# Patient Record
Sex: FEMALE | Race: WHITE | NOT HISPANIC OR LATINO | URBAN - METROPOLITAN AREA
[De-identification: names, ages, dates, MRNs, and addresses within clinical notes are randomized per-mention and may not be internally consistent; named-entity substitution may affect disease eponyms.]

---

## 2017-07-19 ENCOUNTER — OUTPATIENT (OUTPATIENT)
Dept: OUTPATIENT SERVICES | Facility: HOSPITAL | Age: 72
LOS: 1 days | Discharge: HOME | End: 2017-07-19

## 2017-07-19 DIAGNOSIS — N39.0 URINARY TRACT INFECTION, SITE NOT SPECIFIED: ICD-10-CM

## 2017-07-30 ENCOUNTER — OUTPATIENT (OUTPATIENT)
Dept: OUTPATIENT SERVICES | Facility: HOSPITAL | Age: 72
LOS: 1 days | Discharge: HOME | End: 2017-07-30

## 2017-07-30 DIAGNOSIS — N18.9 CHRONIC KIDNEY DISEASE, UNSPECIFIED: ICD-10-CM

## 2018-01-04 PROBLEM — Z00.00 ENCOUNTER FOR PREVENTIVE HEALTH EXAMINATION: Status: ACTIVE | Noted: 2018-01-04

## 2018-03-26 ENCOUNTER — APPOINTMENT (OUTPATIENT)
Dept: OBGYN | Facility: CLINIC | Age: 73
End: 2018-03-26
Payer: COMMERCIAL

## 2018-03-26 PROCEDURE — 77085 DXA BONE DENSITY AXL VRT FX: CPT

## 2018-04-05 ENCOUNTER — APPOINTMENT (OUTPATIENT)
Dept: OBGYN | Facility: CLINIC | Age: 73
End: 2018-04-05

## 2018-06-12 RX ORDER — DENOSUMAB 60 MG/ML
60 INJECTION SUBCUTANEOUS
Qty: 1 | Refills: 5 | Status: ACTIVE | COMMUNITY
Start: 2018-06-12 | End: 1900-01-01

## 2018-08-14 ENCOUNTER — APPOINTMENT (OUTPATIENT)
Dept: OBGYN | Facility: CLINIC | Age: 73
End: 2018-08-14
Payer: COMMERCIAL

## 2018-08-14 VITALS — BODY MASS INDEX: 27.6 KG/M2 | HEIGHT: 62 IN | WEIGHT: 150 LBS

## 2018-08-14 PROCEDURE — 99212 OFFICE O/P EST SF 10 MIN: CPT | Mod: 25

## 2018-08-14 PROCEDURE — 96372 THER/PROPH/DIAG INJ SC/IM: CPT

## 2018-08-30 ENCOUNTER — OUTPATIENT (OUTPATIENT)
Dept: OUTPATIENT SERVICES | Facility: HOSPITAL | Age: 73
LOS: 1 days | Discharge: HOME | End: 2018-08-30

## 2018-08-30 DIAGNOSIS — N28.1 CYST OF KIDNEY, ACQUIRED: ICD-10-CM

## 2018-10-31 ENCOUNTER — OUTPATIENT (OUTPATIENT)
Dept: OUTPATIENT SERVICES | Facility: HOSPITAL | Age: 73
LOS: 1 days | Discharge: HOME | End: 2018-10-31

## 2018-10-31 DIAGNOSIS — K76.89 OTHER SPECIFIED DISEASES OF LIVER: ICD-10-CM

## 2018-11-14 ENCOUNTER — OUTPATIENT (OUTPATIENT)
Dept: OUTPATIENT SERVICES | Facility: HOSPITAL | Age: 73
LOS: 1 days | Discharge: HOME | End: 2018-11-14

## 2018-11-14 DIAGNOSIS — Z12.31 ENCOUNTER FOR SCREENING MAMMOGRAM FOR MALIGNANT NEOPLASM OF BREAST: ICD-10-CM

## 2019-01-17 ENCOUNTER — APPOINTMENT (OUTPATIENT)
Dept: OBGYN | Facility: CLINIC | Age: 74
End: 2019-01-17
Payer: COMMERCIAL

## 2019-01-17 VITALS — WEIGHT: 150 LBS | BODY MASS INDEX: 20.32 KG/M2 | HEIGHT: 72 IN

## 2019-01-17 PROCEDURE — 96372 THER/PROPH/DIAG INJ SC/IM: CPT

## 2019-07-01 ENCOUNTER — APPOINTMENT (OUTPATIENT)
Dept: OBGYN | Facility: CLINIC | Age: 74
End: 2019-07-01

## 2019-08-20 ENCOUNTER — APPOINTMENT (OUTPATIENT)
Dept: OBGYN | Facility: CLINIC | Age: 74
End: 2019-08-20
Payer: COMMERCIAL

## 2019-08-20 PROCEDURE — 96372 THER/PROPH/DIAG INJ SC/IM: CPT

## 2019-08-25 ENCOUNTER — OUTPATIENT (OUTPATIENT)
Dept: OUTPATIENT SERVICES | Facility: HOSPITAL | Age: 74
LOS: 1 days | Discharge: HOME | End: 2019-08-25
Payer: COMMERCIAL

## 2019-08-25 DIAGNOSIS — D35.00 BENIGN NEOPLASM OF UNSPECIFIED ADRENAL GLAND: ICD-10-CM

## 2019-08-25 PROCEDURE — 76770 US EXAM ABDO BACK WALL COMP: CPT | Mod: 26

## 2020-01-03 ENCOUNTER — INPATIENT (INPATIENT)
Facility: HOSPITAL | Age: 75
LOS: 10 days | Discharge: HOME HEALTH | End: 2020-01-14
Attending: INTERNAL MEDICINE | Admitting: INTERNAL MEDICINE
Payer: COMMERCIAL

## 2020-01-03 VITALS
HEART RATE: 100 BPM | OXYGEN SATURATION: 100 % | SYSTOLIC BLOOD PRESSURE: 131 MMHG | TEMPERATURE: 98 F | RESPIRATION RATE: 20 BRPM | DIASTOLIC BLOOD PRESSURE: 87 MMHG

## 2020-01-03 DIAGNOSIS — K66.8 OTHER SPECIFIED DISORDERS OF PERITONEUM: ICD-10-CM

## 2020-01-03 DIAGNOSIS — F41.9 ANXIETY DISORDER, UNSPECIFIED: ICD-10-CM

## 2020-01-03 DIAGNOSIS — K21.9 GASTRO-ESOPHAGEAL REFLUX DISEASE WITHOUT ESOPHAGITIS: ICD-10-CM

## 2020-01-03 DIAGNOSIS — I10 ESSENTIAL (PRIMARY) HYPERTENSION: ICD-10-CM

## 2020-01-03 DIAGNOSIS — M81.0 AGE-RELATED OSTEOPOROSIS WITHOUT CURRENT PATHOLOGICAL FRACTURE: ICD-10-CM

## 2020-01-03 DIAGNOSIS — G89.29 OTHER CHRONIC PAIN: ICD-10-CM

## 2020-01-03 DIAGNOSIS — R53.1 WEAKNESS: ICD-10-CM

## 2020-01-03 DIAGNOSIS — R47.1 DYSARTHRIA AND ANARTHRIA: ICD-10-CM

## 2020-01-03 DIAGNOSIS — J43.9 EMPHYSEMA, UNSPECIFIED: ICD-10-CM

## 2020-01-03 DIAGNOSIS — R47.01 APHASIA: ICD-10-CM

## 2020-01-03 DIAGNOSIS — E78.5 HYPERLIPIDEMIA, UNSPECIFIED: ICD-10-CM

## 2020-01-03 DIAGNOSIS — J98.11 ATELECTASIS: ICD-10-CM

## 2020-01-03 DIAGNOSIS — E87.3 ALKALOSIS: ICD-10-CM

## 2020-01-03 DIAGNOSIS — A41.9 SEPSIS, UNSPECIFIED ORGANISM: ICD-10-CM

## 2020-01-03 DIAGNOSIS — F11.23 OPIOID DEPENDENCE WITH WITHDRAWAL: ICD-10-CM

## 2020-01-03 DIAGNOSIS — E87.2 ACIDOSIS: ICD-10-CM

## 2020-01-03 DIAGNOSIS — R33.9 RETENTION OF URINE, UNSPECIFIED: ICD-10-CM

## 2020-01-03 DIAGNOSIS — A40.3 SEPSIS DUE TO STREPTOCOCCUS PNEUMONIAE: ICD-10-CM

## 2020-01-03 DIAGNOSIS — A08.11 ACUTE GASTROENTEROPATHY DUE TO NORWALK AGENT: ICD-10-CM

## 2020-01-03 DIAGNOSIS — D72.829 ELEVATED WHITE BLOOD CELL COUNT, UNSPECIFIED: ICD-10-CM

## 2020-01-03 DIAGNOSIS — E43 UNSPECIFIED SEVERE PROTEIN-CALORIE MALNUTRITION: ICD-10-CM

## 2020-01-03 DIAGNOSIS — M54.9 DORSALGIA, UNSPECIFIED: ICD-10-CM

## 2020-01-03 DIAGNOSIS — Z98.1 ARTHRODESIS STATUS: ICD-10-CM

## 2020-01-03 LAB
ALBUMIN SERPL ELPH-MCNC: 4.8 G/DL — SIGNIFICANT CHANGE UP (ref 3.5–5.2)
ALP SERPL-CCNC: 44 U/L — SIGNIFICANT CHANGE UP (ref 30–115)
ALT FLD-CCNC: 15 U/L — SIGNIFICANT CHANGE UP (ref 0–41)
ANION GAP SERPL CALC-SCNC: 25 MMOL/L — HIGH (ref 7–14)
APAP SERPL-MCNC: <5 UG/ML — LOW (ref 10–30)
APPEARANCE UR: CLEAR — SIGNIFICANT CHANGE UP
APTT BLD: 23.4 SEC — CRITICAL LOW (ref 27–39.2)
AST SERPL-CCNC: 20 U/L — SIGNIFICANT CHANGE UP (ref 0–41)
BACTERIA # UR AUTO: NEGATIVE — SIGNIFICANT CHANGE UP
BASE EXCESS BLDV CALC-SCNC: 1.4 MMOL/L — SIGNIFICANT CHANGE UP (ref -2–2)
BASOPHILS # BLD AUTO: 0.02 K/UL — SIGNIFICANT CHANGE UP (ref 0–0.2)
BASOPHILS NFR BLD AUTO: 0.2 % — SIGNIFICANT CHANGE UP (ref 0–1)
BILIRUB SERPL-MCNC: 0.9 MG/DL — SIGNIFICANT CHANGE UP (ref 0.2–1.2)
BILIRUB UR-MCNC: NEGATIVE — SIGNIFICANT CHANGE UP
BUN SERPL-MCNC: 15 MG/DL — SIGNIFICANT CHANGE UP (ref 10–20)
CA-I SERPL-SCNC: 1.11 MMOL/L — LOW (ref 1.12–1.3)
CALCIUM SERPL-MCNC: 9.7 MG/DL — SIGNIFICANT CHANGE UP (ref 8.5–10.1)
CHLORIDE SERPL-SCNC: 101 MMOL/L — SIGNIFICANT CHANGE UP (ref 98–110)
CO2 SERPL-SCNC: 18 MMOL/L — SIGNIFICANT CHANGE UP (ref 17–32)
COLOR SPEC: SIGNIFICANT CHANGE UP
CREAT SERPL-MCNC: 0.9 MG/DL — SIGNIFICANT CHANGE UP (ref 0.7–1.5)
DIFF PNL FLD: NEGATIVE — SIGNIFICANT CHANGE UP
EOSINOPHIL # BLD AUTO: 0 K/UL — SIGNIFICANT CHANGE UP (ref 0–0.7)
EOSINOPHIL NFR BLD AUTO: 0 % — SIGNIFICANT CHANGE UP (ref 0–8)
EPI CELLS # UR: 6 /HPF — HIGH (ref 0–5)
ETHANOL SERPL-MCNC: <10 MG/DL — SIGNIFICANT CHANGE UP
FLU A RESULT: NEGATIVE — SIGNIFICANT CHANGE UP
FLU A RESULT: NEGATIVE — SIGNIFICANT CHANGE UP
FLUAV AG NPH QL: NEGATIVE — SIGNIFICANT CHANGE UP
FLUBV AG NPH QL: NEGATIVE — SIGNIFICANT CHANGE UP
GAS PNL BLDV: 144 MMOL/L — SIGNIFICANT CHANGE UP (ref 136–145)
GAS PNL BLDV: SIGNIFICANT CHANGE UP
GLUCOSE SERPL-MCNC: 145 MG/DL — HIGH (ref 70–99)
GLUCOSE UR QL: NEGATIVE — SIGNIFICANT CHANGE UP
HCO3 BLDV-SCNC: 22 MMOL/L — SIGNIFICANT CHANGE UP (ref 22–29)
HCT VFR BLD CALC: 41.4 % — SIGNIFICANT CHANGE UP (ref 37–47)
HCT VFR BLDA CALC: 42.4 % — SIGNIFICANT CHANGE UP (ref 34–44)
HGB BLD CALC-MCNC: 13.8 G/DL — LOW (ref 14–18)
HGB BLD-MCNC: 13.7 G/DL — SIGNIFICANT CHANGE UP (ref 12–16)
HYALINE CASTS # UR AUTO: 3 /LPF — SIGNIFICANT CHANGE UP (ref 0–7)
IMM GRANULOCYTES NFR BLD AUTO: 0.6 % — HIGH (ref 0.1–0.3)
INR BLD: 1.04 RATIO — SIGNIFICANT CHANGE UP (ref 0.65–1.3)
KETONES UR-MCNC: ABNORMAL
LACTATE BLDV-MCNC: 3.7 MMOL/L — HIGH (ref 0.5–1.6)
LACTATE SERPL-SCNC: 3.3 MMOL/L — HIGH (ref 0.7–2)
LEUKOCYTE ESTERASE UR-ACNC: NEGATIVE — SIGNIFICANT CHANGE UP
LYMPHOCYTES # BLD AUTO: 0.61 K/UL — LOW (ref 1.2–3.4)
LYMPHOCYTES # BLD AUTO: 4.8 % — LOW (ref 20.5–51.1)
MCHC RBC-ENTMCNC: 27.5 PG — SIGNIFICANT CHANGE UP (ref 27–31)
MCHC RBC-ENTMCNC: 33.1 G/DL — SIGNIFICANT CHANGE UP (ref 32–37)
MCV RBC AUTO: 83.1 FL — SIGNIFICANT CHANGE UP (ref 81–99)
MONOCYTES # BLD AUTO: 0.41 K/UL — SIGNIFICANT CHANGE UP (ref 0.1–0.6)
MONOCYTES NFR BLD AUTO: 3.2 % — SIGNIFICANT CHANGE UP (ref 1.7–9.3)
NEUTROPHILS # BLD AUTO: 11.6 K/UL — HIGH (ref 1.4–6.5)
NEUTROPHILS NFR BLD AUTO: 91.2 % — HIGH (ref 42.2–75.2)
NITRITE UR-MCNC: NEGATIVE — SIGNIFICANT CHANGE UP
NRBC # BLD: 0 /100 WBCS — SIGNIFICANT CHANGE UP (ref 0–0)
PCO2 BLDV: 24 MMHG — LOW (ref 41–51)
PH BLDV: 7.57 — HIGH (ref 7.26–7.43)
PH UR: 8.5 — HIGH (ref 5–8)
PLATELET # BLD AUTO: 303 K/UL — SIGNIFICANT CHANGE UP (ref 130–400)
PO2 BLDV: 23 MMHG — SIGNIFICANT CHANGE UP (ref 20–40)
POTASSIUM BLDV-SCNC: 3.2 MMOL/L — LOW (ref 3.3–5.6)
POTASSIUM SERPL-MCNC: 3.5 MMOL/L — SIGNIFICANT CHANGE UP (ref 3.5–5)
POTASSIUM SERPL-SCNC: 3.5 MMOL/L — SIGNIFICANT CHANGE UP (ref 3.5–5)
PROT SERPL-MCNC: 7.3 G/DL — SIGNIFICANT CHANGE UP (ref 6–8)
PROT UR-MCNC: ABNORMAL
PROTHROM AB SERPL-ACNC: 11.9 SEC — SIGNIFICANT CHANGE UP (ref 9.95–12.87)
RBC # BLD: 4.98 M/UL — SIGNIFICANT CHANGE UP (ref 4.2–5.4)
RBC # FLD: 15.4 % — HIGH (ref 11.5–14.5)
RBC CASTS # UR COMP ASSIST: 3 /HPF — SIGNIFICANT CHANGE UP (ref 0–4)
RSV RESULT: NEGATIVE — SIGNIFICANT CHANGE UP
RSV RNA RESP QL NAA+PROBE: NEGATIVE — SIGNIFICANT CHANGE UP
SALICYLATES SERPL-MCNC: <0.3 MG/DL — LOW (ref 4–30)
SAO2 % BLDV: 48 % — SIGNIFICANT CHANGE UP
SODIUM SERPL-SCNC: 144 MMOL/L — SIGNIFICANT CHANGE UP (ref 135–146)
SP GR SPEC: >1.05 (ref 1.01–1.02)
TROPONIN T SERPL-MCNC: <0.01 NG/ML — SIGNIFICANT CHANGE UP
UROBILINOGEN FLD QL: SIGNIFICANT CHANGE UP
WBC # BLD: 12.71 K/UL — HIGH (ref 4.8–10.8)
WBC # FLD AUTO: 12.71 K/UL — HIGH (ref 4.8–10.8)
WBC UR QL: 1 /HPF — SIGNIFICANT CHANGE UP (ref 0–5)

## 2020-01-03 PROCEDURE — 70498 CT ANGIOGRAPHY NECK: CPT | Mod: 26

## 2020-01-03 PROCEDURE — 99451 NTRPROF PH1/NTRNET/EHR 5/>: CPT

## 2020-01-03 PROCEDURE — 93010 ELECTROCARDIOGRAM REPORT: CPT

## 2020-01-03 PROCEDURE — 70496 CT ANGIOGRAPHY HEAD: CPT | Mod: 26

## 2020-01-03 PROCEDURE — 71045 X-RAY EXAM CHEST 1 VIEW: CPT | Mod: 26

## 2020-01-03 PROCEDURE — 70450 CT HEAD/BRAIN W/O DYE: CPT | Mod: 26,59

## 2020-01-03 PROCEDURE — 99285 EMERGENCY DEPT VISIT HI MDM: CPT

## 2020-01-03 PROCEDURE — 71275 CT ANGIOGRAPHY CHEST: CPT | Mod: 26

## 2020-01-03 RX ORDER — VANCOMYCIN HCL 1 G
1000 VIAL (EA) INTRAVENOUS EVERY 12 HOURS
Refills: 0 | Status: DISCONTINUED | OUTPATIENT
Start: 2020-01-04 | End: 2020-01-04

## 2020-01-03 RX ORDER — AMLODIPINE BESYLATE 2.5 MG/1
10 TABLET ORAL DAILY
Refills: 0 | Status: DISCONTINUED | OUTPATIENT
Start: 2020-01-03 | End: 2020-01-14

## 2020-01-03 RX ORDER — FENTANYL CITRATE 50 UG/ML
50 INJECTION INTRAVENOUS ONCE
Refills: 0 | Status: DISCONTINUED | OUTPATIENT
Start: 2020-01-03 | End: 2020-01-03

## 2020-01-03 RX ORDER — ATORVASTATIN CALCIUM 80 MG/1
20 TABLET, FILM COATED ORAL AT BEDTIME
Refills: 0 | Status: DISCONTINUED | OUTPATIENT
Start: 2020-01-03 | End: 2020-01-14

## 2020-01-03 RX ORDER — VANCOMYCIN HCL 1 G
1000 VIAL (EA) INTRAVENOUS ONCE
Refills: 0 | Status: COMPLETED | OUTPATIENT
Start: 2020-01-03 | End: 2020-01-03

## 2020-01-03 RX ORDER — ACYCLOVIR SODIUM 500 MG
500 VIAL (EA) INTRAVENOUS EVERY 8 HOURS
Refills: 0 | Status: DISCONTINUED | OUTPATIENT
Start: 2020-01-04 | End: 2020-01-04

## 2020-01-03 RX ORDER — OXYCODONE AND ACETAMINOPHEN 5; 325 MG/1; MG/1
1 TABLET ORAL EVERY 6 HOURS
Refills: 0 | Status: DISCONTINUED | OUTPATIENT
Start: 2020-01-03 | End: 2020-01-10

## 2020-01-03 RX ORDER — ACYCLOVIR SODIUM 500 MG
500 VIAL (EA) INTRAVENOUS ONCE
Refills: 0 | Status: DISCONTINUED | OUTPATIENT
Start: 2020-01-03 | End: 2020-01-04

## 2020-01-03 RX ORDER — ALPRAZOLAM 0.25 MG
0.5 TABLET ORAL
Refills: 0 | Status: DISCONTINUED | OUTPATIENT
Start: 2020-01-03 | End: 2020-01-10

## 2020-01-03 RX ORDER — ACETAMINOPHEN 500 MG
975 TABLET ORAL ONCE
Refills: 0 | Status: COMPLETED | OUTPATIENT
Start: 2020-01-03 | End: 2020-01-03

## 2020-01-03 RX ORDER — ONDANSETRON 8 MG/1
4 TABLET, FILM COATED ORAL
Refills: 0 | Status: DISCONTINUED | OUTPATIENT
Start: 2020-01-03 | End: 2020-01-06

## 2020-01-03 RX ORDER — CEFEPIME 1 G/1
2000 INJECTION, POWDER, FOR SOLUTION INTRAMUSCULAR; INTRAVENOUS ONCE
Refills: 0 | Status: COMPLETED | OUTPATIENT
Start: 2020-01-03 | End: 2020-01-03

## 2020-01-03 RX ORDER — CYCLOBENZAPRINE HYDROCHLORIDE 10 MG/1
5 TABLET, FILM COATED ORAL
Refills: 0 | Status: DISCONTINUED | OUTPATIENT
Start: 2020-01-03 | End: 2020-01-14

## 2020-01-03 RX ORDER — TAMSULOSIN HYDROCHLORIDE 0.4 MG/1
0.4 CAPSULE ORAL AT BEDTIME
Refills: 0 | Status: DISCONTINUED | OUTPATIENT
Start: 2020-01-03 | End: 2020-01-14

## 2020-01-03 RX ORDER — LOSARTAN POTASSIUM 100 MG/1
100 TABLET, FILM COATED ORAL DAILY
Refills: 0 | Status: DISCONTINUED | OUTPATIENT
Start: 2020-01-03 | End: 2020-01-14

## 2020-01-03 RX ORDER — ACETAMINOPHEN 500 MG
650 TABLET ORAL ONCE
Refills: 0 | Status: COMPLETED | OUTPATIENT
Start: 2020-01-03 | End: 2020-01-03

## 2020-01-03 RX ORDER — ASPIRIN/CALCIUM CARB/MAGNESIUM 324 MG
325 TABLET ORAL ONCE
Refills: 0 | Status: COMPLETED | OUTPATIENT
Start: 2020-01-03 | End: 2020-01-03

## 2020-01-03 RX ORDER — PANTOPRAZOLE SODIUM 20 MG/1
40 TABLET, DELAYED RELEASE ORAL
Refills: 0 | Status: DISCONTINUED | OUTPATIENT
Start: 2020-01-03 | End: 2020-01-14

## 2020-01-03 RX ORDER — SERTRALINE 25 MG/1
25 TABLET, FILM COATED ORAL DAILY
Refills: 0 | Status: DISCONTINUED | OUTPATIENT
Start: 2020-01-03 | End: 2020-01-14

## 2020-01-03 RX ORDER — ENOXAPARIN SODIUM 100 MG/ML
40 INJECTION SUBCUTANEOUS AT BEDTIME
Refills: 0 | Status: DISCONTINUED | OUTPATIENT
Start: 2020-01-03 | End: 2020-01-14

## 2020-01-03 RX ADMIN — CEFEPIME 100 MILLIGRAM(S): 1 INJECTION, POWDER, FOR SOLUTION INTRAMUSCULAR; INTRAVENOUS at 16:58

## 2020-01-03 RX ADMIN — FENTANYL CITRATE 50 MICROGRAM(S): 50 INJECTION INTRAVENOUS at 19:21

## 2020-01-03 RX ADMIN — ONDANSETRON 4 MILLIGRAM(S): 8 TABLET, FILM COATED ORAL at 22:11

## 2020-01-03 RX ADMIN — TAMSULOSIN HYDROCHLORIDE 0.4 MILLIGRAM(S): 0.4 CAPSULE ORAL at 23:22

## 2020-01-03 RX ADMIN — ATORVASTATIN CALCIUM 20 MILLIGRAM(S): 80 TABLET, FILM COATED ORAL at 23:20

## 2020-01-03 RX ADMIN — Medication 250 MILLIGRAM(S): at 20:51

## 2020-01-03 RX ADMIN — Medication 975 MILLIGRAM(S): at 16:35

## 2020-01-03 RX ADMIN — Medication 0.5 MILLIGRAM(S): at 23:24

## 2020-01-03 RX ADMIN — Medication 325 MILLIGRAM(S): at 16:31

## 2020-01-03 RX ADMIN — ENOXAPARIN SODIUM 40 MILLIGRAM(S): 100 INJECTION SUBCUTANEOUS at 23:21

## 2020-01-03 NOTE — H&P ADULT - NSHPREVIEWOFSYSTEMS_GEN_ALL_CORE
REVIEW OF SYSTEMS:  CONSTITUTIONAL: Admits to weakness and fevers  EYES/ENT: No visual changes;  No vertigo or throat pain   NECK: No pain or stiffness  RESPIRATORY: No cough, wheezing, hemoptysis; No shortness of breath  CARDIOVASCULAR: No chest pain or palpitations  GASTROINTESTINAL: Admits to epigastric pain. Admits to nausea, vomiting. Denies hematemesis; Admits to diarrhea. No melena or hematochezia.  GENITOURINARY: No dysuria, frequency or hematuria  NEUROLOGICAL: No numbness or weakness  SKIN: No itching, rashes

## 2020-01-03 NOTE — H&P ADULT - NSICDXPASTMEDICALHX_GEN_ALL_CORE_FT
PAST MEDICAL HISTORY:  Chronic back pain s/p back sx and stimulator    Chronic GERD     HLD (hyperlipidemia)     HTN (hypertension)

## 2020-01-03 NOTE — H&P ADULT - NSHPLABSRESULTS_GEN_ALL_CORE
LABS:                          13.7   12.71 )-----------( 303      ( 03 Jan 2020 14:57 )             41.4     01-03    144  |  101  |  15  ----------------------------<  145<H>  3.5   |  18  |  0.9    Ca    9.7      03 Jan 2020 14:57    TPro  7.3  /  Alb  4.8  /  TBili  0.9  /  DBili  x   /  AST  20  /  ALT  15  /  AlkPhos  44  01-03          Urinalysis Basic - ( 03 Jan 2020 16:11 )    Color: Light Yellow / Appearance: Clear / SG: >1.050 / pH: x  Gluc: x / Ketone: Moderate  / Bili: Negative / Urobili: <2 mg/dL   Blood: x / Protein: 30 mg/dL / Nitrite: Negative   Leuk Esterase: Negative / RBC: 3 /HPF / WBC 1 /HPF   Sq Epi: x / Non Sq Epi: 6 /HPF / Bacteria: Negative      PT/INR - ( 03 Jan 2020 14:57 )   PT: 11.90 sec;   INR: 1.04 ratio         PTT - ( 03 Jan 2020 14:57 )  PTT:23.4 sec  Lactate Trend    CARDIAC MARKERS ( 03 Jan 2020 14:57 )  x     / <0.01 ng/mL / x     / x     / x          CAPILLARY BLOOD GLUCOSE  136 (03 Jan 2020 16:12)      POCT Blood Glucose.: 136 mg/dL (03 Jan 2020 15:02)        RADIOLOGY:    < from: CT Angio Neck w/ IV Cont (01.03.20 @ 15:56) >  IMPRESSION:   No evidence of major vascular stenosis or occlusion.  < end of copied text >    < from: CT Angio Chest Dissection Protocol (01.03.20 @ 15:54) >    IMPRESSION:  1.  Study nondiagnostic for aortic dissection due to delayed timing.  2.  Otherwise, no acute findings in the chest abdomen or pelvis.  3.  Sheetlike peritoneal nodularity in the pelvis with associated calcification - differential includes sequelae of prior surgery/inflammatory process versus malignant process such as peritoneal carcinomatosis. Comparison to any prior CTs would be very helpful.  < end of copied text >      < from: CT Brain Stroke Protocol (01.03.20 @ 15:36) >  IMPRESSION:   No evidence of acute intracranial hemorrhage or large territory infarct.  Spoke with GUSTAVO JORGE on 1/3/2020 3:38 PM with readback.  < end of copied text >          EKGS:

## 2020-01-03 NOTE — STROKE CODE NOTE - NIH STROKE SCALE: 9. BEST LANGUAGE, QM
(1) Mild-to-moderate aphasia; some obvious loss of fluency or facility of comprehension, without significant limitation on ideas expressed or form of expression. Reduction of speech and/or comprehension, however, makes conversation about provided material difficult or impossible. For example, in conversation about provided materials, examiner can identify picture or naming card content from patient's response.
(1) Mild-to-moderate aphasia; some obvious loss of fluency or facility of comprehension, without significant limitation on ideas expressed or form of expression. Reduction of speech and/or comprehension, however, makes conversation about provided material difficult or impossible. For example, in conversation about provided materials, examiner can identify picture or naming card content from patient's response.
(0) No aphasia; normal

## 2020-01-03 NOTE — H&P ADULT - ATTENDING COMMENTS
75 yo F w/ PMH of HTN, HLD, LBP s/p multiple back sx and anxiety presents to the hospital complaining of abdominal pain- now a bit resolved, but still with vague discomfort and nausea    Pt seen and examined - a bit improved but still uncomfortable    chart reviewed- agree with above    labs improved    CT unremarkable    all consults noted    GI eval    diet as tolerated    OOB    off abx as per ID

## 2020-01-03 NOTE — H&P ADULT - NSHPPHYSICALEXAM_GEN_ALL_CORE
PHYSICAL EXAM:  GENERAL: NAD, AAO x 4, 74y F  HEAD:  Atraumatic, Normocephalic  EYES: EOMI, conjunctiva clear and sclera white  NECK: Supple, No JVD  CHEST/LUNG: Clear to auscultation bilaterally; No wheeze; No crackles; No accessory muscles used  HEART: Regular rate and rhythm; No murmurs;   ABDOMEN: Soft, Nontender, Nondistended; Bowel sounds present; No guarding  EXTREMITIES:  2+ Peripheral Pulses, No cyanosis or edema  NEUROLOGY: non-focal

## 2020-01-03 NOTE — H&P ADULT - ASSESSMENT
Ms. Chow is a pleasant 75 yo F w/ PMH of HTN, HLD, LBP s/p multiple back sx and anxiety presents to the hospital complaining of abdominal pain.     # Confusion and aphasia, resolved    - r/o encephalitis, meningitis, concussion, metabolic encephalopathy secondary to other infection vs less likely stroke  - CTH negative   - leukocytosis, will trend CBC   - will c/w empirical abx for now  - ID consult  - IR consult (in AM) for LP if indicated  - f/u blood culture     # Epigastric pain, resolved   - likely viral gastroenteritis   - trend CBC   - c/w protonix     # HTN, controlled  # HLD  - c/w home meds    # Anxiety  - c/w xanax, discussed with toxicology, ok to continue    # LBP s/p multiple back sx   - discussed with toxicology, ok to c/w home meds  - c/w pain meds PRN     Diet: DASH diet  GI ppx: Protonix   DVT ppx: Lovenox 40 mg qD  Activity: Increase as tolerated  Code status: Full Code ( X ) / DNR (  ) / DNI (  )  Disposition: from home, needs medical optimization

## 2020-01-03 NOTE — ED PROVIDER NOTE - PHYSICAL EXAMINATION
Physical Exam    Vital Signs: I have reviewed the initial vital signs.  Constitutional: well-nourished, appears stated age, no acute distress  Eyes: Conjunctiva pink, Sclera clear, PERRLA  ENT: MMMs  Cardiovascular: S1 and S2, regular rate, regular rhythm, well-perfused extremities, radial pulses equal and 2+  Respiratory: unlabored respiratory effort, clear to auscultation bilaterally no wheezing, rales and rhonchi  Gastrointestinal: soft,mild diffuse ttp, no pulsatile mass, normal bowl sounds  Musculoskeletal: supple neck, no lower extremity edema, no midline tenderness  Integumentary: warm, dry, no rash  Neurologic: pt is awake, able to follow most command, answers some questions, + aphasia and mild dysarthria, no pronator drift, finger to nose intact, strength intact, face symmetrical, no slurred speech.

## 2020-01-03 NOTE — ED PROVIDER NOTE - OBJECTIVE STATEMENT
73 yo female, pmh of chronic back pain s/p thoracic spinal cord stimulator, lumbar fusion, on percocet and cyclobenzaprine, anxiety on .5 xanax prn, htn, hld, presents to ed for abd pain. In triage pt with c/o abd pain 1 ep of nbnb vomiting. In ed pt became altered and confused. As per daughter not at baseline as of 1345. Daughter states unsure if mother has been taking more or less of prescribed medication.

## 2020-01-03 NOTE — CONSULT NOTE ADULT - ASSESSMENT
A 73 y/o woman, left handed with pmhx of osteoporosis (on Prolia injections), HTN/HLD, prior spinal surgeries who initially presents to ED for abdominal pain, n/v/diarrhea, developed sudden onset CP and aphasia/confusion per ED team while patient in waiting room. Stroke Code activated. Last known well within 3 hours window at 13:15PM. Initial NIHSS 0. CTH negative for any acute intracranial hemorrhage/infarction. CTA H/N ordered for also evaluation of possible aortic dissection and negative; also negative for any LVO. At this time, based on no abnormal neuro clinical findings and neuroimaging she is not a candidate for IV-tPA.     Suggestions:  - no need to admit to Stroke unit  - c/w management per ED team for her abdominal issues    Thank you for consult. Please reconsult if needed.   Updated neuroimaging findings/plan with ED team Dr. Skinner [phone]  Case discussed and patient seen with Stroke attending physician   Carleen Sky, NP  [id number] A 75 y/o woman, left handed with pmhx of osteoporosis (on Prolia injections), HTN/HLD, prior spinal surgeries who initially presents to ED for abdominal pain, n/v/diarrhea, developed sudden onset CP and aphasia/confusion per ED team while patient in waiting room. Stroke Code activated. Last known well within 3 hours window at 13:15PM. Initial NIHSS 3. CTH negative for any acute intracranial hemorrhage/infarction. CTA H/N ordered for also evaluation of possible aortic dissection and negative; also negative for any LVO. Post CT scan, repeat NIHSS: 0. At this time, based on no abnormal neuro clinical findings and neuroimaging she is not a candidate for IV-tPA.     Suggestions:  - no need to admit to Stroke unit  - c/w management per ED team for her abdominal issues    Thank you for consult. Please reconsult if needed.   Updated neuroimaging findings/plan with ED team Dr. Skinner [phone]  Case discussed and patient seen with Stroke attending physician   Carleen Sky NP  [phone]

## 2020-01-03 NOTE — ED PROVIDER NOTE - PROGRESS NOTE DETAILS
time does not reflec real time occurrence: Lena: [employer] Student Thurman evaluated pt at approximately, 1445, at that time code primitivo called, pt was aphasic, last known normal 1345, pt dysarthric, following commands, no sensory deficits, moving all extremities, no pronator drift, symmetrical face, noraml eye exam. Ct and CTA of head and neck normal at this time, pt still confused, at 1629, rectal temp 101, code sepsis initiated at this time. dr gage aware of pt, aware of pending c/s and labs. dr gage aware of pt, aware of pending c/s and labs. pt c/s initally for lp, due to lumbar surgery unable to tap. family c/s approved for tox c/s spoke with tox, nop specific reccs at this time, will speak with attending, will call back inpt team for further recs.

## 2020-01-03 NOTE — CONSULT NOTE ADULT - SUBJECTIVE AND OBJECTIVE BOX
NEUROENDOVASCULAR CONSULT NOTE    Consulted by            for neuroendovascular management.    Patient is a 74y old  Female who presents with a chief complaint of     HPI: This is a 73 y/o woman, left handed with pmhx of osteoporosis (on Prolia injections), HTN/HLD, prior spinal surgeries who initially presents to ED for "stomach pain" per patient, nausea, vomiting, and diarrhea since yesterday evening. While in waiting room, patient developed sudden chest pain and per  at side patient confused. In ED, /87, , RR 20, T 97.7F, O2 100% RA. Patient seen by ED team member and noted patient to be aphasic and activated Stroke Code. Last known well within 3 hours window at 13:15PM. Patient seen in ED holding, in severe abdominal pain, clenching her abdomen, and shivering. Initial NIHSS 0. Following commands, and naming objects intact, and appropriately answering questions despite her distraction due to the pain. No focal neurological deficit appreciated. CTH negative for any acute intracranial hemorrhage/infarction. CTA H/N ordered for also evaluation of possible aortic dissection and negative; also negative for any LVO. At this time, based on no abnormal neuro clinical findings and neuroimaging she is not a candidate for IV-tPA.     PAST MEDICAL & SURGICAL HISTORY: Please see above    FAMILY HISTORY:  No significant family history    SOCIAL HISTORY:  Smoking/ETOH/Drugs: Denies to all    Allergies  No Known Allergies    Intolerances    REVIEW OF SYSTEMS  Constitutional: Ill   Eyes: No eye pain, visual disturbances, or discharge  ENMT:  No difficulty hearing, tinnitus, vertigo; No sinus or throat pain  Neck: No pain or stiffness  Respiratory: No cough, wheezing, chills or hemoptysis  Cardiovascular: + chest pain and palpitations. Denies shortness of breath, dizziness or leg swelling  Gastrointestinal: + abdominal pain, nausea, vomiting, and diarrhea  Genitourinary: No dysuria, frequency, hematuria or incontinence  Neurological: As per HPI  Skin: No itching, burning, rashes or lesions   Musculoskeletal: No joint pain or swelling; No muscle, back or extremity pain    PHYSICAL EXAM:    Vital Signs Last 24 Hrs  T(C): 36.5 (03 Jan 2020 11:46), Max: 36.5 (03 Jan 2020 11:46)  T(F): 97.7 (03 Jan 2020 11:46), Max: 97.7 (03 Jan 2020 11:46)  HR: 100 (03 Jan 2020 11:46) (100 - 100)  BP: 131/87 (03 Jan 2020 11:46) (131/87 - 131/87)  BP(mean): --  RR: 20 (03 Jan 2020 11:46) (20 - 20)  SpO2: 100% (03 Jan 2020 11:46) (100% - 100%)    Neurologic Exam:  Mental status: AAO x 3.  Names objects and comprehension intact.  Attention/concentration intact.  No dysarthria, no aphasia.  Cranial nerves: Pupils equally round and reactive to light, visual fields full, no nystagmus, extraocular muscles intact, V1 through V3 intact bilaterally and symmetric, face symmetric, hearing intact to finger rub, palate elevation symmetric, tongue was midline, sternocleidomastoid/shoulder shrug strength bilaterally 5/5.    Motor: Strength 5/5 in bilateral upper and lower extremities.   strength 5/5.   Sensation: Intact to light touch. No neglect.   Coordination: No dysmetria on finger-to-nose  Gait: Deferred    NIH STROKE SCALE  Item	                                                        Score  1 a.	Level of Consciousness	               	0  1 b. LOC Questions	                                0  1 c.	LOC Commands	                               	0  2.	Best Gaze	                                        0  3.	Visual	                                                0  4.	Facial Palsy	                                        0  5 a.	Motor Arm - Left	                                0  5 b.	Motor Arm - Right	                        0  6 a.	Motor Leg - Left	                                0  6 b.	Motor Leg - Right	                                0  7.	Limb Ataxia	                                        0  8.	Sensory	                                                0  9.	Language	                                        0  10.	Dysarthria	                                        0  11.	Extinction and Inattention  	        0  ______________________________________  TOTAL	                                                        0    mRS: 0 No symptoms at all    MEDICATIONS  (STANDING):  aspirin 325 milliGRAM(s) Oral Once    LABS                        13.7   12.71 )-----------( 303      ( 03 Jan 2020 14:57 )             41.4     PT/INR - ( 03 Jan 2020 14:57 )   PT: 11.90 sec;   INR: 1.04 ratio      RADIOLOGY/EKG:    < from: CT Angio Neck w/ IV Cont (01.03.20 @ 15:56) >  Findings:   NECK:  The visualized aortic arch is patent. There is motion artifact at the level of the great vessel origins with no definite stenosis demonstrated. There is a bovine configuration of the aortic arch, normal variant.    The right common, internal and external carotid arteries are patent.  The left common, internal and external carotid arteries are patent.  The vertebral arteries are patent.    HEAD:  The distal internal carotid arteries are patent. The anterior and middle cerebral arteries are patent.   The distal vertebral arteries are patent.  The basilar artery is patent. The posterior cerebral arteries are patent.    OTHER:  Cervical spine degenerative changes with mild anterolisthesis of C3 on C4 and C4 on C5.  Subcentimeter right thyroid nodule.    IMPRESSION: No evidence of major vascular stenosis or occlusion.    < from: CT Brain Stroke Protocol (01.03.20 @ 15:36) >  Findings:  The ventricles and cortical sulci are normal in size and configuration.   There is no acute intracranial hemorrhage, extra-axialfluid collection or midline shift.  Gray-white matter differentiation is maintained.   The visualized paranasal sinuses and mastoids are well-aerated.    IMPRESSION: No evidence of acute intracranial hemorrhage or large territory infarct. STROKE CONSULT NOTE    Consulted by ED team Dr. Elam for STROKE CODE    Patient is a 74y old  Female who presents with a chief complaint of abdomen pain, n/v/diarrhea.     HPI: This is a 73 y/o woman, left handed with pmhx of osteoporosis (on Prolia injections), HTN/HLD, prior spinal surgeries who initially presents to ED for "stomach pain" per patient, nausea, vomiting, and diarrhea since yesterday evening. While in waiting room, patient developed sudden chest pain and per  at side patient confused. In ED, /87, , RR 20, T 97.7F, O2 100% RA. Patient seen by ED team member and noted patient to be aphasic and activated Stroke Code. Last known well within 3 hours window at 13:15PM. Patient seen in ED holding, in severe abdominal pain, clenching her abdomen, and shivering. Initial NIHSS 3. Following commands, and naming objects intact, and appropriately answering questions despite her distraction due to the pain. CTH negative for any acute intracranial hemorrhage/infarction. CTA H/N ordered for also evaluation of possible aortic dissection and negative; also negative for any LVO. Post CT scans repeat NIHSS: 0. At this time, based on no abnormal neuro clinical findings and neuroimaging she is not a candidate for IV-tPA.     PAST MEDICAL & SURGICAL HISTORY: Please see above    FAMILY HISTORY:  No significant family history    SOCIAL HISTORY:  Smoking/ETOH/Drugs: Denies to all    Allergies  No Known Allergies    Intolerances    REVIEW OF SYSTEMS  Constitutional: Ill   Eyes: No eye pain, visual disturbances, or discharge  ENMT:  No difficulty hearing, tinnitus, vertigo; No sinus or throat pain  Neck: No pain or stiffness  Respiratory: No cough, wheezing, chills or hemoptysis  Cardiovascular: + chest pain and palpitations. Denies shortness of breath, dizziness or leg swelling  Gastrointestinal: + abdominal pain, nausea, vomiting, and diarrhea  Genitourinary: No dysuria, frequency, hematuria or incontinence  Neurological: As per HPI  Skin: No itching, burning, rashes or lesions   Musculoskeletal: No joint pain or swelling; No muscle, back or extremity pain    PHYSICAL EXAM:    Vital Signs Last 24 Hrs  T(C): 36.5 (03 Jan 2020 11:46), Max: 36.5 (03 Jan 2020 11:46)  T(F): 97.7 (03 Jan 2020 11:46), Max: 97.7 (03 Jan 2020 11:46)  HR: 100 (03 Jan 2020 11:46) (100 - 100)  BP: 131/87 (03 Jan 2020 11:46) (131/87 - 131/87)  BP(mean): --  RR: 20 (03 Jan 2020 11:46) (20 - 20)  SpO2: 100% (03 Jan 2020 11:46) (100% - 100%)    Neurologic Exam:  Mental status: AAO x 3.  Names objects and comprehension intact.  Attention/concentration intact.  No dysarthria, no aphasia.  Cranial nerves: Pupils equally round and reactive to light, visual fields full, no nystagmus, extraocular muscles intact, V1 through V3 intact bilaterally and symmetric, face symmetric, hearing intact to finger rub, palate elevation symmetric, tongue was midline, sternocleidomastoid/shoulder shrug strength bilaterally 5/5.    Motor: Strength 5/5 in bilateral upper and lower extremities.   strength 5/5.   Sensation: Intact to light touch. No neglect.   Coordination: No dysmetria on finger-to-nose  Gait: Deferred    NIH STROKE SCALE  Item	                                                        Score  1 a.	Level of Consciousness	               	0  1 b. LOC Questions	                                0  1 c.	LOC Commands	                               	0  2.	Best Gaze	                                        0  3.	Visual	                                                0  4.	Facial Palsy	                                        0  5 a.	Motor Arm - Left	                                0  5 b.	Motor Arm - Right	                        0  6 a.	Motor Leg - Left	                                0  6 b.	Motor Leg - Right	                                0  7.	Limb Ataxia	                                        0  8.	Sensory	                                                0  9.	Language	                                        0  10.	Dysarthria	                                        0  11.	Extinction and Inattention  	        0  ______________________________________  TOTAL	                                                        0    mRS: 0 No symptoms at all    MEDICATIONS  (STANDING):  aspirin 325 milliGRAM(s) Oral Once    LABS                        13.7   12.71 )-----------( 303      ( 03 Jan 2020 14:57 )             41.4     PT/INR - ( 03 Jan 2020 14:57 )   PT: 11.90 sec;   INR: 1.04 ratio      RADIOLOGY/EKG:    < from: CT Angio Neck w/ IV Cont (01.03.20 @ 15:56) >  Findings:   NECK:  The visualized aortic arch is patent. There is motion artifact at the level of the great vessel origins with no definite stenosis demonstrated. There is a bovine configuration of the aortic arch, normal variant.    The right common, internal and external carotid arteries are patent.  The left common, internal and external carotid arteries are patent.  The vertebral arteries are patent.    HEAD:  The distal internal carotid arteries are patent. The anterior and middle cerebral arteries are patent.   The distal vertebral arteries are patent.  The basilar artery is patent. The posterior cerebral arteries are patent.    OTHER:  Cervical spine degenerative changes with mild anterolisthesis of C3 on C4 and C4 on C5.  Subcentimeter right thyroid nodule.    IMPRESSION: No evidence of major vascular stenosis or occlusion.    < from: CT Brain Stroke Protocol (01.03.20 @ 15:36) >  Findings:  The ventricles and cortical sulci are normal in size and configuration.   There is no acute intracranial hemorrhage, extra-axialfluid collection or midline shift.  Gray-white matter differentiation is maintained.   The visualized paranasal sinuses and mastoids are well-aerated.    IMPRESSION: No evidence of acute intracranial hemorrhage or large territory infarct.

## 2020-01-03 NOTE — H&P ADULT - HISTORY OF PRESENT ILLNESS
Ms. Chow is a pleasant 73 yo F w/ PMH of HTN, HLD, LBP s/p multiple back sx and anxiety presents to the hospital complaining of abdominal pain. Pt states she had lettuce the day prior and she developed abdominal discomfort. Had 1 episode non-bloody watery stool. Pt denies sick contact, fever or chill. Pt also hit her head while using the bathroom and LOC. Otherwise, pt also denies palpitation, SOB, CP, dyspnea on exertion, photophobia, neck rigidity and recent travel.     Vital Signs Last 24 Hrs  T(C): 38.4 (03 Jan 2020 16:12), Max: 38.4 (03 Jan 2020 16:12)  T(F): 101.1 (03 Jan 2020 16:12), Max: 101.1 (03 Jan 2020 16:12)  HR: 85 (03 Jan 2020 16:12) (85 - 100)  BP: 131/87 (03 Jan 2020 11:46) (131/87 - 131/87)  BP(mean): --  RR: 20 (03 Jan 2020 11:46) (20 - 20)  SpO2: 100% (03 Jan 2020 11:46) (100% - 100%)    In ED, pt began having aphasia. Concerning for stroke, stroke code was called. Neurology saw pt and had CTH which was negative. Pt was also complaining of shoulder pain and CTA was done to and ruled out dissection. Pt was then found to have rectal temperature of 101F. Jacinto was placed. LP was unable to be done due to hx of back sx and stimulator. Pt was started on empirical abx and antiviral med to r/o encephalitis vs meningitis.

## 2020-01-04 LAB
ALBUMIN SERPL ELPH-MCNC: 4.7 G/DL — SIGNIFICANT CHANGE UP (ref 3.5–5.2)
ALP SERPL-CCNC: 39 U/L — SIGNIFICANT CHANGE UP (ref 30–115)
ALT FLD-CCNC: 15 U/L — SIGNIFICANT CHANGE UP (ref 0–41)
ANION GAP SERPL CALC-SCNC: 20 MMOL/L — HIGH (ref 7–14)
AST SERPL-CCNC: 29 U/L — SIGNIFICANT CHANGE UP (ref 0–41)
BASOPHILS # BLD AUTO: 0.04 K/UL — SIGNIFICANT CHANGE UP (ref 0–0.2)
BASOPHILS NFR BLD AUTO: 0.3 % — SIGNIFICANT CHANGE UP (ref 0–1)
BILIRUB SERPL-MCNC: 0.5 MG/DL — SIGNIFICANT CHANGE UP (ref 0.2–1.2)
BUN SERPL-MCNC: 14 MG/DL — SIGNIFICANT CHANGE UP (ref 10–20)
CALCIUM SERPL-MCNC: 9.1 MG/DL — SIGNIFICANT CHANGE UP (ref 8.5–10.1)
CHLORIDE SERPL-SCNC: 100 MMOL/L — SIGNIFICANT CHANGE UP (ref 98–110)
CHOLEST SERPL-MCNC: 159 MG/DL — SIGNIFICANT CHANGE UP (ref 100–200)
CO2 SERPL-SCNC: 21 MMOL/L — SIGNIFICANT CHANGE UP (ref 17–32)
CREAT SERPL-MCNC: 1 MG/DL — SIGNIFICANT CHANGE UP (ref 0.7–1.5)
CULTURE RESULTS: NO GROWTH — SIGNIFICANT CHANGE UP
EOSINOPHIL # BLD AUTO: 0 K/UL — SIGNIFICANT CHANGE UP (ref 0–0.7)
EOSINOPHIL NFR BLD AUTO: 0 % — SIGNIFICANT CHANGE UP (ref 0–8)
GLUCOSE SERPL-MCNC: 121 MG/DL — HIGH (ref 70–99)
HCT VFR BLD CALC: 40 % — SIGNIFICANT CHANGE UP (ref 37–47)
HDLC SERPL-MCNC: 53 MG/DL — SIGNIFICANT CHANGE UP
HGB BLD-MCNC: 13.2 G/DL — SIGNIFICANT CHANGE UP (ref 12–16)
IMM GRANULOCYTES NFR BLD AUTO: 0.7 % — HIGH (ref 0.1–0.3)
LACTATE SERPL-SCNC: 2.8 MMOL/L — HIGH (ref 0.7–2)
LIPID PNL WITH DIRECT LDL SERPL: 78 MG/DL — SIGNIFICANT CHANGE UP (ref 4–129)
LYMPHOCYTES # BLD AUTO: 1.85 K/UL — SIGNIFICANT CHANGE UP (ref 1.2–3.4)
LYMPHOCYTES # BLD AUTO: 13.4 % — LOW (ref 20.5–51.1)
MAGNESIUM SERPL-MCNC: 2 MG/DL — SIGNIFICANT CHANGE UP (ref 1.8–2.4)
MCHC RBC-ENTMCNC: 27.5 PG — SIGNIFICANT CHANGE UP (ref 27–31)
MCHC RBC-ENTMCNC: 33 G/DL — SIGNIFICANT CHANGE UP (ref 32–37)
MCV RBC AUTO: 83.3 FL — SIGNIFICANT CHANGE UP (ref 81–99)
MONOCYTES # BLD AUTO: 1.21 K/UL — HIGH (ref 0.1–0.6)
MONOCYTES NFR BLD AUTO: 8.8 % — SIGNIFICANT CHANGE UP (ref 1.7–9.3)
NEUTROPHILS # BLD AUTO: 10.62 K/UL — HIGH (ref 1.4–6.5)
NEUTROPHILS NFR BLD AUTO: 76.8 % — HIGH (ref 42.2–75.2)
NRBC # BLD: 0 /100 WBCS — SIGNIFICANT CHANGE UP (ref 0–0)
PHOSPHATE SERPL-MCNC: 2.6 MG/DL — SIGNIFICANT CHANGE UP (ref 2.1–4.9)
PLATELET # BLD AUTO: 309 K/UL — SIGNIFICANT CHANGE UP (ref 130–400)
POTASSIUM SERPL-MCNC: 3.2 MMOL/L — LOW (ref 3.5–5)
POTASSIUM SERPL-SCNC: 3.2 MMOL/L — LOW (ref 3.5–5)
PROT SERPL-MCNC: 6.9 G/DL — SIGNIFICANT CHANGE UP (ref 6–8)
RBC # BLD: 4.8 M/UL — SIGNIFICANT CHANGE UP (ref 4.2–5.4)
RBC # FLD: 16.3 % — HIGH (ref 11.5–14.5)
SODIUM SERPL-SCNC: 141 MMOL/L — SIGNIFICANT CHANGE UP (ref 135–146)
SPECIMEN SOURCE: SIGNIFICANT CHANGE UP
TOTAL CHOLESTEROL/HDL RATIO MEASUREMENT: 3 RATIO — LOW (ref 4–5.5)
TRIGL SERPL-MCNC: 166 MG/DL — HIGH (ref 10–149)
TSH SERPL-MCNC: 0.79 UIU/ML — SIGNIFICANT CHANGE UP (ref 0.27–4.2)
WBC # BLD: 13.81 K/UL — HIGH (ref 4.8–10.8)
WBC # FLD AUTO: 13.81 K/UL — HIGH (ref 4.8–10.8)

## 2020-01-04 PROCEDURE — 74177 CT ABD & PELVIS W/CONTRAST: CPT | Mod: 26

## 2020-01-04 RX ORDER — SODIUM CHLORIDE 9 MG/ML
1000 INJECTION, SOLUTION INTRAVENOUS
Refills: 0 | Status: DISCONTINUED | OUTPATIENT
Start: 2020-01-04 | End: 2020-01-13

## 2020-01-04 RX ORDER — POTASSIUM CHLORIDE 20 MEQ
20 PACKET (EA) ORAL
Refills: 0 | Status: COMPLETED | OUTPATIENT
Start: 2020-01-04 | End: 2020-01-04

## 2020-01-04 RX ORDER — METOCLOPRAMIDE HCL 10 MG
10 TABLET ORAL ONCE
Refills: 0 | Status: COMPLETED | OUTPATIENT
Start: 2020-01-04 | End: 2020-01-04

## 2020-01-04 RX ORDER — POLYETHYLENE GLYCOL 3350 17 G/17G
17 POWDER, FOR SOLUTION ORAL ONCE
Refills: 0 | Status: COMPLETED | OUTPATIENT
Start: 2020-01-04 | End: 2020-01-04

## 2020-01-04 RX ADMIN — Medication 50 MILLIEQUIVALENT(S): at 23:04

## 2020-01-04 RX ADMIN — SERTRALINE 25 MILLIGRAM(S): 25 TABLET, FILM COATED ORAL at 11:41

## 2020-01-04 RX ADMIN — Medication 10 MILLIGRAM(S): at 11:38

## 2020-01-04 RX ADMIN — ONDANSETRON 4 MILLIGRAM(S): 8 TABLET, FILM COATED ORAL at 20:09

## 2020-01-04 RX ADMIN — LOSARTAN POTASSIUM 100 MILLIGRAM(S): 100 TABLET, FILM COATED ORAL at 06:42

## 2020-01-04 RX ADMIN — Medication 110 MILLIGRAM(S): at 06:34

## 2020-01-04 RX ADMIN — Medication 50 MILLIEQUIVALENT(S): at 22:01

## 2020-01-04 RX ADMIN — AMLODIPINE BESYLATE 10 MILLIGRAM(S): 2.5 TABLET ORAL at 06:42

## 2020-01-04 RX ADMIN — Medication 0.5 MILLIGRAM(S): at 18:20

## 2020-01-04 RX ADMIN — TAMSULOSIN HYDROCHLORIDE 0.4 MILLIGRAM(S): 0.4 CAPSULE ORAL at 22:02

## 2020-01-04 RX ADMIN — Medication 0.5 MILLIGRAM(S): at 23:04

## 2020-01-04 RX ADMIN — OXYCODONE AND ACETAMINOPHEN 1 TABLET(S): 5; 325 TABLET ORAL at 22:35

## 2020-01-04 RX ADMIN — Medication 0.5 MILLIGRAM(S): at 06:42

## 2020-01-04 RX ADMIN — Medication 0.5 MILLIGRAM(S): at 11:41

## 2020-01-04 RX ADMIN — ATORVASTATIN CALCIUM 20 MILLIGRAM(S): 80 TABLET, FILM COATED ORAL at 22:02

## 2020-01-04 RX ADMIN — PANTOPRAZOLE SODIUM 40 MILLIGRAM(S): 20 TABLET, DELAYED RELEASE ORAL at 08:00

## 2020-01-04 RX ADMIN — ONDANSETRON 4 MILLIGRAM(S): 8 TABLET, FILM COATED ORAL at 09:34

## 2020-01-04 RX ADMIN — ENOXAPARIN SODIUM 40 MILLIGRAM(S): 100 INJECTION SUBCUTANEOUS at 22:02

## 2020-01-04 RX ADMIN — ONDANSETRON 4 MILLIGRAM(S): 8 TABLET, FILM COATED ORAL at 16:18

## 2020-01-04 RX ADMIN — Medication 50 MILLIEQUIVALENT(S): at 19:00

## 2020-01-04 RX ADMIN — Medication 250 MILLIGRAM(S): at 06:34

## 2020-01-04 RX ADMIN — SODIUM CHLORIDE 75 MILLILITER(S): 9 INJECTION, SOLUTION INTRAVENOUS at 09:08

## 2020-01-04 RX ADMIN — OXYCODONE AND ACETAMINOPHEN 1 TABLET(S): 5; 325 TABLET ORAL at 22:02

## 2020-01-04 NOTE — CONSULT NOTE ADULT - SUBJECTIVE AND OBJECTIVE BOX
Time:01-04-20 @ 23:04  [facility] [id number] [address]  Emergent/Routine    Chief Complaint:  Vomiting and diarrhea    History of Present Illness:  As per ED team, 74y woman  pmh of chronic back pain s/p thoracic spinal cord stimulator, lumbar fusion, on oxycodone and cyclobenzaprine, anxiety on .5 alprazolam prn, htn, hld, presents to ed for abd pain. In triage pt with c/o abd pain 1 ep of nbnb vomiting. In ed pt became altered and confused. As per daughter not at baseline as of 1345. Daughter states unsure if mother has been taking more or less of prescribed medication.  A stroke code was called and workup did not reveal any clear central cause. They are calling now due to concern for possible withdrawal syndromes vs. toxic encephalopathy.       Past Medical History:  SEPSIS  ^SEPSIS  Handoff  MEWS Score  Chronic back pain  Chronic GERD  HLD (hyperlipidemia)  HTN (hypertension)  Sepsis  VOMITTING/FALL  Altered mental status        Home Medications:  ALPRAZolam 0.5 mg oral tablet: 1 tab(s) orally 4 times a day (03 Jan 2020 20:30)  amLODIPine 10 mg oral tablet: 1 tab(s) orally once a day (03 Jan 2020 20:30)  Crestor 5 mg oral tablet: 1 tab(s) orally once a day (03 Jan 2020 20:30)  cyclobenzaprine 5 mg oral tablet: 1 tab(s) orally 2 times a day, As Needed (03 Jan 2020 20:30)  diclofenac sodium 100 mg oral tablet, extended release: 1 tab(s) orally once a day (03 Jan 2020 20:30)  Diovan 160 mg oral capsule: 1 tab(s) orally once a day (03 Jan 2020 20:30)  oxyCODONE-acetaminophen 7.5 mg-325 mg oral tablet: 1 tab(s) orally every 8 hours, As Needed (03 Jan 2020 20:30)  pantoprazole 40 mg oral delayed release tablet: 1 tab(s) orally once a day (03 Jan 2020 20:30)  sertraline 25 mg oral tablet: 1 tab(s) orally once a day (03 Jan 2020 20:30)  tamsulosin 0.4 mg oral capsule: 1 cap(s) orally once a day (03 Jan 2020 20:30)      Active Medications:  MEDICATIONS  (STANDING):  ALPRAZolam 0.5 milliGRAM(s) Oral four times a day  amLODIPine   Tablet 10 milliGRAM(s) Oral daily  atorvastatin 20 milliGRAM(s) Oral at bedtime  enoxaparin Injectable 40 milliGRAM(s) SubCutaneous at bedtime  lactated ringers. 1000 milliLiter(s) (75 mL/Hr) IV Continuous <Continuous>  losartan 100 milliGRAM(s) Oral daily  pantoprazole    Tablet 40 milliGRAM(s) Oral before breakfast  polyethylene glycol 3350 17 Gram(s) Oral once  potassium chloride  20 mEq/100 mL IVPB 20 milliEquivalent(s) IV Intermittent every 2 hours  sertraline 25 milliGRAM(s) Oral daily  tamsulosin 0.4 milliGRAM(s) Oral at bedtime    MEDICATIONS  (PRN):  cyclobenzaprine Oral Tab/Cap - Peds 5 milliGRAM(s) Oral two times a day PRN Back spasm  ondansetron Injectable 4 milliGRAM(s) IV Push four times a day PRN Nausea and/or Vomiting  oxycodone    5 mG/acetaminophen 325 mG 1 Tablet(s) Oral every 6 hours PRN Moderate Pain (4 - 6)        Social History:  Tobacco:   Denied  EtOH:   Denied  Illicit Substances:   Denied    Family History:  Non-contributory    Physical Exam:  Vital Signs Last 24 Hrs  T(C): 37.1 (04 Jan 2020 21:25), Max: 37.6 (04 Jan 2020 05:16)  T(F): 98.7 (04 Jan 2020 21:25), Max: 99.6 (04 Jan 2020 05:16)  HR: 68 (04 Jan 2020 21:25) (68 - 97)  BP: 148/67 (04 Jan 2020 21:25) (117/70 - 148/67)  BP(mean): --  RR: 18 (04 Jan 2020 21:25) (18 - 18)  SpO2: --  CAPILLARY BLOOD GLUCOSE      EKG:  NSR@75 bpm; QRS-88 ms, QTc-457 ms  Labs:                        13.2   13.81 )-----------( 309      ( 04 Jan 2020 09:44 )             40.0     01-04    141  |  100  |  14  ----------------------------<  121<H>  3.2<L>   |  21  |  1.0    Ca    9.1      04 Jan 2020 09:44  Phos  2.6     01-04  Mg     2.0     01-04    TPro  6.9  /  Alb  4.7  /  TBili  0.5  /  DBili  x   /  AST  29  /  ALT  15  /  AlkPhos  39  01-04    PT/INR - ( 03 Jan 2020 14:57 )   PT: 11.90 sec;   INR: 1.04 ratio         PTT - ( 03 Jan 2020 14:57 )  PTT:23.4 sec  Urinalysis Basic - ( 03 Jan 2020 16:11 )    Color: Light Yellow / Appearance: Clear / SG: >1.050 / pH: x  Gluc: x / Ketone: Moderate  / Bili: Negative / Urobili: <2 mg/dL   Blood: x / Protein: 30 mg/dL / Nitrite: Negative   Leuk Esterase: Negative / RBC: 3 /HPF / WBC 1 /HPF   Sq Epi: x / Non Sq Epi: 6 /HPF / Bacteria: Negative        CARDIAC MARKERS ( 03 Jan 2020 14:57 )  x     / <0.01 ng/mL / x     / x     / x          Aspirin:  <0.3  Acetaminophen:  <5  Ethanol:  <10

## 2020-01-04 NOTE — PROGRESS NOTE ADULT - SUBJECTIVE AND OBJECTIVE BOX
HPI:  Ms. Chow is a pleasant 75 yo F w/ PMH of HTN, HLD, LBP s/p multiple back sx and anxiety presents to the hospital complaining of abdominal pain. Pt states she had lettuce the day prior and she developed abdominal discomfort. Had 1 episode non-bloody watery stool. Pt denies sick contact, fever or chill. Pt also hit her head while using the bathroom and LOC. Otherwise, pt also denies palpitation, SOB, CP, dyspnea on exertion, photophobia, neck rigidity and recent travel.     Vital Signs Last 24 Hrs  T(C): 38.4 (03 Jan 2020 16:12), Max: 38.4 (03 Jan 2020 16:12)  T(F): 101.1 (03 Jan 2020 16:12), Max: 101.1 (03 Jan 2020 16:12)  HR: 85 (03 Jan 2020 16:12) (85 - 100)  BP: 131/87 (03 Jan 2020 11:46) (131/87 - 131/87)  BP(mean): --  RR: 20 (03 Jan 2020 11:46) (20 - 20)  SpO2: 100% (03 Jan 2020 11:46) (100% - 100%)    In ED, pt began having aphasia. Concerning for stroke, stroke code was called. Neurology saw pt and had CTH which was negative. Pt was also complaining of shoulder pain and CTA was done to and ruled out dissection. Pt was then found to have rectal temperature of 101F. Jacinto was placed. LP was unable to be done due to hx of back sx and stimulator. Pt was started on empirical abx and antiviral med to r/o encephalitis vs meningitis. (03 Jan 2020 20:18)    Currently admitted to medicine with the primary diagnosis of Sepsis     Today is hospital day 1d.     INTERVAL HPI / OVERNIGHT EVENTS:  Patient was examined and seen at bedside. This morning she is resting comfortably in bed and reports no new issues or overnight events.     ROS: Otherwise unremarkable     PAST MEDICAL & SURGICAL HISTORY  Chronic back pain: s/p back sx and stimulator  Chronic GERD  HLD (hyperlipidemia)  HTN (hypertension)    ALLERGIES  No Known Allergies    MEDICATIONS  STANDING MEDICATIONS  acyclovir IVPB 500 milliGRAM(s) IV Intermittent once  acyclovir IVPB 500 milliGRAM(s) IV Intermittent every 8 hours  ALPRAZolam 0.5 milliGRAM(s) Oral four times a day  amLODIPine   Tablet 10 milliGRAM(s) Oral daily  atorvastatin 20 milliGRAM(s) Oral at bedtime  enoxaparin Injectable 40 milliGRAM(s) SubCutaneous at bedtime  losartan 100 milliGRAM(s) Oral daily  pantoprazole    Tablet 40 milliGRAM(s) Oral before breakfast  sertraline 25 milliGRAM(s) Oral daily  tamsulosin 0.4 milliGRAM(s) Oral at bedtime  vancomycin  IVPB 1000 milliGRAM(s) IV Intermittent every 12 hours    PRN MEDICATIONS  cyclobenzaprine Oral Tab/Cap - Peds 5 milliGRAM(s) Oral two times a day PRN  ondansetron Injectable 4 milliGRAM(s) IV Push four times a day PRN  oxycodone    5 mG/acetaminophen 325 mG 1 Tablet(s) Oral every 6 hours PRN    VITALS:  T(F): 99.6  HR: 97  BP: 117/70  RR: 18  SpO2: 100%    PHYSICAL EXAM  GEN: NAD, Resting comfortably in bed  PULM: Clear to auscultation bilaterally, No wheezes  CVS: Regular rate and rhythm, S1-S2, no murmurs  ABD: Soft, non-tender, non-distended, no guarding  EXT: No edema  NEURO: AAOx3, no focal deficits    LABS                        13.7   12.71 )-----------( 303      ( 03 Jan 2020 14:57 )             41.4     01-03    144  |  101  |  15  ----------------------------<  145<H>  3.5   |  18  |  0.9    Ca    9.7      03 Jan 2020 14:57    TPro  7.3  /  Alb  4.8  /  TBili  0.9  /  DBili  x   /  AST  20  /  ALT  15  /  AlkPhos  44  01-03    PT/INR - ( 03 Jan 2020 14:57 )   PT: 11.90 sec;   INR: 1.04 ratio         PTT - ( 03 Jan 2020 14:57 )  PTT:23.4 sec  Urinalysis Basic - ( 03 Jan 2020 16:11 )    Color: Light Yellow / Appearance: Clear / SG: >1.050 / pH: x  Gluc: x / Ketone: Moderate  / Bili: Negative / Urobili: <2 mg/dL   Blood: x / Protein: 30 mg/dL / Nitrite: Negative   Leuk Esterase: Negative / RBC: 3 /HPF / WBC 1 /HPF   Sq Epi: x / Non Sq Epi: 6 /HPF / Bacteria: Negative        Lactate, Blood: 3.3 mmol/L <H> (01-03-20 @ 19:32)  Troponin T, Serum: <0.01 ng/mL (01-03-20 @ 14:57)      CARDIAC MARKERS ( 03 Jan 2020 14:57 )  x     / <0.01 ng/mL / x     / x     / x          RADIOLOGY    < from: CT Angio Neck w/ IV Cont (01.03.20 @ 15:56) >  IMPRESSION:     No evidence of major vascular stenosis or occlusion.    < end of copied text >    < from: CT Brain Stroke Protocol (01.03.20 @ 15:36) >  IMPRESSION:     No evidence of acute intracranial hemorrhage or large territory infarct.    < end of copied text >    < from: CT Angio Chest Dissection Protocol (01.03.20 @ 15:54) >  IMPRESSION:  1.  Study nondiagnostic for aortic dissection due to delayed timing.  2.  Otherwise, no acute findings in the chest abdomen or pelvis.  3.  Sheetlike peritoneal nodularity in the pelvis with associated calcification - differential includes sequelae of prior surgery/inflammatory process versus malignant process such as peritoneal carcinomatosis. Comparison to any prior CTs would be very helpful.    < end of copied text > HPI:  Ms. Chow is a pleasant 75 yo F w/ PMH of HTN, HLD, LBP s/p multiple back sx and anxiety presents to the hospital complaining of abdominal pain. Pt states she had lettuce the day prior and she developed abdominal discomfort. Had 1 episode non-bloody watery stool. Pt denies sick contact, fever or chill. Pt also hit her head while using the bathroom and LOC. Otherwise, pt also denies palpitation, SOB, CP, dyspnea on exertion, photophobia, neck rigidity and recent travel.     Vital Signs Last 24 Hrs  T(C): 38.4 (03 Jan 2020 16:12), Max: 38.4 (03 Jan 2020 16:12)  T(F): 101.1 (03 Jan 2020 16:12), Max: 101.1 (03 Jan 2020 16:12)  HR: 85 (03 Jan 2020 16:12) (85 - 100)  BP: 131/87 (03 Jan 2020 11:46) (131/87 - 131/87)  BP(mean): --  RR: 20 (03 Jan 2020 11:46) (20 - 20)  SpO2: 100% (03 Jan 2020 11:46) (100% - 100%)    In ED, pt began having aphasia. Concerning for stroke, stroke code was called. Neurology saw pt and had CTH which was negative. Pt was also complaining of shoulder pain and CTA was done to and ruled out dissection. Pt was then found to have rectal temperature of 101F. Jacinto was placed. LP was unable to be done due to hx of back sx and stimulator. Pt was started on empirical abx and antiviral med to r/o encephalitis vs meningitis. (03 Jan 2020 20:18)    Currently admitted to medicine with the primary diagnosis of Sepsis     Today is hospital day 1d.     INTERVAL HPI / OVERNIGHT EVENTS:  Patient was examined and seen at bedside. This morning she is resting comfortably in bed. The patient did not have any dysarthria on my exam States that she had nauseous feeling in the epigastrium. Later in the morning, started feeling pain, had a greenish BM, and vomited. Patient was given Zofran and will be NPO for now.      PAST MEDICAL & SURGICAL HISTORY  Chronic back pain: s/p back sx and stimulator  Chronic GERD  HLD (hyperlipidemia)  HTN (hypertension)    ALLERGIES  No Known Allergies    MEDICATIONS  STANDING MEDICATIONS  acyclovir IVPB 500 milliGRAM(s) IV Intermittent once  acyclovir IVPB 500 milliGRAM(s) IV Intermittent every 8 hours  ALPRAZolam 0.5 milliGRAM(s) Oral four times a day  amLODIPine   Tablet 10 milliGRAM(s) Oral daily  atorvastatin 20 milliGRAM(s) Oral at bedtime  enoxaparin Injectable 40 milliGRAM(s) SubCutaneous at bedtime  losartan 100 milliGRAM(s) Oral daily  pantoprazole    Tablet 40 milliGRAM(s) Oral before breakfast  sertraline 25 milliGRAM(s) Oral daily  tamsulosin 0.4 milliGRAM(s) Oral at bedtime  vancomycin  IVPB 1000 milliGRAM(s) IV Intermittent every 12 hours    PRN MEDICATIONS  cyclobenzaprine Oral Tab/Cap - Peds 5 milliGRAM(s) Oral two times a day PRN  ondansetron Injectable 4 milliGRAM(s) IV Push four times a day PRN  oxycodone    5 mG/acetaminophen 325 mG 1 Tablet(s) Oral every 6 hours PRN    VITALS:  T(F): 99.6  HR: 97  BP: 117/70  RR: 18  SpO2: 100%    PHYSICAL EXAM  GEN: NAD, Resting comfortably in bed  PULM: Clear to auscultation bilaterally, No wheezes  CVS: Regular rate and rhythm, S1-S2, no murmurs  ABD: Soft, non-tender, nauesous feeling on palpation in epigastrium, non-distended, no guarding  EXT: No edema  NEURO: AAOx3, no focal deficits    LABS                        13.7   12.71 )-----------( 303      ( 03 Jan 2020 14:57 )             41.4     01-03    144  |  101  |  15  ----------------------------<  145<H>  3.5   |  18  |  0.9    Ca    9.7      03 Jan 2020 14:57    TPro  7.3  /  Alb  4.8  /  TBili  0.9  /  DBili  x   /  AST  20  /  ALT  15  /  AlkPhos  44  01-03    PT/INR - ( 03 Jan 2020 14:57 )   PT: 11.90 sec;   INR: 1.04 ratio         PTT - ( 03 Jan 2020 14:57 )  PTT:23.4 sec  Urinalysis Basic - ( 03 Jan 2020 16:11 )    Color: Light Yellow / Appearance: Clear / SG: >1.050 / pH: x  Gluc: x / Ketone: Moderate  / Bili: Negative / Urobili: <2 mg/dL   Blood: x / Protein: 30 mg/dL / Nitrite: Negative   Leuk Esterase: Negative / RBC: 3 /HPF / WBC 1 /HPF   Sq Epi: x / Non Sq Epi: 6 /HPF / Bacteria: Negative        Lactate, Blood: 3.3 mmol/L <H> (01-03-20 @ 19:32)  Troponin T, Serum: <0.01 ng/mL (01-03-20 @ 14:57)      CARDIAC MARKERS ( 03 Jan 2020 14:57 )  x     / <0.01 ng/mL / x     / x     / x          RADIOLOGY    < from: CT Angio Neck w/ IV Cont (01.03.20 @ 15:56) >  IMPRESSION:     No evidence of major vascular stenosis or occlusion.    < end of copied text >    < from: CT Brain Stroke Protocol (01.03.20 @ 15:36) >  IMPRESSION:     No evidence of acute intracranial hemorrhage or large territory infarct.    < end of copied text >    < from: CT Angio Chest Dissection Protocol (01.03.20 @ 15:54) >  IMPRESSION:  1.  Study nondiagnostic for aortic dissection due to delayed timing.  2.  Otherwise, no acute findings in the chest abdomen or pelvis.  3.  Sheetlike peritoneal nodularity in the pelvis with associated calcification - differential includes sequelae of prior surgery/inflammatory process versus malignant process such as peritoneal carcinomatosis. Comparison to any prior CTs would be very helpful.    < end of copied text >

## 2020-01-04 NOTE — PROGRESS NOTE ADULT - SUBJECTIVE AND OBJECTIVE BOX
Patient was seen and examined. Spoke with RN. Chart reviewed.  c/o nausea/ abd pain/lbms  d/w nursing zofran prn   ivf  d/w ho    No events overnight.  Vital Signs Last 24 Hrs  T(F): 97.8 (04 Jan 2020 12:00), Max: 101.1 (03 Jan 2020 16:12)  HR: 76 (04 Jan 2020 12:00) (76 - 97)  BP: 145/79 (04 Jan 2020 12:00) (117/70 - 145/79)  SpO2: --  MEDICATIONS  (STANDING):  ALPRAZolam 0.5 milliGRAM(s) Oral four times a day  amLODIPine   Tablet 10 milliGRAM(s) Oral daily  atorvastatin 20 milliGRAM(s) Oral at bedtime  enoxaparin Injectable 40 milliGRAM(s) SubCutaneous at bedtime  lactated ringers. 1000 milliLiter(s) (75 mL/Hr) IV Continuous <Continuous>  losartan 100 milliGRAM(s) Oral daily  pantoprazole    Tablet 40 milliGRAM(s) Oral before breakfast  polyethylene glycol 3350 17 Gram(s) Oral once  sertraline 25 milliGRAM(s) Oral daily  tamsulosin 0.4 milliGRAM(s) Oral at bedtime    MEDICATIONS  (PRN):  cyclobenzaprine Oral Tab/Cap - Peds 5 milliGRAM(s) Oral two times a day PRN Back spasm  ondansetron Injectable 4 milliGRAM(s) IV Push four times a day PRN Nausea and/or Vomiting  oxycodone    5 mG/acetaminophen 325 mG 1 Tablet(s) Oral every 6 hours PRN Moderate Pain (4 - 6)    Labs:                        13.2   13.81 )-----------( 309      ( 04 Jan 2020 09:44 )             40.0                         13.7   12.71 )-----------( 303      ( 03 Jan 2020 14:57 )             41.4     04 Jan 2020 09:44    141    |  100    |  14     ----------------------------<  121    3.2     |  21     |  1.0    03 Jan 2020 14:57    144    |  101    |  15     ----------------------------<  145    3.5     |  18     |  0.9      Ca    9.1        04 Jan 2020 09:44  Ca    9.7        03 Jan 2020 14:57  Phos  2.6       04 Jan 2020 09:44  Mg     2.0       04 Jan 2020 09:44    TPro  6.9    /  Alb  4.7    /  TBili  0.5    /  DBili  x      /  AST  29     /  ALT  15     /  AlkPhos  39     04 Jan 2020 09:44  TPro  7.3    /  Alb  4.8    /  TBili  0.9    /  DBili  x      /  AST  20     /  ALT  15     /  AlkPhos  44     03 Jan 2020 14:57    PT/INR - ( 03 Jan 2020 14:57 )   PT: 11.90 sec;   INR: 1.04 ratio         PTT - ( 03 Jan 2020 14:57 )  PTT:23.4 sec  Urinalysis Basic - ( 03 Jan 2020 16:11 )    Color: Light Yellow / Appearance: Clear / SG: >1.050 / pH: x  Gluc: x / Ketone: Moderate  / Bili: Negative / Urobili: <2 mg/dL   Blood: x / Protein: 30 mg/dL / Nitrite: Negative   Leuk Esterase: Negative / RBC: 3 /HPF / WBC 1 /HPF   Sq Epi: x / Non Sq Epi: 6 /HPF / Bacteria: Negative          Radiology:    General: comfortable, NAD  Neurology: A&Ox3, nonfocal  Head:  Normocephalic, atraumatic  ENT:  Mucosa moist, no ulcerations  Neck:  Supple, no JVD,   Skin: decreased  Resp: CTA B/L  CV: RRR, S1S2,   GI: Soft, NT, bowel sounds  MS: No edema, + peripheral pulses, FROM all 4 extremity

## 2020-01-04 NOTE — PROGRESS NOTE ADULT - ASSESSMENT
Ms. Chow is a pleasant 75 yo F w/ PMH of HTN, HLD, LBP s/p multiple back sx and anxiety presents to the hospital complaining of abdominal pain.     #Confusion and aphasia, resolved    - r/o encephalitis, meningitis, concussion, metabolic encephalopathy secondary to other infection vs less likely stroke  - NIHSS 2-3 (dysarthria, aphasia, answered 1 question)  - CTH negative   - leukocytosis, will trend CBC   - will c/w empirical abx for now  - F/U ID consult  - IR consult (in AM) for LP if indicated  - f/u blood culture   - UA shows proteinuria/ketonuria    # Epigastric pain, resolved   - likely viral gastroenteritis   - trend CBC   - c/w protonix     # HTN, controlled  # HLD  - c/w home meds    # Anxiety  - c/w xanax, discussed with toxicology, ok to continue    # LBP s/p multiple back sx   - ok to c/w home meds as per H&P  - c/w pain meds PRN     Diet: DASH diet  GI ppx: Protonix   DVT ppx: Lovenox 40 mg qD  Activity: Increase as tolerated  Code status: Full Code ( X ) / DNR (  ) / DNI (  )  Disposition: from home, needs medical optimization    Reference: H&P 1/3/2020 Ms. Chow is a pleasant 75 yo F w/ PMH of HTN, HLD, LBP s/p multiple back sx and anxiety presents to the hospital complaining of abdominal pain.     #Confusion and aphasia, resolved    - r/o encephalitis, meningitis, concussion, metabolic encephalopathy secondary to other infection vs less likely stroke  - NIHSS 2-3 (dysarthria, aphasia, answered 1 question)  - CTH negative   - leukocytosis, will trend CBC   - will c/w empirical abx for now  - F/U ID consult  - Trend Lactate  - F/U blood culture   - UA shows proteinuria/ketonuria    # Epigastric pain, vomiting  - likely viral gastroenteritis   - trend CBC   - c/w protonix   - LR @75  - Zofran PRN  - Flu/RSV negative    # HTN, controlled  # HLD  - c/w home meds    # Anxiety  - c/w xanax, discussed with toxicology, ok to continue    # LBP s/p multiple back sx   - ok to c/w home meds as per H&P  - c/w pain meds PRN     Diet: NPO until vomiting resolves  GI ppx: Protonix   DVT ppx: Lovenox 40 mg qD  Activity: Increase as tolerated  Code status: Full Code  Disposition: from home, needs medical optimization    Reference: H&P 1/3/2020

## 2020-01-04 NOTE — CONSULT NOTE ADULT - ASSESSMENT
1-  Respiratory alkalosis associated with abdominal cramping and diarrhea with an episode of aphasia    -  Salicylism ruled out  -  I would highly consider opioid withdrawal especially if improvement started after administration of inpatient opioids  -  Patient had an Rx of oxycodone dispensed on 12/12/19 for oxycodone 7.5/325  -  Patient may have been taking extra doses and ran out early or potentially GI issue prevented ingestion of opioids  -  Since patient is a chronic pain patient, agree with plan for IV opioid therapy   -  Continue all other medications as I do not suspect a toxic encephalopathy as the etiology    --Please call with any further questions    Bree    [phone] (pager)

## 2020-01-04 NOTE — CONSULT NOTE ADULT - SUBJECTIVE AND OBJECTIVE BOX
MANUEL CHOW  74y, Female  Allergy: No Known Allergies      CHIEF COMPLAINT: Stomach ache. (04 Jan 2020 07:19)      HPI:  Ms. Chow is a pleasant 73 yo F w/ PMH of HTN, HLD, LBP s/p multiple back sx and anxiety presents to the hospital complaining of abdominal pain. Pt states she had lettuce the day prior and she developed abdominal discomfort. Had 1 episode non-bloody watery stool. Pt denies sick contact, fever or chill. Pt also hit her head while using the bathroom and LOC. Otherwise, pt also denies palpitation, SOB, CP, dyspnea on exertion, photophobia, neck rigidity and recent travel.     Vital Signs Last 24 Hrs  T(C): 38.4 (03 Jan 2020 16:12), Max: 38.4 (03 Jan 2020 16:12)  T(F): 101.1 (03 Jan 2020 16:12), Max: 101.1 (03 Jan 2020 16:12)  HR: 85 (03 Jan 2020 16:12) (85 - 100)  BP: 131/87 (03 Jan 2020 11:46) (131/87 - 131/87)  BP(mean): --  RR: 20 (03 Jan 2020 11:46) (20 - 20)  SpO2: 100% (03 Jan 2020 11:46) (100% - 100%)    In ED, pt began having aphasia. Concerning for stroke, stroke code was called. Neurology saw pt and had CTH which was negative. Pt was also complaining of shoulder pain and CTA was done to and ruled out dissection. Pt was then found to have rectal temperature of 101F. Jacinto was placed. LP was unable to be done due to hx of back sx and stimulator. Pt was started on empirical abx and antiviral med to r/o encephalitis vs meningitis. (03 Jan 2020 20:18)    FAMILY HISTORY:    PAST MEDICAL & SURGICAL HISTORY:  Chronic back pain: s/p back sx and stimulator  Chronic GERD  HLD (hyperlipidemia)  HTN (hypertension)      Substance Use (  ) never used  (  ) IVDU (  ) Other:  Tobacco Usage:  (   ) never smoked   (   ) former smoker   (   ) current smoker   Alcohol Usage: (   ) social  (   ) daily use (   ) denies  Sexual History:       ROS  General: Denies fevers, chills, nightsweats, weight loss  HEENT: Denies headache, rhinorrhea, sore throat, eye pain  CV: Denies CP, palpitations  PULM: Denies SOB, cough  GI: Denies abdominal pain, diarrhea  : Denies dysuria, hematuria  MSK: Denies arthralgias  SKIN: Denies rash   NEURO: Denies paresthesias, weakness  PSYCH: Denies depression    VITALS:  T(F): 99.6, Max: 101.1 (01-03-20 @ 16:12)  HR: 97  BP: 117/70  RR: 18Vital Signs Last 24 Hrs  T(C): 37.6 (04 Jan 2020 05:16), Max: 38.4 (03 Jan 2020 16:12)  T(F): 99.6 (04 Jan 2020 05:16), Max: 101.1 (03 Jan 2020 16:12)  HR: 97 (04 Jan 2020 05:16) (80 - 100)  BP: 117/70 (04 Jan 2020 05:16) (117/70 - 142/67)  BP(mean): --  RR: 18 (04 Jan 2020 05:16) (18 - 20)  SpO2: 100% (03 Jan 2020 11:46) (100% - 100%)    PHYSICAL EXAM:  Gen: NAD, resting in bed  HEENT: Normocephalic, atraumatic  Neck: supple, no lymphadenopathy  CV: Regular rate & regular rhythm  Lungs: decreased BS at bases, no fremitus  Abdomen: Soft, BS present  Ext: Warm, well perfused  Neuro: non focal, awake  Skin: no rash, no erythema    TESTS & MEASUREMENTS:                        13.2   13.81 )-----------( 309      ( 04 Jan 2020 09:44 )             40.0     01-04    141  |  100  |  14  ----------------------------<  121<H>  3.2<L>   |  21  |  1.0    Ca    9.1      04 Jan 2020 09:44  Phos  2.6     01-04  Mg     2.0     01-04    TPro  6.9  /  Alb  4.7  /  TBili  0.5  /  DBili  x   /  AST  29  /  ALT  15  /  AlkPhos  39  01-04    eGFR if Non African American: 55 mL/min/1.73M2 (01-04-20 @ 09:44)  eGFR if : 63 mL/min/1.73M2 (01-04-20 @ 09:44)  eGFR if Non African American: 62 mL/min/1.73M2 (01-03-20 @ 14:57)  eGFR if : 72 mL/min/1.73M2 (01-03-20 @ 14:57)    LIVER FUNCTIONS - ( 04 Jan 2020 09:44 )  Alb: 4.7 g/dL / Pro: 6.9 g/dL / ALK PHOS: 39 U/L / ALT: 15 U/L / AST: 29 U/L / GGT: x           Urinalysis Basic - ( 03 Jan 2020 16:11 )    Color: Light Yellow / Appearance: Clear / SG: >1.050 / pH: x  Gluc: x / Ketone: Moderate  / Bili: Negative / Urobili: <2 mg/dL   Blood: x / Protein: 30 mg/dL / Nitrite: Negative   Leuk Esterase: Negative / RBC: 3 /HPF / WBC 1 /HPF   Sq Epi: x / Non Sq Epi: 6 /HPF / Bacteria: Negative          Lactate, Blood: 2.8 mmol/L (01-04-20 @ 09:44)  Lactate, Blood: 3.3 mmol/L (01-03-20 @ 19:32)  Blood Gas Venous - Lactate: 3.7 mmoL/L (01-03-20 @ 17:42)      INFECTIOUS DISEASES TESTING      RADIOLOGY & ADDITIONAL TESTS:  I have personally reviewed the last Chest xray  CXR  Xray Chest 1 View- PORTABLE-Urgent:   EXAM:  XR CHEST PORTABLE URGENT 1V            PROCEDURE DATE:  01/03/2020            INTERPRETATION:  CLINICAL HISTORY: Stroke Code.    COMPARISON: 1/17/2013.    TECHNIQUE: Portable frontal chest radiograph. Adequate positioning.    FINDINGS:    Support devices: None.    Cardiac/mediastinum/hilum: Stable.    Lung parenchyma/Pleura: No focal parenchymal opacities, pleural effusions, or pneumothorax.    Skeleton/soft tissues: Spinal stimulator device wires seen.      IMPRESSION:      No radiographic evidence of acute cardiopulmonary disease.                  RU CARTWRIGHT M.D., ATTENDING RADIOLOGIST  This document has been electronically signed. Jan 4 2020  9:31AM             (01-03-20 @ 16:30)      CT  CT Angio Chest Dissection Protocol:   EXAM:  CTA DISSECTION             PROCEDURE DATE:  01/03/2020            INTERPRETATION:  Clinical History / Reason for exam: Chest and abdominal pain. Evaluation for aortic dissection.      TECHNIQUE: Post-administration of intravenous Optiray contrast, CT angiographic axial images of the aorta from the chest to the pelvis were attempted. Coronal and sagittal reformats were performed. 3D (MIP) reformats obtained.    COMPARISON: Abdominal ultrasound dated 10/31/2018.    FINDINGS:    The study is nondiagnostic for aortic dissection as images were obtained with very delayed contrast timing (contrast being excreted through the kidneys), noting an initial CTA of the head had been performed prior to the study.    VASCULATURE:  Nondiagnostic for aortic dissection. No aortic aneurysm. Atherosclerotic calcifications.    LUNGS, PLEURA, AIRWAYS: Patent central airways. Right middle lobe bulla. Scattered areas of linear atelectasis/scarring. No pleural effusion or pneumothorax.    THORACIC NODES: No intrathoracic lymphadenopathy.    MEDIASTINUM/GREAT VESSELS: No pericardial effusion. Heart size is within normal limits. Coronary artery calcifications.    HEPATOBILIARY: Hypoattenuating hepatic lesions, may represent cysts as seen on prior ultrasound of 10/31/2018. On the current study, these lesions measure higher than simple fluid attenuation , probably secondary to extensive streak artifact from patient's arm positioning over the abdomen. Unremarkable CT appearance of the gallbladder. No biliary ductal dilatation.    SPLEEN: Unremarkable.    PANCREAS: Unremarkable.    ADRENAL GLANDS: Unremarkable.    KIDNEYS: Excretion of contrast in the renal collecting systems. No hydronephrosis.    ABDOMINOPELVIC NODES: Unremarkable.    PELVIC ORGANS: Status post hysterectomy.    PERITONEUM/MESENTERY/BOWEL: Sheetlike peritoneal nodularity in the left hemipelvis (series 4 images 349 - 365) with associated calcification along the bilateral pelvic peritoneal planes. Colonic diverticulosis. No evidence of bowel obstruction. No ascites free air or fluid collection.    BONES/SOFT TISSUES: Degenerative changes spine. Lumbar spinal fixation hardware.    OTHER: Spinal stimulator device noted.        IMPRESSION:  1.  Study nondiagnostic for aortic dissection due to delayed timing.  2.  Otherwise, no acute findings in the chest abdomen or pelvis.  3.  Sheetlike peritoneal nodularity in the pelvis with associated calcification - differential includes sequelae of prior surgery/inflammatory process versus malignant process such as peritoneal carcinomatosis. Comparison to any prior CTs would be very helpful.                  KEVIN FERNÁNDEZ M.D., ATTENDING RADIOLOGIST  This document has been electronically signed. Jimenez  3 2020  4:55PM             (01-03-20 @ 15:54)      CARDIOLOGY TESTING  12 Lead ECG:   Ventricular Rate 75 BPM    Atrial Rate 75 BPM    P-R Interval 230 ms    QRS Duration 88 ms    Q-T Interval 410 ms    QTC Calculation(Bezet) 457 ms    P Axis 68 degrees    R Axis -44 degrees    T Axis 43 degrees    Diagnosis Line Sinus rhythm with 1st degree A-V block  Poor R wave progression  Left axis deviation  Low voltage QRS  Nonspecific ST and T wave abnormality  Abnormal ECG    Confirmed by NESTOR FISHMAN MD ([id number]) on 1/3/2020 7:06:47 PM (01-03-20 @ 16:05)      MEDICATIONS  ALPRAZolam 0.5  amLODIPine   Tablet 10  atorvastatin 20  enoxaparin Injectable 40  lactated ringers. 1000  losartan 100  metoclopramide Injectable 10  pantoprazole    Tablet 40  polyethylene glycol 3350 17  sertraline 25  tamsulosin 0.4      ANTIBIOTICS:      All available historical data has been reviewed

## 2020-01-04 NOTE — PROGRESS NOTE ADULT - ASSESSMENT
toxic metabolic encephalopathy  resolved  gastroenteritis      id eval   ivf  symptopmatic rx  zofran  leginella titre  ppi  dvt proph

## 2020-01-04 NOTE — CONSULT NOTE ADULT - ASSESSMENT
ASSESSMENT  74y F admitted with SEPSIS    HPI:  Ms. Chow is a pleasant 73 yo F w/ PMH of HTN, HLD, LBP s/p multiple back sx and anxiety presents to the hospital complaining of abdominal pain. Pt states she had lettuce the day prior and she developed abdominal discomfort. Had vomtingx1; constipated. Pt denies sick contact, fever or chill. Pt also hit her head while using the bathroom and LOC. Otherwise, pt also denies palpitation, SOB, CP, dyspnea on exertion, photophobia, neck rigidity and recent travel.     PROBLEMS  1. R/O Viral syndrome    SIRS (T>101F, Pulse>90, WBC>12)    New problem with additional W/U  acute illness with systemic symptoms    U/A negative  Cxray negative  RSV & FLU all negative    Lactic acidosis    Atelectasis on CT    PLAN  - Await blood cultures, urine culture  Stool GI PCR  Observe off antibiotics  - Repeat  wbc

## 2020-01-05 LAB
ANION GAP SERPL CALC-SCNC: 17 MMOL/L — HIGH (ref 7–14)
BUN SERPL-MCNC: 11 MG/DL — SIGNIFICANT CHANGE UP (ref 10–20)
CALCIUM SERPL-MCNC: 8.7 MG/DL — SIGNIFICANT CHANGE UP (ref 8.5–10.1)
CHLORIDE SERPL-SCNC: 100 MMOL/L — SIGNIFICANT CHANGE UP (ref 98–110)
CO2 SERPL-SCNC: 22 MMOL/L — SIGNIFICANT CHANGE UP (ref 17–32)
CREAT SERPL-MCNC: 0.6 MG/DL — LOW (ref 0.7–1.5)
GLUCOSE SERPL-MCNC: 114 MG/DL — HIGH (ref 70–99)
HCT VFR BLD CALC: 39.6 % — SIGNIFICANT CHANGE UP (ref 37–47)
HGB BLD-MCNC: 12.9 G/DL — SIGNIFICANT CHANGE UP (ref 12–16)
MAGNESIUM SERPL-MCNC: 2 MG/DL — SIGNIFICANT CHANGE UP (ref 1.8–2.4)
MCHC RBC-ENTMCNC: 27.2 PG — SIGNIFICANT CHANGE UP (ref 27–31)
MCHC RBC-ENTMCNC: 32.6 G/DL — SIGNIFICANT CHANGE UP (ref 32–37)
MCV RBC AUTO: 83.5 FL — SIGNIFICANT CHANGE UP (ref 81–99)
NRBC # BLD: 0 /100 WBCS — SIGNIFICANT CHANGE UP (ref 0–0)
PLATELET # BLD AUTO: 239 K/UL — SIGNIFICANT CHANGE UP (ref 130–400)
POTASSIUM SERPL-MCNC: 3.8 MMOL/L — SIGNIFICANT CHANGE UP (ref 3.5–5)
POTASSIUM SERPL-SCNC: 3.8 MMOL/L — SIGNIFICANT CHANGE UP (ref 3.5–5)
RBC # BLD: 4.74 M/UL — SIGNIFICANT CHANGE UP (ref 4.2–5.4)
RBC # FLD: 15.9 % — HIGH (ref 11.5–14.5)
SODIUM SERPL-SCNC: 139 MMOL/L — SIGNIFICANT CHANGE UP (ref 135–146)
WBC # BLD: 11.89 K/UL — HIGH (ref 4.8–10.8)
WBC # FLD AUTO: 11.89 K/UL — HIGH (ref 4.8–10.8)

## 2020-01-05 RX ADMIN — Medication 0.5 MILLIGRAM(S): at 17:28

## 2020-01-05 RX ADMIN — Medication 0.5 MILLIGRAM(S): at 23:04

## 2020-01-05 RX ADMIN — TAMSULOSIN HYDROCHLORIDE 0.4 MILLIGRAM(S): 0.4 CAPSULE ORAL at 23:04

## 2020-01-05 RX ADMIN — PANTOPRAZOLE SODIUM 40 MILLIGRAM(S): 20 TABLET, DELAYED RELEASE ORAL at 06:29

## 2020-01-05 RX ADMIN — ENOXAPARIN SODIUM 40 MILLIGRAM(S): 100 INJECTION SUBCUTANEOUS at 23:04

## 2020-01-05 RX ADMIN — SODIUM CHLORIDE 75 MILLILITER(S): 9 INJECTION, SOLUTION INTRAVENOUS at 12:17

## 2020-01-05 RX ADMIN — ONDANSETRON 4 MILLIGRAM(S): 8 TABLET, FILM COATED ORAL at 12:21

## 2020-01-05 RX ADMIN — LOSARTAN POTASSIUM 100 MILLIGRAM(S): 100 TABLET, FILM COATED ORAL at 05:32

## 2020-01-05 RX ADMIN — ONDANSETRON 4 MILLIGRAM(S): 8 TABLET, FILM COATED ORAL at 23:19

## 2020-01-05 RX ADMIN — ATORVASTATIN CALCIUM 20 MILLIGRAM(S): 80 TABLET, FILM COATED ORAL at 23:04

## 2020-01-05 RX ADMIN — Medication 0.5 MILLIGRAM(S): at 05:32

## 2020-01-05 RX ADMIN — AMLODIPINE BESYLATE 10 MILLIGRAM(S): 2.5 TABLET ORAL at 05:32

## 2020-01-05 RX ADMIN — SODIUM CHLORIDE 75 MILLILITER(S): 9 INJECTION, SOLUTION INTRAVENOUS at 05:33

## 2020-01-05 RX ADMIN — SERTRALINE 25 MILLIGRAM(S): 25 TABLET, FILM COATED ORAL at 12:21

## 2020-01-05 RX ADMIN — Medication 0.5 MILLIGRAM(S): at 12:20

## 2020-01-05 RX ADMIN — ONDANSETRON 4 MILLIGRAM(S): 8 TABLET, FILM COATED ORAL at 19:33

## 2020-01-05 RX ADMIN — ONDANSETRON 4 MILLIGRAM(S): 8 TABLET, FILM COATED ORAL at 05:44

## 2020-01-05 RX ADMIN — ONDANSETRON 4 MILLIGRAM(S): 8 TABLET, FILM COATED ORAL at 00:08

## 2020-01-05 NOTE — PROGRESS NOTE ADULT - SUBJECTIVE AND OBJECTIVE BOX
Patient was seen and examined- improving. Spoke with RN. Chart reviewed- see H and P  No events overnight.  Vital Signs Last 24 Hrs  T(F): 98.5 (05 Jan 2020 05:23), Max: 98.7 (04 Jan 2020 21:25)  HR: 71 (05 Jan 2020 05:23) (68 - 76)  BP: 135/69 (05 Jan 2020 05:23) (135/69 - 148/67)  SpO2: --  MEDICATIONS  (STANDING):  ALPRAZolam 0.5 milliGRAM(s) Oral four times a day  amLODIPine   Tablet 10 milliGRAM(s) Oral daily  atorvastatin 20 milliGRAM(s) Oral at bedtime  enoxaparin Injectable 40 milliGRAM(s) SubCutaneous at bedtime  lactated ringers. 1000 milliLiter(s) (75 mL/Hr) IV Continuous <Continuous>  losartan 100 milliGRAM(s) Oral daily  pantoprazole    Tablet 40 milliGRAM(s) Oral before breakfast  polyethylene glycol 3350 17 Gram(s) Oral once  sertraline 25 milliGRAM(s) Oral daily  tamsulosin 0.4 milliGRAM(s) Oral at bedtime    MEDICATIONS  (PRN):  cyclobenzaprine Oral Tab/Cap - Peds 5 milliGRAM(s) Oral two times a day PRN Back spasm  ondansetron Injectable 4 milliGRAM(s) IV Push four times a day PRN Nausea and/or Vomiting  oxycodone    5 mG/acetaminophen 325 mG 1 Tablet(s) Oral every 6 hours PRN Moderate Pain (4 - 6)    Labs:                        12.9   11.89 )-----------( 239      ( 05 Jan 2020 08:12 )             39.6                         13.2   13.81 )-----------( 309      ( 04 Jan 2020 09:44 )             40.0     05 Jan 2020 08:12    139    |  100    |  11     ----------------------------<  114    3.8     |  22     |  0.6    04 Jan 2020 09:44    141    |  100    |  14     ----------------------------<  121    3.2     |  21     |  1.0      Ca    8.7        05 Jan 2020 08:12  Ca    9.1        04 Jan 2020 09:44  Phos  2.6       04 Jan 2020 09:44  Mg     2.0       05 Jan 2020 08:12  Mg     2.0       04 Jan 2020 09:44    TPro  6.9    /  Alb  4.7    /  TBili  0.5    /  DBili  x      /  AST  29     /  ALT  15     /  AlkPhos  39     04 Jan 2020 09:44  TPro  7.3    /  Alb  4.8    /  TBili  0.9    /  DBili  x      /  AST  20     /  ALT  15     /  AlkPhos  44     03 Jan 2020 14:57    PT/INR - ( 03 Jan 2020 14:57 )   PT: 11.90 sec;   INR: 1.04 ratio         PTT - ( 03 Jan 2020 14:57 )  PTT:23.4 sec  Urinalysis Basic - ( 03 Jan 2020 16:11 )    Color: Light Yellow / Appearance: Clear / SG: >1.050 / pH: x  Gluc: x / Ketone: Moderate  / Bili: Negative / Urobili: <2 mg/dL   Blood: x / Protein: 30 mg/dL / Nitrite: Negative   Leuk Esterase: Negative / RBC: 3 /HPF / WBC 1 /HPF   Sq Epi: x / Non Sq Epi: 6 /HPF / Bacteria: Negative        Culture - Blood (collected 03 Jan 2020 16:34)  Source: .Blood Blood-Peripheral  Preliminary Report (04 Jan 2020 22:01):    No growth to date.    Culture - Blood (collected 03 Jan 2020 16:34)  Source: .Blood Blood-Peripheral  Preliminary Report (04 Jan 2020 22:01):    No growth to date.    Culture - Urine (collected 03 Jan 2020 16:11)  Source: .Urine Catheterized  Final Report (04 Jan 2020 17:14):    No growth      General: comfortable, NAD  Neurology: A&Ox3, nonfocal  Head:  Normocephalic, atraumatic  ENT:  Mucosa moist, no ulcerations  Neck:  Supple, no JVD,   Skin: no breakdowns (as per RN)  Resp: CTA B/L  CV: RRR, S1S2,   GI: mild tenderness difusely  MS: No edema, + peripheral pulses, FROM all 4 extremity      A/P:  73 yo F w/ PMH of HTN, HLD, LBP s/p multiple back sx and anxiety presents to the hospital complaining of abdominal pain- now a bit resolved, but still with vague discomfort and nausea    Pt seen and examined - a bit improved but still uncomfortable    chart reviewed- agree with above    labs improved    CT unremarkable    all consults noted    GI eval    pain managment eval    diet as tolerated    OOB    off abx as per ID    DVT prophylaxis  Decubitus prevention- all measures as per RN protocol  Please call or text me with any questions or updates Patient was seen and examined- improving. Spoke with RN. Chart reviewed- see H and P  No events overnight.  Vital Signs Last 24 Hrs  T(F): 98.5 (05 Jan 2020 05:23), Max: 98.7 (04 Jan 2020 21:25)  HR: 71 (05 Jan 2020 05:23) (68 - 76)  BP: 135/69 (05 Jan 2020 05:23) (135/69 - 148/67)  SpO2: --  MEDICATIONS  (STANDING):  ALPRAZolam 0.5 milliGRAM(s) Oral four times a day  amLODIPine   Tablet 10 milliGRAM(s) Oral daily  atorvastatin 20 milliGRAM(s) Oral at bedtime  enoxaparin Injectable 40 milliGRAM(s) SubCutaneous at bedtime  lactated ringers. 1000 milliLiter(s) (75 mL/Hr) IV Continuous <Continuous>  losartan 100 milliGRAM(s) Oral daily  pantoprazole    Tablet 40 milliGRAM(s) Oral before breakfast  polyethylene glycol 3350 17 Gram(s) Oral once  sertraline 25 milliGRAM(s) Oral daily  tamsulosin 0.4 milliGRAM(s) Oral at bedtime    MEDICATIONS  (PRN):  cyclobenzaprine Oral Tab/Cap - Peds 5 milliGRAM(s) Oral two times a day PRN Back spasm  ondansetron Injectable 4 milliGRAM(s) IV Push four times a day PRN Nausea and/or Vomiting  oxycodone    5 mG/acetaminophen 325 mG 1 Tablet(s) Oral every 6 hours PRN Moderate Pain (4 - 6)    Labs:                        12.9   11.89 )-----------( 239      ( 05 Jan 2020 08:12 )             39.6                         13.2   13.81 )-----------( 309      ( 04 Jan 2020 09:44 )             40.0     05 Jan 2020 08:12    139    |  100    |  11     ----------------------------<  114    3.8     |  22     |  0.6    04 Jan 2020 09:44    141    |  100    |  14     ----------------------------<  121    3.2     |  21     |  1.0      Ca    8.7        05 Jan 2020 08:12  Ca    9.1        04 Jan 2020 09:44  Phos  2.6       04 Jan 2020 09:44  Mg     2.0       05 Jan 2020 08:12  Mg     2.0       04 Jan 2020 09:44    TPro  6.9    /  Alb  4.7    /  TBili  0.5    /  DBili  x      /  AST  29     /  ALT  15     /  AlkPhos  39     04 Jan 2020 09:44  TPro  7.3    /  Alb  4.8    /  TBili  0.9    /  DBili  x      /  AST  20     /  ALT  15     /  AlkPhos  44     03 Jan 2020 14:57    PT/INR - ( 03 Jan 2020 14:57 )   PT: 11.90 sec;   INR: 1.04 ratio         PTT - ( 03 Jan 2020 14:57 )  PTT:23.4 sec  Urinalysis Basic - ( 03 Jan 2020 16:11 )    Color: Light Yellow / Appearance: Clear / SG: >1.050 / pH: x  Gluc: x / Ketone: Moderate  / Bili: Negative / Urobili: <2 mg/dL   Blood: x / Protein: 30 mg/dL / Nitrite: Negative   Leuk Esterase: Negative / RBC: 3 /HPF / WBC 1 /HPF   Sq Epi: x / Non Sq Epi: 6 /HPF / Bacteria: Negative        Culture - Blood (collected 03 Jan 2020 16:34)  Source: .Blood Blood-Peripheral  Preliminary Report (04 Jan 2020 22:01):    No growth to date.    Culture - Blood (collected 03 Jan 2020 16:34)  Source: .Blood Blood-Peripheral  Preliminary Report (04 Jan 2020 22:01):    No growth to date.    Culture - Urine (collected 03 Jan 2020 16:11)  Source: .Urine Catheterized  Final Report (04 Jan 2020 17:14):    No growth      General: comfortable, NAD  Neurology: A&Ox3, nonfocal  Head:  Normocephalic, atraumatic  ENT:  Mucosa moist, no ulcerations  Neck:  Supple, no JVD,   Skin: no breakdowns (as per RN)  Resp: CTA B/L  CV: RRR, S1S2,   GI: mild tenderness difusely  MS: No edema, + peripheral pulses, FROM all 4 extremity      A/P:  73 yo F w/ PMH of HTN, HLD, LBP s/p multiple back sx and anxiety presents to the hospital complaining of abdominal pain- now a bit resolved, but still with vague discomfort and nausea    labs improved    CT unremarkable    all consults noted    GI eval    pain managment eval    diet as tolerated    OOB    off abx as per ID    DVT prophylaxis  Decubitus prevention- all measures as per RN protocol  Please call or text me with any questions or updates

## 2020-01-06 LAB
ANION GAP SERPL CALC-SCNC: 19 MMOL/L — HIGH (ref 7–14)
BUN SERPL-MCNC: 12 MG/DL — SIGNIFICANT CHANGE UP (ref 10–20)
CALCIUM SERPL-MCNC: 8.8 MG/DL — SIGNIFICANT CHANGE UP (ref 8.5–10.1)
CHLORIDE SERPL-SCNC: 99 MMOL/L — SIGNIFICANT CHANGE UP (ref 98–110)
CO2 SERPL-SCNC: 19 MMOL/L — SIGNIFICANT CHANGE UP (ref 17–32)
CREAT SERPL-MCNC: 0.6 MG/DL — LOW (ref 0.7–1.5)
GLUCOSE SERPL-MCNC: 101 MG/DL — HIGH (ref 70–99)
HCT VFR BLD CALC: 41.7 % — SIGNIFICANT CHANGE UP (ref 37–47)
HGB BLD-MCNC: 13.8 G/DL — SIGNIFICANT CHANGE UP (ref 12–16)
MAGNESIUM SERPL-MCNC: 2.2 MG/DL — SIGNIFICANT CHANGE UP (ref 1.8–2.4)
MCHC RBC-ENTMCNC: 27 PG — SIGNIFICANT CHANGE UP (ref 27–31)
MCHC RBC-ENTMCNC: 33.1 G/DL — SIGNIFICANT CHANGE UP (ref 32–37)
MCV RBC AUTO: 81.6 FL — SIGNIFICANT CHANGE UP (ref 81–99)
NRBC # BLD: 0 /100 WBCS — SIGNIFICANT CHANGE UP (ref 0–0)
PLATELET # BLD AUTO: 273 K/UL — SIGNIFICANT CHANGE UP (ref 130–400)
POTASSIUM SERPL-MCNC: 3.8 MMOL/L — SIGNIFICANT CHANGE UP (ref 3.5–5)
POTASSIUM SERPL-SCNC: 3.8 MMOL/L — SIGNIFICANT CHANGE UP (ref 3.5–5)
RBC # BLD: 5.11 M/UL — SIGNIFICANT CHANGE UP (ref 4.2–5.4)
RBC # FLD: 15.4 % — HIGH (ref 11.5–14.5)
SODIUM SERPL-SCNC: 137 MMOL/L — SIGNIFICANT CHANGE UP (ref 135–146)
WBC # BLD: 12.52 K/UL — HIGH (ref 4.8–10.8)
WBC # FLD AUTO: 12.52 K/UL — HIGH (ref 4.8–10.8)

## 2020-01-06 PROCEDURE — 93979 VASCULAR STUDY: CPT | Mod: 26

## 2020-01-06 RX ORDER — PROCHLORPERAZINE MALEATE 5 MG
5 TABLET ORAL ONCE
Refills: 0 | Status: COMPLETED | OUTPATIENT
Start: 2020-01-06 | End: 2020-01-06

## 2020-01-06 RX ORDER — PROCHLORPERAZINE MALEATE 5 MG
5 TABLET ORAL EVERY 6 HOURS
Refills: 0 | Status: DISCONTINUED | OUTPATIENT
Start: 2020-01-06 | End: 2020-01-07

## 2020-01-06 RX ORDER — PROCHLORPERAZINE MALEATE 5 MG
5 TABLET ORAL EVERY 6 HOURS
Refills: 0 | Status: DISCONTINUED | OUTPATIENT
Start: 2020-01-06 | End: 2020-01-06

## 2020-01-06 RX ADMIN — Medication 5 MILLIGRAM(S): at 01:44

## 2020-01-06 RX ADMIN — Medication 5 MILLIGRAM(S): at 16:22

## 2020-01-06 RX ADMIN — Medication 0.5 MILLIGRAM(S): at 06:42

## 2020-01-06 RX ADMIN — PANTOPRAZOLE SODIUM 40 MILLIGRAM(S): 20 TABLET, DELAYED RELEASE ORAL at 06:42

## 2020-01-06 RX ADMIN — ONDANSETRON 4 MILLIGRAM(S): 8 TABLET, FILM COATED ORAL at 06:42

## 2020-01-06 RX ADMIN — TAMSULOSIN HYDROCHLORIDE 0.4 MILLIGRAM(S): 0.4 CAPSULE ORAL at 22:32

## 2020-01-06 RX ADMIN — Medication 0.5 MILLIGRAM(S): at 11:47

## 2020-01-06 RX ADMIN — Medication 5 MILLIGRAM(S): at 22:31

## 2020-01-06 RX ADMIN — LOSARTAN POTASSIUM 100 MILLIGRAM(S): 100 TABLET, FILM COATED ORAL at 06:42

## 2020-01-06 RX ADMIN — AMLODIPINE BESYLATE 10 MILLIGRAM(S): 2.5 TABLET ORAL at 06:42

## 2020-01-06 RX ADMIN — SODIUM CHLORIDE 75 MILLILITER(S): 9 INJECTION, SOLUTION INTRAVENOUS at 22:32

## 2020-01-06 RX ADMIN — ATORVASTATIN CALCIUM 20 MILLIGRAM(S): 80 TABLET, FILM COATED ORAL at 22:31

## 2020-01-06 RX ADMIN — Medication 5 MILLIGRAM(S): at 10:09

## 2020-01-06 RX ADMIN — ENOXAPARIN SODIUM 40 MILLIGRAM(S): 100 INJECTION SUBCUTANEOUS at 22:31

## 2020-01-06 RX ADMIN — Medication 0.5 MILLIGRAM(S): at 17:10

## 2020-01-06 RX ADMIN — SERTRALINE 25 MILLIGRAM(S): 25 TABLET, FILM COATED ORAL at 11:47

## 2020-01-06 NOTE — PROGRESS NOTE ADULT - SUBJECTIVE AND OBJECTIVE BOX
Patient was seen and examined. Spoke with RN. Chart reviewed.  Pt reports not feeling well; with no improvement  Vital Signs Last 24 Hrs  T(F): 98.6 (06 Jan 2020 04:58), Max: 98.9 (05 Jan 2020 22:31)  HR: 72 (06 Jan 2020 04:58) (69 - 72)  BP: 145/74 (06 Jan 2020 04:58) (132/63 - 145/74)  SpO2: 95% (06 Jan 2020 07:59) (95% - 95%)  MEDICATIONS  (STANDING):  ALPRAZolam 0.5 milliGRAM(s) Oral four times a day  amLODIPine   Tablet 10 milliGRAM(s) Oral daily  atorvastatin 20 milliGRAM(s) Oral at bedtime  enoxaparin Injectable 40 milliGRAM(s) SubCutaneous at bedtime  lactated ringers. 1000 milliLiter(s) (75 mL/Hr) IV Continuous <Continuous>  losartan 100 milliGRAM(s) Oral daily  pantoprazole    Tablet 40 milliGRAM(s) Oral before breakfast  polyethylene glycol 3350 17 Gram(s) Oral once  prochlorperazine   Injectable 5 milliGRAM(s) IV Push once  sertraline 25 milliGRAM(s) Oral daily  tamsulosin 0.4 milliGRAM(s) Oral at bedtime    MEDICATIONS  (PRN):  cyclobenzaprine Oral Tab/Cap - Peds 5 milliGRAM(s) Oral two times a day PRN Back spasm  ondansetron Injectable 4 milliGRAM(s) IV Push four times a day PRN Nausea and/or Vomiting  oxycodone    5 mG/acetaminophen 325 mG 1 Tablet(s) Oral every 6 hours PRN Moderate Pain (4 - 6)    Labs:                        13.8   12.52 )-----------( 273      ( 06 Jan 2020 09:23 )             41.7                         12.9   11.89 )-----------( 239      ( 05 Jan 2020 08:12 )             39.6     05 Jan 2020 08:12    139    |  100    |  11     ----------------------------<  114    3.8     |  22     |  0.6      Ca    8.7        05 Jan 2020 08:12  Mg     2.0       05 Jan 2020 08:12            Culture - Blood (collected 04 Jan 2020 09:44)  Source: .Blood None  Preliminary Report (05 Jan 2020 19:01):    No growth to date.    Culture - Blood (collected 03 Jan 2020 16:34)  Source: .Blood Blood-Peripheral  Preliminary Report (04 Jan 2020 22:01):    No growth to date.    Culture - Blood (collected 03 Jan 2020 16:34)  Source: .Blood Blood-Peripheral  Preliminary Report (04 Jan 2020 22:01):    No growth to date.    Culture - Urine (collected 03 Jan 2020 16:11)  Source: .Urine Catheterized  Final Report (04 Jan 2020 17:14):    No growth      General: uncomfortable,   Neurology: A&Ox3, nonfocal  Head:  Normocephalic, atraumatic  ENT:  Mucosa moist, no ulcerations  Neck:  Supple, no JVD,   Skin: no breakdowns (as per RN)  Resp: CTA B/L  CV: RRR, S1S2,   GI: Soft, mild tenderness, bowel sounds  MS: No edema, + peripheral pulses, FROM all 4 extremity      A/P:  75 yo F w/ PMH of HTN, HLD, LBP s/p multiple back sx and anxiety presents to the hospital complaining of abdominal pain- still with vague abdominal discomfort and nausea    labs improved    CT unremarkable    GI eval if unable to tolerate PO intake    diet as tolerated    OOB    off abx as per ID    encourage PO intake    DVT prophylaxis  Decubitus prevention- all measures as per RN protocol  Please call or text me with any questions or updates

## 2020-01-06 NOTE — PROGRESS NOTE ADULT - ASSESSMENT
Ms. Chow is a pleasant 75 yo F w/ PMH of HTN, HLD, LBP s/p multiple back sx and anxiety presents to the hospital complaining of abdominal pain.     #Confusion and aphasia, resolved    - r/o encephalitis, meningitis, concussion, metabolic encephalopathy secondary to other infection vs less likely stroke  - NIHSS 2-3 (dysarthria, aphasia, answered 1 question)  - CTH negative   - leukocytosis, will trend CBC   - will c/w empirical abx for now  - F/U ID consult  - Trend Lactate  - F/U blood culture   - UA shows proteinuria/ketonuria    # Epigastric pain, vomiting  - likely viral gastroenteritis   - trend CBC   - c/w protonix   - LR @75  - Zofran PRN  - Flu/RSV negative    # HTN, controlled  # HLD  - c/w home meds    # Anxiety  - c/w xanax, discussed with toxicology, ok to continue    # LBP s/p multiple back sx   - ok to c/w home meds as per H&P  - c/w pain meds PRN     Diet: NPO until vomiting resolves  GI ppx: Protonix   DVT ppx: Lovenox 40 mg qD  Activity: Increase as tolerated  Code status: Full Code  Disposition: from home, needs medical optimization Ms. Chow is a pleasant 73 yo F w/ PMH of HTN, HLD, LBP s/p multiple back sx and anxiety presents to the hospital complaining of abdominal pain.     #Confusion and aphasia, resolved    - r/o encephalitis, meningitis, concussion, metabolic encephalopathy secondary to other infection vs less likely stroke  - NIHSS 2-3 (dysarthria, aphasia, answered 1 question)  - CTH negative  - leukocytosis, will trend CBC   - BCx, UCx, Flu negative  - Per toxicology, may be secondary to opioid use or withdrawal; symptoms did improve with inpatient   - Lactic acidosis; will trend    # Epigastric pain, vomiting  - likely viral gastroenteritis   - Leukocytosis, will trend CBC  - ID on board: monitor off Abx  - c/w protonix   - LR @75  - Zofran PRN  - Abdominal duplex negative  - GI consult for persistent pain out of proportion to exam and nausea/vomiting    # HTN  - continue amlodipine, losartan    # HLD  - c/w home meds    # Anxiety  - continue home dose of xanax    # LBP s/p multiple back sx   - ok to continue home meds as per H&P  - continue pain meds PRN     Diet: NPO until vomiting resolves  GI ppx: Protonix   DVT ppx: Lovenox 40 mg qD  Activity: Increase as tolerated  Code status: Full Code  Disposition: from home, needs medical optimization Ms. Chow is a pleasant 73 yo F w/ PMH of HTN, HLD, LBP s/p multiple back sx and anxiety presents to the hospital complaining of abdominal pain.     #Confusion and aphasia, resolved    - r/o encephalitis, meningitis, concussion, metabolic encephalopathy secondary to other infection vs less likely stroke  - NIHSS 2-3 (dysarthria, aphasia, answered 1 question)  - CTH negative  - leukocytosis, will trend CBC   - BCx, UCx, Flu negative  - Per toxicology, may be secondary to opioid use or withdrawal; symptoms did improve with inpatient   - Lactic acidosis; will trend    # Epigastric pain, vomiting  - likely viral gastroenteritis   - CT abdomen/pelvis negative for acute abdominal pathology  - Abdominal duplex negative  - Leukocytosis, will trend CBC  - ID on board: monitor off Abx  - c/w protonix   - LR @75  - Zofran PRN  - GI consult for persistent pain out of proportion to exam and nausea/vomiting (Dr. Vásquez)    # HTN  - continue amlodipine, losartan    # HLD  - continue atorvastatin 20mg daily    # Anxiety  - continue home dose of xanax    # LBP s/p multiple back sx   - ok to continue home meds as per H&P  - continue pain meds PRN     #Diet: NPO until vomiting resolves  #GI ppx: Protonix   #DVT ppx: Lovenox 40 mg qD  #Activity: Increase as tolerated; PT consult pending  #Dispo: from home, needs medical optimization  FULL CODE

## 2020-01-06 NOTE — PROGRESS NOTE ADULT - SUBJECTIVE AND OBJECTIVE BOX
Hospital Day:  3d    Subjective:    Patient is a 74y old  Female who presents with a chief complaint of Stomach ache. (06 Jan 2020 12:33)    There were no acute overnight events. The patient was seen and examined at the bedside. She is complaining of nausea and abdominal pain. She states that she feels the urge to vomit whenever she tries to get up or move around. She otherwise denies fever, chills, headache, dizziness, chest pain, palpitations, shortness of breath, abdominal pain, nausea, vomiting, diarrhea.    Past Medical Hx:   Chronic back pain  Chronic GERD  HLD (hyperlipidemia)  HTN (hypertension)    Past Sx:    Allergies:  No Known Allergies    Current Meds:   Standng Meds:  ALPRAZolam 0.5 milliGRAM(s) Oral four times a day  amLODIPine   Tablet 10 milliGRAM(s) Oral daily  atorvastatin 20 milliGRAM(s) Oral at bedtime  enoxaparin Injectable 40 milliGRAM(s) SubCutaneous at bedtime  lactated ringers. 1000 milliLiter(s) (75 mL/Hr) IV Continuous <Continuous>  losartan 100 milliGRAM(s) Oral daily  pantoprazole    Tablet 40 milliGRAM(s) Oral before breakfast  polyethylene glycol 3350 17 Gram(s) Oral once  sertraline 25 milliGRAM(s) Oral daily  tamsulosin 0.4 milliGRAM(s) Oral at bedtime    PRN Meds:  cyclobenzaprine Oral Tab/Cap - Peds 5 milliGRAM(s) Oral two times a day PRN Back spasm  ondansetron Injectable 4 milliGRAM(s) IV Push four times a day PRN Nausea and/or Vomiting  oxycodone    5 mG/acetaminophen 325 mG 1 Tablet(s) Oral every 6 hours PRN Moderate Pain (4 - 6)    HOME MEDICATIONS:  ALPRAZolam 0.5 mg oral tablet: 1 tab(s) orally 4 times a day  amLODIPine 10 mg oral tablet: 1 tab(s) orally once a day  Crestor 5 mg oral tablet: 1 tab(s) orally once a day  cyclobenzaprine 5 mg oral tablet: 1 tab(s) orally 2 times a day, As Needed  diclofenac sodium 100 mg oral tablet, extended release: 1 tab(s) orally once a day  Diovan 160 mg oral capsule: 1 tab(s) orally once a day  oxyCODONE-acetaminophen 7.5 mg-325 mg oral tablet: 1 tab(s) orally every 8 hours, As Needed  pantoprazole 40 mg oral delayed release tablet: 1 tab(s) orally once a day  sertraline 25 mg oral tablet: 1 tab(s) orally once a day  tamsulosin 0.4 mg oral capsule: 1 cap(s) orally once a day      Vital Signs:   T(F): 98.9 (01-06-20 @ 12:14), Max: 98.9 (01-05-20 @ 22:31)  HR: 80 (01-06-20 @ 12:14) (69 - 80)  BP: 147/78 (01-06-20 @ 12:14) (132/63 - 147/78)  RR: 18 (01-06-20 @ 12:14) (18 - 18)  SpO2: 95% (01-06-20 @ 07:59) (95% - 95%)      01-05-20 @ 07:01  -  01-06-20 @ 07:00  --------------------------------------------------------  IN: 0 mL / OUT: 1450 mL / NET: -1450 mL        Physical Exam:   General: Patient resting comfortably in bed, NAD  HEENT: NCAT, conjunctiva clear, sclera white, PEERLA, EOMI, moist mucous membranes, normal orophaynx  Neck: No masses, no JVD, no cervical lymphadenopathy  Respiratory: good inspiratory effort; lungs clear to auscultation bilaterally  CV: Normal rate, regular rhythm, normal S1/S2, no rubs, gallops, murmurs  GI: abdomen soft, non-tender, non-distended, no masses, normal bowel sounds  Extremities: Well-perfused, no clubbing, no cyanosis, no lower extremity edema bilaterally  Skin: warm and dry  Neurology: AAOx3, mentating appropriately, follows commands, nonfocal    Labs:                         13.8   12.52 )-----------( 273      ( 06 Jan 2020 09:23 )             41.7       06 Jan 2020 09:23    137    |  99     |  12     ----------------------------<  101    3.8     |  19     |  0.6      Ca    8.8        06 Jan 2020 09:23  Mg     2.2       06 Jan 2020 09:23                Troponin <0.01, CKMB --, CK -- 01-03-20 @ 14:57        Urinalysis Basic - ( 03 Jan 2020 16:11 )    Color: Light Yellow / Appearance: Clear / SG: >1.050 / pH: x  Gluc: x / Ketone: Moderate  / Bili: Negative / Urobili: <2 mg/dL   Blood: x / Protein: 30 mg/dL / Nitrite: Negative   Leuk Esterase: Negative / RBC: 3 /HPF / WBC 1 /HPF   Sq Epi: x / Non Sq Epi: 6 /HPF / Bacteria: Negative          Culture - Blood (collected 01-04-20 @ 09:44)  Source: .Blood None  Preliminary Report (01-05-20 @ 19:01):    No growth to date.    Culture - Blood (collected 01-03-20 @ 16:34)  Source: .Blood Blood-Peripheral  Preliminary Report (01-04-20 @ 22:01):    No growth to date.    Culture - Blood (collected 01-03-20 @ 16:34)  Source: .Blood Blood-Peripheral  Preliminary Report (01-04-20 @ 22:01):    No growth to date.        Radiology: Hospital Day:  3d    Subjective:    Patient is a 74y old  Female who presents with a chief complaint of Stomach ache. (06 Jan 2020 12:33)    There were no acute overnight events. The patient was seen and examined at the bedside. She is complaining of nausea and abdominal pain. She states that she feels the urge to vomit whenever she tries to get up or move around. She otherwise denies fever, chills, headache, dizziness, chest pain, palpitations, shortness of breath.    Past Medical Hx:   Chronic back pain  Chronic GERD  HLD (hyperlipidemia)  HTN (hypertension)    Past Sx:    Allergies:  No Known Allergies    Current Meds:   Standng Meds:  ALPRAZolam 0.5 milliGRAM(s) Oral four times a day  amLODIPine   Tablet 10 milliGRAM(s) Oral daily  atorvastatin 20 milliGRAM(s) Oral at bedtime  enoxaparin Injectable 40 milliGRAM(s) SubCutaneous at bedtime  lactated ringers. 1000 milliLiter(s) (75 mL/Hr) IV Continuous <Continuous>  losartan 100 milliGRAM(s) Oral daily  pantoprazole    Tablet 40 milliGRAM(s) Oral before breakfast  polyethylene glycol 3350 17 Gram(s) Oral once  sertraline 25 milliGRAM(s) Oral daily  tamsulosin 0.4 milliGRAM(s) Oral at bedtime    PRN Meds:  cyclobenzaprine Oral Tab/Cap - Peds 5 milliGRAM(s) Oral two times a day PRN Back spasm  ondansetron Injectable 4 milliGRAM(s) IV Push four times a day PRN Nausea and/or Vomiting  oxycodone    5 mG/acetaminophen 325 mG 1 Tablet(s) Oral every 6 hours PRN Moderate Pain (4 - 6)    HOME MEDICATIONS:  ALPRAZolam 0.5 mg oral tablet: 1 tab(s) orally 4 times a day  amLODIPine 10 mg oral tablet: 1 tab(s) orally once a day  Crestor 5 mg oral tablet: 1 tab(s) orally once a day  cyclobenzaprine 5 mg oral tablet: 1 tab(s) orally 2 times a day, As Needed  diclofenac sodium 100 mg oral tablet, extended release: 1 tab(s) orally once a day  Diovan 160 mg oral capsule: 1 tab(s) orally once a day  oxyCODONE-acetaminophen 7.5 mg-325 mg oral tablet: 1 tab(s) orally every 8 hours, As Needed  pantoprazole 40 mg oral delayed release tablet: 1 tab(s) orally once a day  sertraline 25 mg oral tablet: 1 tab(s) orally once a day  tamsulosin 0.4 mg oral capsule: 1 cap(s) orally once a day      Vital Signs:   T(F): 98.9 (01-06-20 @ 12:14), Max: 98.9 (01-05-20 @ 22:31)  HR: 80 (01-06-20 @ 12:14) (69 - 80)  BP: 147/78 (01-06-20 @ 12:14) (132/63 - 147/78)  RR: 18 (01-06-20 @ 12:14) (18 - 18)  SpO2: 95% (01-06-20 @ 07:59) (95% - 95%)      01-05-20 @ 07:01  -  01-06-20 @ 07:00  --------------------------------------------------------  IN: 0 mL / OUT: 1450 mL / NET: -1450 mL        Physical Exam:   General: Patient resting in bed, frail appearing, NAD  HEENT: NCAT, conjunctiva clear, sclera white, PEERLA, EOMI, moist mucous membranes, normal orophaynx  Neck: No masses, no JVD, no cervical lymphadenopathy  Respiratory: lungs clear to auscultation bilaterally  CV: Normal rate, regular rhythm, normal S1/S2, systolic murmur  GI: abdomen soft, diffusely tender to light palpation, non-distended, no masses, normal bowel sounds  Extremities: Well-perfused, no clubbing, no cyanosis, no lower extremity edema bilaterally  Skin: warm and dry  Neurology: AAOx3, mentating appropriately, follows commands, nonfocal    Labs:                         13.8   12.52 )-----------( 273      ( 06 Jan 2020 09:23 )             41.7       06 Jan 2020 09:23    137    |  99     |  12     ----------------------------<  101    3.8     |  19     |  0.6      Ca    8.8        06 Jan 2020 09:23  Mg     2.2       06 Jan 2020 09:23                Troponin <0.01, CKMB --, CK -- 01-03-20 @ 14:57        Urinalysis Basic - ( 03 Jan 2020 16:11 )    Color: Light Yellow / Appearance: Clear / SG: >1.050 / pH: x  Gluc: x / Ketone: Moderate  / Bili: Negative / Urobili: <2 mg/dL   Blood: x / Protein: 30 mg/dL / Nitrite: Negative   Leuk Esterase: Negative / RBC: 3 /HPF / WBC 1 /HPF   Sq Epi: x / Non Sq Epi: 6 /HPF / Bacteria: Negative          Culture - Blood (collected 01-04-20 @ 09:44)  Source: .Blood None  Preliminary Report (01-05-20 @ 19:01):    No growth to date.    Culture - Blood (collected 01-03-20 @ 16:34)  Source: .Blood Blood-Peripheral  Preliminary Report (01-04-20 @ 22:01):    No growth to date.    Culture - Blood (collected 01-03-20 @ 16:34)  Source: .Blood Blood-Peripheral  Preliminary Report (01-04-20 @ 22:01):    No growth to date.        Radiology:   < from: [facility] Duplex Aorta/IVC/Iliac Limited (01.06.20 @ 11:06) >    EXAM:  PELVIC VENOUS DUPLEX LIMITED            PROCEDURE DATE:  01/06/2020            INTERPRETATION:  Clinical History / Reason for exam: The patient is a 74 years old female with abdominal pain.  Arterial duplex examination of the abdominal aortaand mesenteric vessels was performed to rule out mesenteric stenosis.    Celiac trunk was visualized and appeared patent with a peak systolic velocity of 240 cm/s. Superior mesenteric artery was patent with a peak systolic velocity of 161 cm/s. Peak systolic velocity in the aorta was 134 cm/s.     Impression:    Patent superior mesenteric artery and celiac artery.    ICD-10:  R10.84                   DANITA MIGUEL M.D., ATTENDING VASCULAR  This document has been electronically signed. Jan 6 2020 11:34AM                < end of copied text >

## 2020-01-06 NOTE — CONSULT NOTE ADULT - ASSESSMENT
RML Bullae  Abdominal Pain  GERD    Pt lifelong non-smoker, RML bullae of no clinical significance  consider gi eval, ct abdomen without intrabdominal pathology  cont ppi  dvt px

## 2020-01-06 NOTE — CONSULT NOTE ADULT - SUBJECTIVE AND OBJECTIVE BOX
MANUEL ROSENBAUM  MRN-[id number]    HISTORY OF PRESENT ILLNESS:  75 yo F w/ PMH of HTN, HLD, LBP s/p multiple back sx and anxiety presents to the hospital complaining of abdominal pain. Pt states she had lettuce the day prior and she developed abdominal discomfort. Had 1 episode non-bloody watery stool. Pt denies sick contact, fever or chill. Pt also hit her head while using the bathroom and LOC. Otherwise, pt also denies palpitation, SOB, CP, dyspnea on exertion, photophobia, neck rigidity and recent travel.   Pt had ct chest and abdomen completed, Ct chest showing RML bullae and ct abdomen without intra-abdominal pathology      Vital Signs Last 24 Hrs  T(C): 38.4 (03 Jan 2020 16:12), Max: 38.4 (03 Jan 2020 16:12)  T(F): 101.1 (03 Jan 2020 16:12), Max: 101.1 (03 Jan 2020 16:12)  HR: 85 (03 Jan 2020 16:12) (85 - 100)  BP: 131/87 (03 Jan 2020 11:46) (131/87 - 131/87)  BP(mean): --  RR: 20 (03 Jan 2020 11:46) (20 - 20)  SpO2: 100% (03 Jan 2020 11:46) (100% - 100%)    In ED, pt began having aphasia. Concerning for stroke, stroke code was called. Neurology saw pt and had CTH which was negative. Pt was also complaining of shoulder pain and CTA was done to and ruled out dissection. Pt was then found to have rectal temperature of 101F. Jacinto was placed. LP was unable to be done due to hx of back sx and stimulator. Pt was started on empirical abx and antiviral med to r/o encephalitis vs meningitis.       PMH/PSH:  PAST MEDICAL & SURGICAL HISTORY:  Chronic back pain: s/p back sx and stimulator  Chronic GERD  HLD (hyperlipidemia)  HTN (hypertension)    ALLERGIES:  Allergies    No Known Allergies    Intolerances      SOCIAL HABITS:  negative x3    FAMILY HISTORY:   n/c    REVIEW OF SYSTEM:  Elements of review of systems are negative or non-applicable except as noted above in HPI section.       HOME MEDICATIONS:  ALPRAZolam 0.5 mg oral tablet  amLODIPine 10 mg oral tablet  Crestor 5 mg oral tablet  cyclobenzaprine 5 mg oral tablet  diclofenac sodium 100 mg oral tablet, extended release  Diovan 160 mg oral capsule  oxyCODONE-acetaminophen 7.5 mg-325 mg oral tablet  pantoprazole 40 mg oral delayed release tablet  sertraline 25 mg oral tablet  tamsulosin 0.4 mg oral capsule    MEDICATIONS:  MEDICATIONS  (STANDING):  ALPRAZolam 0.5 milliGRAM(s) Oral four times a day  amLODIPine   Tablet 10 milliGRAM(s) Oral daily  atorvastatin 20 milliGRAM(s) Oral at bedtime  enoxaparin Injectable 40 milliGRAM(s) SubCutaneous at bedtime  lactated ringers. 1000 milliLiter(s) (75 mL/Hr) IV Continuous <Continuous>  losartan 100 milliGRAM(s) Oral daily  pantoprazole    Tablet 40 milliGRAM(s) Oral before breakfast  polyethylene glycol 3350 17 Gram(s) Oral once  sertraline 25 milliGRAM(s) Oral daily  tamsulosin 0.4 milliGRAM(s) Oral at bedtime    MEDICATIONS  (PRN):  cyclobenzaprine Oral Tab/Cap - Peds 5 milliGRAM(s) Oral two times a day PRN Back spasm  ondansetron Injectable 4 milliGRAM(s) IV Push four times a day PRN Nausea and/or Vomiting  oxycodone    5 mG/acetaminophen 325 mG 1 Tablet(s) Oral every 6 hours PRN Moderate Pain (4 - 6)        VITALS:   Vital Signs Last 24 Hrs  T(C): 37.2 (06 Jan 2020 12:14), Max: 37.2 (05 Jan 2020 22:31)  T(F): 98.9 (06 Jan 2020 12:14), Max: 98.9 (05 Jan 2020 22:31)  HR: 80 (06 Jan 2020 12:14) (69 - 80)  BP: 147/78 (06 Jan 2020 12:14) (132/63 - 147/78)  BP(mean): --  RR: 18 (06 Jan 2020 12:14) (18 - 18)  SpO2: 95% (06 Jan 2020 07:59) (95% - 95%)        PHYSICAL EXAM:    GENERAL: NAD  HEAD:  Atraumatic, Normocephalic  NECK: Supple, No JVD  CHEST/LUNG: Clear to auscultation bilaterally;   HEART: Regular rate and rhythm; No murmurs  ABDOMEN: Soft, tenderness to palpation, Nondistended  EXTREMITIES:  Good peripheral Pulses, No clubbing, cyanosis, or edema      LABS:                        13.8   12.52 )-----------( 273      ( 06 Jan 2020 09:23 )             41.7     01-06    137  |  99  |  12  ----------------------------<  101<H>  3.8   |  19  |  0.6<L>    Ca    8.8      06 Jan 2020 09:23  Mg     2.2     01-06                Culture - Blood (collected 01-04-20 @ 09:44)  Source: .Blood None  Preliminary Report (01-05-20 @ 19:01):    No growth to date.    Culture - Blood (collected 01-03-20 @ 16:34)  Source: .Blood Blood-Peripheral  Preliminary Report (01-04-20 @ 22:01):    No growth to date.    Culture - Blood (collected 01-03-20 @ 16:34)  Source: .Blood Blood-Peripheral  Preliminary Report (01-04-20 @ 22:01):    No growth to date.    Culture - Urine (collected 01-03-20 @ 16:11)  Source: .Urine Catheterized  Final Report (01-04-20 @ 17:14):    No growth              DIAGNOSTIC STUDIES:    < from: CT Angio Chest Dissection Protocol (01.03.20 @ 15:54) >  LUNGS, PLEURA, AIRWAYS: Patent central airways. Right middle lobe bulla. Scattered areas of linear atelectasis/scarring. No pleural effusion or pneumothorax.    < end of copied text >

## 2020-01-07 LAB
ANION GAP SERPL CALC-SCNC: 17 MMOL/L — HIGH (ref 7–14)
BASOPHILS # BLD AUTO: 0.03 K/UL — SIGNIFICANT CHANGE UP (ref 0–0.2)
BASOPHILS NFR BLD AUTO: 0.3 % — SIGNIFICANT CHANGE UP (ref 0–1)
BUN SERPL-MCNC: 12 MG/DL — SIGNIFICANT CHANGE UP (ref 10–20)
CALCIUM SERPL-MCNC: 8.2 MG/DL — LOW (ref 8.5–10.1)
CHLORIDE SERPL-SCNC: 99 MMOL/L — SIGNIFICANT CHANGE UP (ref 98–110)
CO2 SERPL-SCNC: 21 MMOL/L — SIGNIFICANT CHANGE UP (ref 17–32)
CREAT SERPL-MCNC: 0.5 MG/DL — LOW (ref 0.7–1.5)
EOSINOPHIL # BLD AUTO: 0 K/UL — SIGNIFICANT CHANGE UP (ref 0–0.7)
EOSINOPHIL NFR BLD AUTO: 0 % — SIGNIFICANT CHANGE UP (ref 0–8)
GLUCOSE SERPL-MCNC: 92 MG/DL — SIGNIFICANT CHANGE UP (ref 70–99)
HCT VFR BLD CALC: 40.6 % — SIGNIFICANT CHANGE UP (ref 37–47)
HGB BLD-MCNC: 13.1 G/DL — SIGNIFICANT CHANGE UP (ref 12–16)
IMM GRANULOCYTES NFR BLD AUTO: 0.5 % — HIGH (ref 0.1–0.3)
LACTATE SERPL-SCNC: 0.9 MMOL/L — SIGNIFICANT CHANGE UP (ref 0.7–2)
LYMPHOCYTES # BLD AUTO: 19.6 % — LOW (ref 20.5–51.1)
LYMPHOCYTES # BLD AUTO: 2.09 K/UL — SIGNIFICANT CHANGE UP (ref 1.2–3.4)
MAGNESIUM SERPL-MCNC: 2.3 MG/DL — SIGNIFICANT CHANGE UP (ref 1.8–2.4)
MCHC RBC-ENTMCNC: 27.1 PG — SIGNIFICANT CHANGE UP (ref 27–31)
MCHC RBC-ENTMCNC: 32.3 G/DL — SIGNIFICANT CHANGE UP (ref 32–37)
MCV RBC AUTO: 83.9 FL — SIGNIFICANT CHANGE UP (ref 81–99)
MONOCYTES # BLD AUTO: 0.84 K/UL — HIGH (ref 0.1–0.6)
MONOCYTES NFR BLD AUTO: 7.9 % — SIGNIFICANT CHANGE UP (ref 1.7–9.3)
NEUTROPHILS # BLD AUTO: 7.64 K/UL — HIGH (ref 1.4–6.5)
NEUTROPHILS NFR BLD AUTO: 71.7 % — SIGNIFICANT CHANGE UP (ref 42.2–75.2)
NRBC # BLD: 0 /100 WBCS — SIGNIFICANT CHANGE UP (ref 0–0)
PLATELET # BLD AUTO: 262 K/UL — SIGNIFICANT CHANGE UP (ref 130–400)
POTASSIUM SERPL-MCNC: 3.4 MMOL/L — LOW (ref 3.5–5)
POTASSIUM SERPL-SCNC: 3.4 MMOL/L — LOW (ref 3.5–5)
RBC # BLD: 4.84 M/UL — SIGNIFICANT CHANGE UP (ref 4.2–5.4)
RBC # FLD: 15.2 % — HIGH (ref 11.5–14.5)
SODIUM SERPL-SCNC: 137 MMOL/L — SIGNIFICANT CHANGE UP (ref 135–146)
WBC # BLD: 10.65 K/UL — SIGNIFICANT CHANGE UP (ref 4.8–10.8)
WBC # FLD AUTO: 10.65 K/UL — SIGNIFICANT CHANGE UP (ref 4.8–10.8)

## 2020-01-07 RX ORDER — ALPRAZOLAM 0.25 MG
0.25 TABLET ORAL ONCE
Refills: 0 | Status: DISCONTINUED | OUTPATIENT
Start: 2020-01-07 | End: 2020-01-07

## 2020-01-07 RX ORDER — PROCHLORPERAZINE MALEATE 5 MG
5 TABLET ORAL EVERY 6 HOURS
Refills: 0 | Status: DISCONTINUED | OUTPATIENT
Start: 2020-01-07 | End: 2020-01-08

## 2020-01-07 RX ORDER — POTASSIUM CHLORIDE 20 MEQ
20 PACKET (EA) ORAL
Refills: 0 | Status: COMPLETED | OUTPATIENT
Start: 2020-01-07 | End: 2020-01-07

## 2020-01-07 RX ORDER — ONDANSETRON 8 MG/1
4 TABLET, FILM COATED ORAL EVERY 6 HOURS
Refills: 0 | Status: DISCONTINUED | OUTPATIENT
Start: 2020-01-07 | End: 2020-01-08

## 2020-01-07 RX ORDER — ONDANSETRON 8 MG/1
4 TABLET, FILM COATED ORAL ONCE
Refills: 0 | Status: COMPLETED | OUTPATIENT
Start: 2020-01-07 | End: 2020-01-07

## 2020-01-07 RX ADMIN — ONDANSETRON 4 MILLIGRAM(S): 8 TABLET, FILM COATED ORAL at 17:11

## 2020-01-07 RX ADMIN — Medication 0.5 MILLIGRAM(S): at 17:03

## 2020-01-07 RX ADMIN — LOSARTAN POTASSIUM 100 MILLIGRAM(S): 100 TABLET, FILM COATED ORAL at 05:08

## 2020-01-07 RX ADMIN — TAMSULOSIN HYDROCHLORIDE 0.4 MILLIGRAM(S): 0.4 CAPSULE ORAL at 21:40

## 2020-01-07 RX ADMIN — AMLODIPINE BESYLATE 10 MILLIGRAM(S): 2.5 TABLET ORAL at 05:08

## 2020-01-07 RX ADMIN — ONDANSETRON 4 MILLIGRAM(S): 8 TABLET, FILM COATED ORAL at 02:22

## 2020-01-07 RX ADMIN — ENOXAPARIN SODIUM 40 MILLIGRAM(S): 100 INJECTION SUBCUTANEOUS at 21:40

## 2020-01-07 RX ADMIN — Medication 0.5 MILLIGRAM(S): at 23:33

## 2020-01-07 RX ADMIN — SERTRALINE 25 MILLIGRAM(S): 25 TABLET, FILM COATED ORAL at 12:33

## 2020-01-07 RX ADMIN — ATORVASTATIN CALCIUM 20 MILLIGRAM(S): 80 TABLET, FILM COATED ORAL at 21:40

## 2020-01-07 RX ADMIN — Medication 5 MILLIGRAM(S): at 13:25

## 2020-01-07 RX ADMIN — Medication 0.5 MILLIGRAM(S): at 00:32

## 2020-01-07 RX ADMIN — Medication 0.5 MILLIGRAM(S): at 05:08

## 2020-01-07 RX ADMIN — Medication 0.5 MILLIGRAM(S): at 12:33

## 2020-01-07 RX ADMIN — Medication 0.25 MILLIGRAM(S): at 03:39

## 2020-01-07 RX ADMIN — Medication 50 MILLIEQUIVALENT(S): at 12:33

## 2020-01-07 RX ADMIN — Medication 50 MILLIEQUIVALENT(S): at 16:59

## 2020-01-07 RX ADMIN — Medication 5 MILLIGRAM(S): at 06:11

## 2020-01-07 RX ADMIN — PANTOPRAZOLE SODIUM 40 MILLIGRAM(S): 20 TABLET, DELAYED RELEASE ORAL at 06:11

## 2020-01-07 NOTE — DIETITIAN INITIAL EVALUATION ADULT. - PHYSICAL APPEARANCE
well nourished/BMI 25.4 using stated height 62inches, bed scale weight 63.3 kg  IBW: 110# UBW: 150# right before adm. BS 18 redness blanchable skin. No physical signs of malnutrition

## 2020-01-07 NOTE — CONSULT NOTE ADULT - SUBJECTIVE AND OBJECTIVE BOX
Chief Complaint: Patient is a 74y old  Female who presents with a chief complaint of Stomach ache. (07 Jan 2020 09:03)gi called for evaluation of n/v/d fever after eating reported bad letuce as per patient,,  diarrhea less but still with dyspepsia without actual vomitting.        Review Of Systems:    Medications:  ALPRAZolam 0.5 milliGRAM(s) Oral four times a day  amLODIPine   Tablet 10 milliGRAM(s) Oral daily  atorvastatin 20 milliGRAM(s) Oral at bedtime  cyclobenzaprine Oral Tab/Cap - Peds 5 milliGRAM(s) Oral two times a day PRN  enoxaparin Injectable 40 milliGRAM(s) SubCutaneous at bedtime  lactated ringers. 1000 milliLiter(s) IV Continuous <Continuous>  losartan 100 milliGRAM(s) Oral daily  oxycodone    5 mG/acetaminophen 325 mG 1 Tablet(s) Oral every 6 hours PRN  pantoprazole    Tablet 40 milliGRAM(s) Oral before breakfast  potassium chloride  20 mEq/100 mL IVPB 20 milliEquivalent(s) IV Intermittent every 2 hours  prochlorperazine   Injectable 5 milliGRAM(s) IV Push every 6 hours PRN  sertraline 25 milliGRAM(s) Oral daily  tamsulosin 0.4 milliGRAM(s) Oral at bedtime      PMHX/PSHX:  Chronic back pain  Chronic GERD  HLD (hyperlipidemia)  HTN (hypertension)      Family history:      Social History:       Allergies:  No Known Allergies            PHYSICAL EXAM:   Vital Signs:  Vital Signs Last 24 Hrs  T(C): 36.3 (07 Jan 2020 13:03), Max: 37.6 (06 Jan 2020 21:03)  T(F): 97.4 (07 Jan 2020 13:03), Max: 99.6 (06 Jan 2020 21:03)  HR: 114 (07 Jan 2020 13:03) (73 - 114)  BP: 157/81 (07 Jan 2020 13:03) (157/81 - 181/83)  BP(mean): 95 (07 Jan 2020 05:20) (95 - 95)  RR: 18 (07 Jan 2020 13:03) (18 - 18)  SpO2: --  Daily Height in cm: 157.48 (07 Jan 2020 11:19)    Daily     T(C): 36.3 (01-07-20 @ 13:03), Max: 37.6 (01-06-20 @ 21:03)  HR: 114 (01-07-20 @ 13:03) (73 - 114)  BP: 157/81 (01-07-20 @ 13:03) (157/81 - 181/83)  RR: 18 (01-07-20 @ 13:03) (18 - 18)  SpO2: --    GENERAL:  Appears stated age, well-groomed, well-nourished, no distress  ABDOMEN:  Soft, non-tender, non-distended, normoactive bowel sounds,  no masses ,no hepato-splenomegaly, no signs of chronic liver disease        LABS:                        13.1   10.65 )-----------( 262      ( 07 Jan 2020 08:06 )             40.6     01-07    137  |  99  |  12  ----------------------------<  92  3.4<L>   |  21  |  0.5<L>    Ca    8.2<L>      07 Jan 2020 08:06  Mg     2.3     01-07                Imaging:  INTERPRETATION:  CLINICAL STATEMENT: Vomiting and diarrhea. Stroke code.      TECHNIQUE: Contiguous axial CT images were obtained from the lower chest to the pubic symphysis following administration of 100cc Optiray 320 intravenous contrast.  Oral contrast was not administered.  Reformatted images in the coronal and sagittal planes were acquired.    COMPARISON CT: CT angiogram from one-day prior 1/3/2020    OTHER STUDIES USED FOR CORRELATION: None.       FINDINGS:    LOWER CHEST: Unremarkable.    HEPATOBILIARY: Unchanged liver cysts.    SPLEEN: Unremarkable.    PANCREAS: Unremarkable.    ADRENAL GLANDS: Unremarkable.    KIDNEYS: No hydronephrosis. Renal cysts.    ABDOMINOPELVIC NODES: Unremarkable.    PELVIC ORGANS: Jacinto catheter within the urinary bladder. Hysterectomy with postsurgical changes in the pelvis.    PERITONEUM/MESENTERY/BOWEL: Diverticulosis.    BONES/SOFT TISSUES: Unchanged.      IMPRESSION:   Since 1/3/2020:  1.  No CT evidence of acute abdominopelvic pathology.  2.  Stable findings from one day prior on 1/3/2020.  3.  Consider consultation with radiologist prior to performing a repeat exam within 24 hours.    FINDINGS:    The study is nondiagnostic for aortic dissection as images were obtained with very delayed contrast timing (contrast being excreted through the kidneys), noting an initial CTA of the head had been performed prior to the study.    VASCULATURE:  Nondiagnostic for aortic dissection. No aortic aneurysm. Atherosclerotic calcifications.    LUNGS, PLEURA, AIRWAYS: Patent central airways. Right middle lobe bulla. Scattered areas of linear atelectasis/scarring. No pleural effusion or pneumothorax.    THORACIC NODES: No intrathoracic lymphadenopathy.    MEDIASTINUM/GREAT VESSELS: No pericardial effusion. Heart size is within normal limits. Coronary artery calcifications.    HEPATOBILIARY: Hypoattenuating hepatic lesions, may represent cysts as seen on prior ultrasound of 10/31/2018. On the current study, these lesions measure higher than simple fluid attenuation , probably secondary to extensive streak artifact from patient's arm positioning over the abdomen. Unremarkable CT appearance of the gallbladder. No biliary ductal dilatation.    SPLEEN: Unremarkable.    PANCREAS: Unremarkable.    ADRENAL GLANDS: Unremarkable.    KIDNEYS: Excretion of contrast in the renal collecting systems. No hydronephrosis.    ABDOMINOPELVIC NODES: Unremarkable.    PELVIC ORGANS: Status post hysterectomy.    PERITONEUM/MESENTERY/BOWEL: Sheetlike peritoneal nodularity in the left hemipelvis (series 4 images 349 - [id number]) with associated calcification along the bilateral pelvic peritoneal planes. Colonic diverticulosis. No evidence of bowel obstruction. No ascites free air or fluid collection.    BONES/SOFT TISSUES: Degenerative changes spine. Lumbar spinal fixation hardware.    OTHER: Spinal stimulator device noted.        IMPRESSION:  1.  Study nondiagnostic for aortic dissection due to delayed timing.

## 2020-01-07 NOTE — PROGRESS NOTE ADULT - ASSESSMENT
Ms. Chow is a pleasant 73 yo F w/ PMH of HTN, HLD, LBP s/p multiple back sx and anxiety presents to the hospital complaining of abdominal pain.     #Confusion and aphasia, resolved    - r/o encephalitis, meningitis, concussion, metabolic encephalopathy secondary to other infection vs less likely stroke  - NIHSS 2-3 (dysarthria, aphasia, answered 1 question)  - CTH negative  - leukocytosis, will trend CBC   - BCx, UCx, Flu negative  - Per toxicology, may be secondary to opioid use or withdrawal; symptoms did improve with inpatient   - Lactic acidosis; will trend    # Epigastric pain, vomiting  - likely viral gastroenteritis   - CT abdomen/pelvis negative for acute abdominal pathology  - Abdominal duplex negative  - Leukocytosis, will trend CBC  - ID on board: monitor off Abx  - c/w protonix   - LR @75  - Zofran PRN  - GI consult for persistent pain out of proportion to exam and nausea/vomiting (Dr. Vásquez)    # HTN  - continue amlodipine, losartan    # HLD  - continue atorvastatin 20mg daily    # Anxiety  - continue home dose of xanax    # LBP s/p multiple back sx   - ok to continue home meds as per H&P  - continue pain meds PRN     #Diet: NPO until vomiting resolves  #GI ppx: Protonix   #DVT ppx: Lovenox 40 mg qD  #Activity: Increase as tolerated; PT consult pending  #Dispo: from home, needs medical optimization  FULL CODE Ms. Chow is a pleasant 73 yo F w/ PMH of HTN, HLD, LBP s/p multiple back sx and anxiety presents to the hospital complaining of abdominal pain.     #Confusion and aphasia, resolved    - r/o encephalitis, meningitis, concussion, metabolic encephalopathy secondary to other infection vs less likely stroke  - NIHSS 2-3 (dysarthria, aphasia, answered 1 question)  - CTH negative  - leukocytosis, will trend CBC   - BCx, UCx, Flu negative  - Per toxicology, may be secondary to opioid use or withdrawal; symptoms did improve with inpatient   - Lactic acidosis; will trend    # Epigastric pain, vomiting  - likely viral gastroenteritis   - CT abdomen/pelvis negative for acute abdominal pathology  - Abdominal duplex negative  - Leukocytosis, will trend CBC  - ID on board: monitor off Abx  - c/w protonix   - LR @73  - Zofran PRN  - GI consult placed for persistent pain out of proportion to exam and nausea/vomiting (Dr. Vásquez); will follow up    # HTN  - continue amlodipine, losartan    # HLD  - continue atorvastatin 20mg daily    # Anxiety  - continue home dose of xanax    # LBP s/p multiple back sx   - ok to continue home meds as per H&P  - continue pain meds PRN     #Diet: NPO until vomiting resolves  #GI ppx: Protonix   #DVT ppx: Lovenox 40 mg qD  #Activity: Increase as tolerated; PT consult pending  #Dispo: from home, needs medical optimization  FULL CODE Ms. Chow is a pleasant 75 yo F w/ PMH of HTN, HLD, LBP s/p multiple back sx and anxiety presents to the hospital complaining of abdominal pain.     #Confusion and aphasia, resolved    - CTH negative  - patient now AAOx3  - non-focal exam  - afebrile, leukocytosis resolved  - lactic acidosis resolved  - BCx, UCx, Flu negative  - Per toxicology, may be secondary to opioid use or withdrawal; symptoms did improve with inpatient     # Epigastric pain, vomiting  - likely viral gastroenteritis   - CT abdomen/pelvis negative for acute abdominal pathology  - Abdominal duplex negative  - Leukocytosis resolved  - ID on board: monitor off Abx  - c/w protonix   - LR @73  - Zofran PRN  - Follow BMP, replete electrolytes as needed  - GI consult placed for persistent pain out of proportion to exam and nausea/vomiting (Dr. Vásquez); called Dr. Vásquez's office, left message; will follow up    # HTN  - continue amlodipine, losartan    # HLD  - continue atorvastatin 20mg daily    # Anxiety  - continue home dose of xanax    # LBP s/p multiple back sx   - ok to continue home meds as per H&P  - continue pain meds PRN     #Diet: NPO until vomiting resolves  #GI ppx: Protonix   #DVT ppx: Lovenox 40 mg qD  #Activity: Increase as tolerated; PT consult pending  #Dispo: acute  FULL CODE

## 2020-01-07 NOTE — DIETITIAN INITIAL EVALUATION ADULT. - ADD RECOMMEND
When medically feasible advance as tolerated to GI soft diet. If unable to advance diet, consider consulting Dr Parra from [facility].

## 2020-01-07 NOTE — PROGRESS NOTE ADULT - SUBJECTIVE AND OBJECTIVE BOX
Hospital Day:  4d    Subjective:    Patient is a 74y old  Female who presents with a chief complaint of Stomach ache. (06 Jan 2020 14:25)    There were no acute overnight events. The patient was seen and examined at the bedside. No complaints. Denies fever, chills, headache, dizziness, chest pain, palpitations, shortness of breath, abdominal pain, nausea, vomiting, diarrhea.    Past Medical Hx:   Chronic back pain  Chronic GERD  HLD (hyperlipidemia)  HTN (hypertension)    Past Sx:    Allergies:  No Known Allergies    Current Meds:   Standng Meds:  ALPRAZolam 0.5 milliGRAM(s) Oral four times a day  amLODIPine   Tablet 10 milliGRAM(s) Oral daily  atorvastatin 20 milliGRAM(s) Oral at bedtime  enoxaparin Injectable 40 milliGRAM(s) SubCutaneous at bedtime  lactated ringers. 1000 milliLiter(s) (75 mL/Hr) IV Continuous <Continuous>  losartan 100 milliGRAM(s) Oral daily  pantoprazole    Tablet 40 milliGRAM(s) Oral before breakfast  polyethylene glycol 3350 17 Gram(s) Oral once  sertraline 25 milliGRAM(s) Oral daily  tamsulosin 0.4 milliGRAM(s) Oral at bedtime    PRN Meds:  cyclobenzaprine Oral Tab/Cap - Peds 5 milliGRAM(s) Oral two times a day PRN Back spasm  oxycodone    5 mG/acetaminophen 325 mG 1 Tablet(s) Oral every 6 hours PRN Moderate Pain (4 - 6)  prochlorperazine   Injectable 5 milliGRAM(s) IV Push every 6 hours PRN nausea/vomiting    HOME MEDICATIONS:  ALPRAZolam 0.5 mg oral tablet: 1 tab(s) orally 4 times a day  amLODIPine 10 mg oral tablet: 1 tab(s) orally once a day  Crestor 5 mg oral tablet: 1 tab(s) orally once a day  cyclobenzaprine 5 mg oral tablet: 1 tab(s) orally 2 times a day, As Needed  diclofenac sodium 100 mg oral tablet, extended release: 1 tab(s) orally once a day  Diovan 160 mg oral capsule: 1 tab(s) orally once a day  oxyCODONE-acetaminophen 7.5 mg-325 mg oral tablet: 1 tab(s) orally every 8 hours, As Needed  pantoprazole 40 mg oral delayed release tablet: 1 tab(s) orally once a day  sertraline 25 mg oral tablet: 1 tab(s) orally once a day  tamsulosin 0.4 mg oral capsule: 1 cap(s) orally once a day      Vital Signs:   T(F): 97.1 (01-07-20 @ 05:20), Max: 99.6 (01-06-20 @ 21:03)  HR: 95 (01-07-20 @ 05:20) (73 - 95)  BP: 181/83 (01-07-20 @ 05:20) (147/78 - 181/83)  RR: 18 (01-06-20 @ 21:03) (18 - 18)  SpO2: 95% (01-06-20 @ 07:59) (95% - 95%)      01-05-20 @ 07:01  -  01-06-20 @ 07:00  --------------------------------------------------------  IN: 0 mL / OUT: 1450 mL / NET: -1450 mL    01-06-20 @ 07:01  -  01-07-20 @ 06:59  --------------------------------------------------------  IN: 0 mL / OUT: 750 mL / NET: -750 mL        Physical Exam:   General: Patient resting comfortably in bed, NAD  HEENT: NCAT, conjunctiva clear, sclera white, PEERLA, EOMI, moist mucous membranes, normal orophaynx  Neck: No masses, no JVD, no cervical lymphadenopathy  Respiratory: good inspiratory effort; lungs clear to auscultation bilaterally  CV: Normal rate, regular rhythm, normal S1/S2, no rubs, gallops, murmurs  GI: abdomen soft, non-tender, non-distended, no masses, normal bowel sounds  Extremities: Well-perfused, no clubbing, no cyanosis, no lower extremity edema bilaterally  Skin: warm and dry  Neurology: AAOx3, mentating appropriately, follows commands, nonfocal    Labs:                         13.8   12.52 )-----------( 273      ( 06 Jan 2020 09:23 )             41.7       06 Jan 2020 09:23    137    |  99     |  12     ----------------------------<  101    3.8     |  19     |  0.6      Ca    8.8        06 Jan 2020 09:23  Mg     2.2       06 Jan 2020 09:23                Troponin <0.01, CKMB --, CK -- 01-03-20 @ 14:57        Urinalysis Basic - ( 03 Jan 2020 16:11 )    Color: Light Yellow / Appearance: Clear / SG: >1.050 / pH: x  Gluc: x / Ketone: Moderate  / Bili: Negative / Urobili: <2 mg/dL   Blood: x / Protein: 30 mg/dL / Nitrite: Negative   Leuk Esterase: Negative / RBC: 3 /HPF / WBC 1 /HPF   Sq Epi: x / Non Sq Epi: 6 /HPF / Bacteria: Negative          Culture - Blood (collected 01-04-20 @ 09:44)  Source: .Blood None  Preliminary Report (01-05-20 @ 19:01):    No growth to date.    Culture - Blood (collected 01-03-20 @ 16:34)  Source: .Blood Blood-Peripheral  Preliminary Report (01-04-20 @ 22:01):    No growth to date.    Culture - Blood (collected 01-03-20 @ 16:34)  Source: .Blood Blood-Peripheral  Preliminary Report (01-04-20 @ 22:01):    No growth to date.        Radiology: Hospital Day:  4d    Subjective:    Patient is a 74y old  Female who presents with a chief complaint of Stomach ache. (06 Jan 2020 14:25)    There were no acute overnight events. The patient was seen and examined at the bedside. No complaints. Denies fever, chills, headache, dizziness, chest pain, palpitations, shortness of breath, abdominal pain, nausea, vomiting, diarrhea.    Past Medical Hx:   Chronic back pain  Chronic GERD  HLD (hyperlipidemia)  HTN (hypertension)    Past Sx:    Allergies:  No Known Allergies    Current Meds:   Standng Meds:  ALPRAZolam 0.5 milliGRAM(s) Oral four times a day  amLODIPine   Tablet 10 milliGRAM(s) Oral daily  atorvastatin 20 milliGRAM(s) Oral at bedtime  enoxaparin Injectable 40 milliGRAM(s) SubCutaneous at bedtime  lactated ringers. 1000 milliLiter(s) (75 mL/Hr) IV Continuous <Continuous>  losartan 100 milliGRAM(s) Oral daily  pantoprazole    Tablet 40 milliGRAM(s) Oral before breakfast  polyethylene glycol 3350 17 Gram(s) Oral once  sertraline 25 milliGRAM(s) Oral daily  tamsulosin 0.4 milliGRAM(s) Oral at bedtime    PRN Meds:  cyclobenzaprine Oral Tab/Cap - Peds 5 milliGRAM(s) Oral two times a day PRN Back spasm  oxycodone    5 mG/acetaminophen 325 mG 1 Tablet(s) Oral every 6 hours PRN Moderate Pain (4 - 6)  prochlorperazine   Injectable 5 milliGRAM(s) IV Push every 6 hours PRN nausea/vomiting    HOME MEDICATIONS:  ALPRAZolam 0.5 mg oral tablet: 1 tab(s) orally 4 times a day  amLODIPine 10 mg oral tablet: 1 tab(s) orally once a day  Crestor 5 mg oral tablet: 1 tab(s) orally once a day  cyclobenzaprine 5 mg oral tablet: 1 tab(s) orally 2 times a day, As Needed  diclofenac sodium 100 mg oral tablet, extended release: 1 tab(s) orally once a day  Diovan 160 mg oral capsule: 1 tab(s) orally once a day  oxyCODONE-acetaminophen 7.5 mg-325 mg oral tablet: 1 tab(s) orally every 8 hours, As Needed  pantoprazole 40 mg oral delayed release tablet: 1 tab(s) orally once a day  sertraline 25 mg oral tablet: 1 tab(s) orally once a day  tamsulosin 0.4 mg oral capsule: 1 cap(s) orally once a day      Vital Signs:   T(F): 97.1 (01-07-20 @ 05:20), Max: 99.6 (01-06-20 @ 21:03)  HR: 95 (01-07-20 @ 05:20) (73 - 95)  BP: 181/83 (01-07-20 @ 05:20) (147/78 - 181/83)  RR: 18 (01-06-20 @ 21:03) (18 - 18)  SpO2: 95% (01-06-20 @ 07:59) (95% - 95%)      01-05-20 @ 07:01  -  01-06-20 @ 07:00  --------------------------------------------------------  IN: 0 mL / OUT: 1450 mL / NET: -1450 mL    01-06-20 @ 07:01  -  01-07-20 @ 06:59  --------------------------------------------------------  IN: 0 mL / OUT: 750 mL / NET: -750 mL        Physical Exam:   General: Patient resting comfortably in bed, NAD  HEENT: NCAT, conjunctiva clear, sclera white, PEERLA, EOMI, moist mucous membranes, normal orophaynx  Neck: No masses, no JVD, no cervical lymphadenopathy  Respiratory: good inspiratory effort; lungs clear to auscultation bilaterally  CV: Normal rate, regular rhythm, normal S1/S2, no rubs, gallops, murmurs  GI: abdomen soft, non-tender, non-distended, no masses, normal bowel sounds  Extremities: Well-perfused, no clubbing, no cyanosis, no lower extremity edema bilaterally  Skin: warm and dry  Neurology: AAOx3, mentating appropriately, follows commands, nonfocal  Labs:                         13.8   12.52 )-----------( 273      ( 06 Jan 2020 09:23 )             41.7       06 Jan 2020 09:23    137    |  99     |  12     ----------------------------<  101    3.8     |  19     |  0.6      Ca    8.8        06 Jan 2020 09:23  Mg     2.2       06 Jan 2020 09:23                Troponin <0.01, CKMB --, CK -- 01-03-20 @ 14:57        Urinalysis Basic - ( 03 Jan 2020 16:11 )    Color: Light Yellow / Appearance: Clear / SG: >1.050 / pH: x  Gluc: x / Ketone: Moderate  / Bili: Negative / Urobili: <2 mg/dL   Blood: x / Protein: 30 mg/dL / Nitrite: Negative   Leuk Esterase: Negative / RBC: 3 /HPF / WBC 1 /HPF   Sq Epi: x / Non Sq Epi: 6 /HPF / Bacteria: Negative          Culture - Blood (collected 01-04-20 @ 09:44)  Source: .Blood None  Preliminary Report (01-05-20 @ 19:01):    No growth to date.    Culture - Blood (collected 01-03-20 @ 16:34)  Source: .Blood Blood-Peripheral  Preliminary Report (01-04-20 @ 22:01):    No growth to date.    Culture - Blood (collected 01-03-20 @ 16:34)  Source: .Blood Blood-Peripheral  Preliminary Report (01-04-20 @ 22:01):    No growth to date. Hospital Day:  4d    Subjective:    Patient is a 74y old  Female who presents with a chief complaint of Stomach ache. (06 Jan 2020 14:25)    There were no acute overnight events. The patient was seen and examined at the bedside. She continues to complain of abdominal pain, which she reports experiencing from underneath her rib cage, down. She is persistently nauseous and began to dry heave and wretch during the exam. Otherwise denies fever, chills, headache, dizziness, chest pain, palpitations, shortness of breath.    Past Medical Hx:   Chronic back pain  Chronic GERD  HLD (hyperlipidemia)  HTN (hypertension)    Past Sx:    Allergies:  No Known Allergies    Current Meds:   Standng Meds:  ALPRAZolam 0.5 milliGRAM(s) Oral four times a day  amLODIPine   Tablet 10 milliGRAM(s) Oral daily  atorvastatin 20 milliGRAM(s) Oral at bedtime  enoxaparin Injectable 40 milliGRAM(s) SubCutaneous at bedtime  lactated ringers. 1000 milliLiter(s) (75 mL/Hr) IV Continuous <Continuous>  losartan 100 milliGRAM(s) Oral daily  pantoprazole    Tablet 40 milliGRAM(s) Oral before breakfast  polyethylene glycol 3350 17 Gram(s) Oral once  sertraline 25 milliGRAM(s) Oral daily  tamsulosin 0.4 milliGRAM(s) Oral at bedtime    PRN Meds:  cyclobenzaprine Oral Tab/Cap - Peds 5 milliGRAM(s) Oral two times a day PRN Back spasm  oxycodone    5 mG/acetaminophen 325 mG 1 Tablet(s) Oral every 6 hours PRN Moderate Pain (4 - 6)  prochlorperazine   Injectable 5 milliGRAM(s) IV Push every 6 hours PRN nausea/vomiting    HOME MEDICATIONS:  ALPRAZolam 0.5 mg oral tablet: 1 tab(s) orally 4 times a day  amLODIPine 10 mg oral tablet: 1 tab(s) orally once a day  Crestor 5 mg oral tablet: 1 tab(s) orally once a day  cyclobenzaprine 5 mg oral tablet: 1 tab(s) orally 2 times a day, As Needed  diclofenac sodium 100 mg oral tablet, extended release: 1 tab(s) orally once a day  Diovan 160 mg oral capsule: 1 tab(s) orally once a day  oxyCODONE-acetaminophen 7.5 mg-325 mg oral tablet: 1 tab(s) orally every 8 hours, As Needed  pantoprazole 40 mg oral delayed release tablet: 1 tab(s) orally once a day  sertraline 25 mg oral tablet: 1 tab(s) orally once a day  tamsulosin 0.4 mg oral capsule: 1 cap(s) orally once a day    Vital Signs:   T(F): 97.1 (01-07-20 @ 05:20), Max: 99.6 (01-06-20 @ 21:03)  HR: 95 (01-07-20 @ 05:20) (73 - 95)  BP: 181/83 (01-07-20 @ 05:20) (147/78 - 181/83)  RR: 18 (01-06-20 @ 21:03) (18 - 18)  SpO2: --      01-06-20 @ 07:01  -  01-07-20 @ 07:00  --------------------------------------------------------  IN: 0 mL / OUT: 1050 mL / NET: -1050 mL    01-07-20 @ 07:01  -  01-07-20 @ 09:54  --------------------------------------------------------  IN: 236 mL / OUT: 0 mL / NET: 236 mL        Physical Exam:   General: Patient resting in bed, frail appearing, NAD  HEENT: NCAT, conjunctiva clear, sclera white, PEERLA, EOMI, moist mucous membranes, normal orophaynx  Neck: No masses, no JVD, no cervical lymphadenopathy  Respiratory: lungs clear to auscultation bilaterally  CV: Normal rate, regular rhythm, normal S1/S2, systolic murmur  GI: abdomen soft, diffusely tender to light palpation, non-distended, no masses, normal bowel sounds  Extremities: Well-perfused, no clubbing, no cyanosis, no lower extremity edema bilaterally  Skin: warm and dry    Labs:                         13.1   10.65 )-----------( 262      ( 07 Jan 2020 08:06 )             40.6     Neutophil% 71.7, Lymphocyte% 19.6, Monocyte% 7.9, Bands% 0.5 01-07-20 @ 08:06    07 Jan 2020 08:06    137    |  99     |  12     ----------------------------<  92     3.4     |  21     |  0.5      Ca    8.2        07 Jan 2020 08:06  Mg     2.3       07 Jan 2020 08:06                Troponin <0.01, CKMB --, CK -- 01-03-20 @ 14:57        Urinalysis Basic - ( 03 Jan 2020 16:11 )    Color: Light Yellow / Appearance: Clear / SG: >1.050 / pH: x  Gluc: x / Ketone: Moderate  / Bili: Negative / Urobili: <2 mg/dL   Blood: x / Protein: 30 mg/dL / Nitrite: Negative   Leuk Esterase: Negative / RBC: 3 /HPF / WBC 1 /HPF   Sq Epi: x / Non Sq Epi: 6 /HPF / Bacteria: Negative          Culture - Blood (collected 01-04-20 @ 09:44)  Source: .Blood None  Preliminary Report (01-05-20 @ 19:01):    No growth to date.    Culture - Blood (collected 01-03-20 @ 16:34)  Source: .Blood Blood-Peripheral  Preliminary Report (01-04-20 @ 22:01):    No growth to date.    Culture - Blood (collected 01-03-20 @ 16:34)  Source: .Blood Blood-Peripheral  Preliminary Report (01-04-20 @ 22:01):    No growth to date.

## 2020-01-07 NOTE — DIETITIAN INITIAL EVALUATION ADULT. - FACTORS AFF FOOD INTAKE
Pt currently NPO. C/o nausea - says zofran is helping. The only food she took by mouth was apple juice, but she immediately vomited, npo since. Says she is waiting to see Dr. Vásquez. PTA, she had good appetite, 3 meals/day. Reports small BM 1/6. Denies chewing/swallowing difficulty. NKFA.

## 2020-01-07 NOTE — CONSULT NOTE ADULT - ASSESSMENT
ASS Enterocolitis ( food poisening) with possible gastroparesis  possible peritoneal nodularity seen on 1 scan  continue antibiotics   agressive fluid resucitaion  observation for now   reglan 10mg tid prn   patient informed clearly she needs outpatient follow in office to follow the questionable ct scan findings

## 2020-01-07 NOTE — DIETITIAN INITIAL EVALUATION ADULT. - OTHER INFO
Pt adm for Stomach ache. Epigastric pain, vomiting- GI evel pending. CT abdomen/pelvis negative for acute abdominal pathology. Confusion and aphasia - resolved.

## 2020-01-07 NOTE — CHART NOTE - NSCHARTNOTEFT_GEN_A_CORE
Upon Nutritional Assessment by the Registered Dietitian your patient was determined to meet criteria / has evidence of the following diagnosis/diagnoses:          [ ]  Mild Protein Calorie Malnutrition        [ ]  Moderate Protein Calorie Malnutrition        [x] Severe Protein Calorie Malnutrition        [ ] Unspecified Protein Calorie Malnutrition        [ ] Underweight / BMI <19        [ ] Morbid Obesity / BMI > 40      Findings as based on:  •  Comprehensive nutrition assessment and consultation    Indicators:   po intake <50% of estimated needs for >5 days  7% weight loss in less than a week.    Treatment:    The following diet has been recommended: When medically feasible advance as tolerated to GI soft diet. If unable to advance diet, consider consulting Dr Parra from [facility].      PROVIDER Section:     By signing this assessment you are acknowledging and agree with the diagnosis/diagnoses assigned by the Registered Dietician    Comments:

## 2020-01-07 NOTE — PHYSICAL THERAPY INITIAL EVALUATION ADULT - GAIT DISTANCE, PT EVAL
5 steps marching in place and 4 steps forward 4 steps back, 4 steps laterally toward HOB. pt declined ambulating away from bed

## 2020-01-07 NOTE — PROGRESS NOTE ADULT - SUBJECTIVE AND OBJECTIVE BOX
Patient was seen and examined. Spoke with RN. Chart reviewed.  No events overnight.  Pt lying in bed with a bucket under her chin as she is vomiting frequently and cannot hold down anything  No fever or chills  c/o abdominal pain      Vital Signs Last 24 Hrs  T(F): 97.1 (07 Jan 2020 05:20), Max: 99.6 (06 Jan 2020 21:03)  HR: 95 (07 Jan 2020 05:20) (73 - 95)  BP: 181/83 (07 Jan 2020 05:20) (147/78 - 181/83)  SpO2: --      MEDICATIONS  (STANDING):  ALPRAZolam 0.5 milliGRAM(s) Oral four times a day  amLODIPine   Tablet 10 milliGRAM(s) Oral daily  atorvastatin 20 milliGRAM(s) Oral at bedtime  enoxaparin Injectable 40 milliGRAM(s) SubCutaneous at bedtime  lactated ringers. 1000 milliLiter(s) (75 mL/Hr) IV Continuous <Continuous>  losartan 100 milliGRAM(s) Oral daily  pantoprazole    Tablet 40 milliGRAM(s) Oral before breakfast  sertraline 25 milliGRAM(s) Oral daily  tamsulosin 0.4 milliGRAM(s) Oral at bedtime    MEDICATIONS  (PRN):  cyclobenzaprine Oral Tab/Cap - Peds 5 milliGRAM(s) Oral two times a day PRN Back spasm  oxycodone    5 mG/acetaminophen 325 mG 1 Tablet(s) Oral every 6 hours PRN Moderate Pain (4 - 6)  prochlorperazine   Injectable 5 milliGRAM(s) IV Push every 6 hours PRN nausea/vomiting    Labs:                        13.1   10.65 )-----------( 262      ( 07 Jan 2020 08:06 )             40.6                         13.8   12.52 )-----------( 273      ( 06 Jan 2020 09:23 )             41.7     06 Jan 2020 09:23    137    |  99     |  12     ----------------------------<  101    3.8     |  19     |  0.6      Ca    8.8        06 Jan 2020 09:23  Mg     2.2       06 Jan 2020 09:23            Culture - Blood (collected 04 Jan 2020 09:44)  Source: .Blood None  Preliminary Report (05 Jan 2020 19:01):    No growth to date.      General: comfortable, NAD  Neurology: A&Ox3  Head:  Normocephalic, atraumatic  ENT:  Mucosa moist, no ulcerations  Neck:  Supple, no JVD,   Skin: no breakdowns (as per RN)  Resp: CTA B/L  CV: RRR, S1S2,   GI: soft, but did not let me palpate as she said its too tender and it will cause her to vomit   MS: No edema, + peripheral pulses, FROM all 4 extremity      A/P:    73 yo F w/ PMH of HTN, HLD, LBP s/p multiple back sx and anxiety presents to the hospital complaining of abdominal pain.      Epigastric pain, vomiting  - GI evel pending  - IVF/ Zofran  - CT abdomen/pelvis negative for acute abdominal pathology  - Abdominal duplex negative  - watch labs      Confusion and aphasia - resolved    -supportive care  - w/u was negative  - Off abx      HTN  - amlodipine, losartan    Cont other meds    on anxiety and pain meds    DVT prophylaxis  Decubitus prevention- all measures as per RN protocol  Please call or text me with any questions or updates

## 2020-01-07 NOTE — PHYSICAL THERAPY INITIAL EVALUATION ADULT - PERTINENT HX OF CURRENT PROBLEM, REHAB EVAL
pt was admitted with c/o abdominal pain and one episode of non-bloody watery stool. also c/o falling while using bathroom and hitting her head with LOC. pt has h/o low back pain and is s/p back surgery and stimulator.

## 2020-01-07 NOTE — DIETITIAN INITIAL EVALUATION ADULT. - MALNUTRITION
Severe PCM of acute illness RT decreased appetite 2/2 epigastric pain/vomiting AEB po intake <50% of estimated needs for >5 days and 7% weight loss in less than a week. GOAL: Pt to advance to po diet, consume >50% of meal tray, and at least maintain CBW w/i 1-2 lbs in 3 days

## 2020-01-07 NOTE — DIETITIAN INITIAL EVALUATION ADULT. - ENERGY NEEDS
calorie: 1297 - 1621 kcals/day (MSJ x 1.2 - 1.5 AF for PCM)  protein: 76 - 88 gms/day (1.2 - 1.4 gm/kg for PCM)  fluid: 1ml/kcal or per LIP

## 2020-01-07 NOTE — PHYSICAL THERAPY INITIAL EVALUATION ADULT - PRECAUTIONS/LIMITATIONS, REHAB EVAL
History     Chief Complaint   Patient presents with     Cough     Cough runny nose and low grade fever x 2 days.  History of pneumonia last year.       HPI    History obtained from mother    Yo is a 1 year old who presents at  9:32 PM with mother for cough, congestion and low grade fever for the past two days.  Patient's mother reports for the past few days patient has had increasing cough and congestion.  However this evening while getting ready for bed she noted some increased belly breathing and tachypnea.  She felt like the patient was having increased work of breathing and was talking in shorter sentences.  Upon initial evaluation here she denies any nausea, vomiting, diarrhea, abdominal pain, rash or altered mental status.  She notes the patient has otherwise been acting like his normal self but is quite fatigued.  Patient is able to tolerate p.o. fluids without difficulty.  No one else at home has been sick recently.  Patient did have diagnosis of pneumonia one year ago and mother reports that she had similar appearance at that time.  Was treated with antibiotics and improved quickly.    PMHx:  No past medical history on file.  No past surgical history on file.  These were reviewed with the patient/family.    MEDICATIONS were reviewed and are as follows:   No current facility-administered medications for this encounter.      Current Outpatient Prescriptions   Medication     amoxicillin (AMOXIL) 400 MG/5ML suspension     ibuprofen (CHILD IBUPROFEN) 100 MG/5ML suspension     acetaminophen (TYLENOL) 160 MG/5ML solution       ALLERGIES:  Review of patient's allergies indicates no known allergies.    IMMUNIZATIONS: Up-to-date by report.    SOCIAL HISTORY: Yo lives with mother and father.      I have reviewed the Medications, Allergies, Past Medical and Surgical History, and Social History in the Epic system.    Review of Systems  Please see HPI for pertinent positives and negatives.  All other systems  reviewed and found to be negative.        Physical Exam   Heart Rate: 137  Temp: 98.4  F (36.9  C)  Resp: (!) 42  Weight: 19.1 kg (42 lb 1.7 oz)  SpO2: 93 %      Physical Exam  Appearance: Appears uncomfortable, well developed, nontoxic, with moist mucous membranes.  HEENT: Head: Normocephalic and atraumatic. Eyes: PERRL, EOM grossly intact, conjunctivae and sclerae clear. Ears: Tympanic membranes clear bilaterally, without inflammation or effusion. Nose: Nares clear with no active discharge.  Mouth/Throat: No oral lesions, pharynx clear with no erythema or exudate.  Neck: Supple, no masses, no meningismus. No significant cervical lymphadenopathy.  Pulmonary:  mild belly breathing and tachypnea.  Possible mild wheezing present.  No obvious rales or rhonchi.  Cardiovascular: Regular rate and rhythm, normal S1 and S2, with no murmurs.  Normal symmetric peripheral pulses and brisk cap refill.  Abdominal: Normal bowel sounds, soft, nontender, nondistended, with no masses and no hepatosplenomegaly.  Neurologic: Alert and oriented, cranial nerves II-XII grossly intact, moving all extremities equally with grossly normal coordination and normal gait.  Extremities/Back: No deformity, no CVA tenderness.  Skin: No significant rashes, ecchymoses, or lacerations.    ED Course     ED Course     Procedures    Results for orders placed or performed during the hospital encounter of 01/04/18 (from the past 24 hour(s))   XR Chest 2 Views    Narrative    Diffuse interstitial prominence peribronchial cuffing, suggestive of  viral illness or reactive airway disease. Mild retrocardiac opacities  may represent superimposed atelectasis or consolidation.       Medications   ipratropium - albuterol 0.5 mg/2.5 mg/3 mL (DUONEB) neb solution 3 mL (3 mLs Nebulization Given 1/4/18 2221)   ibuprofen (ADVIL/MOTRIN) suspension 200 mg (200 mg Oral Given 1/4/18 2220)       Old chart from [facility] reviewed, supported history as above.  Imaging reviewed  and left retrocardiac was concerning for possible pneumonia.   History obtained from family.    Critical care time:  none      Assessments & Plan (with Medical Decision Making)     Patient is an otherwise healthy 1-year-old male who comes in to the emergency department with cough, runny nose and low-grade fevers for the past few days.  However this evening mother was concerned as the patient began having tachypnea and increased work of breathing.  Upon initial evaluation here he is afebrile slightly tachycardic and tachypneic.  Oxygen saturations are in the mid 90s with his zoie along 93%.  Patient does not have any focal rales or rhonchi however does have some mild wheezing bilaterally.  Chest x-ray was performed as the patient was febrile at home and hypoxic here.  There is a small area in the left retrocardiac region of the lung which is concerning for possible consolidation.  Given the patient's overall clinical appearance I will treat the patient for pneumonia with amoxicillin.  Patient was also given 1 DuoNeb in the emergency department without any significant change of his respiratory status.  However mother and I are both comfortable with the patient going home at this time and will return to the emergency department with any new or worsening symptoms including his breathing.  All questions were answered and patient will be discharged home in stable condition.  Advised mother to continue ibuprofen and Tylenol for fevers.    I have reviewed the nursing notes.    I have reviewed the findings, diagnosis, plan and need for follow up with the patient.  New Prescriptions    AMOXICILLIN (AMOXIL) 400 MG/5ML SUSPENSION    Take 9.6 mLs (768 mg) by mouth 2 times daily for 10 days       Final diagnoses:   Pneumonia of left lower lobe due to infectious organism (H)     Montrell Dykes   1/4/2018   [facility] EMERGENCY DEPARTMENT    Patient data was collected by the resident.  Patient was seen and evaluated by me.  I  repeated the history and physical exam of the patient.  I have discussed with the resident the diagnosis, management options, and plan as documented in the Resident Note.  The key portions of the note including the entire assessment and plan reflect my documentation.    Sammie Tadeo MD  Pediatric Emergency Medicine Attending Physician       Sammie Tadeo MD  01/06/18 [id number]     no known precautions/limitations

## 2020-01-07 NOTE — DIETITIAN INITIAL EVALUATION ADULT. - NUTRITIONGOAL OUTCOME1
Pt to advance to po diet, consume >50% of meal tray, and at least maintain CBW w/i 1-2 lbs in 3 days

## 2020-01-08 LAB
ALBUMIN SERPL ELPH-MCNC: 3.5 G/DL — SIGNIFICANT CHANGE UP (ref 3.5–5.2)
ALP SERPL-CCNC: 33 U/L — SIGNIFICANT CHANGE UP (ref 30–115)
ALT FLD-CCNC: 10 U/L — SIGNIFICANT CHANGE UP (ref 0–41)
ANION GAP SERPL CALC-SCNC: 19 MMOL/L — HIGH (ref 7–14)
AST SERPL-CCNC: 14 U/L — SIGNIFICANT CHANGE UP (ref 0–41)
BASOPHILS # BLD AUTO: 0.04 K/UL — SIGNIFICANT CHANGE UP (ref 0–0.2)
BASOPHILS NFR BLD AUTO: 0.4 % — SIGNIFICANT CHANGE UP (ref 0–1)
BILIRUB SERPL-MCNC: 0.7 MG/DL — SIGNIFICANT CHANGE UP (ref 0.2–1.2)
BUN SERPL-MCNC: 11 MG/DL — SIGNIFICANT CHANGE UP (ref 10–20)
CALCIUM SERPL-MCNC: 7.8 MG/DL — LOW (ref 8.5–10.1)
CHLORIDE SERPL-SCNC: 102 MMOL/L — SIGNIFICANT CHANGE UP (ref 98–110)
CO2 SERPL-SCNC: 18 MMOL/L — SIGNIFICANT CHANGE UP (ref 17–32)
CREAT SERPL-MCNC: 0.5 MG/DL — LOW (ref 0.7–1.5)
CULTURE RESULTS: SIGNIFICANT CHANGE UP
EOSINOPHIL # BLD AUTO: 0.01 K/UL — SIGNIFICANT CHANGE UP (ref 0–0.7)
EOSINOPHIL NFR BLD AUTO: 0.1 % — SIGNIFICANT CHANGE UP (ref 0–8)
GLUCOSE SERPL-MCNC: 84 MG/DL — SIGNIFICANT CHANGE UP (ref 70–99)
HCT VFR BLD CALC: 37.1 % — SIGNIFICANT CHANGE UP (ref 37–47)
HGB BLD-MCNC: 12.1 G/DL — SIGNIFICANT CHANGE UP (ref 12–16)
IMM GRANULOCYTES NFR BLD AUTO: 0.5 % — HIGH (ref 0.1–0.3)
LYMPHOCYTES # BLD AUTO: 1.99 K/UL — SIGNIFICANT CHANGE UP (ref 1.2–3.4)
LYMPHOCYTES # BLD AUTO: 19.2 % — LOW (ref 20.5–51.1)
MCHC RBC-ENTMCNC: 27.2 PG — SIGNIFICANT CHANGE UP (ref 27–31)
MCHC RBC-ENTMCNC: 32.6 G/DL — SIGNIFICANT CHANGE UP (ref 32–37)
MCV RBC AUTO: 83.4 FL — SIGNIFICANT CHANGE UP (ref 81–99)
MONOCYTES # BLD AUTO: 1.05 K/UL — HIGH (ref 0.1–0.6)
MONOCYTES NFR BLD AUTO: 10.1 % — HIGH (ref 1.7–9.3)
NEUTROPHILS # BLD AUTO: 7.23 K/UL — HIGH (ref 1.4–6.5)
NEUTROPHILS NFR BLD AUTO: 69.7 % — SIGNIFICANT CHANGE UP (ref 42.2–75.2)
NRBC # BLD: 0 /100 WBCS — SIGNIFICANT CHANGE UP (ref 0–0)
PLATELET # BLD AUTO: 256 K/UL — SIGNIFICANT CHANGE UP (ref 130–400)
POTASSIUM SERPL-MCNC: 3.4 MMOL/L — LOW (ref 3.5–5)
POTASSIUM SERPL-SCNC: 3.4 MMOL/L — LOW (ref 3.5–5)
PROT SERPL-MCNC: 5.5 G/DL — LOW (ref 6–8)
RBC # BLD: 4.45 M/UL — SIGNIFICANT CHANGE UP (ref 4.2–5.4)
RBC # FLD: 15.1 % — HIGH (ref 11.5–14.5)
SODIUM SERPL-SCNC: 139 MMOL/L — SIGNIFICANT CHANGE UP (ref 135–146)
SPECIMEN SOURCE: SIGNIFICANT CHANGE UP
WBC # BLD: 10.37 K/UL — SIGNIFICANT CHANGE UP (ref 4.8–10.8)
WBC # FLD AUTO: 10.37 K/UL — SIGNIFICANT CHANGE UP (ref 4.8–10.8)

## 2020-01-08 RX ORDER — ONDANSETRON 8 MG/1
4 TABLET, FILM COATED ORAL EVERY 8 HOURS
Refills: 0 | Status: DISCONTINUED | OUTPATIENT
Start: 2020-01-08 | End: 2020-01-14

## 2020-01-08 RX ORDER — METOCLOPRAMIDE HCL 10 MG
10 TABLET ORAL THREE TIMES A DAY
Refills: 0 | Status: DISCONTINUED | OUTPATIENT
Start: 2020-01-08 | End: 2020-01-08

## 2020-01-08 RX ORDER — POTASSIUM CHLORIDE 20 MEQ
20 PACKET (EA) ORAL EVERY 4 HOURS
Refills: 0 | Status: COMPLETED | OUTPATIENT
Start: 2020-01-08 | End: 2020-01-08

## 2020-01-08 RX ORDER — METOCLOPRAMIDE HCL 10 MG
10 TABLET ORAL THREE TIMES A DAY
Refills: 0 | Status: DISCONTINUED | OUTPATIENT
Start: 2020-01-08 | End: 2020-01-09

## 2020-01-08 RX ADMIN — SERTRALINE 25 MILLIGRAM(S): 25 TABLET, FILM COATED ORAL at 12:23

## 2020-01-08 RX ADMIN — ONDANSETRON 4 MILLIGRAM(S): 8 TABLET, FILM COATED ORAL at 04:25

## 2020-01-08 RX ADMIN — LOSARTAN POTASSIUM 100 MILLIGRAM(S): 100 TABLET, FILM COATED ORAL at 05:12

## 2020-01-08 RX ADMIN — Medication 33.33 MILLIEQUIVALENT(S): at 14:13

## 2020-01-08 RX ADMIN — Medication 10 MILLIGRAM(S): at 12:54

## 2020-01-08 RX ADMIN — Medication 0.5 MILLIGRAM(S): at 23:18

## 2020-01-08 RX ADMIN — ENOXAPARIN SODIUM 40 MILLIGRAM(S): 100 INJECTION SUBCUTANEOUS at 22:12

## 2020-01-08 RX ADMIN — AMLODIPINE BESYLATE 10 MILLIGRAM(S): 2.5 TABLET ORAL at 05:12

## 2020-01-08 RX ADMIN — Medication 5 MILLIGRAM(S): at 05:12

## 2020-01-08 RX ADMIN — ATORVASTATIN CALCIUM 20 MILLIGRAM(S): 80 TABLET, FILM COATED ORAL at 22:12

## 2020-01-08 RX ADMIN — Medication 0.5 MILLIGRAM(S): at 12:23

## 2020-01-08 RX ADMIN — Medication 0.5 MILLIGRAM(S): at 17:19

## 2020-01-08 RX ADMIN — ONDANSETRON 4 MILLIGRAM(S): 8 TABLET, FILM COATED ORAL at 18:32

## 2020-01-08 RX ADMIN — Medication 33.33 MILLIEQUIVALENT(S): at 17:19

## 2020-01-08 RX ADMIN — Medication 0.5 MILLIGRAM(S): at 05:12

## 2020-01-08 RX ADMIN — TAMSULOSIN HYDROCHLORIDE 0.4 MILLIGRAM(S): 0.4 CAPSULE ORAL at 22:12

## 2020-01-08 RX ADMIN — Medication 5 MILLIGRAM(S): at 01:19

## 2020-01-08 NOTE — PROVIDER CONTACT NOTE (OTHER) - DATE AND TIME:
Left breast is cool to touch normal color scant serosanguineous drainage  Incision site is approximated  Flow  ultrasound present  Tissue ox 61%   Signal quality 98% 08-Jan-2020 00:01

## 2020-01-08 NOTE — PROGRESS NOTE ADULT - SUBJECTIVE AND OBJECTIVE BOX
Hospital Day:  5d    Subjective:    Patient is a 74y old  Female who presents with a chief complaint of Stomach ache. (08 Jan 2020 13:49)    There were no acute overnight events. The patient was seen and examined at the bedside. No complaints. Denies fever, chills, headache, dizziness, chest pain, palpitations, shortness of breath, abdominal pain, nausea, vomiting, diarrhea.    Past Medical Hx:   Chronic back pain  Chronic GERD  HLD (hyperlipidemia)  HTN (hypertension)    Past Sx:    Allergies:  No Known Allergies    Current Meds:   Standng Meds:  ALPRAZolam 0.5 milliGRAM(s) Oral four times a day  amLODIPine   Tablet 10 milliGRAM(s) Oral daily  atorvastatin 20 milliGRAM(s) Oral at bedtime  enoxaparin Injectable 40 milliGRAM(s) SubCutaneous at bedtime  lactated ringers. 1000 milliLiter(s) (75 mL/Hr) IV Continuous <Continuous>  losartan 100 milliGRAM(s) Oral daily  metoclopramide 10 milliGRAM(s) Oral three times a day  pantoprazole    Tablet 40 milliGRAM(s) Oral before breakfast  potassium chloride  20 mEq/100 mL IVPB 20 milliEquivalent(s) IV Intermittent every 4 hours  sertraline 25 milliGRAM(s) Oral daily  tamsulosin 0.4 milliGRAM(s) Oral at bedtime    PRN Meds:  cyclobenzaprine Oral Tab/Cap - Peds 5 milliGRAM(s) Oral two times a day PRN Back spasm  ondansetron Injectable 4 milliGRAM(s) IV Push every 8 hours PRN Nausea and/or Vomiting  oxycodone    5 mG/acetaminophen 325 mG 1 Tablet(s) Oral every 6 hours PRN Moderate Pain (4 - 6)    HOME MEDICATIONS:  ALPRAZolam 0.5 mg oral tablet: 1 tab(s) orally 4 times a day  amLODIPine 10 mg oral tablet: 1 tab(s) orally once a day  Crestor 5 mg oral tablet: 1 tab(s) orally once a day  cyclobenzaprine 5 mg oral tablet: 1 tab(s) orally 2 times a day, As Needed  diclofenac sodium 100 mg oral tablet, extended release: 1 tab(s) orally once a day  Diovan 160 mg oral capsule: 1 tab(s) orally once a day  oxyCODONE-acetaminophen 7.5 mg-325 mg oral tablet: 1 tab(s) orally every 8 hours, As Needed  pantoprazole 40 mg oral delayed release tablet: 1 tab(s) orally once a day  sertraline 25 mg oral tablet: 1 tab(s) orally once a day  tamsulosin 0.4 mg oral capsule: 1 cap(s) orally once a day      Vital Signs:   T(F): 98.6 (01-08-20 @ 13:42), Max: 99.1 (01-07-20 @ 21:00)  HR: 92 (01-08-20 @ 13:42) (72 - 92)  BP: 141/72 (01-08-20 @ 13:42) (134/65 - 141/72)  RR: 18 (01-08-20 @ 13:42) (18 - 18)  SpO2: --      01-07-20 @ 07:01  -  01-08-20 @ 07:00  --------------------------------------------------------  IN: 236 mL / OUT: 1250 mL / NET: -1014 mL    01-08-20 @ 07:01  -  01-08-20 @ 15:59  --------------------------------------------------------  IN: 240 mL / OUT: 650 mL / NET: -410 mL        Physical Exam:   General: Patient resting comfortably in bed, NAD  HEENT: NCAT, conjunctiva clear, sclera white, PEERLA, EOMI, moist mucous membranes, normal orophaynx  Neck: No masses, no JVD, no cervical lymphadenopathy  Respiratory: good inspiratory effort; lungs clear to auscultation bilaterally  CV: Normal rate, regular rhythm, normal S1/S2, no rubs, gallops, murmurs  GI: abdomen soft, non-tender, non-distended, no masses, normal bowel sounds  Extremities: Well-perfused, no clubbing, no cyanosis, no lower extremity edema bilaterally  Skin: warm and dry  Neurology: AAOx3, mentating appropriately, follows commands, nonfocal    Labs:                         12.1   10.37 )-----------( 256      ( 08 Jan 2020 07:30 )             37.1     Neutophil% 69.7, Lymphocyte% 19.2, Monocyte% 10.1, Bands% 0.5 01-08-20 @ 07:30    08 Jan 2020 07:30    139    |  102    |  11     ----------------------------<  84     3.4     |  18     |  0.5      Ca    7.8        08 Jan 2020 07:30  Mg     2.3       07 Jan 2020 08:06    TPro  5.5    /  Alb  3.5    /  TBili  0.7    /  DBili  x      /  AST  14     /  ALT  10     /  AlkPhos  33     08 Jan 2020 07:30                          Culture - Blood (collected 01-04-20 @ 09:44)  Source: .Blood None  Preliminary Report (01-05-20 @ 19:01):    No growth to date.    Culture - Blood (collected 01-03-20 @ 16:34)  Source: .Blood Blood-Peripheral  Preliminary Report (01-04-20 @ 22:01):    No growth to date.    Culture - Blood (collected 01-03-20 @ 16:34)  Source: .Blood Blood-Peripheral  Preliminary Report (01-04-20 @ 22:01):    No growth to date.        Radiology: Hospital Day:  5d    Subjective:    Patient is a 74y old  Female who presents with a chief complaint of Stomach ache. (08 Jan 2020 13:49)    There were no acute overnight events. The patient was seen and examined at the bedside. Patient had one bout of vomiting overnight, but otherwise has not vomited today. Still endorses abdominal pain. Had 2 bouts of watery diarrhea overnight. Otherwise, denies fever, chills, headache, dizziness, chest pain, palpitations, shortness of breath.    Past Medical Hx:   Chronic back pain  Chronic GERD  HLD (hyperlipidemia)  HTN (hypertension)    Past Sx:    Allergies:  No Known Allergies    Current Meds:   Standng Meds:  ALPRAZolam 0.5 milliGRAM(s) Oral four times a day  amLODIPine   Tablet 10 milliGRAM(s) Oral daily  atorvastatin 20 milliGRAM(s) Oral at bedtime  enoxaparin Injectable 40 milliGRAM(s) SubCutaneous at bedtime  lactated ringers. 1000 milliLiter(s) (75 mL/Hr) IV Continuous <Continuous>  losartan 100 milliGRAM(s) Oral daily  metoclopramide 10 milliGRAM(s) Oral three times a day  pantoprazole    Tablet 40 milliGRAM(s) Oral before breakfast  potassium chloride  20 mEq/100 mL IVPB 20 milliEquivalent(s) IV Intermittent every 4 hours  sertraline 25 milliGRAM(s) Oral daily  tamsulosin 0.4 milliGRAM(s) Oral at bedtime    PRN Meds:  cyclobenzaprine Oral Tab/Cap - Peds 5 milliGRAM(s) Oral two times a day PRN Back spasm  ondansetron Injectable 4 milliGRAM(s) IV Push every 8 hours PRN Nausea and/or Vomiting  oxycodone    5 mG/acetaminophen 325 mG 1 Tablet(s) Oral every 6 hours PRN Moderate Pain (4 - 6)    HOME MEDICATIONS:  ALPRAZolam 0.5 mg oral tablet: 1 tab(s) orally 4 times a day  amLODIPine 10 mg oral tablet: 1 tab(s) orally once a day  Crestor 5 mg oral tablet: 1 tab(s) orally once a day  cyclobenzaprine 5 mg oral tablet: 1 tab(s) orally 2 times a day, As Needed  diclofenac sodium 100 mg oral tablet, extended release: 1 tab(s) orally once a day  Diovan 160 mg oral capsule: 1 tab(s) orally once a day  oxyCODONE-acetaminophen 7.5 mg-325 mg oral tablet: 1 tab(s) orally every 8 hours, As Needed  pantoprazole 40 mg oral delayed release tablet: 1 tab(s) orally once a day  sertraline 25 mg oral tablet: 1 tab(s) orally once a day  tamsulosin 0.4 mg oral capsule: 1 cap(s) orally once a day      Vital Signs:   T(F): 98.6 (01-08-20 @ 13:42), Max: 99.1 (01-07-20 @ 21:00)  HR: 92 (01-08-20 @ 13:42) (72 - 92)  BP: 141/72 (01-08-20 @ 13:42) (134/65 - 141/72)  RR: 18 (01-08-20 @ 13:42) (18 - 18)  SpO2: --      01-07-20 @ 07:01  -  01-08-20 @ 07:00  --------------------------------------------------------  IN: 236 mL / OUT: 1250 mL / NET: -1014 mL    01-08-20 @ 07:01  -  01-08-20 @ 15:59  --------------------------------------------------------  IN: 240 mL / OUT: 650 mL / NET: -410 mL        Physical Exam:   General: Patient resting in bed, frail appearing, NAD  HEENT: NCAT, conjunctiva clear, sclera white, PEERLA, EOMI, moist mucous membranes, normal orophaynx  Neck: No masses, no JVD, no cervical lymphadenopathy  Respiratory: lungs clear to auscultation bilaterally  CV: Normal rate, regular rhythm, normal S1/S2, systolic murmur  GI: abdomen soft, diffusely tender to palpation, non-distended, no masses, normal bowel sounds  Extremities: Well-perfused, no clubbing, no cyanosis, no lower extremity edema bilaterally  Skin: warm and dry    Labs:                         12.1   10.37 )-----------( 256      ( 08 Jan 2020 07:30 )             37.1     Neutophil% 69.7, Lymphocyte% 19.2, Monocyte% 10.1, Bands% 0.5 01-08-20 @ 07:30    08 Jan 2020 07:30    139    |  102    |  11     ----------------------------<  84     3.4     |  18     |  0.5      Ca    7.8        08 Jan 2020 07:30  Mg     2.3       07 Jan 2020 08:06    TPro  5.5    /  Alb  3.5    /  TBili  0.7    /  DBili  x      /  AST  14     /  ALT  10     /  AlkPhos  33     08 Jan 2020 07:30                          Culture - Blood (collected 01-04-20 @ 09:44)  Source: .Blood None  Preliminary Report (01-05-20 @ 19:01):    No growth to date.    Culture - Blood (collected 01-03-20 @ 16:34)  Source: .Blood Blood-Peripheral  Preliminary Report (01-04-20 @ 22:01):    No growth to date.    Culture - Blood (collected 01-03-20 @ 16:34)  Source: .Blood Blood-Peripheral  Preliminary Report (01-04-20 @ 22:01):    No growth to date.

## 2020-01-08 NOTE — PROGRESS NOTE ADULT - ASSESSMENT
Ms. Chow is a pleasant 75 yo F w/ PMH of HTN, HLD, LBP s/p multiple back sx and anxiety presents to the hospital complaining of abdominal pain.     #Confusion and aphasia, resolved    - CTH negative  - patient now AAOx3  - non-focal exam  - afebrile, leukocytosis resolved  - lactic acidosis resolved  - BCx, UCx, Flu negative  - Per toxicology, may be secondary to opioid use or withdrawal; symptoms did improve with inpatient     # Epigastric pain, vomiting  - likely viral gastroenteritis   - CT abdomen/pelvis negative for acute abdominal pathology  - Abdominal duplex negative  - Leukocytosis resolved  - ID on board: monitor off Abx  - c/w protonix   - LR @75  - Zofran PRN  - Follow BMP, replete electrolytes as needed  - GI consult placed for persistent pain out of proportion to exam and nausea/vomiting (Dr. Vásquez); called Dr. Vásquez's office, left message; will follow up    # HTN  - continue amlodipine, losartan    # HLD  - continue atorvastatin 20mg daily    # Anxiety  - continue home dose of xanax    # LBP s/p multiple back sx   - ok to continue home meds as per H&P  - continue pain meds PRN     #Diet: NPO until vomiting resolves  #GI ppx: Protonix   #DVT ppx: Lovenox 40 mg qD  #Activity: Increase as tolerated; PT consult pending  #Dispo: acute  FULL CODE Ms. Chow is a pleasant 73 yo F w/ PMH of HTN, HLD, LBP s/p multiple back sx and anxiety presents to the hospital complaining of abdominal pain.     #Confusion and aphasia, resolved    - CTH negative  - patient now AAOx3  - non-focal exam  - afebrile, leukocytosis resolved  - lactic acidosis resolved  - BCx, UCx, Flu negative  - Per toxicology, may be secondary to opioid use or withdrawal; symptoms did improve with inpatient     # Abdominal pain, vomiting  - Positive for norovirus   - CT abdomen/pelvis negative for acute abdominal pathology  - Abdominal duplex negative  - Leukocytosis resolved  - ID on board: monitor off Abx  - c/w protonix   - LR @75  - Zofran PRN  - Reglan 10mg TID, standing  - Follow BMP, replete electrolytes as needed  - GI on board  - Advance diet as tolerated    # HTN  - continue amlodipine, losartan    # HLD  - continue atorvastatin 20mg daily    # Anxiety  - continue home dose of xanax    # LBP s/p multiple back sx   - ok to continue home meds as per H&P  - continue pain meds PRN     #Diet: Full liquids  #GI ppx: Protonix   #DVT ppx: Lovenox 40 mg qD  #Activity: Increase as tolerated; PT eval: patient at risk for falls  #Dispo: acute  FULL CODE

## 2020-01-08 NOTE — PROGRESS NOTE ADULT - SUBJECTIVE AND OBJECTIVE BOX
Patient was seen and examined. Spoke with RN. Chart reviewed.  No events overnight.  Vital Signs Last 24 Hrs  T(F): 98.6 (08 Jan 2020 13:42), Max: 99.1 (07 Jan 2020 21:00)  HR: 92 (08 Jan 2020 13:42) (72 - 92)  BP: 141/72 (08 Jan 2020 13:42) (134/65 - 141/72)  SpO2: --  MEDICATIONS  (STANDING):  ALPRAZolam 0.5 milliGRAM(s) Oral four times a day  amLODIPine   Tablet 10 milliGRAM(s) Oral daily  atorvastatin 20 milliGRAM(s) Oral at bedtime  enoxaparin Injectable 40 milliGRAM(s) SubCutaneous at bedtime  lactated ringers. 1000 milliLiter(s) (75 mL/Hr) IV Continuous <Continuous>  losartan 100 milliGRAM(s) Oral daily  pantoprazole    Tablet 40 milliGRAM(s) Oral before breakfast  potassium chloride  20 mEq/100 mL IVPB 20 milliEquivalent(s) IV Intermittent every 4 hours  sertraline 25 milliGRAM(s) Oral daily  tamsulosin 0.4 milliGRAM(s) Oral at bedtime    MEDICATIONS  (PRN):  cyclobenzaprine Oral Tab/Cap - Peds 5 milliGRAM(s) Oral two times a day PRN Back spasm  metoclopramide 10 milliGRAM(s) Oral three times a day PRN nausea/vomiting  oxycodone    5 mG/acetaminophen 325 mG 1 Tablet(s) Oral every 6 hours PRN Moderate Pain (4 - 6)    Labs:                        12.1   10.37 )-----------( 256      ( 08 Jan 2020 07:30 )             37.1                         13.1   10.65 )-----------( 262      ( 07 Jan 2020 08:06 )             40.6     08 Jan 2020 07:30    139    |  102    |  11     ----------------------------<  84     3.4     |  18     |  0.5    07 Jan 2020 08:06    137    |  99     |  12     ----------------------------<  92     3.4     |  21     |  0.5      Ca    7.8        08 Jan 2020 07:30  Ca    8.2        07 Jan 2020 08:06  Mg     2.3       07 Jan 2020 08:06    TPro  5.5    /  Alb  3.5    /  TBili  0.7    /  DBili  x      /  AST  14     /  ALT  10     /  AlkPhos  33     08 Jan 2020 07:30          GI PCR Panel, Stool (collected 07 Jan 2020 12:29)  Source: .Stool Feces  Final Report (08 Jan 2020 02:26):    Norovirus GI/GII    DETECTED by PCR    *******Please Note:*******    GI panel PCR evaluates for:    Campylobacter, Plesiomonas shigelloides, Salmonella,    Vibrio, Yersinia enterocolitica, Enteroaggregative    Escherichia coli (EAEC), Enteropathogenic E.coli (EPEC),    Enterotoxigenic E. coli (ETEC) lt/st, Shiga-like    toxin-producing E. coli (STEC) stx1/stx2,    Shigella/ Enteroinvasive E. coli (EIEC), Cryptosporidium,    Cyclospora cayetanensis, Entamoeba histolytica,    Giardia lamblia, Adenovirus F 40/41, Astrovirus,    Norovirus GI/GII, Rotavirus A, Sapovirus      General: uncomfortable,   Neurology: A&Ox3, nonfocal  Head:  Normocephalic, atraumatic  ENT:  Mucosa moist, no ulcerations  Neck:  Supple, no JVD,   Skin: no breakdowns (as per RN)  Resp: CTA B/L  CV: RRR, S1S2,   GI: Soft, tenderness, bowel sounds  MS: No edema, + peripheral pulses, FROM all 4 extremity      A/P:  75 yo F w/ PMH of HTN, HLD, LBP s/p multiple back sx and anxiety presents to the hospital complaining of abdominal pain- still with abdominal discomfort and nausea    Norovirus gastroenteritis     poor PO intake   diet as tolerated  isolation  IVF   OOB  off abx  encourage PO intake  GI f/u   d/c when PO intake adequate   DVT prophylaxis  Decubitus prevention- all measures as per RN protocol  Please call or text me with any questions or updates

## 2020-01-09 LAB
ANION GAP SERPL CALC-SCNC: 20 MMOL/L — HIGH (ref 7–14)
BASOPHILS # BLD AUTO: 0.04 K/UL — SIGNIFICANT CHANGE UP (ref 0–0.2)
BASOPHILS NFR BLD AUTO: 0.3 % — SIGNIFICANT CHANGE UP (ref 0–1)
BUN SERPL-MCNC: 7 MG/DL — LOW (ref 10–20)
CALCIUM SERPL-MCNC: 7.6 MG/DL — LOW (ref 8.5–10.1)
CHLORIDE SERPL-SCNC: 102 MMOL/L — SIGNIFICANT CHANGE UP (ref 98–110)
CO2 SERPL-SCNC: 21 MMOL/L — SIGNIFICANT CHANGE UP (ref 17–32)
CREAT SERPL-MCNC: 0.5 MG/DL — LOW (ref 0.7–1.5)
CULTURE RESULTS: SIGNIFICANT CHANGE UP
EOSINOPHIL # BLD AUTO: 0.09 K/UL — SIGNIFICANT CHANGE UP (ref 0–0.7)
EOSINOPHIL NFR BLD AUTO: 0.8 % — SIGNIFICANT CHANGE UP (ref 0–8)
GLUCOSE SERPL-MCNC: 90 MG/DL — SIGNIFICANT CHANGE UP (ref 70–99)
HCT VFR BLD CALC: 35.3 % — LOW (ref 37–47)
HGB BLD-MCNC: 11.8 G/DL — LOW (ref 12–16)
IMM GRANULOCYTES NFR BLD AUTO: 0.5 % — HIGH (ref 0.1–0.3)
LYMPHOCYTES # BLD AUTO: 2.74 K/UL — SIGNIFICANT CHANGE UP (ref 1.2–3.4)
LYMPHOCYTES # BLD AUTO: 23.6 % — SIGNIFICANT CHANGE UP (ref 20.5–51.1)
MCHC RBC-ENTMCNC: 28.2 PG — SIGNIFICANT CHANGE UP (ref 27–31)
MCHC RBC-ENTMCNC: 33.4 G/DL — SIGNIFICANT CHANGE UP (ref 32–37)
MCV RBC AUTO: 84.2 FL — SIGNIFICANT CHANGE UP (ref 81–99)
MONOCYTES # BLD AUTO: 1.17 K/UL — HIGH (ref 0.1–0.6)
MONOCYTES NFR BLD AUTO: 10.1 % — HIGH (ref 1.7–9.3)
NEUTROPHILS # BLD AUTO: 7.51 K/UL — HIGH (ref 1.4–6.5)
NEUTROPHILS NFR BLD AUTO: 64.7 % — SIGNIFICANT CHANGE UP (ref 42.2–75.2)
NRBC # BLD: 0 /100 WBCS — SIGNIFICANT CHANGE UP (ref 0–0)
PLATELET # BLD AUTO: 255 K/UL — SIGNIFICANT CHANGE UP (ref 130–400)
POTASSIUM SERPL-MCNC: 3.4 MMOL/L — LOW (ref 3.5–5)
POTASSIUM SERPL-SCNC: 3.4 MMOL/L — LOW (ref 3.5–5)
RBC # BLD: 4.19 M/UL — LOW (ref 4.2–5.4)
RBC # FLD: 15.1 % — HIGH (ref 11.5–14.5)
SODIUM SERPL-SCNC: 143 MMOL/L — SIGNIFICANT CHANGE UP (ref 135–146)
SPECIMEN SOURCE: SIGNIFICANT CHANGE UP
WBC # BLD: 11.61 K/UL — HIGH (ref 4.8–10.8)
WBC # FLD AUTO: 11.61 K/UL — HIGH (ref 4.8–10.8)

## 2020-01-09 RX ORDER — PROCHLORPERAZINE MALEATE 5 MG
10 TABLET ORAL THREE TIMES A DAY
Refills: 0 | Status: DISCONTINUED | OUTPATIENT
Start: 2020-01-09 | End: 2020-01-14

## 2020-01-09 RX ORDER — POTASSIUM CHLORIDE 20 MEQ
20 PACKET (EA) ORAL EVERY 4 HOURS
Refills: 0 | Status: COMPLETED | OUTPATIENT
Start: 2020-01-09 | End: 2020-01-09

## 2020-01-09 RX ADMIN — Medication 10 MILLIGRAM(S): at 13:50

## 2020-01-09 RX ADMIN — Medication 0.5 MILLIGRAM(S): at 17:04

## 2020-01-09 RX ADMIN — PANTOPRAZOLE SODIUM 40 MILLIGRAM(S): 20 TABLET, DELAYED RELEASE ORAL at 06:41

## 2020-01-09 RX ADMIN — SODIUM CHLORIDE 75 MILLILITER(S): 9 INJECTION, SOLUTION INTRAVENOUS at 23:28

## 2020-01-09 RX ADMIN — ONDANSETRON 4 MILLIGRAM(S): 8 TABLET, FILM COATED ORAL at 20:25

## 2020-01-09 RX ADMIN — Medication 10 MILLIGRAM(S): at 21:02

## 2020-01-09 RX ADMIN — Medication 33.33 MILLIEQUIVALENT(S): at 13:50

## 2020-01-09 RX ADMIN — SERTRALINE 25 MILLIGRAM(S): 25 TABLET, FILM COATED ORAL at 11:17

## 2020-01-09 RX ADMIN — ENOXAPARIN SODIUM 40 MILLIGRAM(S): 100 INJECTION SUBCUTANEOUS at 21:02

## 2020-01-09 RX ADMIN — Medication 0.5 MILLIGRAM(S): at 23:28

## 2020-01-09 RX ADMIN — OXYCODONE AND ACETAMINOPHEN 1 TABLET(S): 5; 325 TABLET ORAL at 11:17

## 2020-01-09 RX ADMIN — Medication 33.33 MILLIEQUIVALENT(S): at 17:05

## 2020-01-09 RX ADMIN — OXYCODONE AND ACETAMINOPHEN 1 TABLET(S): 5; 325 TABLET ORAL at 02:28

## 2020-01-09 RX ADMIN — OXYCODONE AND ACETAMINOPHEN 1 TABLET(S): 5; 325 TABLET ORAL at 03:00

## 2020-01-09 RX ADMIN — Medication 0.5 MILLIGRAM(S): at 05:49

## 2020-01-09 RX ADMIN — TAMSULOSIN HYDROCHLORIDE 0.4 MILLIGRAM(S): 0.4 CAPSULE ORAL at 21:02

## 2020-01-09 RX ADMIN — ATORVASTATIN CALCIUM 20 MILLIGRAM(S): 80 TABLET, FILM COATED ORAL at 21:02

## 2020-01-09 RX ADMIN — Medication 0.5 MILLIGRAM(S): at 11:16

## 2020-01-09 RX ADMIN — OXYCODONE AND ACETAMINOPHEN 1 TABLET(S): 5; 325 TABLET ORAL at 11:50

## 2020-01-09 RX ADMIN — ONDANSETRON 4 MILLIGRAM(S): 8 TABLET, FILM COATED ORAL at 02:28

## 2020-01-09 NOTE — PROGRESS NOTE ADULT - SUBJECTIVE AND OBJECTIVE BOX
Hospital Day:  6d    Subjective:    Patient is a 74y old  Female who presents with a chief complaint of Stomach ache. (08 Jan 2020 15:53)    There were no acute overnight events. The patient was seen and examined at the bedside. Yesterday, the patient attempted to eat and had an episode of diarrhea and felt nauseated for two hours afterwards. Denies fever, chills, headache, dizziness, chest pain, palpitations, shortness of breath, abdominal pain, nausea, vomiting, diarrhea.    Past Medical Hx:   Chronic back pain  Chronic GERD  HLD (hyperlipidemia)  HTN (hypertension)    Past Sx:    Allergies:  No Known Allergies    Current Meds:   Standng Meds:  ALPRAZolam 0.5 milliGRAM(s) Oral four times a day  amLODIPine   Tablet 10 milliGRAM(s) Oral daily  atorvastatin 20 milliGRAM(s) Oral at bedtime  enoxaparin Injectable 40 milliGRAM(s) SubCutaneous at bedtime  lactated ringers. 1000 milliLiter(s) (75 mL/Hr) IV Continuous <Continuous>  losartan 100 milliGRAM(s) Oral daily  metoclopramide 10 milliGRAM(s) Oral three times a day  pantoprazole    Tablet 40 milliGRAM(s) Oral before breakfast  sertraline 25 milliGRAM(s) Oral daily  tamsulosin 0.4 milliGRAM(s) Oral at bedtime    PRN Meds:  cyclobenzaprine Oral Tab/Cap - Peds 5 milliGRAM(s) Oral two times a day PRN Back spasm  ondansetron Injectable 4 milliGRAM(s) IV Push every 8 hours PRN Nausea and/or Vomiting  oxycodone    5 mG/acetaminophen 325 mG 1 Tablet(s) Oral every 6 hours PRN Moderate Pain (4 - 6)    HOME MEDICATIONS:  ALPRAZolam 0.5 mg oral tablet: 1 tab(s) orally 4 times a day  amLODIPine 10 mg oral tablet: 1 tab(s) orally once a day  Crestor 5 mg oral tablet: 1 tab(s) orally once a day  cyclobenzaprine 5 mg oral tablet: 1 tab(s) orally 2 times a day, As Needed  diclofenac sodium 100 mg oral tablet, extended release: 1 tab(s) orally once a day  Diovan 160 mg oral capsule: 1 tab(s) orally once a day  oxyCODONE-acetaminophen 7.5 mg-325 mg oral tablet: 1 tab(s) orally every 8 hours, As Needed  pantoprazole 40 mg oral delayed release tablet: 1 tab(s) orally once a day  sertraline 25 mg oral tablet: 1 tab(s) orally once a day  tamsulosin 0.4 mg oral capsule: 1 cap(s) orally once a day      Vital Signs:   T(F): 98.2 (01-09-20 @ 05:10), Max: 98.6 (01-08-20 @ 13:42)  HR: 71 (01-09-20 @ 05:10) (71 - 92)  BP: 103/55 (01-09-20 @ 05:10) (103/55 - 141/72)  RR: 18 (01-09-20 @ 05:10) (18 - 18)  SpO2: --      01-07-20 @ 07:01  -  01-08-20 @ 07:00  --------------------------------------------------------  IN: 236 mL / OUT: 1250 mL / NET: -1014 mL    01-08-20 @ 07:01  -  01-09-20 @ 06:46  --------------------------------------------------------  IN: 240 mL / OUT: 2200 mL / NET: -1960 mL        Physical Exam:   General: Patient resting in bed, frail appearing, NAD  HEENT: NCAT, conjunctiva clear, sclera white, PEERLA, EOMI, moist mucous membranes, normal orophaynx  Neck: No masses, no JVD, no cervical lymphadenopathy  Respiratory: lungs clear to auscultation bilaterally  CV: Normal rate, regular rhythm, normal S1/S2, systolic murmur  GI: abdomen soft, diffusely tender to palpation, non-distended, no masses, normal bowel sounds  Extremities: Well-perfused, no clubbing, no cyanosis, no lower extremity edema bilaterally  Skin: warm and dry    Labs:                         12.1   10.37 )-----------( 256      ( 08 Jan 2020 07:30 )             37.1     Neutophil% 69.7, Lymphocyte% 19.2, Monocyte% 10.1, Bands% 0.5 01-08-20 @ 07:30    08 Jan 2020 07:30    139    |  102    |  11     ----------------------------<  84     3.4     |  18     |  0.5      Ca    7.8        08 Jan 2020 07:30  Mg     2.3       07 Jan 2020 08:06    TPro  5.5    /  Alb  3.5    /  TBili  0.7    /  DBili  x      /  AST  14     /  ALT  10     /  AlkPhos  33     08 Jan 2020 07:30                          Culture - Blood (collected 01-04-20 @ 09:44)  Source: .Blood None  Preliminary Report (01-05-20 @ 19:01):    No growth to date.    Culture - Blood (collected 01-03-20 @ 16:34)  Source: .Blood Blood-Peripheral  Final Report (01-08-20 @ 22:01):    No growth at 5 days.    Culture - Blood (collected 01-03-20 @ 16:34)  Source: .Blood Blood-Peripheral  Final Report (01-08-20 @ 22:01):    No growth at 5 days. Hospital Day:  6d    Subjective:    Patient is a 74y old  Female who presents with a chief complaint of Stomach ache. (08 Jan 2020 15:53)    Yesterday, the patient attempted to eat and had an episode of diarrhea and felt nauseated for two hours afterwards. This morning she states that she had 2 episodes of diarrhea over night and was nauseous, requiring zofran, but did not vomit. Patient complains that reglan makes her feel worse and would prefer compazine for nausea. Otherwise, denies fever, chills, headache, dizziness, chest pain, palpitations, shortness of breath.    Past Medical Hx:   Chronic back pain  Chronic GERD  HLD (hyperlipidemia)  HTN (hypertension)    Past Sx:    Allergies:  No Known Allergies    Current Meds:   Standng Meds:  ALPRAZolam 0.5 milliGRAM(s) Oral four times a day  amLODIPine   Tablet 10 milliGRAM(s) Oral daily  atorvastatin 20 milliGRAM(s) Oral at bedtime  enoxaparin Injectable 40 milliGRAM(s) SubCutaneous at bedtime  lactated ringers. 1000 milliLiter(s) (75 mL/Hr) IV Continuous <Continuous>  losartan 100 milliGRAM(s) Oral daily  metoclopramide 10 milliGRAM(s) Oral three times a day  pantoprazole    Tablet 40 milliGRAM(s) Oral before breakfast  sertraline 25 milliGRAM(s) Oral daily  tamsulosin 0.4 milliGRAM(s) Oral at bedtime    PRN Meds:  cyclobenzaprine Oral Tab/Cap - Peds 5 milliGRAM(s) Oral two times a day PRN Back spasm  ondansetron Injectable 4 milliGRAM(s) IV Push every 8 hours PRN Nausea and/or Vomiting  oxycodone    5 mG/acetaminophen 325 mG 1 Tablet(s) Oral every 6 hours PRN Moderate Pain (4 - 6)    HOME MEDICATIONS:  ALPRAZolam 0.5 mg oral tablet: 1 tab(s) orally 4 times a day  amLODIPine 10 mg oral tablet: 1 tab(s) orally once a day  Crestor 5 mg oral tablet: 1 tab(s) orally once a day  cyclobenzaprine 5 mg oral tablet: 1 tab(s) orally 2 times a day, As Needed  diclofenac sodium 100 mg oral tablet, extended release: 1 tab(s) orally once a day  Diovan 160 mg oral capsule: 1 tab(s) orally once a day  oxyCODONE-acetaminophen 7.5 mg-325 mg oral tablet: 1 tab(s) orally every 8 hours, As Needed  pantoprazole 40 mg oral delayed release tablet: 1 tab(s) orally once a day  sertraline 25 mg oral tablet: 1 tab(s) orally once a day  tamsulosin 0.4 mg oral capsule: 1 cap(s) orally once a day      Vital Signs:   T(F): 98.2 (01-09-20 @ 05:10), Max: 98.6 (01-08-20 @ 13:42)  HR: 71 (01-09-20 @ 05:10) (71 - 92)  BP: 103/55 (01-09-20 @ 05:10) (103/55 - 141/72)  RR: 18 (01-09-20 @ 05:10) (18 - 18)  SpO2: --      01-07-20 @ 07:01  -  01-08-20 @ 07:00  --------------------------------------------------------  IN: 236 mL / OUT: 1250 mL / NET: -1014 mL    01-08-20 @ 07:01  -  01-09-20 @ 06:46  --------------------------------------------------------  IN: 240 mL / OUT: 2200 mL / NET: -1960 mL        Physical Exam:   General: Patient resting in bed, frail appearing, NAD  HEENT: NCAT, conjunctiva clear, sclera white, PEERLA, EOMI, moist mucous membranes, normal orophaynx  Neck: No masses, no JVD, no cervical lymphadenopathy  Respiratory: lungs clear to auscultation bilaterally  CV: Normal rate, regular rhythm, normal S1/S2, systolic murmur  GI: abdomen soft, lower abdomen tender to palpation, non-distended, no masses, normal bowel sounds  Extremities: Well-perfused, no clubbing, no cyanosis, no lower extremity edema bilaterally  Skin: warm and dry    Labs:                         12.1   10.37 )-----------( 256      ( 08 Jan 2020 07:30 )             37.1     Neutophil% 69.7, Lymphocyte% 19.2, Monocyte% 10.1, Bands% 0.5 01-08-20 @ 07:30    08 Jan 2020 07:30    139    |  102    |  11     ----------------------------<  84     3.4     |  18     |  0.5      Ca    7.8        08 Jan 2020 07:30  Mg     2.3       07 Jan 2020 08:06    TPro  5.5    /  Alb  3.5    /  TBili  0.7    /  DBili  x      /  AST  14     /  ALT  10     /  AlkPhos  33     08 Jan 2020 07:30                          Culture - Blood (collected 01-04-20 @ 09:44)  Source: .Blood None  Preliminary Report (01-05-20 @ 19:01):    No growth to date.    Culture - Blood (collected 01-03-20 @ 16:34)  Source: .Blood Blood-Peripheral  Final Report (01-08-20 @ 22:01):    No growth at 5 days.    Culture - Blood (collected 01-03-20 @ 16:34)  Source: .Blood Blood-Peripheral  Final Report (01-08-20 @ 22:01):    No growth at 5 days.

## 2020-01-09 NOTE — PROGRESS NOTE ADULT - ASSESSMENT
Ms. Chow is a pleasant 75 yo F w/ PMH of HTN, HLD, LBP s/p multiple back sx and anxiety presents to the hospital complaining of abdominal pain.     # Abdominal pain, vomiting  - Positive for norovirus   - CT abdomen/pelvis negative for acute abdominal pathology  - Abdominal duplex negative  - Leukocytosis resolved  - ID on board: monitor off Abx  - c/w protonix   - LR @75  - Zofran PRN  - Reglan 10mg TID, standing  - Follow BMP, replete electrolytes as needed  - GI on board  - Advance diet as tolerated    #Confusion and aphasia, resolved    - CTH negative  - patient now AAOx3  - non-focal exam  - afebrile, leukocytosis resolved  - lactic acidosis resolved  - BCx, UCx, Flu negative  - Per toxicology, may be secondary to opioid use or withdrawal; symptoms did improve with inpatient     # HTN  - continue amlodipine, losartan    # HLD  - continue atorvastatin 20mg daily    # Anxiety  - continue home dose of xanax    # LBP s/p multiple back sx   - ok to continue home meds as per H&P  - continue pain meds PRN     #Diet: Full liquids  #GI ppx: Protonix   #DVT ppx: Lovenox 40 mg qD  #Activity: Increase as tolerated; PT eval: patient at risk for falls  #Dispo: acute  FULL CODE Ms. Chow is a pleasant 75 yo F w/ PMH of HTN, HLD, LBP s/p multiple back sx and anxiety presents to the hospital complaining of abdominal pain.     # Abdominal pain, vomiting  - Positive for norovirus   - CT abdomen/pelvis negative for acute abdominal pathology  - Abdominal duplex negative  - Leukocytosis resolved  - ID on board: monitor off Abx  - c/w protonix   - LR @ 75  - Zofran PRN  - compazine standing  - Follow BMP, replete electrolytes as needed  - GI on board  - Advance diet as tolerated    #Confusion and aphasia, resolved    - CTH negative  - patient now AAOx3  - non-focal exam  - afebrile, leukocytosis resolved  - lactic acidosis resolved  - BCx, UCx, Flu negative  - Per toxicology, may be secondary to opioid use or withdrawal; symptoms did improve with inpatient     # HTN  - continue amlodipine, losartan    # HLD  - continue atorvastatin 20mg daily    # Anxiety  - continue home dose of xanax    # LBP s/p multiple back sx   - ok to continue home meds as per H&P  - continue pain meds PRN     #Diet: Full liquids  #GI ppx: Protonix   #DVT ppx: Lovenox 40 mg qD  #Activity: Increase as tolerated; PT eval: patient at risk for falls  #Dispo: pending tolerance of diet  FULL CODE

## 2020-01-09 NOTE — PROGRESS NOTE ADULT - SUBJECTIVE AND OBJECTIVE BOX
Patient was seen and examined. Spoke with RN. Chart reviewed.  No events overnight.  Vital Signs Last 24 Hrs  T(F): 98.2 (09 Jan 2020 05:10), Max: 98.6 (08 Jan 2020 13:42)  HR: 71 (09 Jan 2020 05:10) (71 - 92)  BP: 103/55 (09 Jan 2020 05:10) (103/55 - 141/72)  SpO2: --  MEDICATIONS  (STANDING):  ALPRAZolam 0.5 milliGRAM(s) Oral four times a day  amLODIPine   Tablet 10 milliGRAM(s) Oral daily  atorvastatin 20 milliGRAM(s) Oral at bedtime  enoxaparin Injectable 40 milliGRAM(s) SubCutaneous at bedtime  lactated ringers. 1000 milliLiter(s) (75 mL/Hr) IV Continuous <Continuous>  losartan 100 milliGRAM(s) Oral daily  pantoprazole    Tablet 40 milliGRAM(s) Oral before breakfast  prochlorperazine   Tablet 10 milliGRAM(s) Oral three times a day  sertraline 25 milliGRAM(s) Oral daily  tamsulosin 0.4 milliGRAM(s) Oral at bedtime    MEDICATIONS  (PRN):  cyclobenzaprine Oral Tab/Cap - Peds 5 milliGRAM(s) Oral two times a day PRN Back spasm  ondansetron Injectable 4 milliGRAM(s) IV Push every 8 hours PRN Nausea and/or Vomiting  oxycodone    5 mG/acetaminophen 325 mG 1 Tablet(s) Oral every 6 hours PRN Moderate Pain (4 - 6)    Labs:                        11.8   11.61 )-----------( 255      ( 09 Jan 2020 09:00 )             35.3                         12.1   10.37 )-----------( 256      ( 08 Jan 2020 07:30 )             37.1     09 Jan 2020 09:00    143    |  102    |  7      ----------------------------<  90     3.4     |  21     |  0.5    08 Jan 2020 07:30    139    |  102    |  11     ----------------------------<  84     3.4     |  18     |  0.5      Ca    7.6        09 Jan 2020 09:00  Ca    7.8        08 Jan 2020 07:30    TPro  5.5    /  Alb  3.5    /  TBili  0.7    /  DBili  x      /  AST  14     /  ALT  10     /  AlkPhos  33     08 Jan 2020 07:30          GI PCR Panel, Stool (collected 07 Jan 2020 12:29)  Source: .Stool Feces  Final Report (08 Jan 2020 02:26):    Norovirus GI/GII    DETECTED by PCR    *******Please Note:*******    GI panel PCR evaluates for:    Campylobacter, Plesiomonas shigelloides, Salmonella,    Vibrio, Yersinia enterocolitica, Enteroaggregative    Escherichia coli (EAEC), Enteropathogenic E.coli (EPEC),    Enterotoxigenic E. coli (ETEC) lt/st, Shiga-like    toxin-producing E. coli (STEC) stx1/stx2,    Shigella/ Enteroinvasive E. coli (EIEC), Cryptosporidium,    Cyclospora cayetanensis, Entamoeba histolytica,    Giardia lamblia, Adenovirus F 40/41, Astrovirus,    Norovirus GI/GII, Rotavirus A, Sapovirus      General: uncomfortable,   Neurology: A&Ox3, nonfocal  Head:  Normocephalic, atraumatic  ENT:  Mucosa moist, no ulcerations  Neck:  Supple, no JVD,   Skin: no breakdowns (as per RN)  Resp: CTA B/L  CV: RRR, S1S2,   GI: Soft, tenderness, bowel sounds  MS: No edema, + peripheral pulses, FROM all 4 extremity      A/P:  75 yo F w/ PMH of HTN, HLD, LBP s/p multiple back sx and anxiety presents to the hospital complaining of abdominal pain- still with abdominal discomfort and nausea    Norovirus gastroenteritis     serial abd exams   poor PO intake   diet as tolerated  isolation  IVF   OOB  off abx  encourage PO intake  GI f/u   d/c when PO intake adequate, hopefully 24 hours   DVT prophylaxis  Decubitus prevention- all measures as per RN protocol  Please call or text me with any questions or updates Patient was seen and examined. Spoke with RN. Chart reviewed.  No events overnight.  Vital Signs Last 24 Hrs  T(F): 98.2 (09 Jan 2020 05:10), Max: 98.6 (08 Jan 2020 13:42)  HR: 71 (09 Jan 2020 05:10) (71 - 92)  BP: 103/55 (09 Jan 2020 05:10) (103/55 - 141/72)  SpO2: --  MEDICATIONS  (STANDING):  ALPRAZolam 0.5 milliGRAM(s) Oral four times a day  amLODIPine   Tablet 10 milliGRAM(s) Oral daily  atorvastatin 20 milliGRAM(s) Oral at bedtime  enoxaparin Injectable 40 milliGRAM(s) SubCutaneous at bedtime  lactated ringers. 1000 milliLiter(s) (75 mL/Hr) IV Continuous <Continuous>  losartan 100 milliGRAM(s) Oral daily  pantoprazole    Tablet 40 milliGRAM(s) Oral before breakfast  prochlorperazine   Tablet 10 milliGRAM(s) Oral three times a day  sertraline 25 milliGRAM(s) Oral daily  tamsulosin 0.4 milliGRAM(s) Oral at bedtime    MEDICATIONS  (PRN):  cyclobenzaprine Oral Tab/Cap - Peds 5 milliGRAM(s) Oral two times a day PRN Back spasm  ondansetron Injectable 4 milliGRAM(s) IV Push every 8 hours PRN Nausea and/or Vomiting  oxycodone    5 mG/acetaminophen 325 mG 1 Tablet(s) Oral every 6 hours PRN Moderate Pain (4 - 6)    Labs:                        11.8   11.61 )-----------( 255      ( 09 Jan 2020 09:00 )             35.3                         12.1   10.37 )-----------( 256      ( 08 Jan 2020 07:30 )             37.1     09 Jan 2020 09:00    143    |  102    |  7      ----------------------------<  90     3.4     |  21     |  0.5    08 Jan 2020 07:30    139    |  102    |  11     ----------------------------<  84     3.4     |  18     |  0.5      Ca    7.6        09 Jan 2020 09:00  Ca    7.8        08 Jan 2020 07:30    TPro  5.5    /  Alb  3.5    /  TBili  0.7    /  DBili  x      /  AST  14     /  ALT  10     /  AlkPhos  33     08 Jan 2020 07:30          GI PCR Panel, Stool (collected 07 Jan 2020 12:29)  Source: .Stool Feces  Final Report (08 Jan 2020 02:26):    Norovirus GI/GII    DETECTED by PCR    *******Please Note:*******    GI panel PCR evaluates for:    Campylobacter, Plesiomonas shigelloides, Salmonella,    Vibrio, Yersinia enterocolitica, Enteroaggregative    Escherichia coli (EAEC), Enteropathogenic E.coli (EPEC),    Enterotoxigenic E. coli (ETEC) lt/st, Shiga-like    toxin-producing E. coli (STEC) stx1/stx2,    Shigella/ Enteroinvasive E. coli (EIEC), Cryptosporidium,    Cyclospora cayetanensis, Entamoeba histolytica,    Giardia lamblia, Adenovirus F 40/41, Astrovirus,    Norovirus GI/GII, Rotavirus A, Sapovirus      General: uncomfortable,   Neurology: A&Ox3, nonfocal  Head:  Normocephalic, atraumatic  ENT:  Mucosa moist, no ulcerations  Neck:  Supple, no JVD,   Skin: no breakdowns (as per RN)  Resp: CTA B/L  CV: RRR, S1S2,   GI: Soft, tenderness, bowel sounds  MS: No edema, + peripheral pulses, FROM all 4 extremity      A/P:  75 yo F w/ PMH of HTN, HLD, LBP s/p multiple back sx and anxiety presents to the hospital complaining of abdominal pain- still with abdominal discomfort and nausea    Norovirus gastroenteritis     serial abd exams   poor PO intake   diet as tolerated  contact precautions   IVF   OOB  off abx  encourage PO intake  GI f/u   d/c when PO intake adequate, hopefully 24 hours   DVT prophylaxis  Decubitus prevention- all measures as per RN protocol  Please call or text me with any questions or updates

## 2020-01-10 DIAGNOSIS — A40.3 SEPSIS DUE TO STREPTOCOCCUS PNEUMONIAE: ICD-10-CM

## 2020-01-10 LAB
ANION GAP SERPL CALC-SCNC: 12 MMOL/L — SIGNIFICANT CHANGE UP (ref 7–14)
BASOPHILS # BLD AUTO: 0.05 K/UL — SIGNIFICANT CHANGE UP (ref 0–0.2)
BASOPHILS NFR BLD AUTO: 0.5 % — SIGNIFICANT CHANGE UP (ref 0–1)
BUN SERPL-MCNC: 5 MG/DL — LOW (ref 10–20)
CALCIUM SERPL-MCNC: 8 MG/DL — LOW (ref 8.5–10.1)
CHLORIDE SERPL-SCNC: 103 MMOL/L — SIGNIFICANT CHANGE UP (ref 98–110)
CO2 SERPL-SCNC: 25 MMOL/L — SIGNIFICANT CHANGE UP (ref 17–32)
CREAT SERPL-MCNC: 0.5 MG/DL — LOW (ref 0.7–1.5)
EOSINOPHIL # BLD AUTO: 0.12 K/UL — SIGNIFICANT CHANGE UP (ref 0–0.7)
EOSINOPHIL NFR BLD AUTO: 1.1 % — SIGNIFICANT CHANGE UP (ref 0–8)
GLUCOSE SERPL-MCNC: 98 MG/DL — SIGNIFICANT CHANGE UP (ref 70–99)
HCT VFR BLD CALC: 35.6 % — LOW (ref 37–47)
HGB BLD-MCNC: 11.8 G/DL — LOW (ref 12–16)
IMM GRANULOCYTES NFR BLD AUTO: 0.6 % — HIGH (ref 0.1–0.3)
LYMPHOCYTES # BLD AUTO: 2.49 K/UL — SIGNIFICANT CHANGE UP (ref 1.2–3.4)
LYMPHOCYTES # BLD AUTO: 23.3 % — SIGNIFICANT CHANGE UP (ref 20.5–51.1)
MCHC RBC-ENTMCNC: 28.2 PG — SIGNIFICANT CHANGE UP (ref 27–31)
MCHC RBC-ENTMCNC: 33.1 G/DL — SIGNIFICANT CHANGE UP (ref 32–37)
MCV RBC AUTO: 85.2 FL — SIGNIFICANT CHANGE UP (ref 81–99)
MONOCYTES # BLD AUTO: 0.89 K/UL — HIGH (ref 0.1–0.6)
MONOCYTES NFR BLD AUTO: 8.3 % — SIGNIFICANT CHANGE UP (ref 1.7–9.3)
NEUTROPHILS # BLD AUTO: 7.09 K/UL — HIGH (ref 1.4–6.5)
NEUTROPHILS NFR BLD AUTO: 66.2 % — SIGNIFICANT CHANGE UP (ref 42.2–75.2)
NRBC # BLD: 0 /100 WBCS — SIGNIFICANT CHANGE UP (ref 0–0)
PLATELET # BLD AUTO: 240 K/UL — SIGNIFICANT CHANGE UP (ref 130–400)
POTASSIUM SERPL-MCNC: 3.7 MMOL/L — SIGNIFICANT CHANGE UP (ref 3.5–5)
POTASSIUM SERPL-SCNC: 3.7 MMOL/L — SIGNIFICANT CHANGE UP (ref 3.5–5)
RBC # BLD: 4.18 M/UL — LOW (ref 4.2–5.4)
RBC # FLD: 15.4 % — HIGH (ref 11.5–14.5)
SODIUM SERPL-SCNC: 140 MMOL/L — SIGNIFICANT CHANGE UP (ref 135–146)
WBC # BLD: 10.7 K/UL — SIGNIFICANT CHANGE UP (ref 4.8–10.8)
WBC # FLD AUTO: 10.7 K/UL — SIGNIFICANT CHANGE UP (ref 4.8–10.8)

## 2020-01-10 PROCEDURE — 71045 X-RAY EXAM CHEST 1 VIEW: CPT | Mod: 26

## 2020-01-10 RX ORDER — SUCRALFATE 1 G
2 TABLET ORAL
Refills: 0 | Status: DISCONTINUED | OUTPATIENT
Start: 2020-01-10 | End: 2020-01-10

## 2020-01-10 RX ORDER — SUCRALFATE 1 G
1 TABLET ORAL
Refills: 0 | Status: DISCONTINUED | OUTPATIENT
Start: 2020-01-10 | End: 2020-01-14

## 2020-01-10 RX ORDER — ALPRAZOLAM 0.25 MG
0.5 TABLET ORAL
Refills: 0 | Status: DISCONTINUED | OUTPATIENT
Start: 2020-01-10 | End: 2020-01-14

## 2020-01-10 RX ORDER — ACETAMINOPHEN 500 MG
650 TABLET ORAL EVERY 6 HOURS
Refills: 0 | Status: DISCONTINUED | OUTPATIENT
Start: 2020-01-10 | End: 2020-01-14

## 2020-01-10 RX ADMIN — TAMSULOSIN HYDROCHLORIDE 0.4 MILLIGRAM(S): 0.4 CAPSULE ORAL at 21:32

## 2020-01-10 RX ADMIN — Medication 650 MILLIGRAM(S): at 22:06

## 2020-01-10 RX ADMIN — OXYCODONE AND ACETAMINOPHEN 1 TABLET(S): 5; 325 TABLET ORAL at 08:44

## 2020-01-10 RX ADMIN — SERTRALINE 25 MILLIGRAM(S): 25 TABLET, FILM COATED ORAL at 11:23

## 2020-01-10 RX ADMIN — LOSARTAN POTASSIUM 100 MILLIGRAM(S): 100 TABLET, FILM COATED ORAL at 05:09

## 2020-01-10 RX ADMIN — OXYCODONE AND ACETAMINOPHEN 1 TABLET(S): 5; 325 TABLET ORAL at 02:18

## 2020-01-10 RX ADMIN — ONDANSETRON 4 MILLIGRAM(S): 8 TABLET, FILM COATED ORAL at 06:43

## 2020-01-10 RX ADMIN — PANTOPRAZOLE SODIUM 40 MILLIGRAM(S): 20 TABLET, DELAYED RELEASE ORAL at 06:04

## 2020-01-10 RX ADMIN — ENOXAPARIN SODIUM 40 MILLIGRAM(S): 100 INJECTION SUBCUTANEOUS at 21:32

## 2020-01-10 RX ADMIN — SODIUM CHLORIDE 75 MILLILITER(S): 9 INJECTION, SOLUTION INTRAVENOUS at 11:25

## 2020-01-10 RX ADMIN — OXYCODONE AND ACETAMINOPHEN 1 TABLET(S): 5; 325 TABLET ORAL at 02:45

## 2020-01-10 RX ADMIN — AMLODIPINE BESYLATE 10 MILLIGRAM(S): 2.5 TABLET ORAL at 05:09

## 2020-01-10 RX ADMIN — Medication 0.5 MILLIGRAM(S): at 17:03

## 2020-01-10 RX ADMIN — Medication 2 GRAM(S): at 13:42

## 2020-01-10 RX ADMIN — Medication 1 GRAM(S): at 17:03

## 2020-01-10 RX ADMIN — Medication 0.5 MILLIGRAM(S): at 11:22

## 2020-01-10 RX ADMIN — Medication 10 MILLIGRAM(S): at 05:09

## 2020-01-10 RX ADMIN — Medication 10 MILLIGRAM(S): at 21:32

## 2020-01-10 RX ADMIN — Medication 650 MILLIGRAM(S): at 21:36

## 2020-01-10 RX ADMIN — Medication 10 MILLIGRAM(S): at 13:42

## 2020-01-10 RX ADMIN — Medication 1 GRAM(S): at 23:23

## 2020-01-10 RX ADMIN — ATORVASTATIN CALCIUM 20 MILLIGRAM(S): 80 TABLET, FILM COATED ORAL at 21:32

## 2020-01-10 RX ADMIN — Medication 0.5 MILLIGRAM(S): at 05:09

## 2020-01-10 NOTE — CHART NOTE - NSCHARTNOTEFT_GEN_A_CORE
Registered Dietitian Follow-Up     Patient Profile Reviewed                           Yes [x]   No []     Nutrition History Previously Obtained        Yes [x]  No []       Pertinent Subjective Information: Pt reports tolerating jello, yogurt, farina this morning. Lunch came but did not want to eat the pasta. Brought pt Ensure Enlive and prosource gelatein. Says she will drink the Ensure as she's had these in the past      Pertinent Medical Interventions: Abdominal pain, vomiting- Positive for norovirus. Seen by GI - rec carafate. ID on board: monitor off Abx. Urinary retention.      Diet order: DASH/TLC, mech soft, low fiber, low fat      Anthropometrics:  - Ht. 157.48 cm   - Wt. 63.3 kg (1/7) RD taken bed scale weight. Pt OOB to chair at visit, unable to get new weight   - %wt change  - BMI 25.4   - IBW     Pertinent Lab Data: (1/10) H/H 11.8/35.6 BUN 5  Cr 0.5       Pertinent Meds: carafate, lovenox, compazine, zofran, lipitor, lactated ringers     Physical Findings:  - Appearance: alert  - GI function: LBM 1/9 diarrhea  - Tubes:  - Oral/Mouth cavity: denies issues   - Skin: BS 18 skin intact     Nutrition Requirements  Weight Used:  63.3 kg needs cont from initial RD assessment.     calorie: 1297 - 1621 kcals/day (MSJ x 1.2 - 1.5 AF for PCM)  protein: 76 - 88 gms/day (1.2 - 1.4 gm/kg for PCM)  fluid: 1ml/kcal or per LIP     Nutrient Intake: not meeting needs         [] Previous Nutrition Diagnosis: Severe PCM/ Inadequate Oral Intake.            [s] Ongoing          [] Resolved    [] No active nutrition diagnosis identified at this time        Nutrition Intervention meal/snack, med food supplements      Goal/Expected Outcome: Pt to consume >75% of meal tray in 4 days      Indicator/Monitoring: RD to monitor energy intake, diet order, body comp, NFPF    Recommendation: Order Ensure Enlive BID. Cont current diet order.

## 2020-01-10 NOTE — PROGRESS NOTE ADULT - SUBJECTIVE AND OBJECTIVE BOX
Patient was seen and examined. Spoke with RN. Chart reviewed.  No events overnight.  Vital Signs Last 24 Hrs  T(F): 98.1 (10 Jimenez 2020 11:01), Max: 99.7 (09 Jan 2020 20:18)  HR: 83 (10 Jimenez 2020 11:01) (64 - 83)  BP: 115/56 (10 Jimenez 2020 11:01) (103/57 - 115/56)  SpO2: 98% (10 Jimneez 2020 11:01) (98% - 98%)  MEDICATIONS  (STANDING):  ALPRAZolam 0.5 milliGRAM(s) Oral four times a day  amLODIPine   Tablet 10 milliGRAM(s) Oral daily  atorvastatin 20 milliGRAM(s) Oral at bedtime  enoxaparin Injectable 40 milliGRAM(s) SubCutaneous at bedtime  lactated ringers. 1000 milliLiter(s) (75 mL/Hr) IV Continuous <Continuous>  losartan 100 milliGRAM(s) Oral daily  pantoprazole    Tablet 40 milliGRAM(s) Oral before breakfast  prochlorperazine   Tablet 10 milliGRAM(s) Oral three times a day  sertraline 25 milliGRAM(s) Oral daily  tamsulosin 0.4 milliGRAM(s) Oral at bedtime    MEDICATIONS  (PRN):  cyclobenzaprine Oral Tab/Cap - Peds 5 milliGRAM(s) Oral two times a day PRN Back spasm  ondansetron Injectable 4 milliGRAM(s) IV Push every 8 hours PRN Nausea and/or Vomiting  oxycodone    5 mG/acetaminophen 325 mG 1 Tablet(s) Oral every 6 hours PRN Moderate Pain (4 - 6)    Labs:                        11.8   10.70 )-----------( 240      ( 10 Jimenez 2020 07:51 )             35.6                         11.8   11.61 )-----------( 255      ( 09 Jan 2020 09:00 )             35.3     10 Jimenez 2020 07:51    140    |  103    |  5      ----------------------------<  98     3.7     |  25     |  0.5    09 Jan 2020 09:00    143    |  102    |  7      ----------------------------<  90     3.4     |  21     |  0.5      Ca    8.0        10 Jimenez 2020 07:51  Ca    7.6        09 Jan 2020 09:00            GI PCR Panel, Stool (collected 07 Jan 2020 12:29)  Source: .Stool Feces  Final Report (08 Jan 2020 02:26):    Norovirus GI/GII    DETECTED by PCR    *******Please Note:*******    GI panel PCR evaluates for:    Campylobacter, Plesiomonas shigelloides, Salmonella,    Vibrio, Yersinia enterocolitica, Enteroaggregative    Escherichia coli (EAEC), Enteropathogenic E.coli (EPEC),    Enterotoxigenic E. coli (ETEC) lt/st, Shiga-like    toxin-producing E. coli (STEC) stx1/stx2,    Shigella/ Enteroinvasive E. coli (EIEC), Cryptosporidium,    Cyclospora cayetanensis, Entamoeba histolytica,    Giardia lamblia, Adenovirus F 40/41, Astrovirus,    Norovirus GI/GII, Rotavirus A, Sapovirus      General: uncomfortable,   Neurology: A&Ox3, nonfocal  Head:  Normocephalic, atraumatic  ENT:  Mucosa moist, no ulcerations  Neck:  Supple, no JVD,   Skin: no breakdowns (as per RN)  Resp: CTA B/L  CV: RRR, S1S2,   GI: Soft, tenderness, bowel sounds  MS: No edema, + peripheral pulses, FROM all 4 extremity      A/P:  73 yo F w/ PMH of HTN, HLD, LBP s/p multiple back sx and anxiety presents to the hospital complaining of abdominal pain- still with abdominal discomfort and nausea    Norovirus gastroenteritis not improving     serial abd exams   poor PO intake   diet as tolerated  contact precautions   IVF   OOB  off abx  encourage PO intake  GI f/u today   d/c when PO intake adequate and seen by GI   DVT prophylaxis  Decubitus prevention- all measures as per RN protocol  Please call or text me with any questions or updates

## 2020-01-10 NOTE — PROGRESS NOTE ADULT - ASSESSMENT
Ms. Chow is a pleasant 73 yo F w/ PMH of HTN, HLD, LBP s/p multiple back sx and anxiety presents to the hospital complaining of abdominal pain.     # Abdominal pain, vomiting  - Positive for norovirus   - CT abdomen/pelvis negative for acute abdominal pathology  - Abdominal duplex negative  - Leukocytosis resolved  - ID on board: monitor off Abx  - c/w protonix   - LR @ 73  - Zofran PRN  - compazine standing  - Follow BMP, replete electrolytes as needed  - GI on board; recalled Dr. Vásquez, he will see the patient today; will follow up  - Advance diet as tolerated; patient still eating minimal amounts by mouth; encouraging attempts at PO intake    # Urinary retention  - Patient with garza for retention  - Failed trial of void on 1/6/20 and garza replaced  - Patient making good urine; no discomfort  - Trial of void today (1/10)    #Confusion and aphasia   - resolved    - Per toxicology, may have been secondary to opioid use or withdrawal; symptoms did improve with inpatient     # HTN  - continue amlodipine, losartan    # HLD  - continue atorvastatin 20mg daily    # Anxiety  - continue home dose of xanax    # LBP s/p multiple back sx   - ok to continue home meds as per H&P  - continue pain meds PRN     #Diet: DASH  #GI ppx: Protonix   #DVT ppx: Lovenox 40 mg qD  #Activity: Increase as tolerated; PT eval: patient at risk for falls  #Dispo: pending tolerance of diet  FULL CODE

## 2020-01-10 NOTE — CONSULT NOTE ADULT - CONSULT REASON
nausea and vomiting
Bullae-
Encephalopathy with vomiting and diarrhea
Fever
Stroke Code
nauseau vomitting diarrhea abdominal pain

## 2020-01-10 NOTE — CONSULT NOTE ADULT - ATTENDING COMMENTS
gastroenteritis/ viral
I was present during the stroke code. Not a candidate for IV tpa or intervention. Medicine w/u for abdominal and chest pain

## 2020-01-10 NOTE — CONSULT NOTE ADULT - PROVIDER SPECIALTY LIST ADULT
Neurology
Gastroenterology
Pulmonology
Toxicology (Non-Face-To-Face)
Infectious Disease
Gastroenterology

## 2020-01-10 NOTE — PROGRESS NOTE ADULT - SUBJECTIVE AND OBJECTIVE BOX
Hospital Day:  7d    Subjective:    Patient is a 74y old  Female who presents with a chief complaint of Stomach ache. (09 Jan 2020 11:18)    The patient had one episode of diarrhea over night. The patient was seen and examined at the bedside. Vomiting has resolved, but she continues to complain of abdominal pain and nausea. She states she has been trying to eat, but is still tolerating very little food. Complains of pain from the umbilicus to the groin. Otherwise denies fever, chills, headache, dizziness, chest pain, palpitations, shortness of breath.    Past Medical Hx:   Chronic back pain  Chronic GERD  HLD (hyperlipidemia)  HTN (hypertension)    Past Sx:    Allergies:  No Known Allergies    Current Meds:   Standng Meds:  ALPRAZolam 0.5 milliGRAM(s) Oral four times a day  amLODIPine   Tablet 10 milliGRAM(s) Oral daily  atorvastatin 20 milliGRAM(s) Oral at bedtime  enoxaparin Injectable 40 milliGRAM(s) SubCutaneous at bedtime  lactated ringers. 1000 milliLiter(s) (75 mL/Hr) IV Continuous <Continuous>  losartan 100 milliGRAM(s) Oral daily  pantoprazole    Tablet 40 milliGRAM(s) Oral before breakfast  prochlorperazine   Tablet 10 milliGRAM(s) Oral three times a day  sertraline 25 milliGRAM(s) Oral daily  tamsulosin 0.4 milliGRAM(s) Oral at bedtime    PRN Meds:  cyclobenzaprine Oral Tab/Cap - Peds 5 milliGRAM(s) Oral two times a day PRN Back spasm  ondansetron Injectable 4 milliGRAM(s) IV Push every 8 hours PRN Nausea and/or Vomiting  oxycodone    5 mG/acetaminophen 325 mG 1 Tablet(s) Oral every 6 hours PRN Moderate Pain (4 - 6)    HOME MEDICATIONS:  ALPRAZolam 0.5 mg oral tablet: 1 tab(s) orally 4 times a day  amLODIPine 10 mg oral tablet: 1 tab(s) orally once a day  Crestor 5 mg oral tablet: 1 tab(s) orally once a day  cyclobenzaprine 5 mg oral tablet: 1 tab(s) orally 2 times a day, As Needed  diclofenac sodium 100 mg oral tablet, extended release: 1 tab(s) orally once a day  Diovan 160 mg oral capsule: 1 tab(s) orally once a day  oxyCODONE-acetaminophen 7.5 mg-325 mg oral tablet: 1 tab(s) orally every 8 hours, As Needed  pantoprazole 40 mg oral delayed release tablet: 1 tab(s) orally once a day  sertraline 25 mg oral tablet: 1 tab(s) orally once a day  tamsulosin 0.4 mg oral capsule: 1 cap(s) orally once a day      Vital Signs:   T(F): 97 (01-10-20 @ 04:48), Max: 99.7 (01-09-20 @ 20:18)  HR: 64 (01-10-20 @ 04:48) (64 - 71)  BP: 113/59 (01-10-20 @ 04:48) (103/57 - 113/59)  RR: 18 (01-10-20 @ 04:48) (18 - 18)  SpO2: --      01-09-20 @ 07:01  -  01-10-20 @ 07:00  --------------------------------------------------------  IN: 600 mL / OUT: 2200 mL / NET: -1600 mL    01-10-20 @ 07:01  -  01-10-20 @ 10:57  --------------------------------------------------------  IN: 360 mL / OUT: 0 mL / NET: 360 mL        Physical Exam:   General: Patient resting in bed, frail appearing, NAD  HEENT: NCAT, conjunctiva clear, sclera white, PEERLA, EOMI, moist mucous membranes, normal orophaynx  Neck: No masses, no JVD, no cervical lymphadenopathy  Respiratory: lungs clear to auscultation bilaterally  CV: Normal rate, regular rhythm, normal S1/S2, systolic murmur  GI: abdomen soft, lower abdomen tender to palpation, non-distended, no masses, normal bowel sounds  Extremities: Well-perfused, no clubbing, no cyanosis, no lower extremity edema bilaterally  Skin: warm and dry  Neuro: AAOx3, follows commands, mentating appropriately, non-focal    Labs:                         11.8   10.70 )-----------( 240      ( 10 Jimenez 2020 07:51 )             35.6     Neutophil% 66.2, Lymphocyte% 23.3, Monocyte% 8.3, Bands% 0.6 01-10-20 @ 07:51    10 Jimenez 2020 07:51    140    |  103    |  5      ----------------------------<  98     3.7     |  25     |  0.5      Ca    8.0        10 Jimenez 2020 07:51                            Culture - Blood (collected 01-04-20 @ 09:44)  Source: .Blood None  Final Report (01-09-20 @ 19:02):    No growth at 5 days.    Culture - Blood (collected 01-03-20 @ 16:34)  Source: .Blood Blood-Peripheral  Final Report (01-08-20 @ 22:01):    No growth at 5 days.    Culture - Blood (collected 01-03-20 @ 16:34)  Source: .Blood Blood-Peripheral  Final Report (01-08-20 @ 22:01):    No growth at 5 days.

## 2020-01-11 LAB
ANION GAP SERPL CALC-SCNC: 10 MMOL/L — SIGNIFICANT CHANGE UP (ref 7–14)
APPEARANCE UR: CLEAR — SIGNIFICANT CHANGE UP
BACTERIA # UR AUTO: ABNORMAL
BASOPHILS # BLD AUTO: 0.04 K/UL — SIGNIFICANT CHANGE UP (ref 0–0.2)
BASOPHILS NFR BLD AUTO: 0.4 % — SIGNIFICANT CHANGE UP (ref 0–1)
BILIRUB UR-MCNC: NEGATIVE — SIGNIFICANT CHANGE UP
BUN SERPL-MCNC: 7 MG/DL — LOW (ref 10–20)
CALCIUM SERPL-MCNC: 8.3 MG/DL — LOW (ref 8.5–10.1)
CHLORIDE SERPL-SCNC: 104 MMOL/L — SIGNIFICANT CHANGE UP (ref 98–110)
CO2 SERPL-SCNC: 29 MMOL/L — SIGNIFICANT CHANGE UP (ref 17–32)
COLOR SPEC: SIGNIFICANT CHANGE UP
CREAT SERPL-MCNC: 0.6 MG/DL — LOW (ref 0.7–1.5)
DIFF PNL FLD: NEGATIVE — SIGNIFICANT CHANGE UP
EOSINOPHIL # BLD AUTO: 0.17 K/UL — SIGNIFICANT CHANGE UP (ref 0–0.7)
EOSINOPHIL NFR BLD AUTO: 1.9 % — SIGNIFICANT CHANGE UP (ref 0–8)
EPI CELLS # UR: 1 /HPF — SIGNIFICANT CHANGE UP (ref 0–5)
GLUCOSE SERPL-MCNC: 113 MG/DL — HIGH (ref 70–99)
GLUCOSE UR QL: NEGATIVE — SIGNIFICANT CHANGE UP
HCT VFR BLD CALC: 32.9 % — LOW (ref 37–47)
HGB BLD-MCNC: 10.5 G/DL — LOW (ref 12–16)
HYALINE CASTS # UR AUTO: 0 /LPF — SIGNIFICANT CHANGE UP (ref 0–7)
IMM GRANULOCYTES NFR BLD AUTO: 0.8 % — HIGH (ref 0.1–0.3)
KETONES UR-MCNC: NEGATIVE — SIGNIFICANT CHANGE UP
LEUKOCYTE ESTERASE UR-ACNC: ABNORMAL
LYMPHOCYTES # BLD AUTO: 2.04 K/UL — SIGNIFICANT CHANGE UP (ref 1.2–3.4)
LYMPHOCYTES # BLD AUTO: 22.9 % — SIGNIFICANT CHANGE UP (ref 20.5–51.1)
MAGNESIUM SERPL-MCNC: 1.8 MG/DL — SIGNIFICANT CHANGE UP (ref 1.8–2.4)
MCHC RBC-ENTMCNC: 27.3 PG — SIGNIFICANT CHANGE UP (ref 27–31)
MCHC RBC-ENTMCNC: 31.9 G/DL — LOW (ref 32–37)
MCV RBC AUTO: 85.5 FL — SIGNIFICANT CHANGE UP (ref 81–99)
MONOCYTES # BLD AUTO: 1.02 K/UL — HIGH (ref 0.1–0.6)
MONOCYTES NFR BLD AUTO: 11.4 % — HIGH (ref 1.7–9.3)
NEUTROPHILS # BLD AUTO: 5.58 K/UL — SIGNIFICANT CHANGE UP (ref 1.4–6.5)
NEUTROPHILS NFR BLD AUTO: 62.6 % — SIGNIFICANT CHANGE UP (ref 42.2–75.2)
NITRITE UR-MCNC: NEGATIVE — SIGNIFICANT CHANGE UP
NRBC # BLD: 0 /100 WBCS — SIGNIFICANT CHANGE UP (ref 0–0)
PH UR: 7.5 — SIGNIFICANT CHANGE UP (ref 5–8)
PLATELET # BLD AUTO: 257 K/UL — SIGNIFICANT CHANGE UP (ref 130–400)
POTASSIUM SERPL-MCNC: 3.3 MMOL/L — LOW (ref 3.5–5)
POTASSIUM SERPL-SCNC: 3.3 MMOL/L — LOW (ref 3.5–5)
PROT UR-MCNC: NEGATIVE — SIGNIFICANT CHANGE UP
RBC # BLD: 3.85 M/UL — LOW (ref 4.2–5.4)
RBC # FLD: 15.5 % — HIGH (ref 11.5–14.5)
RBC CASTS # UR COMP ASSIST: 1 /HPF — SIGNIFICANT CHANGE UP (ref 0–4)
SODIUM SERPL-SCNC: 143 MMOL/L — SIGNIFICANT CHANGE UP (ref 135–146)
SP GR SPEC: 1.01 — LOW (ref 1.01–1.02)
UROBILINOGEN FLD QL: SIGNIFICANT CHANGE UP
WBC # BLD: 8.92 K/UL — SIGNIFICANT CHANGE UP (ref 4.8–10.8)
WBC # FLD AUTO: 8.92 K/UL — SIGNIFICANT CHANGE UP (ref 4.8–10.8)
WBC UR QL: 5 /HPF — SIGNIFICANT CHANGE UP (ref 0–5)

## 2020-01-11 RX ORDER — OXYCODONE AND ACETAMINOPHEN 5; 325 MG/1; MG/1
1 TABLET ORAL EVERY 6 HOURS
Refills: 0 | Status: DISCONTINUED | OUTPATIENT
Start: 2020-01-11 | End: 2020-01-14

## 2020-01-11 RX ORDER — POTASSIUM CHLORIDE 20 MEQ
20 PACKET (EA) ORAL
Refills: 0 | Status: COMPLETED | OUTPATIENT
Start: 2020-01-11 | End: 2020-01-11

## 2020-01-11 RX ADMIN — Medication 0.5 MILLIGRAM(S): at 00:40

## 2020-01-11 RX ADMIN — ATORVASTATIN CALCIUM 20 MILLIGRAM(S): 80 TABLET, FILM COATED ORAL at 21:19

## 2020-01-11 RX ADMIN — Medication 0.5 MILLIGRAM(S): at 11:26

## 2020-01-11 RX ADMIN — OXYCODONE AND ACETAMINOPHEN 1 TABLET(S): 5; 325 TABLET ORAL at 04:24

## 2020-01-11 RX ADMIN — Medication 0.5 MILLIGRAM(S): at 05:18

## 2020-01-11 RX ADMIN — Medication 10 MILLIGRAM(S): at 05:16

## 2020-01-11 RX ADMIN — SERTRALINE 25 MILLIGRAM(S): 25 TABLET, FILM COATED ORAL at 11:25

## 2020-01-11 RX ADMIN — Medication 10 MILLIGRAM(S): at 21:20

## 2020-01-11 RX ADMIN — ENOXAPARIN SODIUM 40 MILLIGRAM(S): 100 INJECTION SUBCUTANEOUS at 21:20

## 2020-01-11 RX ADMIN — OXYCODONE AND ACETAMINOPHEN 1 TABLET(S): 5; 325 TABLET ORAL at 04:54

## 2020-01-11 RX ADMIN — Medication 50 MILLIEQUIVALENT(S): at 11:26

## 2020-01-11 RX ADMIN — AMLODIPINE BESYLATE 10 MILLIGRAM(S): 2.5 TABLET ORAL at 05:16

## 2020-01-11 RX ADMIN — PANTOPRAZOLE SODIUM 40 MILLIGRAM(S): 20 TABLET, DELAYED RELEASE ORAL at 06:03

## 2020-01-11 RX ADMIN — OXYCODONE AND ACETAMINOPHEN 1 TABLET(S): 5; 325 TABLET ORAL at 15:46

## 2020-01-11 RX ADMIN — OXYCODONE AND ACETAMINOPHEN 1 TABLET(S): 5; 325 TABLET ORAL at 16:45

## 2020-01-11 RX ADMIN — Medication 0.5 MILLIGRAM(S): at 17:14

## 2020-01-11 RX ADMIN — Medication 1 GRAM(S): at 17:12

## 2020-01-11 RX ADMIN — TAMSULOSIN HYDROCHLORIDE 0.4 MILLIGRAM(S): 0.4 CAPSULE ORAL at 21:20

## 2020-01-11 RX ADMIN — Medication 1 GRAM(S): at 23:00

## 2020-01-11 RX ADMIN — Medication 50 MILLIEQUIVALENT(S): at 21:21

## 2020-01-11 RX ADMIN — Medication 1 GRAM(S): at 05:16

## 2020-01-11 RX ADMIN — Medication 0.5 MILLIGRAM(S): at 23:01

## 2020-01-11 RX ADMIN — Medication 50 MILLIEQUIVALENT(S): at 15:08

## 2020-01-11 RX ADMIN — SODIUM CHLORIDE 75 MILLILITER(S): 9 INJECTION, SOLUTION INTRAVENOUS at 18:31

## 2020-01-11 RX ADMIN — SODIUM CHLORIDE 75 MILLILITER(S): 9 INJECTION, SOLUTION INTRAVENOUS at 06:01

## 2020-01-11 RX ADMIN — Medication 10 MILLIGRAM(S): at 13:55

## 2020-01-11 RX ADMIN — LOSARTAN POTASSIUM 100 MILLIGRAM(S): 100 TABLET, FILM COATED ORAL at 05:18

## 2020-01-11 RX ADMIN — Medication 1 GRAM(S): at 11:27

## 2020-01-11 NOTE — PROGRESS NOTE ADULT - SUBJECTIVE AND OBJECTIVE BOX
Patient was seen and examined. Spoke with RN. Chart reviewed.  one episode of low grade fever.   Vital Signs Last 24 Hrs  T(F): 97.4 (2020 04:38), Max: 100 (10 Jimenez 2020 21:07)  HR: 68 (2020 04:38) (68 - 89)  BP: 121/57 (2020 04:38) (91/53 - 121/57)  SpO2: 98% (10 Jimenez 2020 11:01) (98% - 98%)  MEDICATIONS  (STANDING):  ALPRAZolam 0.5 milliGRAM(s) Oral four times a day  amLODIPine   Tablet 10 milliGRAM(s) Oral daily  atorvastatin 20 milliGRAM(s) Oral at bedtime  enoxaparin Injectable 40 milliGRAM(s) SubCutaneous at bedtime  lactated ringers. 1000 milliLiter(s) (75 mL/Hr) IV Continuous <Continuous>  losartan 100 milliGRAM(s) Oral daily  pantoprazole    Tablet 40 milliGRAM(s) Oral before breakfast  prochlorperazine   Tablet 10 milliGRAM(s) Oral three times a day  sertraline 25 milliGRAM(s) Oral daily  sucralfate suspension 1 Gram(s) Oral four times a day  tamsulosin 0.4 milliGRAM(s) Oral at bedtime    MEDICATIONS  (PRN):  acetaminophen   Tablet .. 650 milliGRAM(s) Oral every 6 hours PRN Temp greater or equal to 38C (100.4F)  cyclobenzaprine Oral Tab/Cap - Peds 5 milliGRAM(s) Oral two times a day PRN Back spasm  ondansetron Injectable 4 milliGRAM(s) IV Push every 8 hours PRN Nausea and/or Vomiting  oxycodone    5 mG/acetaminophen 325 mG 1 Tablet(s) Oral every 6 hours PRN Moderate Pain (4 - 6)    Labs:                        10.5   8.92  )-----------( 257      ( 2020 09:37 )             32.9                         11.8   10.70 )-----------( 240      ( 10 Jimenez 2020 07:51 )             35.6     10 Jimenez 2020 07:51    140    |  103    |  5      ----------------------------<  98     3.7     |  25     |  0.5      Ca    8.0        10 Jimenez 2020 07:51        Urinalysis Basic - ( 2020 03:10 )    Color: Light Yellow / Appearance: Clear / S.007 / pH: x  Gluc: x / Ketone: Negative  / Bili: Negative / Urobili: <2 mg/dL   Blood: x / Protein: Negative / Nitrite: Negative   Leuk Esterase: Moderate / RBC: 1 /HPF / WBC 5 /HPF   Sq Epi: x / Non Sq Epi: 1 /HPF / Bacteria: Many        General: comfortable, NAD   Neurology: A&Ox3, nonfocal  Head:  Normocephalic, atraumatic  ENT:  Mucosa moist, no ulcerations  Neck:  Supple, no JVD,   Skin: no breakdowns (as per RN)  Resp: CTA B/L  CV: RRR, S1S2,   GI: Soft, tenderness, bowel sounds  MS: No edema, + peripheral pulses, FROM all 4 extremity      A/P:  73 yo F w/ PMH of HTN, HLD, LBP s/p multiple back sx and anxiety presents to the hospital complaining of abdominal pain- still with abdominal discomfort and nausea    Norovirus gastroenteritis mild improvement, still decreased oral intake     serial abd exams   diet as tolerated  contact precautions   f/u BCx   IVF   OOB  PT/ rehab   off abx  encourage PO intake, had long discussion with the patient   GI f/u appreciated   d/c when PO intake adequate, anticipate 24H  d/w resident physician   DVT prophylaxis  Decubitus prevention- all measures as per RN protocol  Please call or text me with any questions or updates

## 2020-01-12 LAB
ANION GAP SERPL CALC-SCNC: 13 MMOL/L — SIGNIFICANT CHANGE UP (ref 7–14)
BASOPHILS # BLD AUTO: 0.05 K/UL — SIGNIFICANT CHANGE UP (ref 0–0.2)
BASOPHILS NFR BLD AUTO: 0.4 % — SIGNIFICANT CHANGE UP (ref 0–1)
BUN SERPL-MCNC: 7 MG/DL — LOW (ref 10–20)
CALCIUM SERPL-MCNC: 8.7 MG/DL — SIGNIFICANT CHANGE UP (ref 8.5–10.1)
CHLORIDE SERPL-SCNC: 105 MMOL/L — SIGNIFICANT CHANGE UP (ref 98–110)
CO2 SERPL-SCNC: 23 MMOL/L — SIGNIFICANT CHANGE UP (ref 17–32)
CREAT SERPL-MCNC: 0.5 MG/DL — LOW (ref 0.7–1.5)
EOSINOPHIL # BLD AUTO: 0.31 K/UL — SIGNIFICANT CHANGE UP (ref 0–0.7)
EOSINOPHIL NFR BLD AUTO: 2.7 % — SIGNIFICANT CHANGE UP (ref 0–8)
GLUCOSE SERPL-MCNC: 95 MG/DL — SIGNIFICANT CHANGE UP (ref 70–99)
HCT VFR BLD CALC: 33 % — LOW (ref 37–47)
HGB BLD-MCNC: 10.5 G/DL — LOW (ref 12–16)
IMM GRANULOCYTES NFR BLD AUTO: 0.5 % — HIGH (ref 0.1–0.3)
LYMPHOCYTES # BLD AUTO: 2.4 K/UL — SIGNIFICANT CHANGE UP (ref 1.2–3.4)
LYMPHOCYTES # BLD AUTO: 21.2 % — SIGNIFICANT CHANGE UP (ref 20.5–51.1)
MAGNESIUM SERPL-MCNC: 1.9 MG/DL — SIGNIFICANT CHANGE UP (ref 1.8–2.4)
MCHC RBC-ENTMCNC: 26.9 PG — LOW (ref 27–31)
MCHC RBC-ENTMCNC: 31.8 G/DL — LOW (ref 32–37)
MCV RBC AUTO: 84.6 FL — SIGNIFICANT CHANGE UP (ref 81–99)
MONOCYTES # BLD AUTO: 1.09 K/UL — HIGH (ref 0.1–0.6)
MONOCYTES NFR BLD AUTO: 9.6 % — HIGH (ref 1.7–9.3)
NEUTROPHILS # BLD AUTO: 7.43 K/UL — HIGH (ref 1.4–6.5)
NEUTROPHILS NFR BLD AUTO: 65.6 % — SIGNIFICANT CHANGE UP (ref 42.2–75.2)
NRBC # BLD: 0 /100 WBCS — SIGNIFICANT CHANGE UP (ref 0–0)
PLATELET # BLD AUTO: 289 K/UL — SIGNIFICANT CHANGE UP (ref 130–400)
POTASSIUM SERPL-MCNC: 4.2 MMOL/L — SIGNIFICANT CHANGE UP (ref 3.5–5)
POTASSIUM SERPL-SCNC: 4.2 MMOL/L — SIGNIFICANT CHANGE UP (ref 3.5–5)
RBC # BLD: 3.9 M/UL — LOW (ref 4.2–5.4)
RBC # FLD: 15.7 % — HIGH (ref 11.5–14.5)
SODIUM SERPL-SCNC: 141 MMOL/L — SIGNIFICANT CHANGE UP (ref 135–146)
WBC # BLD: 11.34 K/UL — HIGH (ref 4.8–10.8)
WBC # FLD AUTO: 11.34 K/UL — HIGH (ref 4.8–10.8)

## 2020-01-12 RX ADMIN — OXYCODONE AND ACETAMINOPHEN 1 TABLET(S): 5; 325 TABLET ORAL at 21:05

## 2020-01-12 RX ADMIN — Medication 0.5 MILLIGRAM(S): at 05:28

## 2020-01-12 RX ADMIN — Medication 1 GRAM(S): at 05:28

## 2020-01-12 RX ADMIN — AMLODIPINE BESYLATE 10 MILLIGRAM(S): 2.5 TABLET ORAL at 05:28

## 2020-01-12 RX ADMIN — Medication 10 MILLIGRAM(S): at 21:06

## 2020-01-12 RX ADMIN — TAMSULOSIN HYDROCHLORIDE 0.4 MILLIGRAM(S): 0.4 CAPSULE ORAL at 21:06

## 2020-01-12 RX ADMIN — Medication 10 MILLIGRAM(S): at 15:37

## 2020-01-12 RX ADMIN — Medication 1 GRAM(S): at 23:10

## 2020-01-12 RX ADMIN — OXYCODONE AND ACETAMINOPHEN 1 TABLET(S): 5; 325 TABLET ORAL at 11:26

## 2020-01-12 RX ADMIN — PANTOPRAZOLE SODIUM 40 MILLIGRAM(S): 20 TABLET, DELAYED RELEASE ORAL at 06:00

## 2020-01-12 RX ADMIN — Medication 1 GRAM(S): at 11:26

## 2020-01-12 RX ADMIN — ATORVASTATIN CALCIUM 20 MILLIGRAM(S): 80 TABLET, FILM COATED ORAL at 21:06

## 2020-01-12 RX ADMIN — Medication 0.5 MILLIGRAM(S): at 23:14

## 2020-01-12 RX ADMIN — ENOXAPARIN SODIUM 40 MILLIGRAM(S): 100 INJECTION SUBCUTANEOUS at 21:07

## 2020-01-12 RX ADMIN — OXYCODONE AND ACETAMINOPHEN 1 TABLET(S): 5; 325 TABLET ORAL at 21:35

## 2020-01-12 RX ADMIN — SODIUM CHLORIDE 75 MILLILITER(S): 9 INJECTION, SOLUTION INTRAVENOUS at 05:28

## 2020-01-12 RX ADMIN — Medication 10 MILLIGRAM(S): at 05:28

## 2020-01-12 RX ADMIN — Medication 0.5 MILLIGRAM(S): at 11:26

## 2020-01-12 RX ADMIN — SERTRALINE 25 MILLIGRAM(S): 25 TABLET, FILM COATED ORAL at 11:26

## 2020-01-12 RX ADMIN — OXYCODONE AND ACETAMINOPHEN 1 TABLET(S): 5; 325 TABLET ORAL at 12:20

## 2020-01-12 RX ADMIN — Medication 0.5 MILLIGRAM(S): at 17:12

## 2020-01-12 RX ADMIN — OXYCODONE AND ACETAMINOPHEN 1 TABLET(S): 5; 325 TABLET ORAL at 04:06

## 2020-01-12 RX ADMIN — LOSARTAN POTASSIUM 100 MILLIGRAM(S): 100 TABLET, FILM COATED ORAL at 05:29

## 2020-01-12 RX ADMIN — Medication 1 GRAM(S): at 17:12

## 2020-01-12 NOTE — PROGRESS NOTE ADULT - ASSESSMENT
Ms. Chow is a pleasant 73 yo F w/ PMH of HTN, HLD, LBP s/p multiple back sx and anxiety presents to the hospital complaining of abdominal pain.     # Abdominal pain, vomiting  - Positive for norovirus   - CT abdomen/pelvis negative for acute abdominal pathology  - Abdominal duplex negative  - Leukocytosis resolved  - ID on board: monitor off Abx  - c/w protonix   - LR @ 73  - Zofran PRN  - compazine standing  - Follow BMP, replete electrolytes as needed  - Per GI, 1g carafate QID  - Advance diet as tolerated    # Urinary retention  - Patient with garza for retention  - Failed trial of void on 1/6/20 and garza replaced  - Patient making good urine; no discomfort  - Trial of void today (1/10)    #Confusion and aphasia   - resolved    - Per toxicology, may have been secondary to opioid use or withdrawal; symptoms did improve with inpatient     # HTN  - continue amlodipine, losartan    # HLD  - continue atorvastatin 20mg daily    # Anxiety  - continue home dose of xanax    # LBP s/p multiple back sx   - ok to continue home meds as per H&P  - continue pain meds PRN     #Diet: DASH  #GI ppx: Protonix   #DVT ppx: Lovenox 40 mg qD  #Activity: Increase as tolerated; PT eval: patient at risk for falls  #Dispo: pending tolerance of diet  FULL CODE Ms. Chow is a pleasant 75 yo F w/ PMH of HTN, HLD, LBP s/p multiple back sx and anxiety presents to the hospital complaining of abdominal pain.     # Abdominal pain, vomiting  - Positive for norovirus   - CT abdomen/pelvis negative for acute abdominal pathology  - Abdominal duplex negative  - WBC bumped to 11.3 today; will monitor; afebrile  - off Abx  - c/w protonix   - LR @ 73  - Zofran PRN  - compazine standing  - Follow BMP, replete electrolytes as needed  - Per GI, 1g carafate QID  - Now tolerating regular diet    # Urinary retention  - Resolved  - Patient passed TOV, garza removed (1/10)    #Confusion and aphasia   - resolved    - Per toxicology, may have been secondary to opioid use or withdrawal; symptoms did improve with inpatient     # HTN  - continue amlodipine, losartan    # HLD  - continue atorvastatin 20mg daily    # Anxiety  - continue home dose of xanax    # LBP s/p multiple back sx   - ok to continue home meds as per H&P  - continue pain meds PRN     #Diet: DASH  #GI ppx: Protonix   #DVT ppx: Lovenox 40 mg qD  #Activity: Increase as tolerated; PT eval: patient at risk for falls  #Dispo: pending; patient still very weak, will need services or rehab  FULL CODE

## 2020-01-12 NOTE — PROGRESS NOTE ADULT - SUBJECTIVE AND OBJECTIVE BOX
Patient was seen and examined. Spoke with RN. Chart reviewed.  No events overnight.  Vital Signs Last 24 Hrs  T(F): 98.4 (2020 12:27), Max: 98.9 (2020 21:02)  HR: 66 (2020 12:27) (66 - 82)  BP: 96/54 (2020 12:27) (96/54 - 116/60)  SpO2: --  MEDICATIONS  (STANDING):  ALPRAZolam 0.5 milliGRAM(s) Oral four times a day  amLODIPine   Tablet 10 milliGRAM(s) Oral daily  atorvastatin 20 milliGRAM(s) Oral at bedtime  enoxaparin Injectable 40 milliGRAM(s) SubCutaneous at bedtime  lactated ringers. 1000 milliLiter(s) (75 mL/Hr) IV Continuous <Continuous>  losartan 100 milliGRAM(s) Oral daily  pantoprazole    Tablet 40 milliGRAM(s) Oral before breakfast  prochlorperazine   Tablet 10 milliGRAM(s) Oral three times a day  sertraline 25 milliGRAM(s) Oral daily  sucralfate suspension 1 Gram(s) Oral four times a day  tamsulosin 0.4 milliGRAM(s) Oral at bedtime    MEDICATIONS  (PRN):  acetaminophen   Tablet .. 650 milliGRAM(s) Oral every 6 hours PRN Temp greater or equal to 38C (100.4F)  cyclobenzaprine Oral Tab/Cap - Peds 5 milliGRAM(s) Oral two times a day PRN Back spasm  ondansetron Injectable 4 milliGRAM(s) IV Push every 8 hours PRN Nausea and/or Vomiting  oxycodone    5 mG/acetaminophen 325 mG 1 Tablet(s) Oral every 6 hours PRN Moderate Pain (4 - 6)    Labs:                        10.5   11.34 )-----------( 289      ( 2020 08:01 )             33.0                         10.5   8.92  )-----------( 257      ( 2020 09:37 )             32.9     2020 08:01    141    |  105    |  7      ----------------------------<  95     4.2     |  23     |  0.5    2020 09:37    143    |  104    |  7      ----------------------------<  113    3.3     |  29     |  0.6      Ca    8.7        2020 08:01  Ca    8.3        2020 09:37  Mg     1.9       2020 08:01  Mg     1.8       2020 09:37        Urinalysis Basic - ( 2020 03:10 )    Color: Light Yellow / Appearance: Clear / S.007 / pH: x  Gluc: x / Ketone: Negative  / Bili: Negative / Urobili: <2 mg/dL   Blood: x / Protein: Negative / Nitrite: Negative   Leuk Esterase: Moderate / RBC: 1 /HPF / WBC 5 /HPF   Sq Epi: x / Non Sq Epi: 1 /HPF / Bacteria: Many        Culture - Urine (collected 2020 03:10)  Source: .Urine Clean Catch (Midstream)  Preliminary Report (2020 16:30):    >100,000 CFU/ml Gram Negative Rods    Culture - Blood (collected 2020 00:21)  Source: .Blood None  Preliminary Report (2020 07:01):    No growth to date.      General: Elderly F lying in bed, comfortable, NAD  Neurology: A&Ox3, nonfocal  Head:  Normocephalic, atraumatic  ENT:  Mucosa moist, no ulcerations  Neck:  Supple, no JVD,   Skin: no breakdowns (as per RN)  Resp: CTA B/L  CV: RRR, S1S2,   GI: Soft, NT, bowel sounds  MS: No edema, + peripheral pulses, FROM all 4 extremity      A/P:  75 yo F w/ PMH of HTN, HLD, LBP s/p multiple back sx and anxiety presents to the hospital complaining of abdominal pain- still with abdominal discomfort and nausea    Norovirus gastroenteritis much improved    serial abd exams   diet as tolerated  contact precautions   OOB  PT/ rehab   off abx  encourage PO intake   GI f/u appreciated   d/c pending home services, anticipate 24H  d/w resident physician   DVT prophylaxis  Decubitus prevention- all measures as per RN protocol  Please call or text me with any questions or updates

## 2020-01-12 NOTE — PROGRESS NOTE ADULT - SUBJECTIVE AND OBJECTIVE BOX
Hospital Day:  9d    Subjective:    Patient is a 74y old  Female who presents with a chief complaint of Stomach ache. (2020 10:28)    There were no acute overnight events. The patient was seen and examined at the bedside. No complaints. Denies fever, chills, headache, dizziness, chest pain, palpitations, shortness of breath, abdominal pain, nausea, vomiting, diarrhea.    Past Medical Hx:   Chronic back pain  Chronic GERD  HLD (hyperlipidemia)  HTN (hypertension)    Past Sx:    Allergies:  No Known Allergies    Current Meds:   Standng Meds:  ALPRAZolam 0.5 milliGRAM(s) Oral four times a day  amLODIPine   Tablet 10 milliGRAM(s) Oral daily  atorvastatin 20 milliGRAM(s) Oral at bedtime  enoxaparin Injectable 40 milliGRAM(s) SubCutaneous at bedtime  lactated ringers. 1000 milliLiter(s) (75 mL/Hr) IV Continuous <Continuous>  losartan 100 milliGRAM(s) Oral daily  pantoprazole    Tablet 40 milliGRAM(s) Oral before breakfast  prochlorperazine   Tablet 10 milliGRAM(s) Oral three times a day  sertraline 25 milliGRAM(s) Oral daily  sucralfate suspension 1 Gram(s) Oral four times a day  tamsulosin 0.4 milliGRAM(s) Oral at bedtime    PRN Meds:  acetaminophen   Tablet .. 650 milliGRAM(s) Oral every 6 hours PRN Temp greater or equal to 38C (100.4F)  cyclobenzaprine Oral Tab/Cap - Peds 5 milliGRAM(s) Oral two times a day PRN Back spasm  ondansetron Injectable 4 milliGRAM(s) IV Push every 8 hours PRN Nausea and/or Vomiting  oxycodone    5 mG/acetaminophen 325 mG 1 Tablet(s) Oral every 6 hours PRN Moderate Pain (4 - 6)    HOME MEDICATIONS:  ALPRAZolam 0.5 mg oral tablet: 1 tab(s) orally 4 times a day  amLODIPine 10 mg oral tablet: 1 tab(s) orally once a day  Crestor 5 mg oral tablet: 1 tab(s) orally once a day  cyclobenzaprine 5 mg oral tablet: 1 tab(s) orally 2 times a day, As Needed  diclofenac sodium 100 mg oral tablet, extended release: 1 tab(s) orally once a day  Diovan 160 mg oral capsule: 1 tab(s) orally once a day  oxyCODONE-acetaminophen 7.5 mg-325 mg oral tablet: 1 tab(s) orally every 8 hours, As Needed  pantoprazole 40 mg oral delayed release tablet: 1 tab(s) orally once a day  sertraline 25 mg oral tablet: 1 tab(s) orally once a day  tamsulosin 0.4 mg oral capsule: 1 cap(s) orally once a day      Vital Signs:   T(F): 97.8 (20 @ 04:42), Max: 98.9 (20 @ 21:02)  HR: 82 (20 @ 04:42) (73 - 87)  BP: 116/60 (20 @ 04:42) (102/51 - 116/60)  RR: 18 (20 @ 04:42) (18 - 18)  SpO2: --      20 @ 07:01  -  20 @ 07:00  --------------------------------------------------------  IN: 0 mL / OUT: 1200 mL / NET: -1200 mL        Physical Exam:   General: Patient resting in bed, frail appearing, NAD  HEENT: NCAT, conjunctiva clear, sclera white, PEERLA, EOMI, moist mucous membranes, normal orophaynx  Neck: No masses, no JVD, no cervical lymphadenopathy  Respiratory: lungs clear to auscultation bilaterally  CV: Normal rate, regular rhythm, normal S1/S2, systolic murmur  GI: abdomen soft, lower abdomen tender to palpation, non-distended, no masses, normal bowel sounds  Extremities: Well-perfused, no clubbing, no cyanosis, no lower extremity edema bilaterally  Skin: warm and dry  Neuro: AAOx3, follows commands, mentating appropriately, non-focal    Labs:                         10.5   8.92  )-----------( 257      ( 2020 09:37 )             32.9     Neutophil% 62.6, Lymphocyte% 22.9, Monocyte% 11.4, Bands% 0.8 20 @ 09:37    2020 09:37    143    |  104    |  7      ----------------------------<  113    3.3     |  29     |  0.6      Ca    8.3        2020 09:37  Mg     1.8       2020 09:37                      Urinalysis Basic - ( 2020 03:10 )    Color: Light Yellow / Appearance: Clear / S.007 / pH: x  Gluc: x / Ketone: Negative  / Bili: Negative / Urobili: <2 mg/dL   Blood: x / Protein: Negative / Nitrite: Negative   Leuk Esterase: Moderate / RBC: 1 /HPF / WBC 5 /HPF   Sq Epi: x / Non Sq Epi: 1 /HPF / Bacteria: Many          Culture - Blood (collected 20 @ 00:21)  Source: .Blood None  Preliminary Report (20 @ 07:01):    No growth to date. Hospital Day:  9d    Subjective:    Patient is a 74y old  Female who presents with a chief complaint of Stomach ache. (2020 10:28)    There were no acute overnight events. The patient was seen and examined at the bedside. The patient continues to be nauseous, but is now tolerating small amounts of food, but states that she still feels very weak and cannot ambulate without assistance. Denies fever, chills, headache, dizziness, chest pain, palpitations, shortness of breath.    Past Medical Hx:   Chronic back pain  Chronic GERD  HLD (hyperlipidemia)  HTN (hypertension)    Past Sx:    Allergies:  No Known Allergies    Current Meds:   Standng Meds:  ALPRAZolam 0.5 milliGRAM(s) Oral four times a day  amLODIPine   Tablet 10 milliGRAM(s) Oral daily  atorvastatin 20 milliGRAM(s) Oral at bedtime  enoxaparin Injectable 40 milliGRAM(s) SubCutaneous at bedtime  lactated ringers. 1000 milliLiter(s) (75 mL/Hr) IV Continuous <Continuous>  losartan 100 milliGRAM(s) Oral daily  pantoprazole    Tablet 40 milliGRAM(s) Oral before breakfast  prochlorperazine   Tablet 10 milliGRAM(s) Oral three times a day  sertraline 25 milliGRAM(s) Oral daily  sucralfate suspension 1 Gram(s) Oral four times a day  tamsulosin 0.4 milliGRAM(s) Oral at bedtime    PRN Meds:  acetaminophen   Tablet .. 650 milliGRAM(s) Oral every 6 hours PRN Temp greater or equal to 38C (100.4F)  cyclobenzaprine Oral Tab/Cap - Peds 5 milliGRAM(s) Oral two times a day PRN Back spasm  ondansetron Injectable 4 milliGRAM(s) IV Push every 8 hours PRN Nausea and/or Vomiting  oxycodone    5 mG/acetaminophen 325 mG 1 Tablet(s) Oral every 6 hours PRN Moderate Pain (4 - 6)    HOME MEDICATIONS:  ALPRAZolam 0.5 mg oral tablet: 1 tab(s) orally 4 times a day  amLODIPine 10 mg oral tablet: 1 tab(s) orally once a day  Crestor 5 mg oral tablet: 1 tab(s) orally once a day  cyclobenzaprine 5 mg oral tablet: 1 tab(s) orally 2 times a day, As Needed  diclofenac sodium 100 mg oral tablet, extended release: 1 tab(s) orally once a day  Diovan 160 mg oral capsule: 1 tab(s) orally once a day  oxyCODONE-acetaminophen 7.5 mg-325 mg oral tablet: 1 tab(s) orally every 8 hours, As Needed  pantoprazole 40 mg oral delayed release tablet: 1 tab(s) orally once a day  sertraline 25 mg oral tablet: 1 tab(s) orally once a day  tamsulosin 0.4 mg oral capsule: 1 cap(s) orally once a day      Vital Signs:   T(F): 97.8 (20 @ 04:42), Max: 98.9 (20 @ 21:02)  HR: 82 (20 @ 04:42) (73 - 87)  BP: 116/60 (20 @ 04:42) (102/51 - 116/60)  RR: 18 (20 @ 04:42) (18 - 18)  SpO2: --      20 @ 07:01  -  20 @ 07:00  --------------------------------------------------------  IN: 0 mL / OUT: 1200 mL / NET: -1200 mL        Physical Exam:   General: Patient sitting in chair, frail appearing, NAD  HEENT: NCAT, conjunctiva clear, sclera white, PEERLA, EOMI, moist mucous membranes, normal orophaynx  Neck: No masses, no JVD, no cervical lymphadenopathy  Respiratory: lungs clear to auscultation bilaterally  CV: Normal rate, regular rhythm, normal S1/S2, systolic murmur  GI: abdomen soft, lower abdomen tender to palpation, non-distended, no masses, normal bowel sounds  Extremities: Well-perfused, no clubbing, no cyanosis, no lower extremity edema bilaterally  Skin: warm and dry  Neuro: AAOx3, follows commands, mentating appropriately, non-focal    Labs:                         10.5   8.92  )-----------( 257      ( 2020 09:37 )             32.9     Neutophil% 62.6, Lymphocyte% 22.9, Monocyte% 11.4, Bands% 0.8 20 @ 09:37    2020 09:37    143    |  104    |  7      ----------------------------<  113    3.3     |  29     |  0.6      Ca    8.3        2020 09:37  Mg     1.8       2020 09:37                      Urinalysis Basic - ( 2020 03:10 )    Color: Light Yellow / Appearance: Clear / S.007 / pH: x  Gluc: x / Ketone: Negative  / Bili: Negative / Urobili: <2 mg/dL   Blood: x / Protein: Negative / Nitrite: Negative   Leuk Esterase: Moderate / RBC: 1 /HPF / WBC 5 /HPF   Sq Epi: x / Non Sq Epi: 1 /HPF / Bacteria: Many          Culture - Blood (collected 20 @ 00:21)  Source: .Blood None  Preliminary Report (20 @ 07:01):    No growth to date.

## 2020-01-13 ENCOUNTER — TRANSCRIPTION ENCOUNTER (OUTPATIENT)
Age: 75
End: 2020-01-13

## 2020-01-13 LAB
-  AMIKACIN: SIGNIFICANT CHANGE UP
-  AMIKACIN: SIGNIFICANT CHANGE UP
-  AMPICILLIN/SULBACTAM: SIGNIFICANT CHANGE UP
-  AMPICILLIN/SULBACTAM: SIGNIFICANT CHANGE UP
-  AMPICILLIN: SIGNIFICANT CHANGE UP
-  AMPICILLIN: SIGNIFICANT CHANGE UP
-  AZTREONAM: SIGNIFICANT CHANGE UP
-  AZTREONAM: SIGNIFICANT CHANGE UP
-  CEFAZOLIN: SIGNIFICANT CHANGE UP
-  CEFAZOLIN: SIGNIFICANT CHANGE UP
-  CEFEPIME: SIGNIFICANT CHANGE UP
-  CEFEPIME: SIGNIFICANT CHANGE UP
-  CEFOXITIN: SIGNIFICANT CHANGE UP
-  CEFOXITIN: SIGNIFICANT CHANGE UP
-  CEFTRIAXONE: SIGNIFICANT CHANGE UP
-  CEFTRIAXONE: SIGNIFICANT CHANGE UP
-  CIPROFLOXACIN: SIGNIFICANT CHANGE UP
-  CIPROFLOXACIN: SIGNIFICANT CHANGE UP
-  GENTAMICIN: SIGNIFICANT CHANGE UP
-  GENTAMICIN: SIGNIFICANT CHANGE UP
-  IMIPENEM: SIGNIFICANT CHANGE UP
-  IMIPENEM: SIGNIFICANT CHANGE UP
-  LEVOFLOXACIN: SIGNIFICANT CHANGE UP
-  LEVOFLOXACIN: SIGNIFICANT CHANGE UP
-  MEROPENEM: SIGNIFICANT CHANGE UP
-  MEROPENEM: SIGNIFICANT CHANGE UP
-  NITROFURANTOIN: SIGNIFICANT CHANGE UP
-  NITROFURANTOIN: SIGNIFICANT CHANGE UP
-  PIPERACILLIN/TAZOBACTAM: SIGNIFICANT CHANGE UP
-  PIPERACILLIN/TAZOBACTAM: SIGNIFICANT CHANGE UP
-  TIGECYCLINE: SIGNIFICANT CHANGE UP
-  TIGECYCLINE: SIGNIFICANT CHANGE UP
-  TOBRAMYCIN: SIGNIFICANT CHANGE UP
-  TOBRAMYCIN: SIGNIFICANT CHANGE UP
-  TRIMETHOPRIM/SULFAMETHOXAZOLE: SIGNIFICANT CHANGE UP
-  TRIMETHOPRIM/SULFAMETHOXAZOLE: SIGNIFICANT CHANGE UP
ANION GAP SERPL CALC-SCNC: 11 MMOL/L — SIGNIFICANT CHANGE UP (ref 7–14)
BASOPHILS # BLD AUTO: 0.07 K/UL — SIGNIFICANT CHANGE UP (ref 0–0.2)
BASOPHILS NFR BLD AUTO: 0.8 % — SIGNIFICANT CHANGE UP (ref 0–1)
BUN SERPL-MCNC: 11 MG/DL — SIGNIFICANT CHANGE UP (ref 10–20)
CALCIUM SERPL-MCNC: 9.3 MG/DL — SIGNIFICANT CHANGE UP (ref 8.5–10.1)
CHLORIDE SERPL-SCNC: 105 MMOL/L — SIGNIFICANT CHANGE UP (ref 98–110)
CO2 SERPL-SCNC: 26 MMOL/L — SIGNIFICANT CHANGE UP (ref 17–32)
CREAT SERPL-MCNC: 0.6 MG/DL — LOW (ref 0.7–1.5)
CULTURE RESULTS: SIGNIFICANT CHANGE UP
EOSINOPHIL # BLD AUTO: 0.39 K/UL — SIGNIFICANT CHANGE UP (ref 0–0.7)
EOSINOPHIL NFR BLD AUTO: 4.5 % — SIGNIFICANT CHANGE UP (ref 0–8)
GLUCOSE SERPL-MCNC: 102 MG/DL — HIGH (ref 70–99)
HCT VFR BLD CALC: 33.6 % — LOW (ref 37–47)
HGB BLD-MCNC: 10.9 G/DL — LOW (ref 12–16)
IMM GRANULOCYTES NFR BLD AUTO: 0.5 % — HIGH (ref 0.1–0.3)
LYMPHOCYTES # BLD AUTO: 2.28 K/UL — SIGNIFICANT CHANGE UP (ref 1.2–3.4)
LYMPHOCYTES # BLD AUTO: 26.1 % — SIGNIFICANT CHANGE UP (ref 20.5–51.1)
MAGNESIUM SERPL-MCNC: 1.9 MG/DL — SIGNIFICANT CHANGE UP (ref 1.8–2.4)
MCHC RBC-ENTMCNC: 27.8 PG — SIGNIFICANT CHANGE UP (ref 27–31)
MCHC RBC-ENTMCNC: 32.4 G/DL — SIGNIFICANT CHANGE UP (ref 32–37)
MCV RBC AUTO: 85.7 FL — SIGNIFICANT CHANGE UP (ref 81–99)
METHOD TYPE: SIGNIFICANT CHANGE UP
METHOD TYPE: SIGNIFICANT CHANGE UP
MONOCYTES # BLD AUTO: 0.9 K/UL — HIGH (ref 0.1–0.6)
MONOCYTES NFR BLD AUTO: 10.3 % — HIGH (ref 1.7–9.3)
NEUTROPHILS # BLD AUTO: 5.04 K/UL — SIGNIFICANT CHANGE UP (ref 1.4–6.5)
NEUTROPHILS NFR BLD AUTO: 57.8 % — SIGNIFICANT CHANGE UP (ref 42.2–75.2)
NRBC # BLD: 0 /100 WBCS — SIGNIFICANT CHANGE UP (ref 0–0)
ORGANISM # SPEC MICROSCOPIC CNT: SIGNIFICANT CHANGE UP
PLATELET # BLD AUTO: 321 K/UL — SIGNIFICANT CHANGE UP (ref 130–400)
POTASSIUM SERPL-MCNC: 4.6 MMOL/L — SIGNIFICANT CHANGE UP (ref 3.5–5)
POTASSIUM SERPL-SCNC: 4.6 MMOL/L — SIGNIFICANT CHANGE UP (ref 3.5–5)
RBC # BLD: 3.92 M/UL — LOW (ref 4.2–5.4)
RBC # FLD: 15.7 % — HIGH (ref 11.5–14.5)
SODIUM SERPL-SCNC: 142 MMOL/L — SIGNIFICANT CHANGE UP (ref 135–146)
SPECIMEN SOURCE: SIGNIFICANT CHANGE UP
WBC # BLD: 8.72 K/UL — SIGNIFICANT CHANGE UP (ref 4.8–10.8)
WBC # FLD AUTO: 8.72 K/UL — SIGNIFICANT CHANGE UP (ref 4.8–10.8)

## 2020-01-13 RX ADMIN — OXYCODONE AND ACETAMINOPHEN 1 TABLET(S): 5; 325 TABLET ORAL at 05:34

## 2020-01-13 RX ADMIN — TAMSULOSIN HYDROCHLORIDE 0.4 MILLIGRAM(S): 0.4 CAPSULE ORAL at 21:33

## 2020-01-13 RX ADMIN — Medication 0.5 MILLIGRAM(S): at 05:04

## 2020-01-13 RX ADMIN — OXYCODONE AND ACETAMINOPHEN 1 TABLET(S): 5; 325 TABLET ORAL at 15:49

## 2020-01-13 RX ADMIN — LOSARTAN POTASSIUM 100 MILLIGRAM(S): 100 TABLET, FILM COATED ORAL at 05:05

## 2020-01-13 RX ADMIN — Medication 1 GRAM(S): at 05:04

## 2020-01-13 RX ADMIN — AMLODIPINE BESYLATE 10 MILLIGRAM(S): 2.5 TABLET ORAL at 05:05

## 2020-01-13 RX ADMIN — ENOXAPARIN SODIUM 40 MILLIGRAM(S): 100 INJECTION SUBCUTANEOUS at 21:33

## 2020-01-13 RX ADMIN — Medication 10 MILLIGRAM(S): at 21:33

## 2020-01-13 RX ADMIN — OXYCODONE AND ACETAMINOPHEN 1 TABLET(S): 5; 325 TABLET ORAL at 05:04

## 2020-01-13 RX ADMIN — Medication 0.5 MILLIGRAM(S): at 23:16

## 2020-01-13 RX ADMIN — SERTRALINE 25 MILLIGRAM(S): 25 TABLET, FILM COATED ORAL at 11:18

## 2020-01-13 RX ADMIN — PANTOPRAZOLE SODIUM 40 MILLIGRAM(S): 20 TABLET, DELAYED RELEASE ORAL at 06:00

## 2020-01-13 RX ADMIN — Medication 10 MILLIGRAM(S): at 13:35

## 2020-01-13 RX ADMIN — Medication 10 MILLIGRAM(S): at 05:05

## 2020-01-13 RX ADMIN — Medication 1 GRAM(S): at 23:16

## 2020-01-13 RX ADMIN — Medication 1 GRAM(S): at 11:18

## 2020-01-13 RX ADMIN — Medication 1 GRAM(S): at 17:36

## 2020-01-13 RX ADMIN — ATORVASTATIN CALCIUM 20 MILLIGRAM(S): 80 TABLET, FILM COATED ORAL at 21:33

## 2020-01-13 RX ADMIN — Medication 0.5 MILLIGRAM(S): at 17:36

## 2020-01-13 RX ADMIN — Medication 0.5 MILLIGRAM(S): at 11:18

## 2020-01-13 NOTE — DISCHARGE NOTE NURSING/CASE MANAGEMENT/SOCIAL WORK - PATIENT PORTAL LINK FT
You can access the FollowMyHealth Patient Portal offered by [facility] by registering at the following website: [email or web address]. By joining [employer]’s FollowMyHealth portal, you will also be able to view your health information using other applications (apps) compatible with our system.

## 2020-01-13 NOTE — PROGRESS NOTE ADULT - ASSESSMENT
Ms. Chow is a pleasant 75 yo F w/ PMH of HTN, HLD, LBP s/p multiple back sx and anxiety presents to the hospital complaining of abdominal pain.     # Abdominal pain, vomiting  - Positive for norovirus   - CT abdomen/pelvis negative for acute abdominal pathology  - Abdominal duplex negative  - Leukocytosis resolved; afebrile  - off Abx  - continue protonix   - DC IVF; PO hydration  - Zofran PRN  - compazine standing  - Follow BMP, replete electrolytes as needed  - Per GI, 1g carafate QID  - Now tolerating regular diet    # Urinary retention  - Resolved  - Patient passed TOV, garaz removed (1/10)    #Confusion and aphasia   - resolved    - Per toxicology, may have been secondary to opioid use or withdrawal; symptoms did improve with inpatient     # HTN  - continue amlodipine, losartan    # HLD  - continue atorvastatin 20mg daily    # Anxiety  - continue home dose of xanax    # LBP s/p multiple back sx   - ok to continue home meds as per H&P  - continue pain meds PRN     #Diet: DASH  #GI ppx: Protonix   #DVT ppx: Lovenox 40 mg qD  #Activity: Increase as tolerated; PT eval: patient at risk for falls  #Dispo: anticipate tomorrow; home with home care  FULL CODE

## 2020-01-13 NOTE — PROGRESS NOTE ADULT - SUBJECTIVE AND OBJECTIVE BOX
Patient was seen and examined. Spoke with RN. Chart reviewed.  No events overnight.  Vital Signs Last 24 Hrs  T(F): 98.3 (13 Jan 2020 05:21), Max: 98.4 (12 Jan 2020 12:27)  HR: 87 (13 Jan 2020 05:21) (66 - 87)  BP: 115/66 (13 Jan 2020 05:21) (96/54 - 115/66)  SpO2: --  MEDICATIONS  (STANDING):  ALPRAZolam 0.5 milliGRAM(s) Oral four times a day  amLODIPine   Tablet 10 milliGRAM(s) Oral daily  atorvastatin 20 milliGRAM(s) Oral at bedtime  enoxaparin Injectable 40 milliGRAM(s) SubCutaneous at bedtime  lactated ringers. 1000 milliLiter(s) (75 mL/Hr) IV Continuous <Continuous>  losartan 100 milliGRAM(s) Oral daily  pantoprazole    Tablet 40 milliGRAM(s) Oral before breakfast  prochlorperazine   Tablet 10 milliGRAM(s) Oral three times a day  sertraline 25 milliGRAM(s) Oral daily  sucralfate suspension 1 Gram(s) Oral four times a day  tamsulosin 0.4 milliGRAM(s) Oral at bedtime    MEDICATIONS  (PRN):  acetaminophen   Tablet .. 650 milliGRAM(s) Oral every 6 hours PRN Temp greater or equal to 38C (100.4F)  cyclobenzaprine Oral Tab/Cap - Peds 5 milliGRAM(s) Oral two times a day PRN Back spasm  ondansetron Injectable 4 milliGRAM(s) IV Push every 8 hours PRN Nausea and/or Vomiting  oxycodone    5 mG/acetaminophen 325 mG 1 Tablet(s) Oral every 6 hours PRN Moderate Pain (4 - 6)    Labs:                        10.9   8.72  )-----------( 321      ( 13 Jan 2020 08:18 )             33.6                         10.5   11.34 )-----------( 289      ( 12 Jan 2020 08:01 )             33.0     12 Jan 2020 08:01    141    |  105    |  7      ----------------------------<  95     4.2     |  23     |  0.5    11 Jan 2020 09:37    143    |  104    |  7      ----------------------------<  113    3.3     |  29     |  0.6      Ca    8.7        12 Jan 2020 08:01  Ca    8.3        11 Jan 2020 09:37  Mg     1.9       12 Jan 2020 08:01  Mg     1.8       11 Jan 2020 09:37            Culture - Urine (collected 11 Jan 2020 03:10)  Source: .Urine Clean Catch (Midstream)  Preliminary Report (12 Jan 2020 16:30):    >100,000 CFU/ml Gram Negative Rods    Culture - Blood (collected 11 Jan 2020 00:21)  Source: .Blood None  Preliminary Report (12 Jan 2020 07:01):    No growth to date.      General: comfortable, NAD  Neurology: A&Ox3, nonfocal  Head:  Normocephalic, atraumatic  ENT:  Mucosa moist, no ulcerations  Neck:  Supple, no JVD,   Resp: CTA B/L  CV: RRR, S1S2,   GI: Soft, NT, bowel sounds  MS: No edema, + peripheral pulses, FROM all 4 extremity      A/P:  73 yo F w/ PMH of HTN, HLD, LBP s/p multiple back sx and anxiety s/p recovery from Norovirus gastroenteritis    OOB, ambulate    diet as tolerated    minimize opioids    PO hydration    DC planning    DVT prophylaxis  Decubitus prevention- all measures as per RN protocol  Please call or text me with any questions or updates

## 2020-01-13 NOTE — PROGRESS NOTE ADULT - SUBJECTIVE AND OBJECTIVE BOX
Hospital Day:  10d    Subjective:    Patient is a 74y old  Female who presents with a chief complaint of Stomach ache. (2020 09:19)    There were no acute overnight events. The patient was seen and examined at the bedside. No new complaints. Patient continues to be nauseous but is tolerating slightly more food. Denies fever, chills, headache, dizziness, chest pain, palpitations, shortness of breath, abdominal pain, vomiting, diarrhea.    Past Medical Hx:   Chronic back pain  Chronic GERD  HLD (hyperlipidemia)  HTN (hypertension)    Past Sx:    Allergies:  No Known Allergies    Current Meds:   Standng Meds:  ALPRAZolam 0.5 milliGRAM(s) Oral four times a day  amLODIPine   Tablet 10 milliGRAM(s) Oral daily  atorvastatin 20 milliGRAM(s) Oral at bedtime  enoxaparin Injectable 40 milliGRAM(s) SubCutaneous at bedtime  lactated ringers. 1000 milliLiter(s) (75 mL/Hr) IV Continuous <Continuous>  losartan 100 milliGRAM(s) Oral daily  pantoprazole    Tablet 40 milliGRAM(s) Oral before breakfast  prochlorperazine   Tablet 10 milliGRAM(s) Oral three times a day  sertraline 25 milliGRAM(s) Oral daily  sucralfate suspension 1 Gram(s) Oral four times a day  tamsulosin 0.4 milliGRAM(s) Oral at bedtime    PRN Meds:  acetaminophen   Tablet .. 650 milliGRAM(s) Oral every 6 hours PRN Temp greater or equal to 38C (100.4F)  cyclobenzaprine Oral Tab/Cap - Peds 5 milliGRAM(s) Oral two times a day PRN Back spasm  ondansetron Injectable 4 milliGRAM(s) IV Push every 8 hours PRN Nausea and/or Vomiting  oxycodone    5 mG/acetaminophen 325 mG 1 Tablet(s) Oral every 6 hours PRN Moderate Pain (4 - 6)    HOME MEDICATIONS:  ALPRAZolam 0.5 mg oral tablet: 1 tab(s) orally 4 times a day  amLODIPine 10 mg oral tablet: 1 tab(s) orally once a day  Crestor 5 mg oral tablet: 1 tab(s) orally once a day  cyclobenzaprine 5 mg oral tablet: 1 tab(s) orally 2 times a day, As Needed  diclofenac sodium 100 mg oral tablet, extended release: 1 tab(s) orally once a day  Diovan 160 mg oral capsule: 1 tab(s) orally once a day  oxyCODONE-acetaminophen 7.5 mg-325 mg oral tablet: 1 tab(s) orally every 8 hours, As Needed  pantoprazole 40 mg oral delayed release tablet: 1 tab(s) orally once a day  sertraline 25 mg oral tablet: 1 tab(s) orally once a day  tamsulosin 0.4 mg oral capsule: 1 cap(s) orally once a day      Vital Signs:   T(F): 97.2 (20 @ 12:10), Max: 98.3 (20 @ 05:21)  HR: 79 (20 @ 12:10) (69 - 87)  BP: 110/54 (20 @ 12:10) (106/51 - 115/66)  RR: 18 (20 @ 12:10) (18 - 18)  SpO2: --        Physical Exam:   General: Patient sitting in chair, frail appearing, NAD  HEENT: NCAT, conjunctiva clear, sclera white, PEERLA, EOMI, moist mucous membranes, normal orophaynx  Neck: No masses, no JVD, no cervical lymphadenopathy  Respiratory: lungs clear to auscultation bilaterally  CV: Normal rate, regular rhythm, normal S1/S2, systolic murmur  GI: abdomen soft, lower abdomen tender to palpation, non-distended, no masses, normal bowel sounds  Extremities: Well-perfused, no clubbing, no cyanosis, no lower extremity edema bilaterally  Skin: warm and dry  Neuro: AAOx3, follows commands, mentating appropriately, non-focal    Labs:                         10.9   8.72  )-----------( 321      ( 2020 08:18 )             33.6     Neutophil% 57.8, Lymphocyte% 26.1, Monocyte% 10.3, Bands% 0.5 20 @ 08:18    2020 08:18    142    |  105    |  11     ----------------------------<  102    4.6     |  26     |  0.6      Ca    9.3        2020 08:18  Mg     1.9       2020 08:18                      Urinalysis Basic - ( 2020 03:10 )    Color: Light Yellow / Appearance: Clear / S.007 / pH: x  Gluc: x / Ketone: Negative  / Bili: Negative / Urobili: <2 mg/dL   Blood: x / Protein: Negative / Nitrite: Negative   Leuk Esterase: Moderate / RBC: 1 /HPF / WBC 5 /HPF   Sq Epi: x / Non Sq Epi: 1 /HPF / Bacteria: Many          Culture - Blood (collected 20 @ 00:21)  Source: .Blood None  Preliminary Report (20 @ 07:01):    No growth to date.

## 2020-01-14 ENCOUNTER — TRANSCRIPTION ENCOUNTER (OUTPATIENT)
Age: 75
End: 2020-01-14

## 2020-01-14 VITALS
DIASTOLIC BLOOD PRESSURE: 54 MMHG | HEART RATE: 100 BPM | TEMPERATURE: 99 F | SYSTOLIC BLOOD PRESSURE: 95 MMHG | RESPIRATION RATE: 18 BRPM

## 2020-01-14 LAB
ANION GAP SERPL CALC-SCNC: 14 MMOL/L — SIGNIFICANT CHANGE UP (ref 7–14)
BASOPHILS # BLD AUTO: 0.05 K/UL — SIGNIFICANT CHANGE UP (ref 0–0.2)
BASOPHILS NFR BLD AUTO: 0.5 % — SIGNIFICANT CHANGE UP (ref 0–1)
BUN SERPL-MCNC: 9 MG/DL — LOW (ref 10–20)
CALCIUM SERPL-MCNC: 9.2 MG/DL — SIGNIFICANT CHANGE UP (ref 8.5–10.1)
CHLORIDE SERPL-SCNC: 103 MMOL/L — SIGNIFICANT CHANGE UP (ref 98–110)
CO2 SERPL-SCNC: 24 MMOL/L — SIGNIFICANT CHANGE UP (ref 17–32)
CREAT SERPL-MCNC: 0.6 MG/DL — LOW (ref 0.7–1.5)
EOSINOPHIL # BLD AUTO: 0.38 K/UL — SIGNIFICANT CHANGE UP (ref 0–0.7)
EOSINOPHIL NFR BLD AUTO: 3.9 % — SIGNIFICANT CHANGE UP (ref 0–8)
GLUCOSE SERPL-MCNC: 98 MG/DL — SIGNIFICANT CHANGE UP (ref 70–99)
HCT VFR BLD CALC: 33.4 % — LOW (ref 37–47)
HGB BLD-MCNC: 10.9 G/DL — LOW (ref 12–16)
IMM GRANULOCYTES NFR BLD AUTO: 0.5 % — HIGH (ref 0.1–0.3)
LYMPHOCYTES # BLD AUTO: 2.44 K/UL — SIGNIFICANT CHANGE UP (ref 1.2–3.4)
LYMPHOCYTES # BLD AUTO: 24.9 % — SIGNIFICANT CHANGE UP (ref 20.5–51.1)
MCHC RBC-ENTMCNC: 27.5 PG — SIGNIFICANT CHANGE UP (ref 27–31)
MCHC RBC-ENTMCNC: 32.6 G/DL — SIGNIFICANT CHANGE UP (ref 32–37)
MCV RBC AUTO: 84.3 FL — SIGNIFICANT CHANGE UP (ref 81–99)
MONOCYTES # BLD AUTO: 0.87 K/UL — HIGH (ref 0.1–0.6)
MONOCYTES NFR BLD AUTO: 8.9 % — SIGNIFICANT CHANGE UP (ref 1.7–9.3)
NEUTROPHILS # BLD AUTO: 6 K/UL — SIGNIFICANT CHANGE UP (ref 1.4–6.5)
NEUTROPHILS NFR BLD AUTO: 61.3 % — SIGNIFICANT CHANGE UP (ref 42.2–75.2)
NRBC # BLD: 0 /100 WBCS — SIGNIFICANT CHANGE UP (ref 0–0)
PLATELET # BLD AUTO: 371 K/UL — SIGNIFICANT CHANGE UP (ref 130–400)
POTASSIUM SERPL-MCNC: 4.4 MMOL/L — SIGNIFICANT CHANGE UP (ref 3.5–5)
POTASSIUM SERPL-SCNC: 4.4 MMOL/L — SIGNIFICANT CHANGE UP (ref 3.5–5)
RBC # BLD: 3.96 M/UL — LOW (ref 4.2–5.4)
RBC # FLD: 15.8 % — HIGH (ref 11.5–14.5)
SODIUM SERPL-SCNC: 141 MMOL/L — SIGNIFICANT CHANGE UP (ref 135–146)
WBC # BLD: 9.79 K/UL — SIGNIFICANT CHANGE UP (ref 4.8–10.8)
WBC # FLD AUTO: 9.79 K/UL — SIGNIFICANT CHANGE UP (ref 4.8–10.8)

## 2020-01-14 RX ORDER — POLYETHYLENE GLYCOL 3350 17 G/17G
17 POWDER, FOR SOLUTION ORAL
Refills: 0 | Status: DISCONTINUED | OUTPATIENT
Start: 2020-01-14 | End: 2020-01-14

## 2020-01-14 RX ORDER — SUCRALFATE 1 G
10 TABLET ORAL
Qty: 300 | Refills: 0
Start: 2020-01-14

## 2020-01-14 RX ORDER — DICLOFENAC SODIUM 75 MG/1
1 TABLET, DELAYED RELEASE ORAL
Qty: 0 | Refills: 0 | DISCHARGE

## 2020-01-14 RX ADMIN — OXYCODONE AND ACETAMINOPHEN 1 TABLET(S): 5; 325 TABLET ORAL at 05:24

## 2020-01-14 RX ADMIN — POLYETHYLENE GLYCOL 3350 17 GRAM(S): 17 POWDER, FOR SOLUTION ORAL at 17:53

## 2020-01-14 RX ADMIN — Medication 0.5 MILLIGRAM(S): at 17:52

## 2020-01-14 RX ADMIN — SERTRALINE 25 MILLIGRAM(S): 25 TABLET, FILM COATED ORAL at 11:21

## 2020-01-14 RX ADMIN — LOSARTAN POTASSIUM 100 MILLIGRAM(S): 100 TABLET, FILM COATED ORAL at 05:22

## 2020-01-14 RX ADMIN — OXYCODONE AND ACETAMINOPHEN 1 TABLET(S): 5; 325 TABLET ORAL at 06:11

## 2020-01-14 RX ADMIN — Medication 1 GRAM(S): at 11:21

## 2020-01-14 RX ADMIN — AMLODIPINE BESYLATE 10 MILLIGRAM(S): 2.5 TABLET ORAL at 05:21

## 2020-01-14 RX ADMIN — Medication 0.5 MILLIGRAM(S): at 11:21

## 2020-01-14 RX ADMIN — PANTOPRAZOLE SODIUM 40 MILLIGRAM(S): 20 TABLET, DELAYED RELEASE ORAL at 05:21

## 2020-01-14 RX ADMIN — Medication 10 MILLIGRAM(S): at 13:08

## 2020-01-14 RX ADMIN — POLYETHYLENE GLYCOL 3350 17 GRAM(S): 17 POWDER, FOR SOLUTION ORAL at 05:20

## 2020-01-14 RX ADMIN — Medication 1 GRAM(S): at 17:52

## 2020-01-14 RX ADMIN — Medication 1 GRAM(S): at 05:21

## 2020-01-14 RX ADMIN — Medication 10 MILLIGRAM(S): at 05:21

## 2020-01-14 RX ADMIN — Medication 10 MILLIGRAM(S): at 12:02

## 2020-01-14 RX ADMIN — Medication 0.5 MILLIGRAM(S): at 05:20

## 2020-01-14 NOTE — DISCHARGE NOTE PROVIDER - NSDCMRMEDTOKEN_GEN_ALL_CORE_FT
ALPRAZolam 0.5 mg oral tablet: 1 tab(s) orally 4 times a day  amLODIPine 10 mg oral tablet: 1 tab(s) orally once a day  Crestor 5 mg oral tablet: 1 tab(s) orally once a day  cyclobenzaprine 5 mg oral tablet: 1 tab(s) orally 2 times a day, As Needed  diclofenac sodium 100 mg oral tablet, extended release: 1 tab(s) orally once a day  Diovan 160 mg oral capsule: 1 tab(s) orally once a day  oxyCODONE-acetaminophen 7.5 mg-325 mg oral tablet: 1 tab(s) orally every 8 hours, As Needed  pantoprazole 40 mg oral delayed release tablet: 1 tab(s) orally once a day  sertraline 25 mg oral tablet: 1 tab(s) orally once a day  tamsulosin 0.4 mg oral capsule: 1 cap(s) orally once a day ALPRAZolam 0.5 mg oral tablet: 1 tab(s) orally 4 times a day  amLODIPine 10 mg oral tablet: 1 tab(s) orally once a day  Crestor 5 mg oral tablet: 1 tab(s) orally once a day  cyclobenzaprine 5 mg oral tablet: 1 tab(s) orally 2 times a day, As Needed  Diovan 160 mg oral capsule: 1 tab(s) orally once a day  oxyCODONE-acetaminophen 7.5 mg-325 mg oral tablet: 1 tab(s) orally every 8 hours, As Needed  pantoprazole 40 mg oral delayed release tablet: 1 tab(s) orally once a day  sertraline 25 mg oral tablet: 1 tab(s) orally once a day  sucralfate 1 g/10 mL oral suspension: 10 milliliter(s) orally 4 times a day, As Needed -for nausea   tamsulosin 0.4 mg oral capsule: 1 cap(s) orally once a day

## 2020-01-14 NOTE — DISCHARGE NOTE PROVIDER - PROVIDER TOKENS
PROVIDER:[TOKEN:[[id number]],FOLLOWUP:[Routine]] PROVIDER:[TOKEN:[[id number]],FOLLOWUP:[Routine]],PROVIDER:[TOKEN:[[id number]],FOLLOWUP:[1 week]]

## 2020-01-14 NOTE — CHART NOTE - NSCHARTNOTEFT_GEN_A_CORE
<<<RESIDENT DISCHARGE NOTE>>>     MANUEL ROSENBAUM  [id number]    VITAL SIGNS:  T(F): 97 (01-14-20 @ 05:00), Max: 97.4 (01-13-20 @ 22:12)  HR: 91 (01-14-20 @ 05:00)  BP: 118/72 (01-14-20 @ 05:00)  SpO2: --      PHYSICAL EXAMINATION:  General: Patient resting comfortably in bed, NAD  HEENT: NCAT, conjunctiva clear, sclera white, PEERLA, EOMI, moist mucous membranes, normal orophaynx  Neck: No masses, no JVD, no cervical lymphadenopathy  Respiratory: good inspiratory effort; lungs clear to auscultation bilaterally  CV: Normal rate, regular rhythm, normal S1/S2, no rubs, gallops, murmurs  GI: abdomen soft, non-tender, non-distended, no masses, normal bowel sounds  Extremities: Well-perfused, no clubbing, no cyanosis, no lower extremity edema bilaterally  Skin: warm and dry  Neurology: AAOx3, mentating appropriately, follows commands, nonfocal    TEST RESULTS:                        10.9   9.79  )-----------( 371      ( 14 Jan 2020 07:25 )             33.4       01-14    141  |  103  |  9<L>  ----------------------------<  98  4.4   |  24  |  0.6<L>    Ca    9.2      14 Jan 2020 07:25  Mg     1.9     01-13        FINAL DISCHARGE INTERVIEW:  Resident(s) Present: (Name: Ingrid Baugh)    DISCHARGE MEDICATION RECONCILIATION  reviewed with Attending (Name: Devan Alas)    DISPOSITION:   [  ] Home,    [ x ] Home with Visiting Nursing Services,   [  ]  SNF/ NH,    [   ] Acute Rehab (4A),   [   ] Other (Specify:_________) <<<RESIDENT DISCHARGE NOTE>>>     MANUEL ROSENBAUM  [id number]    VITAL SIGNS:  T(F): 97 (01-14-20 @ 05:00), Max: 97.4 (01-13-20 @ 22:12)  HR: 91 (01-14-20 @ 05:00)  BP: 118/72 (01-14-20 @ 05:00)  SpO2: --      PHYSICAL EXAMINATION:  General: Patient sitting up in chair, frail appearing, NAD  HEENT: NCAT, conjunctiva clear, sclera white, PEERLA, EOMI, moist mucous membranes, normal orophaynx  Neck: No masses, no JVD, no cervical lymphadenopathy  Respiratory: good inspiratory effort; lungs clear to auscultation bilaterally  CV: Normal rate, regular rhythm, normal S1/S2, no rubs, gallops, murmurs  GI: abdomen soft, improved tenderness, non-distended, no masses, normal bowel sounds  Extremities: Well-perfused, no clubbing, no cyanosis, no lower extremity edema bilaterally  Skin: warm and dry  Neurology: AAOx3, mentating appropriately, follows commands, nonfocal    TEST RESULTS:                        10.9   9.79  )-----------( 371      ( 14 Jan 2020 07:25 )             33.4       01-14    141  |  103  |  9<L>  ----------------------------<  98  4.4   |  24  |  0.6<L>    Ca    9.2      14 Jan 2020 07:25  Mg     1.9     01-13        FINAL DISCHARGE INTERVIEW:  Resident(s) Present: (Name: Ingrid Baugh)    DISCHARGE MEDICATION RECONCILIATION  reviewed with Attending (Name: Devan Alas)    DISPOSITION:   [  ] Home,    [ x ] Home with Visiting Nursing Services,   [  ]  SNF/ NH,    [   ] Acute Rehab (4A),   [   ] Other (Specify:_________)

## 2020-01-14 NOTE — DISCHARGE NOTE PROVIDER - HOSPITAL COURSE
Ms. Chow is a pleasant 73 yo F w/ PMH of HTN, HLD, LBP s/p multiple back sx and anxiety presents to the hospital complaining of abdominal pain. Pt states she had lettuce the day prior and she developed abdominal discomfort. Had 1 episode non-bloody watery stool. Patient was febrile and started on antibiotics. The patient was NPO for a week due to nausea, vomiting, and inability to tolerate diet. She had a thorough work up including imaging and abdominal duplex, which were negative, and was found on GI PCR to be positive for norovirus. The patient was treated with fluids and her diet was gradually advanced. She is now tolerating regular diet and is clear for discharge with home care.

## 2020-01-14 NOTE — CHART NOTE - NSCHARTNOTEFT_GEN_A_CORE
Registered Dietitian Follow-Up    ***Scroll to the bottom for RD recommendation***    Patient Profile Reviewed                           Yes [x]   No []  Nutrition History Previously Obtained        Yes [x]  No []          PERTINENT SUBJECTIVE INFORMATION (LATEST AS OF TODAY):  - pt has been solely drinking Ensure enlive x2/day and eating the food that is more liquid than solids on her tray because that is what "she could tolerate" at this time. will slowly adv as tolerated per pt.  - otherwise, pt looks well, reported consuming about 75% per tray with supplements.         PERTINENT MEDICAL INFORMATIONS:  (1) hx s/p Multiple back Sx and anxiety p/w c/o abd pain   (2) Vomiting and + Norovirus. Adv'd diet. GI is following.        DIET ORDER:  DASH/TLC, Mech Soft, Low fiber, Ensure enlive x2        ANTHROPOMETRICS:  - Ht.  157.48c m  - Wt.  (1/10): 63.3kg  (1/14): 93.7kg  -likely bedscale error, will monitor  - BMI.  25.6 v.s. 38???  - IBW       PERTINENT LAB DATA:  1/13: h/h 10.9/33.6, Cr 0.6, glucose 102  PERTINENT MEDS:  enoxaparin, miralax, oxy, acetaminophen, ondansetron, amlodipine, atorvastatin, protonix       PHYSICAL FINDINGS  - APPEARANCE:        alert and oriented. no edema  - GI FUNCTION:        constipation, LBM long time ago per pt. On Miralax, RN discussed with  - TUBES:                       - ORAL/MOUTH:      none reported  - SKIN:                        intact        NUTRITION REQUIREMENTS  WEIGHT USED:                          current  63.3 kg ABW  ESTIMATED ENERGY NEEDS:       CONTINUE [ x ]      ADJUST [  ] - from 1/10 note    ESTIMATED ENERGY NEEDS:         1297 - 1621 kcals/day (MSJ x 1.2 - 1.5 AF for PCM)  ESTIMATED PROTEIN NEEDS:        76 - 88 gms/day (1.2 - 1.4 gm/kg for PCM)  ESTIMATED FLUID NEEDS:             1ml/kcal    CURRENT NUTRIENT NEEDS:    slowly improving          [ x ] PREVIOUS NUTRITION DIAGNOSIS:   (1) Severe PCM (2) inadequate oral intake            [  x] ONGOING        [  ] RESOLVED           PATIENT INTERVENTION:    [ x ] ORAL        [ ] EN/TF     GOAL/EXPECTED OUTCOME:     pt to consume and tolerate >50% of all meals and >100% of all supplements upon f/u in 3 days.   INDICATOR/MONITORING:       RD to monitor diet order, energy intake, body composition, nutrition focused physical findings (PO tolerance, appetite)  NUTRITION INTERVENTION:        Meals and snacks.     RECS: (1)  Continue current diet order and supplement shakes. (2) however, please add SOFT to current existing diet order.

## 2020-01-14 NOTE — PROGRESS NOTE ADULT - REASON FOR ADMISSION
Stomach ache.

## 2020-01-14 NOTE — DISCHARGE NOTE PROVIDER - NSDCCPCAREPLAN_GEN_ALL_CORE_FT
PRINCIPAL DISCHARGE DIAGNOSIS  Diagnosis: Enteritis due to Norovirus  Assessment and Plan of Treatment: The vomiting and diarrhea that you experienced were due to infection with norovirus. The diarrhea and vomiting have now resolved. Drink clear fluids to prevent dehydration. Eat bland, easy-to-digest foods as tolerated. Please follow up with your PCP as needed.  SEEK IMMEDIATE MEDICAL CARE IF YOU HAVE ANY OF THE FOLLOWING SYMPTOMS: high fevers, lightheadedness/dizziness, chest pain, black or bloody stools, shortness of breath, severe abdominal or back pain, or any signs of dehydration.      SECONDARY DISCHARGE DIAGNOSES  Diagnosis: General weakness  Assessment and Plan of Treatment: Weakness is a lack of strength. The generalized weakness you have been experiencing is due to a combination of prolonged best rest, lossess due to diarrhea and vomiting, and decreased nutrition due to prolonged inability to eat.  Follow these instructions at home:  Rest as needed.  Try to get enough sleep. Talk to your doctor about how much sleep you need each night.  Take over-the-counter and prescription medicines only as told by your doctor.  Eat a healthy, well-balanced diet. This includes:  Proteins to build muscles, such as lean meats and fish.  Fresh fruits and vegetables.  Carbohydrates to boost energy, such as whole grains.  Drink enough fluid to keep your urine clear or pale yellow.  Work with the physical therapist / home health attendant to increase your daily activity level.  Keep all follow-up visits as told by your doctor. This is important.  Contact a doctor if:  Your weakness does not get better or it gets worse.  Your weakness affects your ability to:  Think clearly.  Do your normal daily activities.  Get help right away if:  You have sudden weakness.  You have trouble breathing or shortness of breath.  You have problems with your vision.  You have trouble talking or swallowing.  You have trouble standing or walking.  You have chest pain.  You are light-headed.  You pass out (lose consciousness).  This information is not intended to replace advice given to you by your health care provider. Make sure you discuss any questions you have with your health care provider.

## 2020-01-14 NOTE — PROGRESS NOTE ADULT - SUBJECTIVE AND OBJECTIVE BOX
Patient was seen and examined. Spoke with RN/Resident. Chart reviewed.  No events overnight.  Pt comfortable in bed  Tolerating very basic diet  still with pain but improved from where she was   no fevers  no chills      Vital Signs Last 24 Hrs  T(F): 97 (14 Jan 2020 05:00), Max: 97.4 (13 Jan 2020 22:12)  HR: 91 (14 Jan 2020 05:00) (68 - 91)  BP: 118/72 (14 Jan 2020 05:00) (96/51 - 118/72)  SpO2: --      MEDICATIONS  (STANDING):  ALPRAZolam 0.5 milliGRAM(s) Oral four times a day  amLODIPine   Tablet 10 milliGRAM(s) Oral daily  atorvastatin 20 milliGRAM(s) Oral at bedtime  enoxaparin Injectable 40 milliGRAM(s) SubCutaneous at bedtime  losartan 100 milliGRAM(s) Oral daily  pantoprazole    Tablet 40 milliGRAM(s) Oral before breakfast  polyethylene glycol 3350 17 Gram(s) Oral two times a day  prochlorperazine   Tablet 10 milliGRAM(s) Oral three times a day  sertraline 25 milliGRAM(s) Oral daily  sucralfate suspension 1 Gram(s) Oral four times a day  tamsulosin 0.4 milliGRAM(s) Oral at bedtime    MEDICATIONS  (PRN):  acetaminophen   Tablet .. 650 milliGRAM(s) Oral every 6 hours PRN Temp greater or equal to 38C (100.4F)  cyclobenzaprine Oral Tab/Cap - Peds 5 milliGRAM(s) Oral two times a day PRN Back spasm  ondansetron Injectable 4 milliGRAM(s) IV Push every 8 hours PRN Nausea and/or Vomiting  oxycodone    5 mG/acetaminophen 325 mG 1 Tablet(s) Oral every 6 hours PRN Moderate Pain (4 - 6)    Labs:                        10.9   8.72  )-----------( 321      ( 13 Jan 2020 08:18 )             33.6                         10.5   11.34 )-----------( 289      ( 12 Jan 2020 08:01 )             33.0     13 Jan 2020 08:18    142    |  105    |  11     ----------------------------<  102    4.6     |  26     |  0.6    12 Jan 2020 08:01    141    |  105    |  7      ----------------------------<  95     4.2     |  23     |  0.5      Ca    9.3        13 Jan 2020 08:18  Ca    8.7        12 Jan 2020 08:01  Mg     1.9       13 Jan 2020 08:18  Mg     1.9       12 Jan 2020 08:01            General: comfortable, NAD  Neurology: A&Ox3, nonfocal  Head:  Normocephalic, atraumatic  ENT:  Mucosa moist, no ulcerations  Neck:  Supple, no JVD,   Skin:   Resp: CTA B/L  CV: RRR, S1S2,   GI: Soft, tender to gentle palpation  MS: No edema,      A/P:    73 yo F w/ PMH of HTN, HLD, LBP s/p multiple back sx and anxiety presents to the hospital complaining of abdominal pain.     Abdominal pain, vomiting  - Positive for norovirus   - advance diet  -  gI follow up  - d/c home later today if stable    Urinary retention  - Patient passed TOV, garza removed (1/10)        Cont all home meds  set up for D/c later today          DVT prophylaxis  Decubitus prevention- all measures as per RN protocol  Please call or text me with any questions or updates

## 2020-01-16 LAB
CULTURE RESULTS: SIGNIFICANT CHANGE UP
SPECIMEN SOURCE: SIGNIFICANT CHANGE UP

## 2020-02-02 ENCOUNTER — OUTPATIENT (OUTPATIENT)
Dept: OUTPATIENT SERVICES | Facility: HOSPITAL | Age: 75
LOS: 1 days | Discharge: HOME | End: 2020-02-02
Payer: COMMERCIAL

## 2020-02-02 DIAGNOSIS — M79.609 PAIN IN UNSPECIFIED LIMB: ICD-10-CM

## 2020-02-02 PROBLEM — M54.9 DORSALGIA, UNSPECIFIED: Chronic | Status: ACTIVE | Noted: 2020-01-03

## 2020-02-02 PROBLEM — E78.5 HYPERLIPIDEMIA, UNSPECIFIED: Chronic | Status: ACTIVE | Noted: 2020-01-03

## 2020-02-02 PROBLEM — I10 ESSENTIAL (PRIMARY) HYPERTENSION: Chronic | Status: ACTIVE | Noted: 2020-01-03

## 2020-02-02 PROCEDURE — 93970 EXTREMITY STUDY: CPT | Mod: 26

## 2020-02-09 ENCOUNTER — OUTPATIENT (OUTPATIENT)
Dept: OUTPATIENT SERVICES | Facility: HOSPITAL | Age: 75
LOS: 1 days | Discharge: HOME | End: 2020-02-09
Payer: COMMERCIAL

## 2020-02-09 DIAGNOSIS — I83.813 VARICOSE VEINS OF BILATERAL LOWER EXTREMITIES WITH PAIN: ICD-10-CM

## 2020-02-09 PROCEDURE — 93970 EXTREMITY STUDY: CPT | Mod: 26

## 2020-02-25 ENCOUNTER — OUTPATIENT (OUTPATIENT)
Dept: OUTPATIENT SERVICES | Facility: HOSPITAL | Age: 75
LOS: 1 days | Discharge: HOME | End: 2020-02-25
Payer: COMMERCIAL

## 2020-02-25 DIAGNOSIS — D30.00 BENIGN NEOPLASM OF UNSPECIFIED KIDNEY: ICD-10-CM

## 2020-02-25 PROCEDURE — 74176 CT ABD & PELVIS W/O CONTRAST: CPT | Mod: 26

## 2020-03-14 ENCOUNTER — OUTPATIENT (OUTPATIENT)
Dept: OUTPATIENT SERVICES | Facility: HOSPITAL | Age: 75
LOS: 1 days | Discharge: HOME | End: 2020-03-14
Payer: COMMERCIAL

## 2020-03-14 DIAGNOSIS — M25.569 PAIN IN UNSPECIFIED KNEE: ICD-10-CM

## 2020-03-14 PROCEDURE — 73562 X-RAY EXAM OF KNEE 3: CPT | Mod: 26,LT

## 2020-04-13 ENCOUNTER — APPOINTMENT (OUTPATIENT)
Dept: OBGYN | Facility: CLINIC | Age: 75
End: 2020-04-13
Payer: COMMERCIAL

## 2020-04-13 PROCEDURE — 96372 THER/PROPH/DIAG INJ SC/IM: CPT

## 2020-07-01 ENCOUNTER — OUTPATIENT (OUTPATIENT)
Dept: OUTPATIENT SERVICES | Facility: HOSPITAL | Age: 75
LOS: 1 days | Discharge: HOME | End: 2020-07-01
Payer: COMMERCIAL

## 2020-07-01 DIAGNOSIS — Z01.818 ENCOUNTER FOR OTHER PREPROCEDURAL EXAMINATION: ICD-10-CM

## 2020-07-01 PROCEDURE — 78452 HT MUSCLE IMAGE SPECT MULT: CPT | Mod: 26

## 2020-10-09 ENCOUNTER — OUTPATIENT (OUTPATIENT)
Dept: OUTPATIENT SERVICES | Facility: HOSPITAL | Age: 75
LOS: 1 days | Discharge: HOME | End: 2020-10-09
Payer: COMMERCIAL

## 2020-10-09 DIAGNOSIS — Z12.31 ENCOUNTER FOR SCREENING MAMMOGRAM FOR MALIGNANT NEOPLASM OF BREAST: ICD-10-CM

## 2020-10-09 PROCEDURE — 77063 BREAST TOMOSYNTHESIS BI: CPT | Mod: 26

## 2020-10-09 PROCEDURE — 77067 SCR MAMMO BI INCL CAD: CPT | Mod: 26

## 2020-11-02 NOTE — ED PROVIDER NOTE - INPATIENT RESIDENT/ACP NOTIFIED
Tiago Maher is a 55 y/o individual with h/o Barrera's esophagus who is here for follow-up of his condition. . He has had chronic reflux disease.  His disease is under control on omeprazole 40mg daily.  If he misses a dose, he has recurrence of sxs within 1-2 days.  No N/V/abdominal pain, no dysphagia.   Normal BM, no blood in the stool.  . Today on 11/2/2020, pt reports that overall he is doing well.  His reflux is doing well overall.  Is taking his H2 blocker, nearly on a daily basis.  . SH:  . PMH: GERD and Barrera's h/o.  BPH FH: No GI malignancy . Colonoscopy with h/o polyps (2007 and 2010), no polyp in 2015, due in 2020 . 6/2018 EGD: There were esophageal mucosal changes secondary to established short-segment Barrera's disease present in the lower third of the esophagus.  Mucosa was biopsied with a cold forceps for histology in 4 quadrants in the lower third of the esophagus. . FINAL MICROSCOPIC DIAGNOSIS:  A. DISTAL ESOPHAGUS, BIOPSY: -Squamous mucosa with reflux associated changes. -Gastric cardia type mucosa with chronic inflammation and foveolar hyperplasia. -Alcian blue/PAS stain is negative for intestinal metaplasia. No fungus. -There is no evidence of dysplasia or malignancy. . 12/2016: chronic esophagitis, no dysplasia .
dr gage aware

## 2020-11-03 ENCOUNTER — NON-APPOINTMENT (OUTPATIENT)
Age: 75
End: 2020-11-03

## 2020-11-03 ENCOUNTER — APPOINTMENT (OUTPATIENT)
Dept: OBGYN | Facility: CLINIC | Age: 75
End: 2020-11-03

## 2021-02-08 ENCOUNTER — OUTPATIENT (OUTPATIENT)
Dept: OUTPATIENT SERVICES | Facility: HOSPITAL | Age: 76
LOS: 1 days | Discharge: HOME | End: 2021-02-08

## 2021-02-08 DIAGNOSIS — Z11.59 ENCOUNTER FOR SCREENING FOR OTHER VIRAL DISEASES: ICD-10-CM

## 2021-02-10 ENCOUNTER — RESULT REVIEW (OUTPATIENT)
Age: 76
End: 2021-02-10

## 2021-02-10 ENCOUNTER — OUTPATIENT (OUTPATIENT)
Dept: OUTPATIENT SERVICES | Facility: HOSPITAL | Age: 76
LOS: 1 days | Discharge: HOME | End: 2021-02-10
Payer: OTHER MISCELLANEOUS

## 2021-02-10 DIAGNOSIS — M96.1 POSTLAMINECTOMY SYNDROME, NOT ELSEWHERE CLASSIFIED: ICD-10-CM

## 2021-02-10 PROCEDURE — 72131 CT LUMBAR SPINE W/O DYE: CPT | Mod: 26

## 2021-02-16 ENCOUNTER — OUTPATIENT (OUTPATIENT)
Dept: OUTPATIENT SERVICES | Facility: HOSPITAL | Age: 76
LOS: 1 days | Discharge: HOME | End: 2021-02-16
Payer: OTHER MISCELLANEOUS

## 2021-02-16 DIAGNOSIS — M25.561 PAIN IN RIGHT KNEE: ICD-10-CM

## 2021-02-16 PROCEDURE — 73562 X-RAY EXAM OF KNEE 3: CPT | Mod: 26,RT

## 2021-03-20 ENCOUNTER — OUTPATIENT (OUTPATIENT)
Dept: OUTPATIENT SERVICES | Facility: HOSPITAL | Age: 76
LOS: 1 days | Discharge: HOME | End: 2021-03-20
Payer: OTHER MISCELLANEOUS

## 2021-03-20 DIAGNOSIS — D30.00 BENIGN NEOPLASM OF UNSPECIFIED KIDNEY: ICD-10-CM

## 2021-03-20 DIAGNOSIS — N28.1 CYST OF KIDNEY, ACQUIRED: ICD-10-CM

## 2021-03-20 DIAGNOSIS — Q44.6 CYSTIC DISEASE OF LIVER: ICD-10-CM

## 2021-03-20 PROCEDURE — 76700 US EXAM ABDOM COMPLETE: CPT | Mod: 26

## 2021-03-20 PROCEDURE — 76857 US EXAM PELVIC LIMITED: CPT | Mod: 26

## 2021-04-17 ENCOUNTER — OUTPATIENT (OUTPATIENT)
Dept: OUTPATIENT SERVICES | Facility: HOSPITAL | Age: 76
LOS: 1 days | Discharge: HOME | End: 2021-04-17
Payer: COMMERCIAL

## 2021-04-17 DIAGNOSIS — R91.1 SOLITARY PULMONARY NODULE: ICD-10-CM

## 2021-04-17 PROCEDURE — 71046 X-RAY EXAM CHEST 2 VIEWS: CPT | Mod: 26

## 2021-05-24 ENCOUNTER — APPOINTMENT (OUTPATIENT)
Age: 76
End: 2021-05-24
Payer: COMMERCIAL

## 2021-05-24 VITALS
HEART RATE: 94 BPM | HEIGHT: 62 IN | DIASTOLIC BLOOD PRESSURE: 80 MMHG | RESPIRATION RATE: 12 BRPM | WEIGHT: 157 LBS | BODY MASS INDEX: 28.89 KG/M2 | OXYGEN SATURATION: 93 % | SYSTOLIC BLOOD PRESSURE: 120 MMHG

## 2021-05-24 PROCEDURE — 99213 OFFICE O/P EST LOW 20 MIN: CPT

## 2021-06-07 ENCOUNTER — OUTPATIENT (OUTPATIENT)
Dept: OUTPATIENT SERVICES | Facility: HOSPITAL | Age: 76
LOS: 1 days | Discharge: HOME | End: 2021-06-07
Payer: COMMERCIAL

## 2021-06-07 ENCOUNTER — RESULT REVIEW (OUTPATIENT)
Age: 76
End: 2021-06-07

## 2021-06-07 DIAGNOSIS — R91.8 OTHER NONSPECIFIC ABNORMAL FINDING OF LUNG FIELD: ICD-10-CM

## 2021-06-07 PROCEDURE — 71250 CT THORAX DX C-: CPT | Mod: 26

## 2021-06-08 ENCOUNTER — APPOINTMENT (OUTPATIENT)
Dept: OBGYN | Facility: CLINIC | Age: 76
End: 2021-06-08
Payer: COMMERCIAL

## 2021-06-08 DIAGNOSIS — M81.0 AGE-RELATED OSTEOPOROSIS W/OUT CURRENT PATHOLOGICAL FRACTURE: ICD-10-CM

## 2021-06-08 PROCEDURE — 96372 THER/PROPH/DIAG INJ SC/IM: CPT

## 2021-06-09 PROBLEM — M81.0 OSTEOPOROSIS, POSTMENOPAUSAL: Status: ACTIVE | Noted: 2018-06-12

## 2021-08-18 ENCOUNTER — APPOINTMENT (OUTPATIENT)
Age: 76
End: 2021-08-18

## 2021-10-08 ENCOUNTER — APPOINTMENT (OUTPATIENT)
Dept: VASCULAR SURGERY | Facility: CLINIC | Age: 76
End: 2021-10-08
Payer: COMMERCIAL

## 2021-10-08 VITALS
DIASTOLIC BLOOD PRESSURE: 75 MMHG | BODY MASS INDEX: 28.52 KG/M2 | SYSTOLIC BLOOD PRESSURE: 145 MMHG | HEIGHT: 62 IN | WEIGHT: 155 LBS | HEART RATE: 114 BPM

## 2021-10-08 DIAGNOSIS — I10 ESSENTIAL (PRIMARY) HYPERTENSION: ICD-10-CM

## 2021-10-08 DIAGNOSIS — E78.00 PURE HYPERCHOLESTEROLEMIA, UNSPECIFIED: ICD-10-CM

## 2021-10-08 DIAGNOSIS — Z78.9 OTHER SPECIFIED HEALTH STATUS: ICD-10-CM

## 2021-10-08 PROCEDURE — 93971 EXTREMITY STUDY: CPT

## 2021-10-08 PROCEDURE — 99204 OFFICE O/P NEW MOD 45 MIN: CPT

## 2021-10-08 RX ORDER — VALSARTAN 160 MG/1
160 TABLET ORAL
Refills: 0 | Status: ACTIVE | COMMUNITY

## 2021-10-08 RX ORDER — AMLODIPINE BESYLATE 5 MG/1
5 TABLET ORAL
Refills: 0 | Status: ACTIVE | COMMUNITY

## 2021-10-08 RX ORDER — ROSUVASTATIN CALCIUM 10 MG/1
10 TABLET, FILM COATED ORAL
Refills: 0 | Status: ACTIVE | COMMUNITY

## 2021-10-08 RX ORDER — SERTRALINE 25 MG/1
25 TABLET, FILM COATED ORAL
Refills: 0 | Status: ACTIVE | COMMUNITY

## 2021-10-08 RX ORDER — ALPRAZOLAM 0.5 MG/1
0.5 TABLET, EXTENDED RELEASE ORAL
Refills: 0 | Status: ACTIVE | COMMUNITY

## 2021-10-08 RX ORDER — OXYCODONE AND ACETAMINOPHEN 7.5; 325 MG/1; MG/1
7.5-325 TABLET ORAL
Refills: 0 | Status: ACTIVE | COMMUNITY

## 2021-10-08 RX ORDER — CYCLOBENZAPRINE HYDROCHLORIDE 5 MG/1
5 TABLET, FILM COATED ORAL
Refills: 0 | Status: ACTIVE | COMMUNITY

## 2021-10-08 RX ORDER — TAMSULOSIN HYDROCHLORIDE 0.4 MG/1
0.4 CAPSULE ORAL
Refills: 0 | Status: ACTIVE | COMMUNITY

## 2021-10-08 RX ORDER — FAMOTIDINE 40 MG/1
40 TABLET, FILM COATED ORAL
Refills: 0 | Status: ACTIVE | COMMUNITY

## 2021-11-19 ENCOUNTER — RX RENEWAL (OUTPATIENT)
Age: 76
End: 2021-11-19

## 2021-11-19 RX ORDER — DENOSUMAB 60 MG/ML
60 INJECTION SUBCUTANEOUS
Qty: 60 | Refills: 3 | Status: ACTIVE | COMMUNITY
Start: 2020-10-01 | End: 1900-01-01

## 2021-12-02 ENCOUNTER — APPOINTMENT (OUTPATIENT)
Age: 76
End: 2021-12-02
Payer: COMMERCIAL

## 2021-12-02 VITALS
RESPIRATION RATE: 14 BRPM | OXYGEN SATURATION: 95 % | WEIGHT: 166 LBS | SYSTOLIC BLOOD PRESSURE: 124 MMHG | BODY MASS INDEX: 30.55 KG/M2 | DIASTOLIC BLOOD PRESSURE: 74 MMHG | HEIGHT: 62 IN | HEART RATE: 90 BPM

## 2021-12-02 PROCEDURE — 99214 OFFICE O/P EST MOD 30 MIN: CPT

## 2021-12-02 NOTE — ASSESSMENT
[FreeTextEntry1] : Was recommended that we repeat the CAT scan.  We will perform it at this point.  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

## 2021-12-02 NOTE — REASON FOR VISIT
[Follow-Up] : a follow-up visit [TextBox_44] : Patient has a history of right middle lobe atelectasis.  It recurred around January 2020 when she had an accident.  It was first seen on those films.  She recently had a CAT scan which showed slight worsening of the atelectasis with elevated right hemidiaphragm.  It appears that this area of the lung has been down for upwards of a year.  Patient is also complaining of a postnasal drip syndrome.  She has a lot of clear secretions in the morning when she awakens.  She has chronic nasal congestion.

## 2021-12-06 ENCOUNTER — APPOINTMENT (OUTPATIENT)
Dept: OBGYN | Facility: CLINIC | Age: 76
End: 2021-12-06
Payer: COMMERCIAL

## 2021-12-06 PROCEDURE — 77085 DXA BONE DENSITY AXL VRT FX: CPT

## 2021-12-06 PROCEDURE — 96372 THER/PROPH/DIAG INJ SC/IM: CPT

## 2021-12-07 DIAGNOSIS — J02.9 ACUTE PHARYNGITIS, UNSPECIFIED: ICD-10-CM

## 2021-12-07 RX ORDER — AZITHROMYCIN 250 MG/1
250 TABLET, FILM COATED ORAL
Qty: 6 | Refills: 1 | Status: ACTIVE | COMMUNITY
Start: 2021-12-07 | End: 1900-01-01

## 2021-12-17 ENCOUNTER — NON-APPOINTMENT (OUTPATIENT)
Age: 76
End: 2021-12-17

## 2021-12-17 ENCOUNTER — OUTPATIENT (OUTPATIENT)
Dept: OUTPATIENT SERVICES | Facility: HOSPITAL | Age: 76
LOS: 1 days | Discharge: HOME | End: 2021-12-17
Payer: COMMERCIAL

## 2021-12-17 ENCOUNTER — RESULT REVIEW (OUTPATIENT)
Age: 76
End: 2021-12-17

## 2021-12-17 DIAGNOSIS — J47.9 BRONCHIECTASIS, UNCOMPLICATED: ICD-10-CM

## 2021-12-17 PROCEDURE — 71250 CT THORAX DX C-: CPT | Mod: 26

## 2021-12-30 ENCOUNTER — NON-APPOINTMENT (OUTPATIENT)
Age: 76
End: 2021-12-30

## 2022-01-04 ENCOUNTER — NON-APPOINTMENT (OUTPATIENT)
Age: 77
End: 2022-01-04

## 2022-02-07 ENCOUNTER — OUTPATIENT (OUTPATIENT)
Dept: OUTPATIENT SERVICES | Facility: HOSPITAL | Age: 77
LOS: 1 days | Discharge: HOME | End: 2022-02-07
Payer: MEDICARE

## 2022-02-07 DIAGNOSIS — M25.512 PAIN IN LEFT SHOULDER: ICD-10-CM

## 2022-02-07 DIAGNOSIS — M79.602 PAIN IN LEFT ARM: ICD-10-CM

## 2022-02-07 PROCEDURE — 73060 X-RAY EXAM OF HUMERUS: CPT | Mod: 26,LT

## 2022-02-07 PROCEDURE — 73030 X-RAY EXAM OF SHOULDER: CPT | Mod: 26,LT

## 2022-03-20 ENCOUNTER — OUTPATIENT (OUTPATIENT)
Dept: OUTPATIENT SERVICES | Facility: HOSPITAL | Age: 77
LOS: 1 days | Discharge: HOME | End: 2022-03-20
Payer: MEDICARE

## 2022-03-20 DIAGNOSIS — R10.9 UNSPECIFIED ABDOMINAL PAIN: ICD-10-CM

## 2022-03-20 PROCEDURE — 76700 US EXAM ABDOM COMPLETE: CPT | Mod: 26

## 2022-05-19 ENCOUNTER — APPOINTMENT (OUTPATIENT)
Dept: VASCULAR SURGERY | Facility: CLINIC | Age: 77
End: 2022-05-19
Payer: MEDICARE

## 2022-05-19 VITALS — BODY MASS INDEX: 30.21 KG/M2 | WEIGHT: 160 LBS | HEIGHT: 61 IN

## 2022-05-19 VITALS — DIASTOLIC BLOOD PRESSURE: 73 MMHG | SYSTOLIC BLOOD PRESSURE: 137 MMHG

## 2022-05-19 PROCEDURE — 99214 OFFICE O/P EST MOD 30 MIN: CPT

## 2022-05-19 PROCEDURE — 93971 EXTREMITY STUDY: CPT

## 2022-05-19 NOTE — ASSESSMENT
[FreeTextEntry1] : 75 y/o female with provoked RLE DVT after right knee replacement in July 2021 and was on Xarelto for 3 months now off anticoagulation for six months.\par \par Duplex today showed no evidence of acute or chronic DVT in the right lower extremity.\par \par Follow up as needed.\par \par I spent 40 minutes with patient performing physical exam, reviewing duplex results, discussing treatment plan and followup.\par

## 2022-05-19 NOTE — HISTORY OF PRESENT ILLNESS
[FreeTextEntry1] : 75 y/o female who underwent right knee replacement at [facility] in July 2021, was diagnosed with RLE DVT post operatively and was on Xarelto for 3 months until October 2021. \par \par

## 2022-06-02 ENCOUNTER — APPOINTMENT (OUTPATIENT)
Age: 77
End: 2022-06-02
Payer: MEDICARE

## 2022-06-02 VITALS
HEART RATE: 64 BPM | WEIGHT: 166 LBS | OXYGEN SATURATION: 94 % | HEIGHT: 61 IN | RESPIRATION RATE: 14 BRPM | DIASTOLIC BLOOD PRESSURE: 54 MMHG | BODY MASS INDEX: 31.34 KG/M2 | SYSTOLIC BLOOD PRESSURE: 112 MMHG

## 2022-06-02 DIAGNOSIS — J98.11 ATELECTASIS: ICD-10-CM

## 2022-06-02 PROCEDURE — 99213 OFFICE O/P EST LOW 20 MIN: CPT

## 2022-06-02 RX ORDER — RIVAROXABAN 20 MG/1
20 TABLET, FILM COATED ORAL
Refills: 0 | Status: COMPLETED | COMMUNITY
End: 2022-06-02

## 2022-06-02 RX ORDER — PANTOPRAZOLE SODIUM 40 MG/1
40 GRANULE, DELAYED RELEASE ORAL
Refills: 0 | Status: COMPLETED | COMMUNITY
End: 2022-06-02

## 2022-06-02 RX ORDER — AZELASTINE HYDROCHLORIDE 137 UG/1
0.1 SPRAY, METERED NASAL DAILY
Qty: 1 | Refills: 5 | Status: ACTIVE | COMMUNITY
Start: 1900-01-01 | End: 1900-01-01

## 2022-06-02 NOTE — REASON FOR VISIT
[Follow-Up] : a follow-up visit [Abnormal CXR/ Chest CT] : an abnormal CXR/ chest CT [TextBox_44] : Patient has been doing well recently except for some significant allergic type symptomatology.  She has nasal congestion and postnasal drip.  She awakens in the morning secretions in her chest.  Sometimes she has difficulty with the use.  I reviewed her latest CAT scan which shows signs suggestive of right middle lobe syndrome.

## 2022-06-02 NOTE — ASSESSMENT
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

## 2022-06-02 NOTE — HISTORY OF PRESENT ILLNESS
[TextBox_4] : I reviewed the original evaluation and last notes.\par \par I reviewed all the previous sleep test that are available on file.\par I reviewed the previous office notes.\par

## 2022-06-29 ENCOUNTER — OUTPATIENT (OUTPATIENT)
Dept: OUTPATIENT SERVICES | Facility: HOSPITAL | Age: 77
LOS: 1 days | Discharge: HOME | End: 2022-06-29

## 2022-06-29 DIAGNOSIS — M25.512 PAIN IN LEFT SHOULDER: ICD-10-CM

## 2022-06-29 DIAGNOSIS — M25.522 PAIN IN LEFT ELBOW: ICD-10-CM

## 2022-06-29 PROCEDURE — 73060 X-RAY EXAM OF HUMERUS: CPT | Mod: 26,LT

## 2022-06-29 PROCEDURE — 73030 X-RAY EXAM OF SHOULDER: CPT | Mod: 26,LT

## 2022-06-29 PROCEDURE — 73080 X-RAY EXAM OF ELBOW: CPT | Mod: 26,LT

## 2022-08-04 ENCOUNTER — OUTPATIENT (OUTPATIENT)
Dept: OUTPATIENT SERVICES | Facility: HOSPITAL | Age: 77
LOS: 1 days | Discharge: HOME | End: 2022-08-04

## 2022-08-04 DIAGNOSIS — R07.9 CHEST PAIN, UNSPECIFIED: ICD-10-CM

## 2022-08-04 PROCEDURE — 75574 CT ANGIO HRT W/3D IMAGE: CPT | Mod: 26,MH

## 2022-08-21 ENCOUNTER — EMERGENCY (EMERGENCY)
Facility: HOSPITAL | Age: 77
LOS: 0 days | Discharge: HOME | End: 2022-08-21
Attending: STUDENT IN AN ORGANIZED HEALTH CARE EDUCATION/TRAINING PROGRAM | Admitting: STUDENT IN AN ORGANIZED HEALTH CARE EDUCATION/TRAINING PROGRAM

## 2022-08-21 VITALS
HEIGHT: 62 IN | OXYGEN SATURATION: 96 % | DIASTOLIC BLOOD PRESSURE: 59 MMHG | SYSTOLIC BLOOD PRESSURE: 116 MMHG | HEART RATE: 100 BPM | WEIGHT: 160.06 LBS | RESPIRATION RATE: 18 BRPM | TEMPERATURE: 98 F

## 2022-08-21 DIAGNOSIS — H10.9 UNSPECIFIED CONJUNCTIVITIS: ICD-10-CM

## 2022-08-21 DIAGNOSIS — M54.50 LOW BACK PAIN, UNSPECIFIED: ICD-10-CM

## 2022-08-21 DIAGNOSIS — R06.02 SHORTNESS OF BREATH: ICD-10-CM

## 2022-08-21 DIAGNOSIS — R05.9 COUGH, UNSPECIFIED: ICD-10-CM

## 2022-08-21 DIAGNOSIS — I10 ESSENTIAL (PRIMARY) HYPERTENSION: ICD-10-CM

## 2022-08-21 DIAGNOSIS — E78.5 HYPERLIPIDEMIA, UNSPECIFIED: ICD-10-CM

## 2022-08-21 DIAGNOSIS — G89.29 OTHER CHRONIC PAIN: ICD-10-CM

## 2022-08-21 DIAGNOSIS — R53.83 OTHER FATIGUE: ICD-10-CM

## 2022-08-21 DIAGNOSIS — U07.1 COVID-19: ICD-10-CM

## 2022-08-21 DIAGNOSIS — H53.8 OTHER VISUAL DISTURBANCES: ICD-10-CM

## 2022-08-21 LAB
ALBUMIN SERPL ELPH-MCNC: 4.1 G/DL — SIGNIFICANT CHANGE UP (ref 3.5–5.2)
ALP SERPL-CCNC: 76 U/L — SIGNIFICANT CHANGE UP (ref 30–115)
ALT FLD-CCNC: 13 U/L — SIGNIFICANT CHANGE UP (ref 0–41)
ANION GAP SERPL CALC-SCNC: 13 MMOL/L — SIGNIFICANT CHANGE UP (ref 7–14)
AST SERPL-CCNC: 15 U/L — SIGNIFICANT CHANGE UP (ref 0–41)
BASOPHILS # BLD AUTO: 0.02 K/UL — SIGNIFICANT CHANGE UP (ref 0–0.2)
BASOPHILS NFR BLD AUTO: 0.1 % — SIGNIFICANT CHANGE UP (ref 0–1)
BILIRUB SERPL-MCNC: <0.2 MG/DL — SIGNIFICANT CHANGE UP (ref 0.2–1.2)
BUN SERPL-MCNC: 19 MG/DL — SIGNIFICANT CHANGE UP (ref 10–20)
CALCIUM SERPL-MCNC: 8.7 MG/DL — SIGNIFICANT CHANGE UP (ref 8.5–10.1)
CHLORIDE SERPL-SCNC: 103 MMOL/L — SIGNIFICANT CHANGE UP (ref 98–110)
CO2 SERPL-SCNC: 21 MMOL/L — SIGNIFICANT CHANGE UP (ref 17–32)
CREAT SERPL-MCNC: 0.9 MG/DL — SIGNIFICANT CHANGE UP (ref 0.7–1.5)
EGFR: 65 ML/MIN/1.73M2 — SIGNIFICANT CHANGE UP
EOSINOPHIL # BLD AUTO: 0 K/UL — SIGNIFICANT CHANGE UP (ref 0–0.7)
EOSINOPHIL NFR BLD AUTO: 0 % — SIGNIFICANT CHANGE UP (ref 0–8)
GLUCOSE SERPL-MCNC: 137 MG/DL — HIGH (ref 70–99)
HCT VFR BLD CALC: 33.7 % — LOW (ref 37–47)
HGB BLD-MCNC: 11.5 G/DL — LOW (ref 12–16)
IMM GRANULOCYTES NFR BLD AUTO: 1.1 % — HIGH (ref 0.1–0.3)
LYMPHOCYTES # BLD AUTO: 1.32 K/UL — SIGNIFICANT CHANGE UP (ref 1.2–3.4)
LYMPHOCYTES # BLD AUTO: 5.8 % — LOW (ref 20.5–51.1)
MCHC RBC-ENTMCNC: 26.6 PG — LOW (ref 27–31)
MCHC RBC-ENTMCNC: 34.1 G/DL — SIGNIFICANT CHANGE UP (ref 32–37)
MCV RBC AUTO: 78 FL — LOW (ref 81–99)
MONOCYTES # BLD AUTO: 1.16 K/UL — HIGH (ref 0.1–0.6)
MONOCYTES NFR BLD AUTO: 5.1 % — SIGNIFICANT CHANGE UP (ref 1.7–9.3)
NEUTROPHILS # BLD AUTO: 20.07 K/UL — HIGH (ref 1.4–6.5)
NEUTROPHILS NFR BLD AUTO: 87.9 % — HIGH (ref 42.2–75.2)
NRBC # BLD: 0 /100 WBCS — SIGNIFICANT CHANGE UP (ref 0–0)
NT-PROBNP SERPL-SCNC: 247 PG/ML — SIGNIFICANT CHANGE UP (ref 0–300)
PLATELET # BLD AUTO: 438 K/UL — HIGH (ref 130–400)
POTASSIUM SERPL-MCNC: 3.5 MMOL/L — SIGNIFICANT CHANGE UP (ref 3.5–5)
POTASSIUM SERPL-SCNC: 3.5 MMOL/L — SIGNIFICANT CHANGE UP (ref 3.5–5)
PROT SERPL-MCNC: 6.5 G/DL — SIGNIFICANT CHANGE UP (ref 6–8)
RBC # BLD: 4.32 M/UL — SIGNIFICANT CHANGE UP (ref 4.2–5.4)
RBC # FLD: 17.2 % — HIGH (ref 11.5–14.5)
SARS-COV-2 RNA SPEC QL NAA+PROBE: SIGNIFICANT CHANGE UP
SODIUM SERPL-SCNC: 137 MMOL/L — SIGNIFICANT CHANGE UP (ref 135–146)
TROPONIN T SERPL-MCNC: <0.01 NG/ML — SIGNIFICANT CHANGE UP
WBC # BLD: 22.81 K/UL — HIGH (ref 4.8–10.8)
WBC # FLD AUTO: 22.81 K/UL — HIGH (ref 4.8–10.8)

## 2022-08-21 PROCEDURE — 93010 ELECTROCARDIOGRAM REPORT: CPT

## 2022-08-21 PROCEDURE — 99285 EMERGENCY DEPT VISIT HI MDM: CPT | Mod: FS,CS

## 2022-08-21 PROCEDURE — 71045 X-RAY EXAM CHEST 1 VIEW: CPT | Mod: 26

## 2022-08-21 RX ORDER — POLYMYXIN B SULF/TRIMETHOPRIM 10000-1/ML
1 DROPS OPHTHALMIC (EYE)
Qty: 15 | Refills: 0
Start: 2022-08-21 | End: 2022-08-27

## 2022-08-21 NOTE — ED PROVIDER NOTE - PHYSICAL EXAMINATION
Physical Exam    Constitutional: No acute distress.   Eyes: bilateral conjunctival erythema with crusting and mucous discharge. No periorbital erythema or tenderness.   ENT: No sinus tenderness. No nasal discharge. Mild oropharyngeal erythema, no edema, or exudates. Uvula midline.   Cardiovascular: Regular rate, regular rhythm. No noted murmurs rubs or gallops.  Respiratory: unlabored respiratory effort, clear to auscultation bilaterally no wheezing, rales or rhonchi  Gastrointestinal: Normal bowel sounds. soft, non distended, non-tender abdomen.   Musculoskeletal: supple neck, no midline tenderness. No joint or bony deformity.   Integumentary: warm, dry, no rash  Neurologic: awake, alert, cranial nerves II-XII grossly intact, extremities’ motor and sensory functions grossly intact  Psychiatric: appropriate mood, appropriate affect

## 2022-08-21 NOTE — ED PROVIDER NOTE - CARE PLAN
1 Principal Discharge DX:	2019 novel coronavirus disease (COVID-19)  Secondary Diagnosis:	Conjunctivitis

## 2022-08-21 NOTE — ED PROVIDER NOTE - CLINICAL SUMMARY MEDICAL DECISION MAKING FREE TEXT BOX
.    76-year-old female, PMH HTN, HLD, p/w progressive cough, shortness of breath, fatigue, nasal congestion x5 days.  COVID positive test 5 days ago.  Has been taking Paxilovid and steroids since onset of symptoms.  + Bilateral itchy eyes x2 to 3 days.    PE: GEN, tired: NAD; HEAD: NCAT; ENT: + Nasal congestion, dry MM; NECK: supple; CARDIO: RRR; PULM: No resp distress, CTA B; ABD: s/nt; MSK: no pedal edema, calves nontender; NEURO: grossly non focal; PSYCHE: cooperative.    Labs reviewed.  Leukocytosis likely from concomitant steroid use.  Chest x-ray clear.  EKG normal sinus.  Patient felt somewhat better after IV fluids.    Impression COVID-19  Plan: DC home with supportive care, continue taking steroid and Paxilovid, PMD follow-up.  Discussed signs and symptoms for ED return.  Patient stable for discharge home.    .

## 2022-08-21 NOTE — ED PROVIDER NOTE - PATIENT PORTAL LINK FT
You can access the FollowMyHealth Patient Portal offered by [facility] by registering at the following website: [email or web address]. By joining [facility]’s FollowMyHealth portal, you will also be able to view your health information using other applications (apps) compatible with our system.

## 2022-08-21 NOTE — ED PROVIDER NOTE - NS ED ROS FT
Constitutional: (-) fever (+) fatigue  Eyes/ENT: (+) blurry vision, (+) bilateral eye redness (+) mucous discharge from eyes + crusting (+) eye pruritis (-) epistaxis (+) sore throat  Cardiovascular: (-) chest pain, (-) syncope  Respiratory: (+) cough, (+) shortness of breath  Gastrointestinal: (-) abdominal pain (-) nausea (-) vomiting, (-) diarrhea  : (+) urinary frequency (-) dysuria (-) flank pain   Musculoskeletal: (-) neck pain, (-) back pain, (-) joint pain  Integumentary: (-) rash, (-) edema  Neurological: (+) headache, (-) altered mental status  Psychiatric: (-) hallucinations  Allergic/Immunologic: (-) pruritus

## 2022-08-21 NOTE — ED PROVIDER NOTE - NS ED ATTENDING STATEMENT MOD
This was a shared visit with the NAVIN. I reviewed and verified the documentation and independently performed the documented:

## 2022-08-21 NOTE — ED ADULT TRIAGE NOTE - CHIEF COMPLAINT QUOTE
Covid+, cough, difficulty breathing, eye discharge that causing blurry vision that started yesterday.

## 2022-08-21 NOTE — ED PROVIDER NOTE - OBJECTIVE STATEMENT
76 year old female with a hsitory of HTN, HLD, and chronic low back pain presents to the ED with multiple complaints COVID positive. Patient tested positive and developed symptoms of COVID 5 days ago, started taking Paxlovid on first day of symptom onset. She is now reporting worsening shortness of breath and cough productive of green sputum as well as generalized fatigue. Reports that she also woke up today with bilateral eye redness, mucous discharge, pruritis and blurred vision bilaterally. Further admits to bandlike headache, sore throat, and urinary frequency. denies fever, chills, chest pain, shortness of breath, nausea, vomiting, abdominal pain, diarrhea, leg swelling.

## 2022-08-21 NOTE — ED PROVIDER NOTE - NSFOLLOWUPINSTRUCTIONS_ED_ALL_ED_FT
Continue Paxlovid and Dexamethasone as per your primary care provider. Please take Benzonatate 200mg every 8 hours as needed for cough, You may also take Dextromethorphan over the counter from the pharmacy. For conjunctivitis please take Polytrim drops as prescribed for 7 days.     Please monitor your pulse oximeter and return for O2 saturation below 88%. Please also return if you have worsening shortness of breath or weakness and fatigue.    You have tested POSITIVE for the novel coronavirus (COVID-19). Please quarantine until symptoms resolve. Please wear a face mask if you are around other individuals. Try to avoid contact with house members, family, and friends for the duration of this quarantine. Please follow up with your primary care physician within 2-3 weeks of your discharge from the hospital. Please take all medications as prescribed. If you experience any worsening or recurrence of your symptoms, particularly worsening or high fever, shortness of breathe, extreme fatigue, or bloody cough please call [phone] immediately or report to the nearest Emergency Department.

## 2022-09-06 ENCOUNTER — OUTPATIENT (OUTPATIENT)
Dept: OUTPATIENT SERVICES | Facility: HOSPITAL | Age: 77
LOS: 1 days | Discharge: HOME | End: 2022-09-06

## 2022-09-06 DIAGNOSIS — R05.2 SUBACUTE COUGH: ICD-10-CM

## 2022-09-06 PROCEDURE — 71046 X-RAY EXAM CHEST 2 VIEWS: CPT | Mod: 26

## 2022-10-17 ENCOUNTER — OUTPATIENT (OUTPATIENT)
Dept: OUTPATIENT SERVICES | Facility: HOSPITAL | Age: 77
LOS: 1 days | Discharge: HOME | End: 2022-10-17

## 2022-10-17 DIAGNOSIS — M54.50 LOW BACK PAIN, UNSPECIFIED: ICD-10-CM

## 2022-10-17 PROCEDURE — 72131 CT LUMBAR SPINE W/O DYE: CPT | Mod: 26

## 2022-11-10 ENCOUNTER — APPOINTMENT (OUTPATIENT)
Dept: VASCULAR SURGERY | Facility: CLINIC | Age: 77
End: 2022-11-10

## 2022-11-10 PROCEDURE — 93971 EXTREMITY STUDY: CPT

## 2023-01-25 ENCOUNTER — APPOINTMENT (OUTPATIENT)
Age: 78
End: 2023-01-25
Payer: MEDICARE

## 2023-01-25 VITALS
WEIGHT: 160 LBS | BODY MASS INDEX: 30.21 KG/M2 | HEIGHT: 61 IN | SYSTOLIC BLOOD PRESSURE: 116 MMHG | DIASTOLIC BLOOD PRESSURE: 80 MMHG | HEART RATE: 72 BPM | OXYGEN SATURATION: 96 % | RESPIRATION RATE: 14 BRPM

## 2023-01-25 DIAGNOSIS — J30.1 ALLERGIC RHINITIS DUE TO POLLEN: ICD-10-CM

## 2023-01-25 PROCEDURE — 99213 OFFICE O/P EST LOW 20 MIN: CPT

## 2023-01-25 NOTE — ASSESSMENT
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

## 2023-01-25 NOTE — REASON FOR VISIT
[Follow-Up] : a follow-up visit [TextBox_44] :  history of the right middle lobe syndrome.   Overall has been doing very well she has not had any flareups for some time.  She does not have any purulent sputum production.  She only gets shortness of breath on significant exertion..

## 2023-04-03 ENCOUNTER — OUTPATIENT (OUTPATIENT)
Dept: OUTPATIENT SERVICES | Facility: HOSPITAL | Age: 78
LOS: 1 days | End: 2023-04-03
Payer: MEDICARE

## 2023-04-03 DIAGNOSIS — M25.551 PAIN IN RIGHT HIP: ICD-10-CM

## 2023-04-03 PROCEDURE — 73502 X-RAY EXAM HIP UNI 2-3 VIEWS: CPT | Mod: RT

## 2023-04-03 PROCEDURE — 73502 X-RAY EXAM HIP UNI 2-3 VIEWS: CPT | Mod: 26,RT

## 2023-04-04 DIAGNOSIS — M25.551 PAIN IN RIGHT HIP: ICD-10-CM

## 2023-04-14 ENCOUNTER — OUTPATIENT (OUTPATIENT)
Dept: OUTPATIENT SERVICES | Facility: HOSPITAL | Age: 78
LOS: 1 days | End: 2023-04-14
Payer: MEDICARE

## 2023-04-14 DIAGNOSIS — Z00.8 ENCOUNTER FOR OTHER GENERAL EXAMINATION: ICD-10-CM

## 2023-04-14 DIAGNOSIS — R10.9 UNSPECIFIED ABDOMINAL PAIN: ICD-10-CM

## 2023-04-14 PROCEDURE — 76700 US EXAM ABDOM COMPLETE: CPT

## 2023-04-14 PROCEDURE — 76700 US EXAM ABDOM COMPLETE: CPT | Mod: 26

## 2023-04-15 DIAGNOSIS — R10.9 UNSPECIFIED ABDOMINAL PAIN: ICD-10-CM

## 2023-05-10 ENCOUNTER — OUTPATIENT (OUTPATIENT)
Dept: OUTPATIENT SERVICES | Facility: HOSPITAL | Age: 78
LOS: 1 days | End: 2023-05-10
Payer: MEDICARE

## 2023-05-10 DIAGNOSIS — Z12.31 ENCOUNTER FOR SCREENING MAMMOGRAM FOR MALIGNANT NEOPLASM OF BREAST: ICD-10-CM

## 2023-05-10 PROCEDURE — 77067 SCR MAMMO BI INCL CAD: CPT

## 2023-05-10 PROCEDURE — 77063 BREAST TOMOSYNTHESIS BI: CPT

## 2023-05-10 PROCEDURE — 77063 BREAST TOMOSYNTHESIS BI: CPT | Mod: 26

## 2023-05-10 PROCEDURE — 77067 SCR MAMMO BI INCL CAD: CPT | Mod: 26

## 2023-05-11 DIAGNOSIS — Z12.31 ENCOUNTER FOR SCREENING MAMMOGRAM FOR MALIGNANT NEOPLASM OF BREAST: ICD-10-CM

## 2023-07-12 ENCOUNTER — OUTPATIENT (OUTPATIENT)
Dept: OUTPATIENT SERVICES | Facility: HOSPITAL | Age: 78
LOS: 1 days | End: 2023-07-12
Payer: MEDICARE

## 2023-07-12 DIAGNOSIS — Z00.8 ENCOUNTER FOR OTHER GENERAL EXAMINATION: ICD-10-CM

## 2023-07-12 DIAGNOSIS — R13.10 DYSPHAGIA, UNSPECIFIED: ICD-10-CM

## 2023-07-12 PROCEDURE — 74221 X-RAY XM ESOPHAGUS 2CNTRST: CPT | Mod: 26

## 2023-07-12 PROCEDURE — 74221 X-RAY XM ESOPHAGUS 2CNTRST: CPT

## 2023-07-13 DIAGNOSIS — R13.10 DYSPHAGIA, UNSPECIFIED: ICD-10-CM

## 2023-08-01 ENCOUNTER — OUTPATIENT (OUTPATIENT)
Dept: OUTPATIENT SERVICES | Facility: HOSPITAL | Age: 78
LOS: 1 days | End: 2023-08-01
Payer: COMMERCIAL

## 2023-08-01 DIAGNOSIS — M25.561 PAIN IN RIGHT KNEE: ICD-10-CM

## 2023-08-01 PROCEDURE — 73562 X-RAY EXAM OF KNEE 3: CPT | Mod: RT

## 2023-08-01 PROCEDURE — 73562 X-RAY EXAM OF KNEE 3: CPT | Mod: 26,RT

## 2023-08-02 DIAGNOSIS — M25.561 PAIN IN RIGHT KNEE: ICD-10-CM

## 2023-08-08 ENCOUNTER — APPOINTMENT (OUTPATIENT)
Dept: PULMONOLOGY | Facility: CLINIC | Age: 78
End: 2023-08-08
Payer: MEDICARE

## 2023-08-08 VITALS
DIASTOLIC BLOOD PRESSURE: 70 MMHG | HEART RATE: 101 BPM | SYSTOLIC BLOOD PRESSURE: 140 MMHG | WEIGHT: 162 LBS | BODY MASS INDEX: 30.58 KG/M2 | OXYGEN SATURATION: 93 % | RESPIRATION RATE: 12 BRPM | HEIGHT: 61 IN

## 2023-08-08 PROCEDURE — 99213 OFFICE O/P EST LOW 20 MIN: CPT

## 2023-08-08 NOTE — ASSESSMENT
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   Rx to use saline nasal washes BID. No specific treatment for the right middle lobe syndrome  Had a long discussion with the patient and her  regarding the symptoms that may develop syndrome. f/u 12m Would benefit from weight loss  I reviewed the entire case with the patient's  who was in attendance today.
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
## 2023-10-06 ENCOUNTER — APPOINTMENT (OUTPATIENT)
Dept: PULMONOLOGY | Facility: CLINIC | Age: 78
End: 2023-10-06
Payer: MEDICARE

## 2023-10-06 VITALS
SYSTOLIC BLOOD PRESSURE: 122 MMHG | OXYGEN SATURATION: 96 % | HEIGHT: 65 IN | DIASTOLIC BLOOD PRESSURE: 82 MMHG | BODY MASS INDEX: 26.66 KG/M2 | WEIGHT: 160 LBS | RESPIRATION RATE: 12 BRPM | HEART RATE: 102 BPM

## 2023-10-06 DIAGNOSIS — R06.09 OTHER FORMS OF DYSPNEA: ICD-10-CM

## 2023-10-06 PROCEDURE — 99214 OFFICE O/P EST MOD 30 MIN: CPT

## 2023-10-06 RX ORDER — FLUTICASONE PROPIONATE AND SALMETEROL 250; 50 UG/1; UG/1
250-50 POWDER RESPIRATORY (INHALATION)
Qty: 1 | Refills: 5 | Status: ACTIVE | COMMUNITY
Start: 1900-01-01 | End: 1900-01-01

## 2023-10-16 ENCOUNTER — INPATIENT (INPATIENT)
Facility: HOSPITAL | Age: 78
LOS: 9 days | Discharge: HOME | DRG: 392 | End: 2023-10-26
Attending: INTERNAL MEDICINE | Admitting: STUDENT IN AN ORGANIZED HEALTH CARE EDUCATION/TRAINING PROGRAM
Payer: MEDICARE

## 2023-10-16 VITALS
DIASTOLIC BLOOD PRESSURE: 64 MMHG | HEART RATE: 106 BPM | HEIGHT: 60 IN | SYSTOLIC BLOOD PRESSURE: 143 MMHG | TEMPERATURE: 98 F | RESPIRATION RATE: 17 BRPM | OXYGEN SATURATION: 96 % | WEIGHT: 160.06 LBS

## 2023-10-16 DIAGNOSIS — Z90.710 ACQUIRED ABSENCE OF BOTH CERVIX AND UTERUS: Chronic | ICD-10-CM

## 2023-10-16 DIAGNOSIS — Z98.890 OTHER SPECIFIED POSTPROCEDURAL STATES: Chronic | ICD-10-CM

## 2023-10-16 DIAGNOSIS — K57.92 DIVERTICULITIS OF INTESTINE, PART UNSPECIFIED, WITHOUT PERFORATION OR ABSCESS WITHOUT BLEEDING: ICD-10-CM

## 2023-10-16 LAB
ALBUMIN SERPL ELPH-MCNC: 4 G/DL — SIGNIFICANT CHANGE UP (ref 3.5–5.2)
ALP SERPL-CCNC: 58 U/L — SIGNIFICANT CHANGE UP (ref 30–115)
ALT FLD-CCNC: 15 U/L — SIGNIFICANT CHANGE UP (ref 0–41)
ANION GAP SERPL CALC-SCNC: 13 MMOL/L — SIGNIFICANT CHANGE UP (ref 7–14)
APPEARANCE UR: CLEAR — SIGNIFICANT CHANGE UP
APTT BLD: 26.9 SEC — LOW (ref 27–39.2)
AST SERPL-CCNC: 15 U/L — SIGNIFICANT CHANGE UP (ref 0–41)
BASE EXCESS BLDV CALC-SCNC: 2.3 MMOL/L — SIGNIFICANT CHANGE UP (ref -2–3)
BASOPHILS # BLD AUTO: 0.06 K/UL — SIGNIFICANT CHANGE UP (ref 0–0.2)
BASOPHILS NFR BLD AUTO: 0.3 % — SIGNIFICANT CHANGE UP (ref 0–1)
BILIRUB SERPL-MCNC: 0.6 MG/DL — SIGNIFICANT CHANGE UP (ref 0.2–1.2)
BILIRUB UR-MCNC: NEGATIVE — SIGNIFICANT CHANGE UP
BUN SERPL-MCNC: 10 MG/DL — SIGNIFICANT CHANGE UP (ref 10–20)
CA-I SERPL-SCNC: 1.13 MMOL/L — LOW (ref 1.15–1.33)
CALCIUM SERPL-MCNC: 9.1 MG/DL — SIGNIFICANT CHANGE UP (ref 8.4–10.5)
CHLORIDE SERPL-SCNC: 102 MMOL/L — SIGNIFICANT CHANGE UP (ref 98–110)
CO2 SERPL-SCNC: 23 MMOL/L — SIGNIFICANT CHANGE UP (ref 17–32)
COLOR SPEC: YELLOW — SIGNIFICANT CHANGE UP
CREAT SERPL-MCNC: 0.7 MG/DL — SIGNIFICANT CHANGE UP (ref 0.7–1.5)
DIFF PNL FLD: NEGATIVE — SIGNIFICANT CHANGE UP
EGFR: 87 ML/MIN/1.73M2 — SIGNIFICANT CHANGE UP
EOSINOPHIL # BLD AUTO: 0.16 K/UL — SIGNIFICANT CHANGE UP (ref 0–0.7)
EOSINOPHIL NFR BLD AUTO: 0.9 % — SIGNIFICANT CHANGE UP (ref 0–8)
GAS PNL BLDV: 134 MMOL/L — LOW (ref 136–145)
GAS PNL BLDV: SIGNIFICANT CHANGE UP
GLUCOSE SERPL-MCNC: 139 MG/DL — HIGH (ref 70–99)
GLUCOSE UR QL: NEGATIVE MG/DL — SIGNIFICANT CHANGE UP
HCO3 BLDV-SCNC: 26 MMOL/L — SIGNIFICANT CHANGE UP (ref 22–29)
HCT VFR BLD CALC: 38.9 % — SIGNIFICANT CHANGE UP (ref 37–47)
HCT VFR BLDA CALC: 52 % — HIGH (ref 39–51)
HGB BLD CALC-MCNC: 17.3 G/DL — SIGNIFICANT CHANGE UP (ref 12.6–17.4)
HGB BLD-MCNC: 12.7 G/DL — SIGNIFICANT CHANGE UP (ref 12–16)
IMM GRANULOCYTES NFR BLD AUTO: 0.6 % — HIGH (ref 0.1–0.3)
INR BLD: 0.98 RATIO — SIGNIFICANT CHANGE UP (ref 0.65–1.3)
KETONES UR-MCNC: ABNORMAL MG/DL
LACTATE BLDV-MCNC: 1.2 MMOL/L — SIGNIFICANT CHANGE UP (ref 0.5–2)
LACTATE SERPL-SCNC: 1.2 MMOL/L — SIGNIFICANT CHANGE UP (ref 0.7–2)
LEUKOCYTE ESTERASE UR-ACNC: NEGATIVE — SIGNIFICANT CHANGE UP
LIDOCAIN IGE QN: 23 U/L — SIGNIFICANT CHANGE UP (ref 7–60)
LYMPHOCYTES # BLD AUTO: 1.58 K/UL — SIGNIFICANT CHANGE UP (ref 1.2–3.4)
LYMPHOCYTES # BLD AUTO: 8.8 % — LOW (ref 20.5–51.1)
MCHC RBC-ENTMCNC: 26.7 PG — LOW (ref 27–31)
MCHC RBC-ENTMCNC: 32.6 G/DL — SIGNIFICANT CHANGE UP (ref 32–37)
MCV RBC AUTO: 81.7 FL — SIGNIFICANT CHANGE UP (ref 81–99)
MONOCYTES # BLD AUTO: 1.27 K/UL — HIGH (ref 0.1–0.6)
MONOCYTES NFR BLD AUTO: 7.1 % — SIGNIFICANT CHANGE UP (ref 1.7–9.3)
NEUTROPHILS # BLD AUTO: 14.83 K/UL — HIGH (ref 1.4–6.5)
NEUTROPHILS NFR BLD AUTO: 82.3 % — HIGH (ref 42.2–75.2)
NITRITE UR-MCNC: NEGATIVE — SIGNIFICANT CHANGE UP
NRBC # BLD: 0 /100 WBCS — SIGNIFICANT CHANGE UP (ref 0–0)
PCO2 BLDV: 39 MMHG — SIGNIFICANT CHANGE UP (ref 39–42)
PH BLDV: 7.44 — HIGH (ref 7.32–7.43)
PH UR: 6 — SIGNIFICANT CHANGE UP (ref 5–8)
PLATELET # BLD AUTO: 355 K/UL — SIGNIFICANT CHANGE UP (ref 130–400)
PMV BLD: 10.9 FL — HIGH (ref 7.4–10.4)
PO2 BLDV: 28 MMHG — SIGNIFICANT CHANGE UP
POTASSIUM BLDV-SCNC: 3.8 MMOL/L — SIGNIFICANT CHANGE UP (ref 3.5–5.1)
POTASSIUM SERPL-MCNC: 3.9 MMOL/L — SIGNIFICANT CHANGE UP (ref 3.5–5)
POTASSIUM SERPL-SCNC: 3.9 MMOL/L — SIGNIFICANT CHANGE UP (ref 3.5–5)
PROT SERPL-MCNC: 6.4 G/DL — SIGNIFICANT CHANGE UP (ref 6–8)
PROT UR-MCNC: NEGATIVE MG/DL — SIGNIFICANT CHANGE UP
PROTHROM AB SERPL-ACNC: 11.2 SEC — SIGNIFICANT CHANGE UP (ref 9.95–12.87)
RBC # BLD: 4.76 M/UL — SIGNIFICANT CHANGE UP (ref 4.2–5.4)
RBC # FLD: 16.5 % — HIGH (ref 11.5–14.5)
SAO2 % BLDV: 39 % — SIGNIFICANT CHANGE UP
SODIUM SERPL-SCNC: 138 MMOL/L — SIGNIFICANT CHANGE UP (ref 135–146)
SP GR SPEC: 1.01 — SIGNIFICANT CHANGE UP (ref 1–1.03)
UROBILINOGEN FLD QL: 0.2 MG/DL — SIGNIFICANT CHANGE UP (ref 0.2–1)
WBC # BLD: 18 K/UL — HIGH (ref 4.8–10.8)
WBC # FLD AUTO: 18 K/UL — HIGH (ref 4.8–10.8)

## 2023-10-16 PROCEDURE — 85025 COMPLETE CBC W/AUTO DIFF WBC: CPT

## 2023-10-16 PROCEDURE — 80053 COMPREHEN METABOLIC PANEL: CPT

## 2023-10-16 PROCEDURE — 83993 ASSAY FOR CALPROTECTIN FECAL: CPT

## 2023-10-16 PROCEDURE — 93005 ELECTROCARDIOGRAM TRACING: CPT

## 2023-10-16 PROCEDURE — 83986 ASSAY PH BODY FLUID NOS: CPT

## 2023-10-16 PROCEDURE — 83036 HEMOGLOBIN GLYCOSYLATED A1C: CPT

## 2023-10-16 PROCEDURE — 86140 C-REACTIVE PROTEIN: CPT

## 2023-10-16 PROCEDURE — 80048 BASIC METABOLIC PNL TOTAL CA: CPT

## 2023-10-16 PROCEDURE — 89055 LEUKOCYTE ASSESSMENT FECAL: CPT

## 2023-10-16 PROCEDURE — 87507 IADNA-DNA/RNA PROBE TQ 12-25: CPT

## 2023-10-16 PROCEDURE — 74177 CT ABD & PELVIS W/CONTRAST: CPT | Mod: 26,MA

## 2023-10-16 PROCEDURE — 87493 C DIFF AMPLIFIED PROBE: CPT

## 2023-10-16 PROCEDURE — 80061 LIPID PANEL: CPT

## 2023-10-16 PROCEDURE — 84100 ASSAY OF PHOSPHORUS: CPT

## 2023-10-16 PROCEDURE — 93010 ELECTROCARDIOGRAM REPORT: CPT

## 2023-10-16 PROCEDURE — 87045 FECES CULTURE AEROBIC BACT: CPT

## 2023-10-16 PROCEDURE — 87046 STOOL CULTR AEROBIC BACT EA: CPT

## 2023-10-16 PROCEDURE — 36415 COLL VENOUS BLD VENIPUNCTURE: CPT

## 2023-10-16 PROCEDURE — 82705 FATS/LIPIDS FECES QUAL: CPT

## 2023-10-16 PROCEDURE — 83631 LACTOFERRIN FECAL (QUANT): CPT

## 2023-10-16 PROCEDURE — 99223 1ST HOSP IP/OBS HIGH 75: CPT

## 2023-10-16 PROCEDURE — 85652 RBC SED RATE AUTOMATED: CPT

## 2023-10-16 PROCEDURE — 83735 ASSAY OF MAGNESIUM: CPT

## 2023-10-16 PROCEDURE — 71045 X-RAY EXAM CHEST 1 VIEW: CPT | Mod: 26

## 2023-10-16 PROCEDURE — 76770 US EXAM ABDO BACK WALL COMP: CPT

## 2023-10-16 PROCEDURE — 84999 UNLISTED CHEMISTRY PROCEDURE: CPT

## 2023-10-16 PROCEDURE — 99285 EMERGENCY DEPT VISIT HI MDM: CPT | Mod: FS

## 2023-10-16 RX ORDER — PIPERACILLIN AND TAZOBACTAM 4; .5 G/20ML; G/20ML
3.38 INJECTION, POWDER, LYOPHILIZED, FOR SOLUTION INTRAVENOUS ONCE
Refills: 0 | Status: COMPLETED | OUTPATIENT
Start: 2023-10-16 | End: 2023-10-16

## 2023-10-16 RX ORDER — CIPROFLOXACIN LACTATE 400MG/40ML
400 VIAL (ML) INTRAVENOUS ONCE
Refills: 0 | Status: COMPLETED | OUTPATIENT
Start: 2023-10-16 | End: 2023-10-16

## 2023-10-16 RX ORDER — ACETAMINOPHEN 500 MG
650 TABLET ORAL EVERY 6 HOURS
Refills: 0 | Status: DISCONTINUED | OUTPATIENT
Start: 2023-10-16 | End: 2023-10-26

## 2023-10-16 RX ORDER — VALSARTAN 80 MG/1
1 TABLET ORAL
Qty: 0 | Refills: 0 | DISCHARGE

## 2023-10-16 RX ORDER — TAMSULOSIN HYDROCHLORIDE 0.4 MG/1
0.4 CAPSULE ORAL AT BEDTIME
Refills: 0 | Status: DISCONTINUED | OUTPATIENT
Start: 2023-10-16 | End: 2023-10-26

## 2023-10-16 RX ORDER — CYCLOBENZAPRINE HYDROCHLORIDE 10 MG/1
1 TABLET, FILM COATED ORAL
Qty: 0 | Refills: 0 | DISCHARGE

## 2023-10-16 RX ORDER — ONDANSETRON 8 MG/1
4 TABLET, FILM COATED ORAL ONCE
Refills: 0 | Status: COMPLETED | OUTPATIENT
Start: 2023-10-16 | End: 2023-10-16

## 2023-10-16 RX ORDER — SERTRALINE 25 MG/1
1 TABLET, FILM COATED ORAL
Refills: 0 | DISCHARGE

## 2023-10-16 RX ORDER — LANOLIN ALCOHOL/MO/W.PET/CERES
3 CREAM (GRAM) TOPICAL AT BEDTIME
Refills: 0 | Status: DISCONTINUED | OUTPATIENT
Start: 2023-10-16 | End: 2023-10-26

## 2023-10-16 RX ORDER — OXYCODONE AND ACETAMINOPHEN 5; 325 MG/1; MG/1
1 TABLET ORAL
Qty: 0 | Refills: 0 | DISCHARGE

## 2023-10-16 RX ORDER — CIPROFLOXACIN LACTATE 400MG/40ML
400 VIAL (ML) INTRAVENOUS EVERY 12 HOURS
Refills: 0 | Status: DISCONTINUED | OUTPATIENT
Start: 2023-10-16 | End: 2023-10-17

## 2023-10-16 RX ORDER — ATORVASTATIN CALCIUM 80 MG/1
20 TABLET, FILM COATED ORAL AT BEDTIME
Refills: 0 | Status: DISCONTINUED | OUTPATIENT
Start: 2023-10-16 | End: 2023-10-26

## 2023-10-16 RX ORDER — PIPERACILLIN AND TAZOBACTAM 4; .5 G/20ML; G/20ML
3.38 INJECTION, POWDER, LYOPHILIZED, FOR SOLUTION INTRAVENOUS EVERY 8 HOURS
Refills: 0 | Status: DISCONTINUED | OUTPATIENT
Start: 2023-10-16 | End: 2023-10-17

## 2023-10-16 RX ORDER — MORPHINE SULFATE 50 MG/1
2 CAPSULE, EXTENDED RELEASE ORAL ONCE
Refills: 0 | Status: DISCONTINUED | OUTPATIENT
Start: 2023-10-16 | End: 2023-10-16

## 2023-10-16 RX ORDER — ALBUTEROL 90 UG/1
2 AEROSOL, METERED ORAL EVERY 6 HOURS
Refills: 0 | Status: DISCONTINUED | OUTPATIENT
Start: 2023-10-16 | End: 2023-10-26

## 2023-10-16 RX ORDER — PANTOPRAZOLE SODIUM 20 MG/1
1 TABLET, DELAYED RELEASE ORAL
Qty: 0 | Refills: 0 | DISCHARGE

## 2023-10-16 RX ORDER — SENNA PLUS 8.6 MG/1
2 TABLET ORAL AT BEDTIME
Refills: 0 | Status: DISCONTINUED | OUTPATIENT
Start: 2023-10-16 | End: 2023-10-24

## 2023-10-16 RX ORDER — OMEPRAZOLE 10 MG/1
1 CAPSULE, DELAYED RELEASE ORAL
Refills: 0 | DISCHARGE

## 2023-10-16 RX ORDER — ROSUVASTATIN CALCIUM 5 MG/1
1 TABLET ORAL
Qty: 0 | Refills: 0 | DISCHARGE

## 2023-10-16 RX ORDER — ONDANSETRON 8 MG/1
4 TABLET, FILM COATED ORAL EVERY 8 HOURS
Refills: 0 | Status: DISCONTINUED | OUTPATIENT
Start: 2023-10-16 | End: 2023-10-26

## 2023-10-16 RX ORDER — ENOXAPARIN SODIUM 100 MG/ML
40 INJECTION SUBCUTANEOUS EVERY 24 HOURS
Refills: 0 | Status: DISCONTINUED | OUTPATIENT
Start: 2023-10-16 | End: 2023-10-26

## 2023-10-16 RX ORDER — FAMOTIDINE 10 MG/ML
20 INJECTION INTRAVENOUS ONCE
Refills: 0 | Status: COMPLETED | OUTPATIENT
Start: 2023-10-16 | End: 2023-10-16

## 2023-10-16 RX ORDER — VALSARTAN 80 MG/1
160 TABLET ORAL DAILY
Refills: 0 | Status: DISCONTINUED | OUTPATIENT
Start: 2023-10-16 | End: 2023-10-17

## 2023-10-16 RX ORDER — ALPRAZOLAM 0.25 MG
0.5 TABLET ORAL
Refills: 0 | Status: DISCONTINUED | OUTPATIENT
Start: 2023-10-16 | End: 2023-10-23

## 2023-10-16 RX ORDER — SERTRALINE 25 MG/1
1 TABLET, FILM COATED ORAL
Qty: 0 | Refills: 0 | DISCHARGE

## 2023-10-16 RX ORDER — AMLODIPINE BESYLATE 2.5 MG/1
10 TABLET ORAL DAILY
Refills: 0 | Status: DISCONTINUED | OUTPATIENT
Start: 2023-10-16 | End: 2023-10-17

## 2023-10-16 RX ORDER — BUDESONIDE AND FORMOTEROL FUMARATE DIHYDRATE 160; 4.5 UG/1; UG/1
1 AEROSOL RESPIRATORY (INHALATION) ONCE
Refills: 0 | Status: DISCONTINUED | OUTPATIENT
Start: 2023-10-16 | End: 2023-10-26

## 2023-10-16 RX ORDER — METRONIDAZOLE 500 MG
500 TABLET ORAL ONCE
Refills: 0 | Status: COMPLETED | OUTPATIENT
Start: 2023-10-16 | End: 2023-10-16

## 2023-10-16 RX ORDER — SODIUM CHLORIDE 9 MG/ML
1000 INJECTION, SOLUTION INTRAVENOUS ONCE
Refills: 0 | Status: COMPLETED | OUTPATIENT
Start: 2023-10-16 | End: 2023-10-16

## 2023-10-16 RX ORDER — SERTRALINE 25 MG/1
50 TABLET, FILM COATED ORAL DAILY
Refills: 0 | Status: DISCONTINUED | OUTPATIENT
Start: 2023-10-16 | End: 2023-10-17

## 2023-10-16 RX ORDER — FLUTICASONE PROPIONATE AND SALMETEROL 50; 250 UG/1; UG/1
1 POWDER ORAL; RESPIRATORY (INHALATION)
Refills: 0 | DISCHARGE

## 2023-10-16 RX ORDER — PANTOPRAZOLE SODIUM 20 MG/1
40 TABLET, DELAYED RELEASE ORAL
Refills: 0 | Status: DISCONTINUED | OUTPATIENT
Start: 2023-10-16 | End: 2023-10-26

## 2023-10-16 RX ORDER — ALPRAZOLAM 0.25 MG
1 TABLET ORAL
Qty: 0 | Refills: 0 | DISCHARGE

## 2023-10-16 RX ORDER — TAMSULOSIN HYDROCHLORIDE 0.4 MG/1
1 CAPSULE ORAL
Qty: 0 | Refills: 0 | DISCHARGE

## 2023-10-16 RX ADMIN — MORPHINE SULFATE 2 MILLIGRAM(S): 50 CAPSULE, EXTENDED RELEASE ORAL at 12:14

## 2023-10-16 RX ADMIN — MORPHINE SULFATE 2 MILLIGRAM(S): 50 CAPSULE, EXTENDED RELEASE ORAL at 11:44

## 2023-10-16 RX ADMIN — SODIUM CHLORIDE 1000 MILLILITER(S): 9 INJECTION, SOLUTION INTRAVENOUS at 02:54

## 2023-10-16 RX ADMIN — FAMOTIDINE 20 MILLIGRAM(S): 10 INJECTION INTRAVENOUS at 02:53

## 2023-10-16 RX ADMIN — PIPERACILLIN AND TAZOBACTAM 3.38 GRAM(S): 4; .5 INJECTION, POWDER, LYOPHILIZED, FOR SOLUTION INTRAVENOUS at 13:21

## 2023-10-16 RX ADMIN — PIPERACILLIN AND TAZOBACTAM 25 GRAM(S): 4; .5 INJECTION, POWDER, LYOPHILIZED, FOR SOLUTION INTRAVENOUS at 22:52

## 2023-10-16 RX ADMIN — Medication 0.5 MILLIGRAM(S): at 17:16

## 2023-10-16 RX ADMIN — Medication 100 MILLIGRAM(S): at 06:44

## 2023-10-16 RX ADMIN — Medication 0.5 MILLIGRAM(S): at 11:43

## 2023-10-16 RX ADMIN — ENOXAPARIN SODIUM 40 MILLIGRAM(S): 100 INJECTION SUBCUTANEOUS at 22:53

## 2023-10-16 RX ADMIN — ONDANSETRON 4 MILLIGRAM(S): 8 TABLET, FILM COATED ORAL at 21:13

## 2023-10-16 RX ADMIN — SERTRALINE 50 MILLIGRAM(S): 25 TABLET, FILM COATED ORAL at 11:43

## 2023-10-16 RX ADMIN — SENNA PLUS 2 TABLET(S): 8.6 TABLET ORAL at 22:50

## 2023-10-16 RX ADMIN — MORPHINE SULFATE 2 MILLIGRAM(S): 50 CAPSULE, EXTENDED RELEASE ORAL at 05:51

## 2023-10-16 RX ADMIN — PIPERACILLIN AND TAZOBACTAM 25 GRAM(S): 4; .5 INJECTION, POWDER, LYOPHILIZED, FOR SOLUTION INTRAVENOUS at 15:59

## 2023-10-16 RX ADMIN — MORPHINE SULFATE 2 MILLIGRAM(S): 50 CAPSULE, EXTENDED RELEASE ORAL at 13:21

## 2023-10-16 RX ADMIN — Medication 200 MILLIGRAM(S): at 07:46

## 2023-10-16 RX ADMIN — ATORVASTATIN CALCIUM 20 MILLIGRAM(S): 80 TABLET, FILM COATED ORAL at 22:50

## 2023-10-16 RX ADMIN — ONDANSETRON 4 MILLIGRAM(S): 8 TABLET, FILM COATED ORAL at 02:53

## 2023-10-16 RX ADMIN — MORPHINE SULFATE 2 MILLIGRAM(S): 50 CAPSULE, EXTENDED RELEASE ORAL at 02:54

## 2023-10-16 RX ADMIN — Medication 0.5 MILLIGRAM(S): at 23:25

## 2023-10-16 RX ADMIN — PIPERACILLIN AND TAZOBACTAM 200 GRAM(S): 4; .5 INJECTION, POWDER, LYOPHILIZED, FOR SOLUTION INTRAVENOUS at 05:01

## 2023-10-16 RX ADMIN — Medication 200 MILLIGRAM(S): at 18:19

## 2023-10-16 RX ADMIN — ONDANSETRON 4 MILLIGRAM(S): 8 TABLET, FILM COATED ORAL at 11:44

## 2023-10-16 RX ADMIN — PANTOPRAZOLE SODIUM 40 MILLIGRAM(S): 20 TABLET, DELAYED RELEASE ORAL at 17:16

## 2023-10-16 RX ADMIN — ONDANSETRON 4 MILLIGRAM(S): 8 TABLET, FILM COATED ORAL at 05:51

## 2023-10-16 RX ADMIN — TAMSULOSIN HYDROCHLORIDE 0.4 MILLIGRAM(S): 0.4 CAPSULE ORAL at 22:50
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
## 2023-10-16 NOTE — ED PROVIDER NOTE - IV ALTEPLASE EXCL ABS HIDDEN
[Good] : ~his/her~  mood as  good [Never] : Never [No] : No [No falls in past year] : Patient reported no falls in the past year show [0] : 2) Feeling down, depressed, or hopeless: Not at all (0) [PHQ-2 Negative - No further assessment needed] : PHQ-2 Negative - No further assessment needed [Patient reported colonoscopy was abnormal] : Patient reported colonoscopy was abnormal [HIV test declined] : HIV test declined [Hepatitis C test declined] : Hepatitis C test declined [None] : None [With Family] : lives with family [Employed] : employed [College] : College [] :  [# Of Children ___] : has [unfilled] children [Sexually Active] : sexually active [Feels Safe at Home] : Feels safe at home [Fully functional (bathing, dressing, toileting, transferring, walking, feeding)] : Fully functional (bathing, dressing, toileting, transferring, walking, feeding) [Fully functional (using the telephone, shopping, preparing meals, housekeeping, doing laundry, using] : Fully functional and needs no help or supervision to perform IADLs (using the telephone, shopping, preparing meals, housekeeping, doing laundry, using transportation, managing medications and managing finances) [Smoke Detector] : smoke detector [Carbon Monoxide Detector] : carbon monoxide detector [Seat Belt] :  uses seat belt [AXD0Saody] : 0 [Change in mental status noted] : No change in mental status noted [Reports changes in hearing] : Reports no changes in hearing [Reports changes in vision] : Reports no changes in vision [Reports changes in dental health] : Reports no changes in dental health [Guns at Home] : no guns at home [ColonoscopyDate] : 11/2020 [FreeTextEntry2] :  [ColonoscopyComments] : diverticulosis, hemorrhoids

## 2023-10-16 NOTE — ED PROVIDER NOTE - OBJECTIVE STATEMENT
78 yold female to Ed Pmhx Hld, Htn, c/o suprapubic abdominal pain radiating diffusely entire abdomen and back x today with nausea/vomiting x1; pt denies fever, chills, diarrhea, dysuria, frequency, flank pain; pt denies similar sx in past;

## 2023-10-16 NOTE — ED PROVIDER NOTE - CLINICAL SUMMARY MEDICAL DECISION MAKING FREE TEXT BOX
78 yold female to Ed Pmhx Hld, Htn, c/o suprapubic abdominal pain radiating diffusely entire abdomen and back x today with nausea/vomiting x1; pt denies fever, chills, diarrhea, dysuria, frequency, flank pain. physical exam showed  generalized abdominal tenderness, labs significant for leukocytosis to 18k, otherwise unremarkable, ct showed acute diverticulitis, pt has n/v and does not tolerate orally. pt admitted to medicine for iv abx.

## 2023-10-16 NOTE — ED PROVIDER NOTE - PHYSICAL EXAMINATION
Constitutional: Well developed, well nourished. NAD  Head: Normocephalic, atraumatic.  Eyes: PERRL, EOMI.  ENT: No nasal discharge. Mucous membranes dry.  Neck: Supple. Painless ROM.  Cardiovascular:  Regular rate and rhythm.    Pulmonary:  Lungs clear to auscultation bilaterally.    Abdominal: Soft diffuse tender abdomen especially suprapubic area; no flank pain;   Extremities. Pelvis stable. No lower extremity edema, symmetric calves.  Skin: No rashes, cyanosis.  Neuro: AAOx3. No focal neurological deficits.  Psych: Normal mood. Normal affect.

## 2023-10-16 NOTE — H&P ADULT - NSICDXPASTMEDICALHX_GEN_ALL_CORE_FT
PAST MEDICAL HISTORY:  Chronic back pain s/p back sx and stimulator    Gastroesophageal reflux disease with hiatal hernia     HLD (hyperlipidemia)     HTN (hypertension)

## 2023-10-16 NOTE — H&P ADULT - HISTORY OF PRESENT ILLNESS
Patient is an 78 year old female with PMH of HLD, HTN, chronic pain (multiple back and knee surgeries on oxycodone), anxiety and depression (on Xanax),  hiatal hernia, s/p vaginal hysterectomy and laparotomy to remove bowel adhesion, last EGD 2-3 months ago (per pt found scar tissue on put on PPI), last colonoscopy 1 year ago (per pt, found polyps), presents to the ED for suprapubic abdominal pain that radiates diffusely across all abdominal quadrants with associated nausea and vomiting since the morning of 10/15/23. Patient explains that she woke up on the 15th with diffuse pain, loss of appetite (only able to eat oatmeal for breakfast inability to urinate, and inability to defecate. Patient denies SOB, chest pain, fevers.     In the ED  ·	Vitals: /64, , temp 98.4, on RA    ·	Labs: WBC 18K, lactate neg, UA neg    ·	Imaging: CXR (10/16) negative. CTAP (10/16): Colonic diverticulosis. Diffuse sigmoid thickening with surrounding fat stranding. Likely inflamed diverticulum on series 4 image 264. Avulsion, pneumoperitoneum or pneumatosis.    ·	Interventions: received 1L LR bolus, zofran 4mg IV x2, famotidine 20mg IV x1, morphine 2mg IV x2, straight cath for residual urine of 300cc

## 2023-10-16 NOTE — H&P ADULT - ASSESSMENT
Patient is an 78 year old female with PMH of HLD, HTN, chronic pain, anxiety, depression, hiatal hernia, s/p vaginal hysterectomy and laparotomy, presents to the ED for suprapubic abdominal pain that radiates diffusely across all abdominal quadrants with associated nausea and vomiting since the morning of 10/15/23. Patient was found to have colonic diverticulitis and urinary retention.     #Colonic diverticulitis  - CTAP (10/16): Colonic diverticulosis. Diffuse sigmoid thickening with surrounding fat stranding. Likely inflamed diverticulum on series 4 image 264. Avulsion, pneumoperitoneum or pneumatosis.  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 Birch, pulmonology   - Takes  Advair HFA at home    #Anxiety  #Depression  - Continue home medication of sertraline 50mg oral and Xanax 0.5mg four times a day  - Need to check ISTOP    #Chronic pain  - Sees Dr. Joya  - Takes oxycodone 2-3 times a day    #DVT prophylaxis:   #Activity: as tolerated  #Diet: regular  #Dispo: medicine Patient is an 78 year old female with PMH of HLD, HTN, chronic pain, anxiety, depression, hiatal hernia, s/p vaginal hysterectomy and laparotomy, presents to the ED for suprapubic abdominal pain that radiates diffusely across all abdominal quadrants with associated nausea and vomiting since the morning of 10/15/23. Patient was found to have colonic diverticulitis and urinary retention.     #Colonic diverticulitis  - CTAP (10/16): Colonic diverticulosis. Diffuse sigmoid thickening with surrounding fat stranding. Likely inflamed diverticulum on series 4 image 264. Avulsion, pneumoperitoneum or pneumatosis.  - WBC: 16K (10/16)  - S/p ciprofloxacin IVPB 400mg x1, metronidazole IVPB 500mg x1, Zosyn 3.375 x1 (10/16)  - Continue ciprofloxacin IVPB 400mg q12h x1 Zosyn 3.375 q8h (10/16)  - Pain control: continue morphine  - Continue zofran    #Urinary rentention   - Started on 10/15/23, per patient never experienced this  - Endorses suprapubic pain and urgency  - UA 10/16: negative  - Ucx 10/16: pending  - S/p straight cath for 300cc residual urine  - Obtain bladder US to rule out hydronephrosis  - Obtain bladder scan post-void to monitor rentention    #GERD 2/2 hiatal hernia  - Home medication omeprazole 30mg BID  - Start pantoprazole 40mg BID    #HTN  #HLD  - Continue amlodipine 10mg daily,     #Pt lifelong non-smoker, 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 Birch, pulmonology   - Takes Advair HFA at home    #Anxiety  #Depression  - Continue home medication of sertraline 50mg oral and Xanax 0.5mg four times a day  - Need to check ISTOP    #Chronic pain  - Sees Dr. Joya  - Takes oxycodone 2-3 times a day - check ISTOP    #Constipation  - Last BM yesterday - small quantity   - Start senna 2tablet QHS - takes at home    #DVT prophylaxis: Lovenox 40mg subQ q24h  #Activity: as tolerated  #Diet: regular  #Dispo: medicine Patient is an 78 year old female with PMH of HLD, HTN, chronic pain, anxiety, depression, hiatal hernia, s/p vaginal hysterectomy and laparotomy, presents to the ED for suprapubic abdominal pain that radiates diffusely across all abdominal quadrants with associated nausea and vomiting since the morning of 10/15/23. Patient was found to have colonic diverticulitis and urinary retention.     #Colonic diverticulitis  - CTAP (10/16): Colonic diverticulosis. Diffuse sigmoid thickening with surrounding fat stranding. Likely inflamed diverticulum on series 4 image 264. Avulsion, pneumoperitoneum or pneumatosis.  - WBC: 16K (10/16)  - S/p ciprofloxacin IVPB 400mg x1, metronidazole IVPB 500mg x1, Zosyn 3.375 x1 (10/16)  - Continue ciprofloxacin IVPB 400mg q12h x1 Zosyn 3.375 q8h (10/16)  - Pain control: continue morphine  - Continue zofran 4mg IV q8h PRN for nausea     #Urinary rentention   - Started on 10/15/23, endorses suprapubic pain and urgency with inability to urinate, per patient never experienced this  - Continue home tamsulosin 0.4mg daily  - UA 10/16: negative  - Ucx 10/16: pending  - S/p straight cath for 300cc residual urine (10/16)  - Obtain bladder US to rule out hydronephrosis  - Obtain bladder scan post-void to monitor rentention    #GERD 2/2 hiatal hernia  - Home medication omeprazole 30mg BID  - Start pantoprazole 40mg BID    #HTN  #HLD  - Continue amlodipine 10mg daily, atorvastatin 20mg daily (takes rosuvastatin 5mg QHS at home), and valsartan 160mg daily     #Pt lifelong non-smoker, 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 Amaya, pulmonology   - Takes Advair HFA at home  - Albuterol PRN for dyspnea    #Anxiety  #Depression  - Continue home medication of sertraline 50mg oral and Xanax 0.5mg four times a day - prescribed by PCP  - Need to check ISTOP    #Chronic pain  - Sees Dr. Joya  - Takes oxycodone 2-3 times a day - check ISTOP    #Constipation  - Last BM 10/15- small quantity   - Start home senna 2 tablet QHS  - Also take colace at home    #DVT prophylaxis: Lovenox 40mg subQ q24h  #Activity: as tolerated  #Diet: regular  #Dispo: medicine Patient is an 78 year old female with PMH of HLD, HTN, chronic pain, anxiety, depression, hiatal hernia, s/p vaginal hysterectomy and laparotomy, presents to the ED for suprapubic abdominal pain that radiates diffusely across all abdominal quadrants with associated nausea and vomiting since the morning of 10/15/23. Patient was found to have colonic diverticulitis and urinary retention.     #Colonic diverticulitis  - CTAP (10/16): Colonic diverticulosis. Diffuse sigmoid thickening with surrounding fat stranding. Likely inflamed diverticulum on series 4 image 264. Avulsion, pneumoperitoneum or pneumatosis.  - WBC: 16K (10/16)  - S/p ciprofloxacin IVPB 400mg x1, metronidazole IVPB 500mg x1, Zosyn 3.375 x1 (10/16)  - Continue ciprofloxacin IVPB 400mg q12h x1 Zosyn 3.375 q8h (10/16)  - Pain control: continue morphine  - Continue zofran 4mg IV q8h PRN for nausea     #Urinary rentention   - Started on 10/15/23, endorses suprapubic pain and urgency with inability to urinate, per patient never experienced this  - Continue home tamsulosin 0.4mg daily  - UA 10/16: negative  - Ucx 10/16: pending  - S/p straight cath for 300cc residual urine (10/16)  - Obtain bladder US to rule out hydronephrosis  - Obtain bladder scan post-void to monitor rentention    #GERD 2/2 hiatal hernia  - Home medication omeprazole 30mg BID  - Start pantoprazole 40mg BID    #HTN  #HLD  - Continue amlodipine 10mg daily, atorvastatin 20mg daily (takes rosuvastatin 5mg QHS at home), and valsartan 160mg daily     #Pt lifelong non-smoker, 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 Amaya, pulmonology   - Takes Advair HFA at home  - Symbicort PRN for dyspnea    #Anxiety  #Depression  - Continue home medication of sertraline 50mg oral and Xanax 0.5mg four times a day - prescribed by PCP  - Need to check ISTOP    #Chronic pain  - Sees Dr. Joya  - Takes oxycodone 2-3 times a day - called patient's pharmacy but she does not get rx there, will need to check ISTOP. Left message for Dr. Joya to confirm dosage/where she fills it.     #Constipation  - Last BM 10/15- small quantity   - Start home senna 2 tablet QHS  - Also take colace at home    #DVT prophylaxis: Lovenox 40mg subQ q24h  #Activity: as tolerated  #Diet: regular  #Dispo: medicine Patient is an 78 year old female with PMH of HLD, HTN, chronic pain, anxiety, depression, hiatal hernia, s/p vaginal hysterectomy and laparotomy, presents to the ED for suprapubic abdominal pain that radiates diffusely across all abdominal quadrants with associated nausea and vomiting since the morning of 10/15/23. Patient was found to have colonic diverticulitis and urinary retention.     #Colonic diverticulitis  - CTAP (10/16): Colonic diverticulosis. Diffuse sigmoid thickening with surrounding fat stranding. Likely inflamed diverticulum on series 4 image 264. Avulsion, pneumoperitoneum or pneumatosis.  - WBC: 16K (10/16)  - S/p ciprofloxacin IVPB 400mg x1, metronidazole IVPB 500mg x1, Zosyn 3.375 x1 (10/16)  - Continue ciprofloxacin IVPB 400mg q12h x1 Zosyn 3.375 q8h (10/16)  - Pain control: continue morphine  - Continue zofran 4mg IV q8h PRN for nausea     #Urinary rentention   - Started on 10/15/23, endorses suprapubic pain and urgency with inability to urinate, per patient never experienced this  - Continue home tamsulosin 0.4mg daily  - UA 10/16: negative  - Ucx 10/16: pending  - S/p straight cath for 300cc residual urine (10/16)  - Obtain bladder US to rule out hydronephrosis  - Obtain bladder scan post-void to monitor rentention    #GERD 2/2 hiatal hernia  - Home medication omeprazole 30mg BID  - Start pantoprazole 40mg BID    #HTN  #HLD  - Continue amlodipine 10mg daily, atorvastatin 20mg daily (takes rosuvastatin 5mg QHS at home), and valsartan 160mg daily     #Pt lifelong non-smoker, 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 Amaya, pulmonology   - Takes Advair HFA at home  - Symbicort PRN for dyspnea    #Anxiety  #Depression  - Continue home medication of sertraline 50mg oral and Xanax 0.5mg four times a day - prescribed by PCP  - Need to check ISTOP    #Chronic pain  - Sees Dr. Joya  - Takes oxycodone 7.5mg /acetaminophen 325mg 2-3 tablets times a day as needed for severe pain - confirmed by Dr. Joya's office on 10/16/23, this meds is sent to [facility]   - Will start oxycodone 5mg /acetaminophen 325mg 2-3 tablets times a day as needed for severe pain     #Constipation  - Last BM 10/15- small quantity   - Start home senna 2 tablet QHS  - Also take colace at home    #DVT prophylaxis: Lovenox 40mg subQ q24h  #Activity: as tolerated  #Diet: regular  #Dispo: medicine

## 2023-10-16 NOTE — ED PROVIDER NOTE - ATTENDING APP SHARED VISIT CONTRIBUTION OF CARE
I have personally performed a history and physical exam on this patient and personally directed the management of the patient.  78 yold female to Ed Pmhx Hld, Htn, , s/p hysterectomy and laparotomy c/o suprapubic abdominal pain radiating diffusely entire abdomen and back x today with nausea/vomiting x1; pt denies fever, chills, diarrhea, dysuria, frequency, flank pain.   CON: appears stated age, pleasant, no acute distress, HENMT: normocephalic, atraumatic, anicteric, no conjunctival injection,  CV: regular rhythm, distal pulses intact, RESP: no acute respiratory distress, no stridor, breathing comfortably on RA , GI:  generalized tenderness, no rebound, no guarding, SKIN: no wounds MSK: no deformities, NEURO: no gross motor or sensory deficit Psychiatric: appropriate mood, appropriate affect  will send labs ivf pain meds ct ap and reevaluate

## 2023-10-16 NOTE — ED ADULT TRIAGE NOTE - CHIEF COMPLAINT QUOTE
Patient presents with lower abdominal pain and nausea beginning this morning. Reports pain begins directly above pubic bone and radiates to rectum. Pain noted with both urination and bowel movements.

## 2023-10-16 NOTE — H&P ADULT - NSHPPHYSICALEXAM_GEN_ALL_CORE
General: Well developed, well nourished. NAD  Head: Normocephalic, atraumatic.  Eyes: PERRL, EOMI.  ENT: No nasal discharge. Mucous membranes dry.  Neck: Supple. Painless ROM.  Cardiovascular:  Regular rate and rhythm.    Pulmonary:  Lungs clear to auscultation bilaterally.    Abdominal: Soft diffuse tender abdomen especially suprapubic area; voluntary guarding of stomach  Extremities. Pelvis stable. No lower extremity edema, symmetric calves.  Skin: No rashes, cyanosis.  Neuro: AAOx3. No focal neurological deficits.  Psych: Normal mood. Normal affect

## 2023-10-16 NOTE — ED ADULT NURSE NOTE - NSFALLHARMRISKINTERV_ED_ALL_ED

## 2023-10-16 NOTE — H&P ADULT - ATTENDING COMMENTS
Patient is an 78 year old female with PMH of HLD, HTN, chronic pain (multiple back and knee surgeries on oxycodone), anxiety and depression (on Xanax),  hiatal hernia, s/p vaginal hysterectomy and laparotomy to remove bowel adhesion, last EGD 2-3 months ago (per pt found scar tissue on put on PPI), last colonoscopy 1 year ago (per pt, found polyps), presents to the ED for suprapubic abdominal pain that radiates diffusely across all abdominal quadrants with associated nausea and vomiting since the morning of 10/15/23. Patient explains that she woke up on the 15th with diffuse pain, loss of appetite (only able to eat oatmeal for breakfast inability to urinate, and inability to defecate. Patient denies SOB, chest pain, fevers.     In the ED  Vitals: /64, , temp 98.4, on RA    Labs: WBC 18K, lactate neg, UA neg    Imaging: CXR (10/16) negative. CTAP (10/16): Colonic diverticulosis. Diffuse sigmoid thickening with surrounding fat stranding. Likely inflamed diverticulum on series 4 image 264. Avulsion, pneumoperitoneum or pneumatosis.    Interventions: received 1L LR bolus, zofran 4mg IV x2, famotidine 20mg IV x1, morphine 2mg IV x2, straight cath for residual urine of 300cc    Acute colonic/ Sigmoid diverticulitis   >> clinically feeling better now.  >> Prefers IV Morphine for now, will de escalate to po to Percocet.  >> IV Abx, IV hydration till diet is tolerated well.  >> GI f/u as outpt    Chronic pain /Anxiety  >> on Xanax, Percocet at home    >> checked and verified at I -STOP.  >> will add senna for chronic constipation     c/w other medical mgmt  Plan d/w the patient at bedside.

## 2023-10-16 NOTE — H&P ADULT - NSHPLABSRESULTS_GEN_ALL_CORE
LABS:                          12.7   18.00 )-----------( 355      ( 16 Oct 2023 02:12 )             38.9     10-16    138  |  102  |  10  ----------------------------<  139<H>  3.9   |  23  |  0.7    Ca    9.1      16 Oct 2023 02:12    TPro  6.4  /  Alb  4.0  /  TBili  0.6  /  DBili  x   /  AST  15  /  ALT  15  /  AlkPhos  58  10-16    LIVER FUNCTIONS - ( 16 Oct 2023 02:12 )  Alb: 4.0 g/dL / Pro: 6.4 g/dL / ALK PHOS: 58 U/L / ALT: 15 U/L / AST: 15 U/L / GGT: x           PT/INR - ( 16 Oct 2023 02:32 )   PT: 11.20 sec;   INR: 0.98 ratio         PTT - ( 16 Oct 2023 02:32 )  PTT:26.9 sec  Urinalysis Basic - ( 16 Oct 2023 04:06 )    Color: Yellow / Appearance: Clear / S.007 / pH: x  Gluc: x / Ketone: Trace mg/dL  / Bili: Negative / Urobili: 0.2 mg/dL   Blood: x / Protein: Negative mg/dL / Nitrite: Negative   Leuk Esterase: Negative / RBC: x / WBC x   Sq Epi: x / Non Sq Epi: x / Bacteria: x              < from: Xray Chest 1 View- PORTABLE-Urgent (10.16.23 @ 02:02) >      FINDINGS/  IMPRESSION:    Elevated right hemidiaphragm.  No focal consolidation, pneumothorax or pleural effusion.    Stable cardiomediastinal silhouette.    Unchanged osseous structures.    --- End of Report ---    < end of copied text >    < from: CT Abdomen and Pelvis w/ IV Cont (10.16.23 @ 04:33) >    FINDINGS:    LOWER CHEST: Unremarkable.    HEPATOBILIARY: Hepatic cysts without change.    SPLEEN: Unremarkable.    PANCREAS: Unremarkable.    ADRENAL GLANDS: Unremarkable.    KIDNEYS: Symmetric enhancement. No hydronephrosis.    ABDOMINOPELVIC NODES: Unremarkable.    PELVIC ORGANS: Bladder catheter.    PERITONEUM/MESENTERY/BOWEL: Colonic diverticulosis. Diffuse sigmoid   thickening with surrounding fat stranding. Likely inflamed diverticulum   on series 4 image 264. Avulsion, pneumoperitoneum or pneumatosis.    BONES/SOFT TISSUES: No acute osseous abnormality. Osteopenia. Bony   degenerative changes. Lumbar hardware without change    OTHER: Normal caliber aorta.      IMPRESSION:    Sigmoid colitis/diverticulitis. No abscess.        --- End of Report ---      < end of copied text >

## 2023-10-17 LAB
A1C WITH ESTIMATED AVERAGE GLUCOSE RESULT: 6.1 % — HIGH (ref 4–5.6)
A1C WITH ESTIMATED AVERAGE GLUCOSE RESULT: 6.1 % — HIGH (ref 4–5.6)
ALBUMIN SERPL ELPH-MCNC: 3.8 G/DL — SIGNIFICANT CHANGE UP (ref 3.5–5.2)
ALBUMIN SERPL ELPH-MCNC: 3.8 G/DL — SIGNIFICANT CHANGE UP (ref 3.5–5.2)
ALP SERPL-CCNC: 52 U/L — SIGNIFICANT CHANGE UP (ref 30–115)
ALP SERPL-CCNC: 52 U/L — SIGNIFICANT CHANGE UP (ref 30–115)
ALT FLD-CCNC: 23 U/L — SIGNIFICANT CHANGE UP (ref 0–41)
ALT FLD-CCNC: 23 U/L — SIGNIFICANT CHANGE UP (ref 0–41)
ANION GAP SERPL CALC-SCNC: 14 MMOL/L — SIGNIFICANT CHANGE UP (ref 7–14)
ANION GAP SERPL CALC-SCNC: 14 MMOL/L — SIGNIFICANT CHANGE UP (ref 7–14)
AST SERPL-CCNC: 26 U/L — SIGNIFICANT CHANGE UP (ref 0–41)
AST SERPL-CCNC: 26 U/L — SIGNIFICANT CHANGE UP (ref 0–41)
BASOPHILS # BLD AUTO: 0.06 K/UL — SIGNIFICANT CHANGE UP (ref 0–0.2)
BASOPHILS # BLD AUTO: 0.06 K/UL — SIGNIFICANT CHANGE UP (ref 0–0.2)
BASOPHILS NFR BLD AUTO: 0.4 % — SIGNIFICANT CHANGE UP (ref 0–1)
BASOPHILS NFR BLD AUTO: 0.4 % — SIGNIFICANT CHANGE UP (ref 0–1)
BILIRUB SERPL-MCNC: 0.6 MG/DL — SIGNIFICANT CHANGE UP (ref 0.2–1.2)
BILIRUB SERPL-MCNC: 0.6 MG/DL — SIGNIFICANT CHANGE UP (ref 0.2–1.2)
BUN SERPL-MCNC: 8 MG/DL — LOW (ref 10–20)
BUN SERPL-MCNC: 8 MG/DL — LOW (ref 10–20)
CALCIUM SERPL-MCNC: 8.3 MG/DL — LOW (ref 8.4–10.5)
CALCIUM SERPL-MCNC: 8.3 MG/DL — LOW (ref 8.4–10.5)
CHLORIDE SERPL-SCNC: 98 MMOL/L — SIGNIFICANT CHANGE UP (ref 98–110)
CHLORIDE SERPL-SCNC: 98 MMOL/L — SIGNIFICANT CHANGE UP (ref 98–110)
CHOLEST SERPL-MCNC: 129 MG/DL — SIGNIFICANT CHANGE UP
CHOLEST SERPL-MCNC: 129 MG/DL — SIGNIFICANT CHANGE UP
CO2 SERPL-SCNC: 22 MMOL/L — SIGNIFICANT CHANGE UP (ref 17–32)
CO2 SERPL-SCNC: 22 MMOL/L — SIGNIFICANT CHANGE UP (ref 17–32)
CREAT SERPL-MCNC: 0.8 MG/DL — SIGNIFICANT CHANGE UP (ref 0.7–1.5)
CREAT SERPL-MCNC: 0.8 MG/DL — SIGNIFICANT CHANGE UP (ref 0.7–1.5)
CULTURE RESULTS: NO GROWTH — SIGNIFICANT CHANGE UP
CULTURE RESULTS: NO GROWTH — SIGNIFICANT CHANGE UP
EGFR: 74 ML/MIN/1.73M2 — SIGNIFICANT CHANGE UP
EGFR: 74 ML/MIN/1.73M2 — SIGNIFICANT CHANGE UP
EOSINOPHIL # BLD AUTO: 0.42 K/UL — SIGNIFICANT CHANGE UP (ref 0–0.7)
EOSINOPHIL # BLD AUTO: 0.42 K/UL — SIGNIFICANT CHANGE UP (ref 0–0.7)
EOSINOPHIL NFR BLD AUTO: 2.8 % — SIGNIFICANT CHANGE UP (ref 0–8)
EOSINOPHIL NFR BLD AUTO: 2.8 % — SIGNIFICANT CHANGE UP (ref 0–8)
ESTIMATED AVERAGE GLUCOSE: 128 MG/DL — HIGH (ref 68–114)
ESTIMATED AVERAGE GLUCOSE: 128 MG/DL — HIGH (ref 68–114)
GLUCOSE SERPL-MCNC: 114 MG/DL — HIGH (ref 70–99)
GLUCOSE SERPL-MCNC: 114 MG/DL — HIGH (ref 70–99)
HCT VFR BLD CALC: 35.5 % — LOW (ref 37–47)
HCT VFR BLD CALC: 35.5 % — LOW (ref 37–47)
HDLC SERPL-MCNC: 50 MG/DL — LOW
HDLC SERPL-MCNC: 50 MG/DL — LOW
HGB BLD-MCNC: 11.2 G/DL — LOW (ref 12–16)
HGB BLD-MCNC: 11.2 G/DL — LOW (ref 12–16)
IMM GRANULOCYTES NFR BLD AUTO: 0.7 % — HIGH (ref 0.1–0.3)
IMM GRANULOCYTES NFR BLD AUTO: 0.7 % — HIGH (ref 0.1–0.3)
LIPID PNL WITH DIRECT LDL SERPL: 52 MG/DL — SIGNIFICANT CHANGE UP
LIPID PNL WITH DIRECT LDL SERPL: 52 MG/DL — SIGNIFICANT CHANGE UP
LYMPHOCYTES # BLD AUTO: 1.99 K/UL — SIGNIFICANT CHANGE UP (ref 1.2–3.4)
LYMPHOCYTES # BLD AUTO: 1.99 K/UL — SIGNIFICANT CHANGE UP (ref 1.2–3.4)
LYMPHOCYTES # BLD AUTO: 13.2 % — LOW (ref 20.5–51.1)
LYMPHOCYTES # BLD AUTO: 13.2 % — LOW (ref 20.5–51.1)
MAGNESIUM SERPL-MCNC: 2.1 MG/DL — SIGNIFICANT CHANGE UP (ref 1.8–2.4)
MAGNESIUM SERPL-MCNC: 2.1 MG/DL — SIGNIFICANT CHANGE UP (ref 1.8–2.4)
MCHC RBC-ENTMCNC: 25.5 PG — LOW (ref 27–31)
MCHC RBC-ENTMCNC: 25.5 PG — LOW (ref 27–31)
MCHC RBC-ENTMCNC: 31.5 G/DL — LOW (ref 32–37)
MCHC RBC-ENTMCNC: 31.5 G/DL — LOW (ref 32–37)
MCV RBC AUTO: 80.9 FL — LOW (ref 81–99)
MCV RBC AUTO: 80.9 FL — LOW (ref 81–99)
MONOCYTES # BLD AUTO: 1.51 K/UL — HIGH (ref 0.1–0.6)
MONOCYTES # BLD AUTO: 1.51 K/UL — HIGH (ref 0.1–0.6)
MONOCYTES NFR BLD AUTO: 10 % — HIGH (ref 1.7–9.3)
MONOCYTES NFR BLD AUTO: 10 % — HIGH (ref 1.7–9.3)
NEUTROPHILS # BLD AUTO: 10.97 K/UL — HIGH (ref 1.4–6.5)
NEUTROPHILS # BLD AUTO: 10.97 K/UL — HIGH (ref 1.4–6.5)
NEUTROPHILS NFR BLD AUTO: 72.9 % — SIGNIFICANT CHANGE UP (ref 42.2–75.2)
NEUTROPHILS NFR BLD AUTO: 72.9 % — SIGNIFICANT CHANGE UP (ref 42.2–75.2)
NON HDL CHOLESTEROL: 79 MG/DL — SIGNIFICANT CHANGE UP
NON HDL CHOLESTEROL: 79 MG/DL — SIGNIFICANT CHANGE UP
NRBC # BLD: 0 /100 WBCS — SIGNIFICANT CHANGE UP (ref 0–0)
NRBC # BLD: 0 /100 WBCS — SIGNIFICANT CHANGE UP (ref 0–0)
PLATELET # BLD AUTO: 336 K/UL — SIGNIFICANT CHANGE UP (ref 130–400)
PLATELET # BLD AUTO: 336 K/UL — SIGNIFICANT CHANGE UP (ref 130–400)
PMV BLD: 11.4 FL — HIGH (ref 7.4–10.4)
PMV BLD: 11.4 FL — HIGH (ref 7.4–10.4)
POTASSIUM SERPL-MCNC: 3.5 MMOL/L — SIGNIFICANT CHANGE UP (ref 3.5–5)
POTASSIUM SERPL-MCNC: 3.5 MMOL/L — SIGNIFICANT CHANGE UP (ref 3.5–5)
POTASSIUM SERPL-SCNC: 3.5 MMOL/L — SIGNIFICANT CHANGE UP (ref 3.5–5)
POTASSIUM SERPL-SCNC: 3.5 MMOL/L — SIGNIFICANT CHANGE UP (ref 3.5–5)
PROT SERPL-MCNC: 5.9 G/DL — LOW (ref 6–8)
PROT SERPL-MCNC: 5.9 G/DL — LOW (ref 6–8)
RBC # BLD: 4.39 M/UL — SIGNIFICANT CHANGE UP (ref 4.2–5.4)
RBC # BLD: 4.39 M/UL — SIGNIFICANT CHANGE UP (ref 4.2–5.4)
RBC # FLD: 16.2 % — HIGH (ref 11.5–14.5)
RBC # FLD: 16.2 % — HIGH (ref 11.5–14.5)
SODIUM SERPL-SCNC: 134 MMOL/L — LOW (ref 135–146)
SODIUM SERPL-SCNC: 134 MMOL/L — LOW (ref 135–146)
SPECIMEN SOURCE: SIGNIFICANT CHANGE UP
SPECIMEN SOURCE: SIGNIFICANT CHANGE UP
TRIGL SERPL-MCNC: 137 MG/DL — SIGNIFICANT CHANGE UP
TRIGL SERPL-MCNC: 137 MG/DL — SIGNIFICANT CHANGE UP
WBC # BLD: 15.05 K/UL — HIGH (ref 4.8–10.8)
WBC # BLD: 15.05 K/UL — HIGH (ref 4.8–10.8)
WBC # FLD AUTO: 15.05 K/UL — HIGH (ref 4.8–10.8)
WBC # FLD AUTO: 15.05 K/UL — HIGH (ref 4.8–10.8)

## 2023-10-17 PROCEDURE — 99233 SBSQ HOSP IP/OBS HIGH 50: CPT

## 2023-10-17 PROCEDURE — 76770 US EXAM ABDO BACK WALL COMP: CPT | Mod: 26

## 2023-10-17 RX ORDER — METRONIDAZOLE 500 MG
TABLET ORAL
Refills: 0 | Status: DISCONTINUED | OUTPATIENT
Start: 2023-10-17 | End: 2023-10-17

## 2023-10-17 RX ORDER — METRONIDAZOLE 500 MG
500 TABLET ORAL EVERY 8 HOURS
Refills: 0 | Status: DISCONTINUED | OUTPATIENT
Start: 2023-10-17 | End: 2023-10-26

## 2023-10-17 RX ORDER — CHLORHEXIDINE GLUCONATE 213 G/1000ML
1 SOLUTION TOPICAL
Refills: 0 | Status: DISCONTINUED | OUTPATIENT
Start: 2023-10-17 | End: 2023-10-26

## 2023-10-17 RX ORDER — METRONIDAZOLE 500 MG
500 TABLET ORAL ONCE
Refills: 0 | Status: DISCONTINUED | OUTPATIENT
Start: 2023-10-17 | End: 2023-10-17

## 2023-10-17 RX ORDER — SERTRALINE 25 MG/1
50 TABLET, FILM COATED ORAL AT BEDTIME
Refills: 0 | Status: DISCONTINUED | OUTPATIENT
Start: 2023-10-17 | End: 2023-10-26

## 2023-10-17 RX ORDER — METRONIDAZOLE 500 MG
500 TABLET ORAL EVERY 8 HOURS
Refills: 0 | Status: DISCONTINUED | OUTPATIENT
Start: 2023-10-17 | End: 2023-10-17

## 2023-10-17 RX ORDER — INFLUENZA VIRUS VACCINE 15; 15; 15; 15 UG/.5ML; UG/.5ML; UG/.5ML; UG/.5ML
0.7 SUSPENSION INTRAMUSCULAR ONCE
Refills: 0 | Status: DISCONTINUED | OUTPATIENT
Start: 2023-10-17 | End: 2023-10-26

## 2023-10-17 RX ORDER — CEFTRIAXONE 500 MG/1
1000 INJECTION, POWDER, FOR SOLUTION INTRAMUSCULAR; INTRAVENOUS EVERY 24 HOURS
Refills: 0 | Status: COMPLETED | OUTPATIENT
Start: 2023-10-17 | End: 2023-10-23

## 2023-10-17 RX ADMIN — Medication 0.5 MILLIGRAM(S): at 11:10

## 2023-10-17 RX ADMIN — TAMSULOSIN HYDROCHLORIDE 0.4 MILLIGRAM(S): 0.4 CAPSULE ORAL at 21:10

## 2023-10-17 RX ADMIN — CEFTRIAXONE 100 MILLIGRAM(S): 500 INJECTION, POWDER, FOR SOLUTION INTRAMUSCULAR; INTRAVENOUS at 11:09

## 2023-10-17 RX ADMIN — PANTOPRAZOLE SODIUM 40 MILLIGRAM(S): 20 TABLET, DELAYED RELEASE ORAL at 17:10

## 2023-10-17 RX ADMIN — Medication 0.5 MILLIGRAM(S): at 23:29

## 2023-10-17 RX ADMIN — Medication 100 MILLIGRAM(S): at 21:13

## 2023-10-17 RX ADMIN — Medication 100 MILLIGRAM(S): at 13:04

## 2023-10-17 RX ADMIN — PIPERACILLIN AND TAZOBACTAM 25 GRAM(S): 4; .5 INJECTION, POWDER, LYOPHILIZED, FOR SOLUTION INTRAVENOUS at 05:37

## 2023-10-17 RX ADMIN — ENOXAPARIN SODIUM 40 MILLIGRAM(S): 100 INJECTION SUBCUTANEOUS at 22:00

## 2023-10-17 RX ADMIN — PANTOPRAZOLE SODIUM 40 MILLIGRAM(S): 20 TABLET, DELAYED RELEASE ORAL at 06:48

## 2023-10-17 RX ADMIN — ONDANSETRON 4 MILLIGRAM(S): 8 TABLET, FILM COATED ORAL at 11:47

## 2023-10-17 RX ADMIN — ATORVASTATIN CALCIUM 20 MILLIGRAM(S): 80 TABLET, FILM COATED ORAL at 21:10

## 2023-10-17 RX ADMIN — Medication 200 MILLIGRAM(S): at 05:36

## 2023-10-17 RX ADMIN — Medication 0.5 MILLIGRAM(S): at 17:10

## 2023-10-17 RX ADMIN — SERTRALINE 50 MILLIGRAM(S): 25 TABLET, FILM COATED ORAL at 11:09

## 2023-10-17 RX ADMIN — Medication 0.5 MILLIGRAM(S): at 05:41

## 2023-10-17 NOTE — PATIENT PROFILE ADULT - FUNCTIONAL ASSESSMENT - DAILY ACTIVITY 2.
Patient received medroxyprogesterone 150mg/mL on 9/20/19  in right upper outer glute    LOT: [id number]  EXP 6/21    Advised to return between 12/6 and 12/20 for next injection   3 = A little assistance

## 2023-10-17 NOTE — PATIENT PROFILE ADULT - FALL HARM RISK - HARM RISK INTERVENTIONS
Assistance with ambulation/Assistance OOB with selected safe patient handling equipment/Communicate Risk of Fall with Harm to all staff/Monitor gait and stability/Reinforce activity limits and safety measures with patient and family/Sit up slowly, dangle for a short time, stand at bedside before walking/Tailored Fall Risk Interventions/Visual Cue: Yellow wristband and red socks/Bed in lowest position, wheels locked, appropriate side rails in place/Call bell, personal items and telephone in reach/Instruct patient to call for assistance before getting out of bed or chair/Non-slip footwear when patient is out of bed/[place] to call system/Physically safe environment - no spills, clutter or unnecessary equipment/Purposeful Proactive Rounding/Room/bathroom lighting operational, light cord in reach

## 2023-10-17 NOTE — PATIENT PROFILE ADULT - FUNCTIONAL ASSESSMENT - BASIC MOBILITY 6.
2-calculated by average/Not able to assess (calculate score using [facility] averaging method)

## 2023-10-18 LAB
ANION GAP SERPL CALC-SCNC: 11 MMOL/L — SIGNIFICANT CHANGE UP (ref 7–14)
ANION GAP SERPL CALC-SCNC: 11 MMOL/L — SIGNIFICANT CHANGE UP (ref 7–14)
BASOPHILS # BLD AUTO: 0.06 K/UL — SIGNIFICANT CHANGE UP (ref 0–0.2)
BASOPHILS # BLD AUTO: 0.06 K/UL — SIGNIFICANT CHANGE UP (ref 0–0.2)
BASOPHILS NFR BLD AUTO: 0.7 % — SIGNIFICANT CHANGE UP (ref 0–1)
BASOPHILS NFR BLD AUTO: 0.7 % — SIGNIFICANT CHANGE UP (ref 0–1)
BUN SERPL-MCNC: 8 MG/DL — LOW (ref 10–20)
BUN SERPL-MCNC: 8 MG/DL — LOW (ref 10–20)
CALCIUM SERPL-MCNC: 8.2 MG/DL — LOW (ref 8.4–10.5)
CALCIUM SERPL-MCNC: 8.2 MG/DL — LOW (ref 8.4–10.5)
CHLORIDE SERPL-SCNC: 102 MMOL/L — SIGNIFICANT CHANGE UP (ref 98–110)
CHLORIDE SERPL-SCNC: 102 MMOL/L — SIGNIFICANT CHANGE UP (ref 98–110)
CO2 SERPL-SCNC: 24 MMOL/L — SIGNIFICANT CHANGE UP (ref 17–32)
CO2 SERPL-SCNC: 24 MMOL/L — SIGNIFICANT CHANGE UP (ref 17–32)
CREAT SERPL-MCNC: 0.8 MG/DL — SIGNIFICANT CHANGE UP (ref 0.7–1.5)
CREAT SERPL-MCNC: 0.8 MG/DL — SIGNIFICANT CHANGE UP (ref 0.7–1.5)
EGFR: 74 ML/MIN/1.73M2 — SIGNIFICANT CHANGE UP
EGFR: 74 ML/MIN/1.73M2 — SIGNIFICANT CHANGE UP
EOSINOPHIL # BLD AUTO: 0.67 K/UL — SIGNIFICANT CHANGE UP (ref 0–0.7)
EOSINOPHIL # BLD AUTO: 0.67 K/UL — SIGNIFICANT CHANGE UP (ref 0–0.7)
EOSINOPHIL NFR BLD AUTO: 7.6 % — SIGNIFICANT CHANGE UP (ref 0–8)
EOSINOPHIL NFR BLD AUTO: 7.6 % — SIGNIFICANT CHANGE UP (ref 0–8)
GLUCOSE SERPL-MCNC: 117 MG/DL — HIGH (ref 70–99)
GLUCOSE SERPL-MCNC: 117 MG/DL — HIGH (ref 70–99)
HCT VFR BLD CALC: 34.1 % — LOW (ref 37–47)
HCT VFR BLD CALC: 34.1 % — LOW (ref 37–47)
HGB BLD-MCNC: 10.6 G/DL — LOW (ref 12–16)
HGB BLD-MCNC: 10.6 G/DL — LOW (ref 12–16)
IMM GRANULOCYTES NFR BLD AUTO: 0.5 % — HIGH (ref 0.1–0.3)
IMM GRANULOCYTES NFR BLD AUTO: 0.5 % — HIGH (ref 0.1–0.3)
LYMPHOCYTES # BLD AUTO: 1.75 K/UL — SIGNIFICANT CHANGE UP (ref 1.2–3.4)
LYMPHOCYTES # BLD AUTO: 1.75 K/UL — SIGNIFICANT CHANGE UP (ref 1.2–3.4)
LYMPHOCYTES # BLD AUTO: 19.7 % — LOW (ref 20.5–51.1)
LYMPHOCYTES # BLD AUTO: 19.7 % — LOW (ref 20.5–51.1)
MAGNESIUM SERPL-MCNC: 2.1 MG/DL — SIGNIFICANT CHANGE UP (ref 1.8–2.4)
MAGNESIUM SERPL-MCNC: 2.1 MG/DL — SIGNIFICANT CHANGE UP (ref 1.8–2.4)
MCHC RBC-ENTMCNC: 25.5 PG — LOW (ref 27–31)
MCHC RBC-ENTMCNC: 25.5 PG — LOW (ref 27–31)
MCHC RBC-ENTMCNC: 31.1 G/DL — LOW (ref 32–37)
MCHC RBC-ENTMCNC: 31.1 G/DL — LOW (ref 32–37)
MCV RBC AUTO: 82.2 FL — SIGNIFICANT CHANGE UP (ref 81–99)
MCV RBC AUTO: 82.2 FL — SIGNIFICANT CHANGE UP (ref 81–99)
MONOCYTES # BLD AUTO: 0.78 K/UL — HIGH (ref 0.1–0.6)
MONOCYTES # BLD AUTO: 0.78 K/UL — HIGH (ref 0.1–0.6)
MONOCYTES NFR BLD AUTO: 8.8 % — SIGNIFICANT CHANGE UP (ref 1.7–9.3)
MONOCYTES NFR BLD AUTO: 8.8 % — SIGNIFICANT CHANGE UP (ref 1.7–9.3)
NEUTROPHILS # BLD AUTO: 5.57 K/UL — SIGNIFICANT CHANGE UP (ref 1.4–6.5)
NEUTROPHILS # BLD AUTO: 5.57 K/UL — SIGNIFICANT CHANGE UP (ref 1.4–6.5)
NEUTROPHILS NFR BLD AUTO: 62.7 % — SIGNIFICANT CHANGE UP (ref 42.2–75.2)
NEUTROPHILS NFR BLD AUTO: 62.7 % — SIGNIFICANT CHANGE UP (ref 42.2–75.2)
NRBC # BLD: 0 /100 WBCS — SIGNIFICANT CHANGE UP (ref 0–0)
NRBC # BLD: 0 /100 WBCS — SIGNIFICANT CHANGE UP (ref 0–0)
PLATELET # BLD AUTO: 321 K/UL — SIGNIFICANT CHANGE UP (ref 130–400)
PLATELET # BLD AUTO: 321 K/UL — SIGNIFICANT CHANGE UP (ref 130–400)
PMV BLD: 11.4 FL — HIGH (ref 7.4–10.4)
PMV BLD: 11.4 FL — HIGH (ref 7.4–10.4)
POTASSIUM SERPL-MCNC: 3.8 MMOL/L — SIGNIFICANT CHANGE UP (ref 3.5–5)
POTASSIUM SERPL-MCNC: 3.8 MMOL/L — SIGNIFICANT CHANGE UP (ref 3.5–5)
POTASSIUM SERPL-SCNC: 3.8 MMOL/L — SIGNIFICANT CHANGE UP (ref 3.5–5)
POTASSIUM SERPL-SCNC: 3.8 MMOL/L — SIGNIFICANT CHANGE UP (ref 3.5–5)
RBC # BLD: 4.15 M/UL — LOW (ref 4.2–5.4)
RBC # BLD: 4.15 M/UL — LOW (ref 4.2–5.4)
RBC # FLD: 16 % — HIGH (ref 11.5–14.5)
RBC # FLD: 16 % — HIGH (ref 11.5–14.5)
SODIUM SERPL-SCNC: 137 MMOL/L — SIGNIFICANT CHANGE UP (ref 135–146)
SODIUM SERPL-SCNC: 137 MMOL/L — SIGNIFICANT CHANGE UP (ref 135–146)
WBC # BLD: 8.87 K/UL — SIGNIFICANT CHANGE UP (ref 4.8–10.8)
WBC # BLD: 8.87 K/UL — SIGNIFICANT CHANGE UP (ref 4.8–10.8)
WBC # FLD AUTO: 8.87 K/UL — SIGNIFICANT CHANGE UP (ref 4.8–10.8)
WBC # FLD AUTO: 8.87 K/UL — SIGNIFICANT CHANGE UP (ref 4.8–10.8)

## 2023-10-18 PROCEDURE — 99232 SBSQ HOSP IP/OBS MODERATE 35: CPT

## 2023-10-18 RX ORDER — SODIUM CHLORIDE 9 MG/ML
1000 INJECTION, SOLUTION INTRAVENOUS
Refills: 0 | Status: DISCONTINUED | OUTPATIENT
Start: 2023-10-18 | End: 2023-10-23

## 2023-10-18 RX ORDER — ONDANSETRON 8 MG/1
4 TABLET, FILM COATED ORAL THREE TIMES A DAY
Refills: 0 | Status: DISCONTINUED | OUTPATIENT
Start: 2023-10-18 | End: 2023-10-26

## 2023-10-18 RX ADMIN — SENNA PLUS 2 TABLET(S): 8.6 TABLET ORAL at 21:12

## 2023-10-18 RX ADMIN — ATORVASTATIN CALCIUM 20 MILLIGRAM(S): 80 TABLET, FILM COATED ORAL at 21:12

## 2023-10-18 RX ADMIN — Medication 650 MILLIGRAM(S): at 12:39

## 2023-10-18 RX ADMIN — Medication 0.5 MILLIGRAM(S): at 05:20

## 2023-10-18 RX ADMIN — PANTOPRAZOLE SODIUM 40 MILLIGRAM(S): 20 TABLET, DELAYED RELEASE ORAL at 06:18

## 2023-10-18 RX ADMIN — TAMSULOSIN HYDROCHLORIDE 0.4 MILLIGRAM(S): 0.4 CAPSULE ORAL at 21:11

## 2023-10-18 RX ADMIN — CHLORHEXIDINE GLUCONATE 1 APPLICATION(S): 213 SOLUTION TOPICAL at 05:20

## 2023-10-18 RX ADMIN — Medication 0.5 MILLIGRAM(S): at 11:38

## 2023-10-18 RX ADMIN — Medication 100 MILLIGRAM(S): at 21:12

## 2023-10-18 RX ADMIN — Medication 650 MILLIGRAM(S): at 11:38

## 2023-10-18 RX ADMIN — CEFTRIAXONE 100 MILLIGRAM(S): 500 INJECTION, POWDER, FOR SOLUTION INTRAMUSCULAR; INTRAVENOUS at 11:40

## 2023-10-18 RX ADMIN — Medication 100 MILLIGRAM(S): at 14:17

## 2023-10-18 RX ADMIN — PANTOPRAZOLE SODIUM 40 MILLIGRAM(S): 20 TABLET, DELAYED RELEASE ORAL at 17:30

## 2023-10-18 RX ADMIN — Medication 0.5 MILLIGRAM(S): at 17:30

## 2023-10-18 RX ADMIN — ENOXAPARIN SODIUM 40 MILLIGRAM(S): 100 INJECTION SUBCUTANEOUS at 21:11

## 2023-10-18 RX ADMIN — SERTRALINE 50 MILLIGRAM(S): 25 TABLET, FILM COATED ORAL at 21:12

## 2023-10-18 RX ADMIN — ONDANSETRON 4 MILLIGRAM(S): 8 TABLET, FILM COATED ORAL at 08:27

## 2023-10-18 RX ADMIN — Medication 100 MILLIGRAM(S): at 05:20

## 2023-10-19 LAB
ALBUMIN SERPL ELPH-MCNC: 3.4 G/DL — LOW (ref 3.5–5.2)
ALBUMIN SERPL ELPH-MCNC: 3.4 G/DL — LOW (ref 3.5–5.2)
ALP SERPL-CCNC: 50 U/L — SIGNIFICANT CHANGE UP (ref 30–115)
ALP SERPL-CCNC: 50 U/L — SIGNIFICANT CHANGE UP (ref 30–115)
ALT FLD-CCNC: 21 U/L — SIGNIFICANT CHANGE UP (ref 0–41)
ALT FLD-CCNC: 21 U/L — SIGNIFICANT CHANGE UP (ref 0–41)
ANION GAP SERPL CALC-SCNC: 10 MMOL/L — SIGNIFICANT CHANGE UP (ref 7–14)
ANION GAP SERPL CALC-SCNC: 10 MMOL/L — SIGNIFICANT CHANGE UP (ref 7–14)
AST SERPL-CCNC: 18 U/L — SIGNIFICANT CHANGE UP (ref 0–41)
AST SERPL-CCNC: 18 U/L — SIGNIFICANT CHANGE UP (ref 0–41)
BASOPHILS # BLD AUTO: 0.08 K/UL — SIGNIFICANT CHANGE UP (ref 0–0.2)
BASOPHILS # BLD AUTO: 0.08 K/UL — SIGNIFICANT CHANGE UP (ref 0–0.2)
BASOPHILS NFR BLD AUTO: 1 % — SIGNIFICANT CHANGE UP (ref 0–1)
BASOPHILS NFR BLD AUTO: 1 % — SIGNIFICANT CHANGE UP (ref 0–1)
BILIRUB SERPL-MCNC: <0.2 MG/DL — SIGNIFICANT CHANGE UP (ref 0.2–1.2)
BILIRUB SERPL-MCNC: <0.2 MG/DL — SIGNIFICANT CHANGE UP (ref 0.2–1.2)
BUN SERPL-MCNC: 6 MG/DL — LOW (ref 10–20)
BUN SERPL-MCNC: 6 MG/DL — LOW (ref 10–20)
CALCIUM SERPL-MCNC: 8.3 MG/DL — LOW (ref 8.4–10.5)
CALCIUM SERPL-MCNC: 8.3 MG/DL — LOW (ref 8.4–10.5)
CHLORIDE SERPL-SCNC: 106 MMOL/L — SIGNIFICANT CHANGE UP (ref 98–110)
CHLORIDE SERPL-SCNC: 106 MMOL/L — SIGNIFICANT CHANGE UP (ref 98–110)
CO2 SERPL-SCNC: 27 MMOL/L — SIGNIFICANT CHANGE UP (ref 17–32)
CO2 SERPL-SCNC: 27 MMOL/L — SIGNIFICANT CHANGE UP (ref 17–32)
CREAT SERPL-MCNC: 0.7 MG/DL — SIGNIFICANT CHANGE UP (ref 0.7–1.5)
CREAT SERPL-MCNC: 0.7 MG/DL — SIGNIFICANT CHANGE UP (ref 0.7–1.5)
EGFR: 87 ML/MIN/1.73M2 — SIGNIFICANT CHANGE UP
EGFR: 87 ML/MIN/1.73M2 — SIGNIFICANT CHANGE UP
EOSINOPHIL # BLD AUTO: 0.86 K/UL — HIGH (ref 0–0.7)
EOSINOPHIL # BLD AUTO: 0.86 K/UL — HIGH (ref 0–0.7)
EOSINOPHIL NFR BLD AUTO: 10.6 % — HIGH (ref 0–8)
EOSINOPHIL NFR BLD AUTO: 10.6 % — HIGH (ref 0–8)
GLUCOSE SERPL-MCNC: 107 MG/DL — HIGH (ref 70–99)
GLUCOSE SERPL-MCNC: 107 MG/DL — HIGH (ref 70–99)
HCT VFR BLD CALC: 33.8 % — LOW (ref 37–47)
HCT VFR BLD CALC: 33.8 % — LOW (ref 37–47)
HGB BLD-MCNC: 10.9 G/DL — LOW (ref 12–16)
HGB BLD-MCNC: 10.9 G/DL — LOW (ref 12–16)
IMM GRANULOCYTES NFR BLD AUTO: 0.7 % — HIGH (ref 0.1–0.3)
IMM GRANULOCYTES NFR BLD AUTO: 0.7 % — HIGH (ref 0.1–0.3)
LYMPHOCYTES # BLD AUTO: 1.84 K/UL — SIGNIFICANT CHANGE UP (ref 1.2–3.4)
LYMPHOCYTES # BLD AUTO: 1.84 K/UL — SIGNIFICANT CHANGE UP (ref 1.2–3.4)
LYMPHOCYTES # BLD AUTO: 22.7 % — SIGNIFICANT CHANGE UP (ref 20.5–51.1)
LYMPHOCYTES # BLD AUTO: 22.7 % — SIGNIFICANT CHANGE UP (ref 20.5–51.1)
MAGNESIUM SERPL-MCNC: 2.1 MG/DL — SIGNIFICANT CHANGE UP (ref 1.8–2.4)
MAGNESIUM SERPL-MCNC: 2.1 MG/DL — SIGNIFICANT CHANGE UP (ref 1.8–2.4)
MCHC RBC-ENTMCNC: 26.5 PG — LOW (ref 27–31)
MCHC RBC-ENTMCNC: 26.5 PG — LOW (ref 27–31)
MCHC RBC-ENTMCNC: 32.2 G/DL — SIGNIFICANT CHANGE UP (ref 32–37)
MCHC RBC-ENTMCNC: 32.2 G/DL — SIGNIFICANT CHANGE UP (ref 32–37)
MCV RBC AUTO: 82 FL — SIGNIFICANT CHANGE UP (ref 81–99)
MCV RBC AUTO: 82 FL — SIGNIFICANT CHANGE UP (ref 81–99)
MONOCYTES # BLD AUTO: 0.64 K/UL — HIGH (ref 0.1–0.6)
MONOCYTES # BLD AUTO: 0.64 K/UL — HIGH (ref 0.1–0.6)
MONOCYTES NFR BLD AUTO: 7.9 % — SIGNIFICANT CHANGE UP (ref 1.7–9.3)
MONOCYTES NFR BLD AUTO: 7.9 % — SIGNIFICANT CHANGE UP (ref 1.7–9.3)
NEUTROPHILS # BLD AUTO: 4.61 K/UL — SIGNIFICANT CHANGE UP (ref 1.4–6.5)
NEUTROPHILS # BLD AUTO: 4.61 K/UL — SIGNIFICANT CHANGE UP (ref 1.4–6.5)
NEUTROPHILS NFR BLD AUTO: 57.1 % — SIGNIFICANT CHANGE UP (ref 42.2–75.2)
NEUTROPHILS NFR BLD AUTO: 57.1 % — SIGNIFICANT CHANGE UP (ref 42.2–75.2)
NRBC # BLD: 0 /100 WBCS — SIGNIFICANT CHANGE UP (ref 0–0)
NRBC # BLD: 0 /100 WBCS — SIGNIFICANT CHANGE UP (ref 0–0)
PHOSPHATE SERPL-MCNC: 3 MG/DL — SIGNIFICANT CHANGE UP (ref 2.1–4.9)
PHOSPHATE SERPL-MCNC: 3 MG/DL — SIGNIFICANT CHANGE UP (ref 2.1–4.9)
PLATELET # BLD AUTO: 336 K/UL — SIGNIFICANT CHANGE UP (ref 130–400)
PLATELET # BLD AUTO: 336 K/UL — SIGNIFICANT CHANGE UP (ref 130–400)
PMV BLD: 11.3 FL — HIGH (ref 7.4–10.4)
PMV BLD: 11.3 FL — HIGH (ref 7.4–10.4)
POTASSIUM SERPL-MCNC: 3.9 MMOL/L — SIGNIFICANT CHANGE UP (ref 3.5–5)
POTASSIUM SERPL-MCNC: 3.9 MMOL/L — SIGNIFICANT CHANGE UP (ref 3.5–5)
POTASSIUM SERPL-SCNC: 3.9 MMOL/L — SIGNIFICANT CHANGE UP (ref 3.5–5)
POTASSIUM SERPL-SCNC: 3.9 MMOL/L — SIGNIFICANT CHANGE UP (ref 3.5–5)
PROT SERPL-MCNC: 5.3 G/DL — LOW (ref 6–8)
PROT SERPL-MCNC: 5.3 G/DL — LOW (ref 6–8)
RBC # BLD: 4.12 M/UL — LOW (ref 4.2–5.4)
RBC # BLD: 4.12 M/UL — LOW (ref 4.2–5.4)
RBC # FLD: 16.3 % — HIGH (ref 11.5–14.5)
RBC # FLD: 16.3 % — HIGH (ref 11.5–14.5)
SODIUM SERPL-SCNC: 143 MMOL/L — SIGNIFICANT CHANGE UP (ref 135–146)
SODIUM SERPL-SCNC: 143 MMOL/L — SIGNIFICANT CHANGE UP (ref 135–146)
WBC # BLD: 8.09 K/UL — SIGNIFICANT CHANGE UP (ref 4.8–10.8)
WBC # BLD: 8.09 K/UL — SIGNIFICANT CHANGE UP (ref 4.8–10.8)
WBC # FLD AUTO: 8.09 K/UL — SIGNIFICANT CHANGE UP (ref 4.8–10.8)
WBC # FLD AUTO: 8.09 K/UL — SIGNIFICANT CHANGE UP (ref 4.8–10.8)

## 2023-10-19 PROCEDURE — 93010 ELECTROCARDIOGRAM REPORT: CPT

## 2023-10-19 PROCEDURE — 99232 SBSQ HOSP IP/OBS MODERATE 35: CPT

## 2023-10-19 RX ADMIN — Medication 0.5 MILLIGRAM(S): at 11:35

## 2023-10-19 RX ADMIN — CEFTRIAXONE 100 MILLIGRAM(S): 500 INJECTION, POWDER, FOR SOLUTION INTRAMUSCULAR; INTRAVENOUS at 11:37

## 2023-10-19 RX ADMIN — Medication 100 MILLIGRAM(S): at 13:58

## 2023-10-19 RX ADMIN — ONDANSETRON 4 MILLIGRAM(S): 8 TABLET, FILM COATED ORAL at 07:39

## 2023-10-19 RX ADMIN — Medication 0.5 MILLIGRAM(S): at 00:47

## 2023-10-19 RX ADMIN — Medication 0.5 MILLIGRAM(S): at 23:48

## 2023-10-19 RX ADMIN — Medication 100 MILLIGRAM(S): at 21:21

## 2023-10-19 RX ADMIN — ONDANSETRON 4 MILLIGRAM(S): 8 TABLET, FILM COATED ORAL at 16:47

## 2023-10-19 RX ADMIN — Medication 0.5 MILLIGRAM(S): at 17:49

## 2023-10-19 RX ADMIN — SERTRALINE 50 MILLIGRAM(S): 25 TABLET, FILM COATED ORAL at 21:18

## 2023-10-19 RX ADMIN — PANTOPRAZOLE SODIUM 40 MILLIGRAM(S): 20 TABLET, DELAYED RELEASE ORAL at 05:43

## 2023-10-19 RX ADMIN — TAMSULOSIN HYDROCHLORIDE 0.4 MILLIGRAM(S): 0.4 CAPSULE ORAL at 21:18

## 2023-10-19 RX ADMIN — PANTOPRAZOLE SODIUM 40 MILLIGRAM(S): 20 TABLET, DELAYED RELEASE ORAL at 17:50

## 2023-10-19 RX ADMIN — Medication 0.5 MILLIGRAM(S): at 05:42

## 2023-10-19 RX ADMIN — ENOXAPARIN SODIUM 40 MILLIGRAM(S): 100 INJECTION SUBCUTANEOUS at 22:47

## 2023-10-19 RX ADMIN — Medication 100 MILLIGRAM(S): at 05:43

## 2023-10-19 RX ADMIN — ATORVASTATIN CALCIUM 20 MILLIGRAM(S): 80 TABLET, FILM COATED ORAL at 21:18

## 2023-10-20 LAB
ANION GAP SERPL CALC-SCNC: 10 MMOL/L — SIGNIFICANT CHANGE UP (ref 7–14)
ANION GAP SERPL CALC-SCNC: 10 MMOL/L — SIGNIFICANT CHANGE UP (ref 7–14)
BASOPHILS # BLD AUTO: 0.1 K/UL — SIGNIFICANT CHANGE UP (ref 0–0.2)
BASOPHILS # BLD AUTO: 0.1 K/UL — SIGNIFICANT CHANGE UP (ref 0–0.2)
BASOPHILS NFR BLD AUTO: 1 % — SIGNIFICANT CHANGE UP (ref 0–1)
BASOPHILS NFR BLD AUTO: 1 % — SIGNIFICANT CHANGE UP (ref 0–1)
BUN SERPL-MCNC: 4 MG/DL — LOW (ref 10–20)
BUN SERPL-MCNC: 4 MG/DL — LOW (ref 10–20)
CALCIUM SERPL-MCNC: 8 MG/DL — LOW (ref 8.4–10.5)
CALCIUM SERPL-MCNC: 8 MG/DL — LOW (ref 8.4–10.5)
CHLORIDE SERPL-SCNC: 107 MMOL/L — SIGNIFICANT CHANGE UP (ref 98–110)
CHLORIDE SERPL-SCNC: 107 MMOL/L — SIGNIFICANT CHANGE UP (ref 98–110)
CO2 SERPL-SCNC: 24 MMOL/L — SIGNIFICANT CHANGE UP (ref 17–32)
CO2 SERPL-SCNC: 24 MMOL/L — SIGNIFICANT CHANGE UP (ref 17–32)
CREAT SERPL-MCNC: 0.7 MG/DL — SIGNIFICANT CHANGE UP (ref 0.7–1.5)
CREAT SERPL-MCNC: 0.7 MG/DL — SIGNIFICANT CHANGE UP (ref 0.7–1.5)
EGFR: 87 ML/MIN/1.73M2 — SIGNIFICANT CHANGE UP
EGFR: 87 ML/MIN/1.73M2 — SIGNIFICANT CHANGE UP
EOSINOPHIL # BLD AUTO: 1.07 K/UL — HIGH (ref 0–0.7)
EOSINOPHIL # BLD AUTO: 1.07 K/UL — HIGH (ref 0–0.7)
EOSINOPHIL NFR BLD AUTO: 11.1 % — HIGH (ref 0–8)
EOSINOPHIL NFR BLD AUTO: 11.1 % — HIGH (ref 0–8)
GLUCOSE SERPL-MCNC: 105 MG/DL — HIGH (ref 70–99)
GLUCOSE SERPL-MCNC: 105 MG/DL — HIGH (ref 70–99)
HCT VFR BLD CALC: 35.4 % — LOW (ref 37–47)
HCT VFR BLD CALC: 35.4 % — LOW (ref 37–47)
HGB BLD-MCNC: 11.3 G/DL — LOW (ref 12–16)
HGB BLD-MCNC: 11.3 G/DL — LOW (ref 12–16)
IMM GRANULOCYTES NFR BLD AUTO: 0.6 % — HIGH (ref 0.1–0.3)
IMM GRANULOCYTES NFR BLD AUTO: 0.6 % — HIGH (ref 0.1–0.3)
LYMPHOCYTES # BLD AUTO: 2.21 K/UL — SIGNIFICANT CHANGE UP (ref 1.2–3.4)
LYMPHOCYTES # BLD AUTO: 2.21 K/UL — SIGNIFICANT CHANGE UP (ref 1.2–3.4)
LYMPHOCYTES # BLD AUTO: 22.9 % — SIGNIFICANT CHANGE UP (ref 20.5–51.1)
LYMPHOCYTES # BLD AUTO: 22.9 % — SIGNIFICANT CHANGE UP (ref 20.5–51.1)
MCHC RBC-ENTMCNC: 26.2 PG — LOW (ref 27–31)
MCHC RBC-ENTMCNC: 26.2 PG — LOW (ref 27–31)
MCHC RBC-ENTMCNC: 31.9 G/DL — LOW (ref 32–37)
MCHC RBC-ENTMCNC: 31.9 G/DL — LOW (ref 32–37)
MCV RBC AUTO: 82.1 FL — SIGNIFICANT CHANGE UP (ref 81–99)
MCV RBC AUTO: 82.1 FL — SIGNIFICANT CHANGE UP (ref 81–99)
MONOCYTES # BLD AUTO: 0.92 K/UL — HIGH (ref 0.1–0.6)
MONOCYTES # BLD AUTO: 0.92 K/UL — HIGH (ref 0.1–0.6)
MONOCYTES NFR BLD AUTO: 9.5 % — HIGH (ref 1.7–9.3)
MONOCYTES NFR BLD AUTO: 9.5 % — HIGH (ref 1.7–9.3)
NEUTROPHILS # BLD AUTO: 5.3 K/UL — SIGNIFICANT CHANGE UP (ref 1.4–6.5)
NEUTROPHILS # BLD AUTO: 5.3 K/UL — SIGNIFICANT CHANGE UP (ref 1.4–6.5)
NEUTROPHILS NFR BLD AUTO: 54.9 % — SIGNIFICANT CHANGE UP (ref 42.2–75.2)
NEUTROPHILS NFR BLD AUTO: 54.9 % — SIGNIFICANT CHANGE UP (ref 42.2–75.2)
NRBC # BLD: 0 /100 WBCS — SIGNIFICANT CHANGE UP (ref 0–0)
NRBC # BLD: 0 /100 WBCS — SIGNIFICANT CHANGE UP (ref 0–0)
PLATELET # BLD AUTO: 342 K/UL — SIGNIFICANT CHANGE UP (ref 130–400)
PLATELET # BLD AUTO: 342 K/UL — SIGNIFICANT CHANGE UP (ref 130–400)
PMV BLD: 10.7 FL — HIGH (ref 7.4–10.4)
PMV BLD: 10.7 FL — HIGH (ref 7.4–10.4)
POTASSIUM SERPL-MCNC: 4 MMOL/L — SIGNIFICANT CHANGE UP (ref 3.5–5)
POTASSIUM SERPL-MCNC: 4 MMOL/L — SIGNIFICANT CHANGE UP (ref 3.5–5)
POTASSIUM SERPL-SCNC: 4 MMOL/L — SIGNIFICANT CHANGE UP (ref 3.5–5)
POTASSIUM SERPL-SCNC: 4 MMOL/L — SIGNIFICANT CHANGE UP (ref 3.5–5)
RBC # BLD: 4.31 M/UL — SIGNIFICANT CHANGE UP (ref 4.2–5.4)
RBC # BLD: 4.31 M/UL — SIGNIFICANT CHANGE UP (ref 4.2–5.4)
RBC # FLD: 16.1 % — HIGH (ref 11.5–14.5)
RBC # FLD: 16.1 % — HIGH (ref 11.5–14.5)
SODIUM SERPL-SCNC: 141 MMOL/L — SIGNIFICANT CHANGE UP (ref 135–146)
SODIUM SERPL-SCNC: 141 MMOL/L — SIGNIFICANT CHANGE UP (ref 135–146)
WBC # BLD: 9.66 K/UL — SIGNIFICANT CHANGE UP (ref 4.8–10.8)
WBC # BLD: 9.66 K/UL — SIGNIFICANT CHANGE UP (ref 4.8–10.8)
WBC # FLD AUTO: 9.66 K/UL — SIGNIFICANT CHANGE UP (ref 4.8–10.8)
WBC # FLD AUTO: 9.66 K/UL — SIGNIFICANT CHANGE UP (ref 4.8–10.8)

## 2023-10-20 PROCEDURE — 99233 SBSQ HOSP IP/OBS HIGH 50: CPT

## 2023-10-20 RX ADMIN — Medication 100 MILLIGRAM(S): at 13:07

## 2023-10-20 RX ADMIN — PANTOPRAZOLE SODIUM 40 MILLIGRAM(S): 20 TABLET, DELAYED RELEASE ORAL at 06:05

## 2023-10-20 RX ADMIN — Medication 0.5 MILLIGRAM(S): at 17:29

## 2023-10-20 RX ADMIN — TAMSULOSIN HYDROCHLORIDE 0.4 MILLIGRAM(S): 0.4 CAPSULE ORAL at 21:46

## 2023-10-20 RX ADMIN — Medication 0.5 MILLIGRAM(S): at 06:05

## 2023-10-20 RX ADMIN — Medication 0.5 MILLIGRAM(S): at 11:43

## 2023-10-20 RX ADMIN — ONDANSETRON 4 MILLIGRAM(S): 8 TABLET, FILM COATED ORAL at 11:35

## 2023-10-20 RX ADMIN — SERTRALINE 50 MILLIGRAM(S): 25 TABLET, FILM COATED ORAL at 21:45

## 2023-10-20 RX ADMIN — Medication 100 MILLIGRAM(S): at 21:48

## 2023-10-20 RX ADMIN — Medication 100 MILLIGRAM(S): at 06:05

## 2023-10-20 RX ADMIN — CEFTRIAXONE 100 MILLIGRAM(S): 500 INJECTION, POWDER, FOR SOLUTION INTRAMUSCULAR; INTRAVENOUS at 11:42

## 2023-10-20 RX ADMIN — ENOXAPARIN SODIUM 40 MILLIGRAM(S): 100 INJECTION SUBCUTANEOUS at 22:25

## 2023-10-20 RX ADMIN — ATORVASTATIN CALCIUM 20 MILLIGRAM(S): 80 TABLET, FILM COATED ORAL at 21:46

## 2023-10-20 RX ADMIN — ONDANSETRON 4 MILLIGRAM(S): 8 TABLET, FILM COATED ORAL at 17:25

## 2023-10-20 RX ADMIN — ONDANSETRON 4 MILLIGRAM(S): 8 TABLET, FILM COATED ORAL at 06:05

## 2023-10-20 RX ADMIN — PANTOPRAZOLE SODIUM 40 MILLIGRAM(S): 20 TABLET, DELAYED RELEASE ORAL at 17:29

## 2023-10-21 ENCOUNTER — TRANSCRIPTION ENCOUNTER (OUTPATIENT)
Age: 78
End: 2023-10-21

## 2023-10-21 LAB
ALBUMIN SERPL ELPH-MCNC: 3.5 G/DL — SIGNIFICANT CHANGE UP (ref 3.5–5.2)
ALBUMIN SERPL ELPH-MCNC: 3.5 G/DL — SIGNIFICANT CHANGE UP (ref 3.5–5.2)
ALP SERPL-CCNC: 47 U/L — SIGNIFICANT CHANGE UP (ref 30–115)
ALP SERPL-CCNC: 47 U/L — SIGNIFICANT CHANGE UP (ref 30–115)
ALT FLD-CCNC: 23 U/L — SIGNIFICANT CHANGE UP (ref 0–41)
ALT FLD-CCNC: 23 U/L — SIGNIFICANT CHANGE UP (ref 0–41)
ANION GAP SERPL CALC-SCNC: 13 MMOL/L — SIGNIFICANT CHANGE UP (ref 7–14)
ANION GAP SERPL CALC-SCNC: 13 MMOL/L — SIGNIFICANT CHANGE UP (ref 7–14)
AST SERPL-CCNC: 36 U/L — SIGNIFICANT CHANGE UP (ref 0–41)
AST SERPL-CCNC: 36 U/L — SIGNIFICANT CHANGE UP (ref 0–41)
BASOPHILS # BLD AUTO: 0.09 K/UL — SIGNIFICANT CHANGE UP (ref 0–0.2)
BASOPHILS # BLD AUTO: 0.09 K/UL — SIGNIFICANT CHANGE UP (ref 0–0.2)
BASOPHILS NFR BLD AUTO: 0.9 % — SIGNIFICANT CHANGE UP (ref 0–1)
BASOPHILS NFR BLD AUTO: 0.9 % — SIGNIFICANT CHANGE UP (ref 0–1)
BILIRUB SERPL-MCNC: <0.2 MG/DL — SIGNIFICANT CHANGE UP (ref 0.2–1.2)
BILIRUB SERPL-MCNC: <0.2 MG/DL — SIGNIFICANT CHANGE UP (ref 0.2–1.2)
BUN SERPL-MCNC: 4 MG/DL — LOW (ref 10–20)
BUN SERPL-MCNC: 4 MG/DL — LOW (ref 10–20)
CALCIUM SERPL-MCNC: 7.8 MG/DL — LOW (ref 8.4–10.5)
CALCIUM SERPL-MCNC: 7.8 MG/DL — LOW (ref 8.4–10.5)
CHLORIDE SERPL-SCNC: 105 MMOL/L — SIGNIFICANT CHANGE UP (ref 98–110)
CHLORIDE SERPL-SCNC: 105 MMOL/L — SIGNIFICANT CHANGE UP (ref 98–110)
CO2 SERPL-SCNC: 24 MMOL/L — SIGNIFICANT CHANGE UP (ref 17–32)
CO2 SERPL-SCNC: 24 MMOL/L — SIGNIFICANT CHANGE UP (ref 17–32)
CREAT SERPL-MCNC: 0.7 MG/DL — SIGNIFICANT CHANGE UP (ref 0.7–1.5)
CREAT SERPL-MCNC: 0.7 MG/DL — SIGNIFICANT CHANGE UP (ref 0.7–1.5)
CULTURE RESULTS: SIGNIFICANT CHANGE UP
EGFR: 87 ML/MIN/1.73M2 — SIGNIFICANT CHANGE UP
EGFR: 87 ML/MIN/1.73M2 — SIGNIFICANT CHANGE UP
EOSINOPHIL # BLD AUTO: 0.85 K/UL — HIGH (ref 0–0.7)
EOSINOPHIL # BLD AUTO: 0.85 K/UL — HIGH (ref 0–0.7)
EOSINOPHIL NFR BLD AUTO: 8.1 % — HIGH (ref 0–8)
EOSINOPHIL NFR BLD AUTO: 8.1 % — HIGH (ref 0–8)
GLUCOSE SERPL-MCNC: 98 MG/DL — SIGNIFICANT CHANGE UP (ref 70–99)
GLUCOSE SERPL-MCNC: 98 MG/DL — SIGNIFICANT CHANGE UP (ref 70–99)
HCT VFR BLD CALC: 34.9 % — LOW (ref 37–47)
HCT VFR BLD CALC: 34.9 % — LOW (ref 37–47)
HGB BLD-MCNC: 10.9 G/DL — LOW (ref 12–16)
HGB BLD-MCNC: 10.9 G/DL — LOW (ref 12–16)
IMM GRANULOCYTES NFR BLD AUTO: 0.8 % — HIGH (ref 0.1–0.3)
IMM GRANULOCYTES NFR BLD AUTO: 0.8 % — HIGH (ref 0.1–0.3)
LYMPHOCYTES # BLD AUTO: 1.99 K/UL — SIGNIFICANT CHANGE UP (ref 1.2–3.4)
LYMPHOCYTES # BLD AUTO: 1.99 K/UL — SIGNIFICANT CHANGE UP (ref 1.2–3.4)
LYMPHOCYTES # BLD AUTO: 18.9 % — LOW (ref 20.5–51.1)
LYMPHOCYTES # BLD AUTO: 18.9 % — LOW (ref 20.5–51.1)
MAGNESIUM SERPL-MCNC: 1.8 MG/DL — SIGNIFICANT CHANGE UP (ref 1.8–2.4)
MAGNESIUM SERPL-MCNC: 1.8 MG/DL — SIGNIFICANT CHANGE UP (ref 1.8–2.4)
MCHC RBC-ENTMCNC: 25.6 PG — LOW (ref 27–31)
MCHC RBC-ENTMCNC: 25.6 PG — LOW (ref 27–31)
MCHC RBC-ENTMCNC: 31.2 G/DL — LOW (ref 32–37)
MCHC RBC-ENTMCNC: 31.2 G/DL — LOW (ref 32–37)
MCV RBC AUTO: 82.1 FL — SIGNIFICANT CHANGE UP (ref 81–99)
MCV RBC AUTO: 82.1 FL — SIGNIFICANT CHANGE UP (ref 81–99)
MONOCYTES # BLD AUTO: 0.87 K/UL — HIGH (ref 0.1–0.6)
MONOCYTES # BLD AUTO: 0.87 K/UL — HIGH (ref 0.1–0.6)
MONOCYTES NFR BLD AUTO: 8.3 % — SIGNIFICANT CHANGE UP (ref 1.7–9.3)
MONOCYTES NFR BLD AUTO: 8.3 % — SIGNIFICANT CHANGE UP (ref 1.7–9.3)
NEUTROPHILS # BLD AUTO: 6.63 K/UL — HIGH (ref 1.4–6.5)
NEUTROPHILS # BLD AUTO: 6.63 K/UL — HIGH (ref 1.4–6.5)
NEUTROPHILS NFR BLD AUTO: 63 % — SIGNIFICANT CHANGE UP (ref 42.2–75.2)
NEUTROPHILS NFR BLD AUTO: 63 % — SIGNIFICANT CHANGE UP (ref 42.2–75.2)
NRBC # BLD: 0 /100 WBCS — SIGNIFICANT CHANGE UP (ref 0–0)
NRBC # BLD: 0 /100 WBCS — SIGNIFICANT CHANGE UP (ref 0–0)
PHOSPHATE SERPL-MCNC: 2.6 MG/DL — SIGNIFICANT CHANGE UP (ref 2.1–4.9)
PHOSPHATE SERPL-MCNC: 2.6 MG/DL — SIGNIFICANT CHANGE UP (ref 2.1–4.9)
PLATELET # BLD AUTO: 351 K/UL — SIGNIFICANT CHANGE UP (ref 130–400)
PLATELET # BLD AUTO: 351 K/UL — SIGNIFICANT CHANGE UP (ref 130–400)
PMV BLD: 11 FL — HIGH (ref 7.4–10.4)
PMV BLD: 11 FL — HIGH (ref 7.4–10.4)
POTASSIUM SERPL-MCNC: 3.8 MMOL/L — SIGNIFICANT CHANGE UP (ref 3.5–5)
POTASSIUM SERPL-MCNC: 3.8 MMOL/L — SIGNIFICANT CHANGE UP (ref 3.5–5)
POTASSIUM SERPL-SCNC: 3.8 MMOL/L — SIGNIFICANT CHANGE UP (ref 3.5–5)
POTASSIUM SERPL-SCNC: 3.8 MMOL/L — SIGNIFICANT CHANGE UP (ref 3.5–5)
PROT SERPL-MCNC: 5.3 G/DL — LOW (ref 6–8)
PROT SERPL-MCNC: 5.3 G/DL — LOW (ref 6–8)
RBC # BLD: 4.25 M/UL — SIGNIFICANT CHANGE UP (ref 4.2–5.4)
RBC # BLD: 4.25 M/UL — SIGNIFICANT CHANGE UP (ref 4.2–5.4)
RBC # FLD: 16.4 % — HIGH (ref 11.5–14.5)
RBC # FLD: 16.4 % — HIGH (ref 11.5–14.5)
SODIUM SERPL-SCNC: 142 MMOL/L — SIGNIFICANT CHANGE UP (ref 135–146)
SODIUM SERPL-SCNC: 142 MMOL/L — SIGNIFICANT CHANGE UP (ref 135–146)
SPECIMEN SOURCE: SIGNIFICANT CHANGE UP
WBC # BLD: 10.51 K/UL — SIGNIFICANT CHANGE UP (ref 4.8–10.8)
WBC # BLD: 10.51 K/UL — SIGNIFICANT CHANGE UP (ref 4.8–10.8)
WBC # FLD AUTO: 10.51 K/UL — SIGNIFICANT CHANGE UP (ref 4.8–10.8)
WBC # FLD AUTO: 10.51 K/UL — SIGNIFICANT CHANGE UP (ref 4.8–10.8)

## 2023-10-21 PROCEDURE — 99232 SBSQ HOSP IP/OBS MODERATE 35: CPT

## 2023-10-21 RX ADMIN — Medication 0.5 MILLIGRAM(S): at 23:20

## 2023-10-21 RX ADMIN — PANTOPRAZOLE SODIUM 40 MILLIGRAM(S): 20 TABLET, DELAYED RELEASE ORAL at 06:03

## 2023-10-21 RX ADMIN — SODIUM CHLORIDE 50 MILLILITER(S): 9 INJECTION, SOLUTION INTRAVENOUS at 21:25

## 2023-10-21 RX ADMIN — PANTOPRAZOLE SODIUM 40 MILLIGRAM(S): 20 TABLET, DELAYED RELEASE ORAL at 17:04

## 2023-10-21 RX ADMIN — Medication 0.5 MILLIGRAM(S): at 00:27

## 2023-10-21 RX ADMIN — Medication 0.5 MILLIGRAM(S): at 17:01

## 2023-10-21 RX ADMIN — ONDANSETRON 4 MILLIGRAM(S): 8 TABLET, FILM COATED ORAL at 06:02

## 2023-10-21 RX ADMIN — Medication 0.5 MILLIGRAM(S): at 11:08

## 2023-10-21 RX ADMIN — CHLORHEXIDINE GLUCONATE 1 APPLICATION(S): 213 SOLUTION TOPICAL at 06:08

## 2023-10-21 RX ADMIN — ENOXAPARIN SODIUM 40 MILLIGRAM(S): 100 INJECTION SUBCUTANEOUS at 21:38

## 2023-10-21 RX ADMIN — SERTRALINE 50 MILLIGRAM(S): 25 TABLET, FILM COATED ORAL at 21:23

## 2023-10-21 RX ADMIN — ONDANSETRON 4 MILLIGRAM(S): 8 TABLET, FILM COATED ORAL at 16:58

## 2023-10-21 RX ADMIN — Medication 0.5 MILLIGRAM(S): at 06:07

## 2023-10-21 RX ADMIN — CEFTRIAXONE 100 MILLIGRAM(S): 500 INJECTION, POWDER, FOR SOLUTION INTRAMUSCULAR; INTRAVENOUS at 10:35

## 2023-10-21 RX ADMIN — Medication 100 MILLIGRAM(S): at 21:25

## 2023-10-21 RX ADMIN — SODIUM CHLORIDE 50 MILLILITER(S): 9 INJECTION, SOLUTION INTRAVENOUS at 06:02

## 2023-10-21 RX ADMIN — ATORVASTATIN CALCIUM 20 MILLIGRAM(S): 80 TABLET, FILM COATED ORAL at 21:22

## 2023-10-21 RX ADMIN — TAMSULOSIN HYDROCHLORIDE 0.4 MILLIGRAM(S): 0.4 CAPSULE ORAL at 21:23

## 2023-10-21 RX ADMIN — Medication 100 MILLIGRAM(S): at 15:08

## 2023-10-21 RX ADMIN — Medication 100 MILLIGRAM(S): at 06:11

## 2023-10-21 NOTE — DISCHARGE NOTE PROVIDER - PROVIDER TOKENS
PROVIDER:[TOKEN:[[id number]],FOLLOWUP:[2 weeks]] PROVIDER:[TOKEN:[[id number]],FOLLOWUP:[2 weeks]],PROVIDER:[TOKEN:[[id number]],FOLLOWUP:[1 month]]

## 2023-10-21 NOTE — DISCHARGE NOTE PROVIDER - ATTENDING DISCHARGE PHYSICAL EXAMINATION:
T(C): 36.6 (10-26-23 @ 08:02), Max: 36.9 (10-25-23 @ 15:15)  HR: 60 (10-26-23 @ 08:02) (60 - 73)  BP: 157/69 (10-26-23 @ 08:02) (125/59 - 157/69)  RR: 18 (10-25-23 @ 23:38) (18 - 18)  SpO2: --  O/E:  Awake, alert, not in distress.  HEENT: atraumatic, EOMI.  Chest: clear.  CVS: SIS2 +, no murmur.  P/A: Soft, BS+  CNS: awake, alert  Ext: no edema feet.  All systems reviewed positive findings as above.

## 2023-10-21 NOTE — DISCHARGE NOTE PROVIDER - NSDCFUSCHEDAPPT_GEN_ALL_CORE_FT
Baldomero Amaya  [facility]  PULMMED [facility]any [facility]  Scheduled Appointment: 12/12/2023

## 2023-10-21 NOTE — DISCHARGE NOTE PROVIDER - CARE PROVIDER_API CALL
Seema Irene  Internal Medicine  [address] [place] [id number]  Phone: [phone]  Fax: [phone]  Follow Up Time: 2 weeks   Seema Irene  Internal Medicine  [facility] [place] [address]  Phone: [phone]  Fax: [phone]  Follow Up Time: 2 weeks    Ellis Vásquez  Gastroenterology  [address] [place] [address]  Phone: [phone]  Fax: [phone]  Follow Up Time: 1 month

## 2023-10-21 NOTE — DISCHARGE NOTE PROVIDER - HOSPITAL COURSE
78 year old female with PMH of HLD, HTN, chronic pain, anxiety, depression, hiatal hernia, s/p vaginal hysterectomy and laparotomy, presented to the ED for suprapubic abdominal pain that radiates diffusely across all abdominal quadrants with associated nausea and vomiting since the morning of 10/15/23. Patient was found to have colonic diverticulitis and urinary retention on CT A/P. Patient was started on Rocephin and flagyl and her symptoms improved over the course of hospital. Patient is vitally and clinically stable to be discharged home.       HOSPITAL COURSE:  #Colonic diverticulitis  - CTAP (10/16): Colonic diverticulosis. Diffuse sigmoid thickening with surrounding fat stranding. Likely inflamed diverticulum on series 4 image 264. Avulsion, pneumoperitoneum or pneumatosis.  - WBC: 18K (10/16)-->15-->8 (10/19)  - s/p ciprofloxacin and Zosyn   - switched abx to rocephine and flagyl 10/17  - Pain control: continue morphine  - Continue zofran 4mg IV q8h PRN for nausea   - soft diet for now  - started IVF as pt is hypotensive and dizzy likely 2/2 decreased PO intake  - held anti hypertensive medication  - improving clinically, no nausea, diarrhea    #Urinary rentention   - Started on 10/15/23, endorses suprapubic pain and urgency with inability to urinate, per patient never experienced this  - Continue home tamsulosin 0.4mg daily  - UA 10/16: negative  - Ucx 10/16: pending  - Bladder US: no hydronephrosis  - passing urine, no more retaining    #GERD 2/2 hiatal hernia  - takes omeprazole 30mg BID  - Start pantoprazole 40mg BID    #HTN  #HLD  - C/w amlodipine 10mg daily, atorvastatin 20mg daily (takes rosuvastatin 5mg QHS at home), and valsartan 160mg daily     #Pt lifelong non-smoker, 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 Amaya, pulmonology   - Takes Advair HFA at home  - Symbicort PRN for dyspnea    #Anxiety  #Depression  - Continue home medication of sertraline 50mg oral and Xanax 0.5mg four times a day - prescribed by PCP    #Chronic pain  - Sees Dr. Joya  - Takes oxycodone 7.5mg /acetaminophen 325mg 2-3 tablets times a day as needed for severe pain - confirmed by Dr. Joya's office on 10/16/23, this meds is sent to [facility]   - Will start oxycodone 5mg /acetaminophen 325mg 2-3 tablets times a day as needed for severe pain     #Constipation  - Last BM 10/15- small quantity   - Start home senna 2 tablet QHS  - Also take colace at home    #DVT prophylaxis: Lovenox 40mg subQ q24h  #Activity: as tolerated  #Diet: regular  #Dispo: Acute       78 year old female with PMH of HLD, HTN, chronic pain, anxiety, depression, hiatal hernia, s/p vaginal hysterectomy and laparotomy, presented to the ED for suprapubic abdominal pain that radiates diffusely across all abdominal quadrants with associated nausea and vomiting since the morning of 10/15/23. Patient was found to have colonic diverticulitis and urinary retention on CT A/P. Patient was started on Rocephin and flagyl and her symptoms improved over the course of hospital. But pt complained of mild diarrhea and persistent abdominal pain and nausea after meals. GI was consulted, recommended to do stool PCR and C.DIFF, both came back negative. Patient has to follow up with the GI as out patient for possible colonoscopy in 4-6 weeks. Patient was also started on lactose free diet on this admission. Patient is vitally and clinically stable to be discharged home. Tolerating oral intake and diarrhea resolved.       HOSPITAL COURSE:  #Colonic diverticulitis  - CTAP (10/16): Colonic diverticulosis. Diffuse sigmoid thickening with surrounding fat stranding. Likely inflamed diverticulum on series 4 image 264. Avulsion, pneumoperitoneum or pneumatosis.  - WBC: 18K (10/16)-->15-->8 (10/19)  - s/p ciprofloxacin and Zosyn   - switched abx to rocephine and flagyl 10/17  - Pain control: continue morphine  - Continue zofran 4mg IV q8h PRN for nausea   - Lactose free diet  - held anti hypertensive medication due to soft BP  - improving clinically, no nausea, diarrhea    #Urinary rentention   - Started on 10/15/23, endorses suprapubic pain and urgency with inability to urinate, per patient never experienced this  - Continue home tamsulosin 0.4mg daily  - UA 10/16: negative  - Ucx 10/16: pending  - Bladder US: no hydronephrosis  - passing urine, no more retaining    #GERD 2/2 hiatal hernia  - takes omeprazole 30mg BID  - Start pantoprazole 40mg BID    #HTN  #HLD  - C/w atorvastatin 20mg daily (takes rosuvastatin 5mg QHS at home), and valsartan 160mg daily and discontinued Amlodipine.     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 Amaya, pulmonology   - Takes Advair HFA at home  - Symbicort PRN for dyspnea    #Anxiety  #Depression  - Continue home medication of sertraline 50mg oral and Xanax 0.5mg four times a day - prescribed by PCP    #Chronic pain  - Sees Dr. Joya  - Takes oxycodone 7.5mg /acetaminophen 325mg 2-3 tablets times a day as needed for severe pain - confirmed by Dr. Joya's office on 10/16/23, this meds is sent to [facility]   - Will start oxycodone 5mg /acetaminophen 325mg 2-3 tablets times a day as needed for severe pain     #Constipation  - Last BM 10/15- small quantity   - Start home senna 2 tablet QHS  - Also take colace at home    #DVT prophylaxis: Lovenox 40mg subQ q24h  #Activity: as tolerated  #Diet: lactose free         78 year old female with PMH of HLD, HTN, chronic pain, anxiety, depression, hiatal hernia, s/p vaginal hysterectomy and laparotomy, presented to the ED for suprapubic abdominal pain that radiates diffusely across all abdominal quadrants with associated nausea and vomiting since the morning of 10/15/23. Patient was found to have colonic diverticulitis and urinary retention on CT A/P. Patient was started on Rocephin and flagyl and her symptoms improved over the course of hospital. But pt complained of mild diarrhea and persistent abdominal pain and nausea after meals. GI was consulted, recommended to do stool PCR and C.DIFF, both came back negative. Patient has to follow up with the GI as out patient for possible colonoscopy in 4-6 weeks. Patient was also started on lactose free diet on this admission. Patient is vitally and clinically stable to be discharged home. Tolerating oral intake and diarrhea resolved.       HOSPITAL COURSE:  # Sigmoid  diverticulitis- stable  # hyponatremia resolved  -  CT Abdomen and Pelvis w/ IV Cont (10.16.23 @ 04:33) >Sigmoid colitis/diverticulitis. No abscess.  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 Amaya, pulmonology   - Takes Advair HFA at home    #Chronic pain  - Sees Dr. Joya  - Takes oxycodone 7.5mg /acetaminophen 325mg 2-3 tablets times a day as needed for severe pain - confirmed by Dr. Joya's office on 10/16/23, this meds is sent to [facility]   - Will start oxycodone 5mg /acetaminophen 325mg 2-3 tablets times a day as needed for severe pain

## 2023-10-21 NOTE — DISCHARGE NOTE PROVIDER - NSDCFUADDINST_GEN_ALL_CORE_FT
Your Home medication norvasc was discontinued secondary to low BP during your Hospital stay. F/u in 1-2 weeks with your PCP for monitoring BP

## 2023-10-21 NOTE — DISCHARGE NOTE PROVIDER - NSDCMRMEDTOKEN_GEN_ALL_CORE_FT
Advair Diskus 250 mcg-50 mcg inhalation powder: 1 puff(s) inhaled 2 times a day  ALPRAZolam 0.5 mg oral tablet: 1 tab(s) orally 4 times a day  amLODIPine 10 mg oral tablet: 1 tab(s) orally once a day  Crestor 5 mg oral tablet: 1 tab(s) orally once a day  Diovan 160 mg oral capsule: 1 tab(s) orally once a day  omeprazole 20 mg oral delayed release tablet: 1 tab(s) orally 2 times a day  oxyCODONE-acetaminophen 7.5 mg-325 mg oral tablet: 1 tab(s) orally every 8 hours, As Needed  sertraline 50 mg oral tablet: 1 tab(s) orally once a day  tamsulosin 0.4 mg oral capsule: 1 cap(s) orally once a day   Advair Diskus 250 mcg-50 mcg inhalation powder: 1 puff(s) inhaled 2 times a day  ALPRAZolam 0.5 mg oral tablet: 1 tab(s) orally 4 times a day  Crestor 5 mg oral tablet: 1 tab(s) orally once a day  Diovan 160 mg oral capsule: 1 tab(s) orally once a day  omeprazole 20 mg oral delayed release tablet: 1 tab(s) orally 2 times a day  oxyCODONE-acetaminophen 7.5 mg-325 mg oral tablet: 1 tab(s) orally every 8 hours, As Needed  sertraline 50 mg oral tablet: 1 tab(s) orally once a day  tamsulosin 0.4 mg oral capsule: 1 cap(s) orally once a day

## 2023-10-21 NOTE — DISCHARGE NOTE PROVIDER - NSDCCPCAREPLAN_GEN_ALL_CORE_FT
PRINCIPAL DISCHARGE DIAGNOSIS  Diagnosis: Diverticulitis  Assessment and Plan of Treatment: You presented to the ED for suprapubic abdominal pain that radiates diffusely across all abdominal quadrants with associated nausea and vomiting since the morning of 10/15/23. Patient was found to have colonic diverticulitis and urinary retention on CT A/P. You were started on Rocephin and flagyl and her symptoms improved over the course of hospital. Patient is vitally and clinically stable to be discharged home.   Diverticula are small, bulging pouches that can form in the lining of your digestive system. They are found most often in the lower part of the large intestine (colon). Diverticula are common, especially after age 40, and seldom cause problems.  Sometimes, however, one or more of the pouches become inflamed or infected. That condition is known as diverticulitis (die-[id number]-tik-bia-LIE-[id number]). Diverticulitis can cause severe abdominal pain, fever, nausea and a marked change in your bowel habits.  Mild diverticulitis can be treated with rest, changes in your diet and antibiotics. Severe or recurring diverticulitis may require surgery.  To help prevent diverticulitis:  Exercise regularly. Exercise promotes normal bowel function and reduces pressure inside your colon.   Eat more fiber. A high-fiber diet decreases the risk of diverticulitis. Fiber-rich foods, such as fresh fruits and vegetables and whole grains, soften waste material and help it pass more quickly through your colon. Eating seeds and nuts isn't associated with developing diverticulitis.  Drink plenty of fluids. Fiber works by absorbing water and increasing the soft, bulky waste in your colon. But if you don't drink enough liquid to replace what's absorbed, fiber can be constipating.  Treatment is with antibiotics and possibly surgery.  Call 911 and return to the emergency room if you have chest pain, difficulty breathing, high fevers, worsening of your symptoms, feel unwell or have nausea and vomiting.       PRINCIPAL DISCHARGE DIAGNOSIS  Diagnosis: Diverticulitis  Assessment and Plan of Treatment: You presented to the ED for suprapubic abdominal pain that radiates diffusely across all abdominal quadrants with associated nausea and vomiting since the morning of 10/15/23. You were found to have colonic diverticulitis and urinary retention on CT A/P. You were started on Rocephin and flagyl and her symptoms improved over the course of hospital.  GI was also consulted and recommended to send stool PCR and C.diff, both came back negative.  You were started on Lactose free diet. Your symptoms improved over time. Please follow up with GI for colonoscopy in 4-6 weeks. Please continue with the lactose free diet.  Diverticula are small, bulging pouches that can form in the lining of your digestive system. They are found most often in the lower part of the large intestine (colon).   Sometimes, however, one or more of the pouches become inflamed or infected. That condition is known as diverticulitis . Diverticulitis can cause severe abdominal pain, fever, nausea and a marked change in your bowel habits.  Mild diverticulitis can be treated with rest, changes in your diet and antibiotics. Severe or recurring diverticulitis may require surgery.  To help prevent diverticulitis:  Exercise regularly. Exercise promotes normal bowel function and reduces pressure inside your colon.   Eat more fiber. A high-fiber diet decreases the risk of diverticulitis. Fiber-rich foods, such as fresh fruits and vegetables and whole grains, soften waste material and help it pass more quickly through your colon. Eating seeds and nuts isn't associated with developing diverticulitis.  Drink plenty of fluids. Fiber works by absorbing water and increasing the soft, bulky waste in your colon. But if you don't drink enough liquid to replace what's absorbed, fiber can be constipating.  Call 911 and return to the emergency room if you have chest pain, difficulty breathing, high fevers, worsening of your symptoms, feel unwell or have nausea and vomiting.

## 2023-10-22 LAB
ALBUMIN SERPL ELPH-MCNC: 3.1 G/DL — LOW (ref 3.5–5.2)
ALBUMIN SERPL ELPH-MCNC: 3.1 G/DL — LOW (ref 3.5–5.2)
ALP SERPL-CCNC: 46 U/L — SIGNIFICANT CHANGE UP (ref 30–115)
ALP SERPL-CCNC: 46 U/L — SIGNIFICANT CHANGE UP (ref 30–115)
ALT FLD-CCNC: 25 U/L — SIGNIFICANT CHANGE UP (ref 0–41)
ALT FLD-CCNC: 25 U/L — SIGNIFICANT CHANGE UP (ref 0–41)
ANION GAP SERPL CALC-SCNC: 9 MMOL/L — SIGNIFICANT CHANGE UP (ref 7–14)
ANION GAP SERPL CALC-SCNC: 9 MMOL/L — SIGNIFICANT CHANGE UP (ref 7–14)
AST SERPL-CCNC: 40 U/L — SIGNIFICANT CHANGE UP (ref 0–41)
AST SERPL-CCNC: 40 U/L — SIGNIFICANT CHANGE UP (ref 0–41)
BASOPHILS # BLD AUTO: 0.08 K/UL — SIGNIFICANT CHANGE UP (ref 0–0.2)
BASOPHILS # BLD AUTO: 0.08 K/UL — SIGNIFICANT CHANGE UP (ref 0–0.2)
BASOPHILS NFR BLD AUTO: 0.8 % — SIGNIFICANT CHANGE UP (ref 0–1)
BASOPHILS NFR BLD AUTO: 0.8 % — SIGNIFICANT CHANGE UP (ref 0–1)
BILIRUB SERPL-MCNC: <0.2 MG/DL — SIGNIFICANT CHANGE UP (ref 0.2–1.2)
BILIRUB SERPL-MCNC: <0.2 MG/DL — SIGNIFICANT CHANGE UP (ref 0.2–1.2)
BUN SERPL-MCNC: 5 MG/DL — LOW (ref 10–20)
BUN SERPL-MCNC: 5 MG/DL — LOW (ref 10–20)
CALCIUM SERPL-MCNC: 8.5 MG/DL — SIGNIFICANT CHANGE UP (ref 8.4–10.4)
CALCIUM SERPL-MCNC: 8.5 MG/DL — SIGNIFICANT CHANGE UP (ref 8.4–10.4)
CHLORIDE SERPL-SCNC: 108 MMOL/L — SIGNIFICANT CHANGE UP (ref 98–110)
CHLORIDE SERPL-SCNC: 108 MMOL/L — SIGNIFICANT CHANGE UP (ref 98–110)
CO2 SERPL-SCNC: 26 MMOL/L — SIGNIFICANT CHANGE UP (ref 17–32)
CO2 SERPL-SCNC: 26 MMOL/L — SIGNIFICANT CHANGE UP (ref 17–32)
CREAT SERPL-MCNC: 0.7 MG/DL — SIGNIFICANT CHANGE UP (ref 0.7–1.5)
CREAT SERPL-MCNC: 0.7 MG/DL — SIGNIFICANT CHANGE UP (ref 0.7–1.5)
EGFR: 87 ML/MIN/1.73M2 — SIGNIFICANT CHANGE UP
EGFR: 87 ML/MIN/1.73M2 — SIGNIFICANT CHANGE UP
EOSINOPHIL # BLD AUTO: 0.79 K/UL — HIGH (ref 0–0.7)
EOSINOPHIL # BLD AUTO: 0.79 K/UL — HIGH (ref 0–0.7)
EOSINOPHIL NFR BLD AUTO: 7.5 % — SIGNIFICANT CHANGE UP (ref 0–8)
EOSINOPHIL NFR BLD AUTO: 7.5 % — SIGNIFICANT CHANGE UP (ref 0–8)
GLUCOSE SERPL-MCNC: 89 MG/DL — SIGNIFICANT CHANGE UP (ref 70–99)
GLUCOSE SERPL-MCNC: 89 MG/DL — SIGNIFICANT CHANGE UP (ref 70–99)
HCT VFR BLD CALC: 35 % — LOW (ref 37–47)
HCT VFR BLD CALC: 35 % — LOW (ref 37–47)
HGB BLD-MCNC: 10.8 G/DL — LOW (ref 12–16)
HGB BLD-MCNC: 10.8 G/DL — LOW (ref 12–16)
IMM GRANULOCYTES NFR BLD AUTO: 0.6 % — HIGH (ref 0.1–0.3)
IMM GRANULOCYTES NFR BLD AUTO: 0.6 % — HIGH (ref 0.1–0.3)
LYMPHOCYTES # BLD AUTO: 2.14 K/UL — SIGNIFICANT CHANGE UP (ref 1.2–3.4)
LYMPHOCYTES # BLD AUTO: 2.14 K/UL — SIGNIFICANT CHANGE UP (ref 1.2–3.4)
LYMPHOCYTES # BLD AUTO: 20.4 % — LOW (ref 20.5–51.1)
LYMPHOCYTES # BLD AUTO: 20.4 % — LOW (ref 20.5–51.1)
MAGNESIUM SERPL-MCNC: 2 MG/DL — SIGNIFICANT CHANGE UP (ref 1.8–2.4)
MAGNESIUM SERPL-MCNC: 2 MG/DL — SIGNIFICANT CHANGE UP (ref 1.8–2.4)
MCHC RBC-ENTMCNC: 25.2 PG — LOW (ref 27–31)
MCHC RBC-ENTMCNC: 25.2 PG — LOW (ref 27–31)
MCHC RBC-ENTMCNC: 30.9 G/DL — LOW (ref 32–37)
MCHC RBC-ENTMCNC: 30.9 G/DL — LOW (ref 32–37)
MCV RBC AUTO: 81.8 FL — SIGNIFICANT CHANGE UP (ref 81–99)
MCV RBC AUTO: 81.8 FL — SIGNIFICANT CHANGE UP (ref 81–99)
MONOCYTES # BLD AUTO: 0.87 K/UL — HIGH (ref 0.1–0.6)
MONOCYTES # BLD AUTO: 0.87 K/UL — HIGH (ref 0.1–0.6)
MONOCYTES NFR BLD AUTO: 8.3 % — SIGNIFICANT CHANGE UP (ref 1.7–9.3)
MONOCYTES NFR BLD AUTO: 8.3 % — SIGNIFICANT CHANGE UP (ref 1.7–9.3)
NEUTROPHILS # BLD AUTO: 6.57 K/UL — HIGH (ref 1.4–6.5)
NEUTROPHILS # BLD AUTO: 6.57 K/UL — HIGH (ref 1.4–6.5)
NEUTROPHILS NFR BLD AUTO: 62.4 % — SIGNIFICANT CHANGE UP (ref 42.2–75.2)
NEUTROPHILS NFR BLD AUTO: 62.4 % — SIGNIFICANT CHANGE UP (ref 42.2–75.2)
NRBC # BLD: 0 /100 WBCS — SIGNIFICANT CHANGE UP (ref 0–0)
NRBC # BLD: 0 /100 WBCS — SIGNIFICANT CHANGE UP (ref 0–0)
PHOSPHATE SERPL-MCNC: 3.5 MG/DL — SIGNIFICANT CHANGE UP (ref 2.1–4.9)
PHOSPHATE SERPL-MCNC: 3.5 MG/DL — SIGNIFICANT CHANGE UP (ref 2.1–4.9)
PLATELET # BLD AUTO: 346 K/UL — SIGNIFICANT CHANGE UP (ref 130–400)
PLATELET # BLD AUTO: 346 K/UL — SIGNIFICANT CHANGE UP (ref 130–400)
PMV BLD: 11.2 FL — HIGH (ref 7.4–10.4)
PMV BLD: 11.2 FL — HIGH (ref 7.4–10.4)
POTASSIUM SERPL-MCNC: 4 MMOL/L — SIGNIFICANT CHANGE UP (ref 3.5–5)
POTASSIUM SERPL-MCNC: 4 MMOL/L — SIGNIFICANT CHANGE UP (ref 3.5–5)
POTASSIUM SERPL-SCNC: 4 MMOL/L — SIGNIFICANT CHANGE UP (ref 3.5–5)
POTASSIUM SERPL-SCNC: 4 MMOL/L — SIGNIFICANT CHANGE UP (ref 3.5–5)
PROT SERPL-MCNC: 4.9 G/DL — LOW (ref 6–8)
PROT SERPL-MCNC: 4.9 G/DL — LOW (ref 6–8)
RBC # BLD: 4.28 M/UL — SIGNIFICANT CHANGE UP (ref 4.2–5.4)
RBC # BLD: 4.28 M/UL — SIGNIFICANT CHANGE UP (ref 4.2–5.4)
RBC # FLD: 16.4 % — HIGH (ref 11.5–14.5)
RBC # FLD: 16.4 % — HIGH (ref 11.5–14.5)
SODIUM SERPL-SCNC: 143 MMOL/L — SIGNIFICANT CHANGE UP (ref 135–146)
SODIUM SERPL-SCNC: 143 MMOL/L — SIGNIFICANT CHANGE UP (ref 135–146)
WBC # BLD: 10.51 K/UL — SIGNIFICANT CHANGE UP (ref 4.8–10.8)
WBC # BLD: 10.51 K/UL — SIGNIFICANT CHANGE UP (ref 4.8–10.8)
WBC # FLD AUTO: 10.51 K/UL — SIGNIFICANT CHANGE UP (ref 4.8–10.8)
WBC # FLD AUTO: 10.51 K/UL — SIGNIFICANT CHANGE UP (ref 4.8–10.8)

## 2023-10-22 PROCEDURE — 99233 SBSQ HOSP IP/OBS HIGH 50: CPT

## 2023-10-22 RX ADMIN — Medication 100 MILLIGRAM(S): at 05:32

## 2023-10-22 RX ADMIN — Medication 0.5 MILLIGRAM(S): at 05:32

## 2023-10-22 RX ADMIN — Medication 100 MILLIGRAM(S): at 21:22

## 2023-10-22 RX ADMIN — ONDANSETRON 4 MILLIGRAM(S): 8 TABLET, FILM COATED ORAL at 06:28

## 2023-10-22 RX ADMIN — Medication 100 MILLIGRAM(S): at 14:00

## 2023-10-22 RX ADMIN — SERTRALINE 50 MILLIGRAM(S): 25 TABLET, FILM COATED ORAL at 21:23

## 2023-10-22 RX ADMIN — SODIUM CHLORIDE 50 MILLILITER(S): 9 INJECTION, SOLUTION INTRAVENOUS at 23:09

## 2023-10-22 RX ADMIN — ENOXAPARIN SODIUM 40 MILLIGRAM(S): 100 INJECTION SUBCUTANEOUS at 21:32

## 2023-10-22 RX ADMIN — Medication 0.5 MILLIGRAM(S): at 23:27

## 2023-10-22 RX ADMIN — Medication 0.5 MILLIGRAM(S): at 11:24

## 2023-10-22 RX ADMIN — Medication 0.5 MILLIGRAM(S): at 17:08

## 2023-10-22 RX ADMIN — TAMSULOSIN HYDROCHLORIDE 0.4 MILLIGRAM(S): 0.4 CAPSULE ORAL at 21:21

## 2023-10-22 RX ADMIN — ATORVASTATIN CALCIUM 20 MILLIGRAM(S): 80 TABLET, FILM COATED ORAL at 21:21

## 2023-10-22 RX ADMIN — PANTOPRAZOLE SODIUM 40 MILLIGRAM(S): 20 TABLET, DELAYED RELEASE ORAL at 05:32

## 2023-10-22 RX ADMIN — CHLORHEXIDINE GLUCONATE 1 APPLICATION(S): 213 SOLUTION TOPICAL at 05:33

## 2023-10-22 RX ADMIN — ONDANSETRON 4 MILLIGRAM(S): 8 TABLET, FILM COATED ORAL at 17:09

## 2023-10-22 RX ADMIN — CEFTRIAXONE 100 MILLIGRAM(S): 500 INJECTION, POWDER, FOR SOLUTION INTRAMUSCULAR; INTRAVENOUS at 11:24

## 2023-10-22 RX ADMIN — SENNA PLUS 2 TABLET(S): 8.6 TABLET ORAL at 21:21

## 2023-10-22 RX ADMIN — PANTOPRAZOLE SODIUM 40 MILLIGRAM(S): 20 TABLET, DELAYED RELEASE ORAL at 17:08

## 2023-10-23 LAB
ALBUMIN SERPL ELPH-MCNC: 2.9 G/DL — LOW (ref 3.5–5.2)
ALBUMIN SERPL ELPH-MCNC: 2.9 G/DL — LOW (ref 3.5–5.2)
ALP SERPL-CCNC: 42 U/L — SIGNIFICANT CHANGE UP (ref 30–115)
ALP SERPL-CCNC: 42 U/L — SIGNIFICANT CHANGE UP (ref 30–115)
ALT FLD-CCNC: 22 U/L — SIGNIFICANT CHANGE UP (ref 0–41)
ALT FLD-CCNC: 22 U/L — SIGNIFICANT CHANGE UP (ref 0–41)
ANION GAP SERPL CALC-SCNC: 11 MMOL/L — SIGNIFICANT CHANGE UP (ref 7–14)
ANION GAP SERPL CALC-SCNC: 11 MMOL/L — SIGNIFICANT CHANGE UP (ref 7–14)
AST SERPL-CCNC: 29 U/L — SIGNIFICANT CHANGE UP (ref 0–41)
AST SERPL-CCNC: 29 U/L — SIGNIFICANT CHANGE UP (ref 0–41)
BASOPHILS # BLD AUTO: 0.08 K/UL — SIGNIFICANT CHANGE UP (ref 0–0.2)
BASOPHILS # BLD AUTO: 0.08 K/UL — SIGNIFICANT CHANGE UP (ref 0–0.2)
BASOPHILS NFR BLD AUTO: 0.8 % — SIGNIFICANT CHANGE UP (ref 0–1)
BASOPHILS NFR BLD AUTO: 0.8 % — SIGNIFICANT CHANGE UP (ref 0–1)
BILIRUB SERPL-MCNC: <0.2 MG/DL — SIGNIFICANT CHANGE UP (ref 0.2–1.2)
BILIRUB SERPL-MCNC: <0.2 MG/DL — SIGNIFICANT CHANGE UP (ref 0.2–1.2)
BUN SERPL-MCNC: 8 MG/DL — LOW (ref 10–20)
BUN SERPL-MCNC: 8 MG/DL — LOW (ref 10–20)
CALCIUM SERPL-MCNC: 8.3 MG/DL — LOW (ref 8.4–10.5)
CALCIUM SERPL-MCNC: 8.3 MG/DL — LOW (ref 8.4–10.5)
CHLORIDE SERPL-SCNC: 112 MMOL/L — HIGH (ref 98–110)
CHLORIDE SERPL-SCNC: 112 MMOL/L — HIGH (ref 98–110)
CO2 SERPL-SCNC: 25 MMOL/L — SIGNIFICANT CHANGE UP (ref 17–32)
CO2 SERPL-SCNC: 25 MMOL/L — SIGNIFICANT CHANGE UP (ref 17–32)
CREAT SERPL-MCNC: 0.8 MG/DL — SIGNIFICANT CHANGE UP (ref 0.7–1.5)
CREAT SERPL-MCNC: 0.8 MG/DL — SIGNIFICANT CHANGE UP (ref 0.7–1.5)
EGFR: 74 ML/MIN/1.73M2 — SIGNIFICANT CHANGE UP
EGFR: 74 ML/MIN/1.73M2 — SIGNIFICANT CHANGE UP
EOSINOPHIL # BLD AUTO: 0.62 K/UL — SIGNIFICANT CHANGE UP (ref 0–0.7)
EOSINOPHIL # BLD AUTO: 0.62 K/UL — SIGNIFICANT CHANGE UP (ref 0–0.7)
EOSINOPHIL NFR BLD AUTO: 6.3 % — SIGNIFICANT CHANGE UP (ref 0–8)
EOSINOPHIL NFR BLD AUTO: 6.3 % — SIGNIFICANT CHANGE UP (ref 0–8)
GLUCOSE SERPL-MCNC: 115 MG/DL — HIGH (ref 70–99)
GLUCOSE SERPL-MCNC: 115 MG/DL — HIGH (ref 70–99)
HCT VFR BLD CALC: 33.3 % — LOW (ref 37–47)
HCT VFR BLD CALC: 33.3 % — LOW (ref 37–47)
HGB BLD-MCNC: 10.3 G/DL — LOW (ref 12–16)
HGB BLD-MCNC: 10.3 G/DL — LOW (ref 12–16)
IMM GRANULOCYTES NFR BLD AUTO: 0.7 % — HIGH (ref 0.1–0.3)
IMM GRANULOCYTES NFR BLD AUTO: 0.7 % — HIGH (ref 0.1–0.3)
LYMPHOCYTES # BLD AUTO: 1.53 K/UL — SIGNIFICANT CHANGE UP (ref 1.2–3.4)
LYMPHOCYTES # BLD AUTO: 1.53 K/UL — SIGNIFICANT CHANGE UP (ref 1.2–3.4)
LYMPHOCYTES # BLD AUTO: 15.5 % — LOW (ref 20.5–51.1)
LYMPHOCYTES # BLD AUTO: 15.5 % — LOW (ref 20.5–51.1)
MAGNESIUM SERPL-MCNC: 1.8 MG/DL — SIGNIFICANT CHANGE UP (ref 1.8–2.4)
MAGNESIUM SERPL-MCNC: 1.8 MG/DL — SIGNIFICANT CHANGE UP (ref 1.8–2.4)
MCHC RBC-ENTMCNC: 25.8 PG — LOW (ref 27–31)
MCHC RBC-ENTMCNC: 25.8 PG — LOW (ref 27–31)
MCHC RBC-ENTMCNC: 30.9 G/DL — LOW (ref 32–37)
MCHC RBC-ENTMCNC: 30.9 G/DL — LOW (ref 32–37)
MCV RBC AUTO: 83.3 FL — SIGNIFICANT CHANGE UP (ref 81–99)
MCV RBC AUTO: 83.3 FL — SIGNIFICANT CHANGE UP (ref 81–99)
MONOCYTES # BLD AUTO: 0.66 K/UL — HIGH (ref 0.1–0.6)
MONOCYTES # BLD AUTO: 0.66 K/UL — HIGH (ref 0.1–0.6)
MONOCYTES NFR BLD AUTO: 6.7 % — SIGNIFICANT CHANGE UP (ref 1.7–9.3)
MONOCYTES NFR BLD AUTO: 6.7 % — SIGNIFICANT CHANGE UP (ref 1.7–9.3)
NEUTROPHILS # BLD AUTO: 6.94 K/UL — HIGH (ref 1.4–6.5)
NEUTROPHILS # BLD AUTO: 6.94 K/UL — HIGH (ref 1.4–6.5)
NEUTROPHILS NFR BLD AUTO: 70 % — SIGNIFICANT CHANGE UP (ref 42.2–75.2)
NEUTROPHILS NFR BLD AUTO: 70 % — SIGNIFICANT CHANGE UP (ref 42.2–75.2)
NRBC # BLD: 0 /100 WBCS — SIGNIFICANT CHANGE UP (ref 0–0)
NRBC # BLD: 0 /100 WBCS — SIGNIFICANT CHANGE UP (ref 0–0)
PHOSPHATE SERPL-MCNC: 3.6 MG/DL — SIGNIFICANT CHANGE UP (ref 2.1–4.9)
PHOSPHATE SERPL-MCNC: 3.6 MG/DL — SIGNIFICANT CHANGE UP (ref 2.1–4.9)
PLATELET # BLD AUTO: 346 K/UL — SIGNIFICANT CHANGE UP (ref 130–400)
PLATELET # BLD AUTO: 346 K/UL — SIGNIFICANT CHANGE UP (ref 130–400)
PMV BLD: 11.1 FL — HIGH (ref 7.4–10.4)
PMV BLD: 11.1 FL — HIGH (ref 7.4–10.4)
POTASSIUM SERPL-MCNC: 3.8 MMOL/L — SIGNIFICANT CHANGE UP (ref 3.5–5)
POTASSIUM SERPL-MCNC: 3.8 MMOL/L — SIGNIFICANT CHANGE UP (ref 3.5–5)
POTASSIUM SERPL-SCNC: 3.8 MMOL/L — SIGNIFICANT CHANGE UP (ref 3.5–5)
POTASSIUM SERPL-SCNC: 3.8 MMOL/L — SIGNIFICANT CHANGE UP (ref 3.5–5)
PROT SERPL-MCNC: 4.8 G/DL — LOW (ref 6–8)
PROT SERPL-MCNC: 4.8 G/DL — LOW (ref 6–8)
RBC # BLD: 4 M/UL — LOW (ref 4.2–5.4)
RBC # BLD: 4 M/UL — LOW (ref 4.2–5.4)
RBC # FLD: 16.5 % — HIGH (ref 11.5–14.5)
RBC # FLD: 16.5 % — HIGH (ref 11.5–14.5)
SODIUM SERPL-SCNC: 148 MMOL/L — HIGH (ref 135–146)
SODIUM SERPL-SCNC: 148 MMOL/L — HIGH (ref 135–146)
WBC # BLD: 9.9 K/UL — SIGNIFICANT CHANGE UP (ref 4.8–10.8)
WBC # BLD: 9.9 K/UL — SIGNIFICANT CHANGE UP (ref 4.8–10.8)
WBC # FLD AUTO: 9.9 K/UL — SIGNIFICANT CHANGE UP (ref 4.8–10.8)
WBC # FLD AUTO: 9.9 K/UL — SIGNIFICANT CHANGE UP (ref 4.8–10.8)

## 2023-10-23 PROCEDURE — 93010 ELECTROCARDIOGRAM REPORT: CPT

## 2023-10-23 PROCEDURE — 99232 SBSQ HOSP IP/OBS MODERATE 35: CPT

## 2023-10-23 RX ORDER — SODIUM CHLORIDE 9 MG/ML
1000 INJECTION, SOLUTION INTRAVENOUS
Refills: 0 | Status: DISCONTINUED | OUTPATIENT
Start: 2023-10-23 | End: 2023-10-25

## 2023-10-23 RX ORDER — CEFTRIAXONE 500 MG/1
1000 INJECTION, POWDER, FOR SOLUTION INTRAMUSCULAR; INTRAVENOUS EVERY 24 HOURS
Refills: 0 | Status: DISCONTINUED | OUTPATIENT
Start: 2023-10-23 | End: 2023-10-26

## 2023-10-23 RX ADMIN — Medication 0.5 MILLIGRAM(S): at 05:40

## 2023-10-23 RX ADMIN — TAMSULOSIN HYDROCHLORIDE 0.4 MILLIGRAM(S): 0.4 CAPSULE ORAL at 21:21

## 2023-10-23 RX ADMIN — PANTOPRAZOLE SODIUM 40 MILLIGRAM(S): 20 TABLET, DELAYED RELEASE ORAL at 17:34

## 2023-10-23 RX ADMIN — CHLORHEXIDINE GLUCONATE 1 APPLICATION(S): 213 SOLUTION TOPICAL at 07:23

## 2023-10-23 RX ADMIN — PANTOPRAZOLE SODIUM 40 MILLIGRAM(S): 20 TABLET, DELAYED RELEASE ORAL at 05:27

## 2023-10-23 RX ADMIN — Medication 100 MILLIGRAM(S): at 13:05

## 2023-10-23 RX ADMIN — ONDANSETRON 4 MILLIGRAM(S): 8 TABLET, FILM COATED ORAL at 05:41

## 2023-10-23 RX ADMIN — CEFTRIAXONE 100 MILLIGRAM(S): 500 INJECTION, POWDER, FOR SOLUTION INTRAMUSCULAR; INTRAVENOUS at 11:10

## 2023-10-23 RX ADMIN — ENOXAPARIN SODIUM 40 MILLIGRAM(S): 100 INJECTION SUBCUTANEOUS at 21:23

## 2023-10-23 RX ADMIN — Medication 0.5 MILLIGRAM(S): at 11:11

## 2023-10-23 RX ADMIN — Medication 100 MILLIGRAM(S): at 21:21

## 2023-10-23 RX ADMIN — SODIUM CHLORIDE 50 MILLILITER(S): 9 INJECTION, SOLUTION INTRAVENOUS at 15:17

## 2023-10-23 RX ADMIN — Medication 100 MILLIGRAM(S): at 05:45

## 2023-10-23 RX ADMIN — ATORVASTATIN CALCIUM 20 MILLIGRAM(S): 80 TABLET, FILM COATED ORAL at 21:22

## 2023-10-23 RX ADMIN — SERTRALINE 50 MILLIGRAM(S): 25 TABLET, FILM COATED ORAL at 21:21

## 2023-10-23 RX ADMIN — Medication 0.5 MILLIGRAM(S): at 17:34

## 2023-10-23 RX ADMIN — ONDANSETRON 4 MILLIGRAM(S): 8 TABLET, FILM COATED ORAL at 17:33

## 2023-10-23 NOTE — CONSULT NOTE ADULT - SUBJECTIVE AND OBJECTIVE BOX
Gastroenterology Follow Up Note      Location: [address] ([facility]  Patient Name: MANUEL ROSENBAUM  Age: 78y  Gender: Female      Chief Complaint  Patient is a 78y old Female who presents with a chief complaint of Suprapubic abdominal pain (23 Oct 2023 11:44)  Primary diagnosis of Diverticulitis of intestine without perforation or abscess without bleeding      Reason for Consult  Diverticulitis      History of Present Illness  78 year old female patient with history of:  - Anxiety  - Depression  - DL  - HTN  - GERD/ Hiatal hernia  - Chronic back and knee pains s/p multiple surgeries       She presented on 10/16 for evaluation of abdominal pain, nausea, and vomiting.  She was found to be hemodynamically stable with evidence of sigmoid colitis/ diverticulitis without an abscess on imaging.  * ED vitals 143/64 mmHg,  bpm, T 98.4 degrees  * ED Labs WBC 18, Hb 12.7, Plt 355, Na 143, K 4, BUN 5, Cr 0.7, LFT <0.2/46/40/25; Lipase 23  * CT Abdomen and Pelvis w/ IV Cont (10.16.23 @ 04:33) Colonic diverticulosis. Diffuse sigmoid thickening with surrounding fat stranding. Likely inflamed diverticulum on series 4 image 264. Avulsion, pneumoperitoneum or pneumatosis.    Admitted for further management and started on antibiotics, IV fluids, pain medications, and antiemetics.  * Currently hemodynamically stable  * Labs 10/22: WBC trended down to 10.51, Hb 10.8, Plt 346    We are consulted for uncomplicated diverticulitis  * Last colonoscopy was last year by Dr Vásquez: few benign polyps and diverticulosis; asked to come back in 5 years  * Family history of Crohn Disease in daughter      Prior EGD:  as detailed below      Prior Colonoscopy:  as detailed below      Past Medical and Surgical History:  HTN (hypertension)  HLD (hyperlipidemia)  Chronic back pain  s/p back sx and stimulator  Gastroesophageal reflux disease with hiatal hernia  H/O vaginal hysterectom  History of laparotomy        Home Medications:  Home Medications:  Advair Diskus 250 mcg-50 mcg inhalation powder: 1 puff(s) inhaled 2 times a day (16 Oct 2023 09:57)  ALPRAZolam 0.5 mg oral tablet: 1 tab(s) orally 4 times a day (16 Oct 2023 09:59)  amLODIPine 10 mg oral tablet: 1 tab(s) orally once a day (16 Oct 2023 09:59)  Crestor 5 mg oral tablet: 1 tab(s) orally once a day (16 Oct 2023 09:59)  Diovan 160 mg oral capsule: 1 tab(s) orally once a day (16 Oct 2023 09:59)  omeprazole 20 mg oral delayed release tablet: 1 tab(s) orally 2 times a day (16 Oct 2023 09:57)  oxyCODONE-acetaminophen 7.5 mg-325 mg oral tablet: 1 tab(s) orally every 8 hours, As Needed (16 Oct 2023 15:07)  sertraline 50 mg oral tablet: 1 tab(s) orally once a day (16 Oct 2023 09:58)  tamsulosin 0.4 mg oral capsule: 1 cap(s) orally once a day (16 Oct 2023 09:59)      Social History:  Tobacco: denies   Alcohol: occasional alcohol use  Drugs: denies      Allergies:  No Known Allergies      Family History:  FAMILY HISTORY:  Crohn Disease in daughter      Vital Signs in the last 24 hours   Vitals Summary T(C): 35.9 (10-23-23 @ 08:00), Max: 36 (10-22-23 @ 16:20)  HR: 55 (10-23-23 @ 08:00) (55 - 79)  BP: 103/55 (10-23-23 @ 08:00) (103/55 - 109/52)  RR: 18 (10-23-23 @ 08:00) (18 - 18)  SpO2: --  Vent Data   Intake/ Output   10-22-23 @ 07:01  -  10-23-23 @ 07:00  --------------------------------------------------------  IN: 450 mL / OUT: 0 mL / NET: 450 mL    10-23-23 @ 07:01  -  10-23-23 @ 12:55  --------------------------------------------------------  IN: 240 mL / OUT: 0 mL / NET: 240 mL      Physical Exam  * General Appearance: Alert, cooperative, interactive, oriented to time, place, and person, in no acute distress  * Neck: Supple, symmetrical, trachea midline, no adenopathy   * Lungs: Good bilateral air entry, normal breath sounds  * Heart: Regular Rate and Rhythm, normal S1 and S2, no audible murmur, rub, or gallop  * Abdomen: Symmetric, non-distended, soft, minimal tenderness near LLQ, bowel sounds active all four quadrants, no masses, no organomegaly noted      Investigations   Laboratory Workup      - CBC:                        10.3   9.90  )-----------( 346      ( 23 Oct 2023 07:11 )             33.3       - Hgb Trend:  10.3  10-23-23 @ 07:11  10.8  10-22-23 @ 05:43  10.9  10-21-23 @ 11:37          - Chemistry:  10-23    148<H>  |  112<H>  |  8<L>  ----------------------------<  115<H>  3.8   |  25  |  0.8    Ca    8.3<L>      23 Oct 2023 07:11  Phos  3.6     10-23  Mg     1.8     10-23    TPro  4.8<L>  /  Alb  2.9<L>  /  TBili  <0.2  /  DBili  x   /  AST  29  /  ALT  22  /  AlkPhos  42  10-23    Liver panel trend:  TBili <0.2   /   AST 29   /   ALT 22   /   AlkP 42   /   Tptn 4.8   /   Alb 2.9    /   DBili --      10-23  TBili <0.2   /   AST 40   /   ALT 25   /   AlkP 46   /   Tptn 4.9   /   Alb 3.1    /   DBili --      10-22  TBili <0.2   /   AST 36   /   ALT 23   /   AlkP 47   /   Tptn 5.3   /   Alb 3.5    /   DBili --      10-21  TBili <0.2   /   AST 18   /   ALT 21   /   AlkP 50   /   Tptn 5.3   /   Alb 3.4    /   DBili --      10-19  TBili 0.6   /   AST 26   /   ALT 23   /   AlkP 52   /   Tptn 5.9   /   Alb 3.8    /   DBili --      10-17  TBili 0.6   /   AST 15   /   ALT 15   /   AlkP 58   /   Tptn 6.4   /   Alb 4.0    /   DBili --      10-16      Microbiological Workup  Urinalysis Basic - ( 23 Oct 2023 07:11 )    Color: x / Appearance: x / SG: x / pH: x  Gluc: 115 mg/dL / Ketone: x  / Bili: x / Urobili: x   Blood: x / Protein: x / Nitrite: x   Leuk Esterase: x / RBC: x / WBC x   Sq Epi: x / Non Sq Epi: x / Bacteria: x        Current Medications  Standing Medications  ALPRAZolam 0.5 milliGRAM(s) Oral four times a day  atorvastatin 20 milliGRAM(s) Oral at bedtime  budesonide  80 MICROgram(s)/formoterol 4.5 MICROgram(s) Inhaler 1 Puff(s) Inhalation once  chlorhexidine 2% Cloths 1 Application(s) Topical <User Schedule>  enoxaparin Injectable 40 milliGRAM(s) SubCutaneous every 24 hours  influenza  Vaccine (HIGH DOSE) 0.7 milliLiter(s) IntraMuscular once  metroNIDAZOLE  IVPB 500 milliGRAM(s) IV Intermittent every 8 hours  pantoprazole    Tablet 40 milliGRAM(s) Oral two times a day  senna 2 Tablet(s) Oral at bedtime  sertraline 50 milliGRAM(s) Oral at bedtime  tamsulosin 0.4 milliGRAM(s) Oral at bedtime    PRN Medications  acetaminophen     Tablet .. 650 milliGRAM(s) Oral every 6 hours PRN Temp greater or equal to 38C (100.4F), Mild Pain (1 - 3)  albuterol    90 MICROgram(s) HFA Inhaler 2 Puff(s) Inhalation every 6 hours PRN Shortness of Breath and/or Wheezing  aluminum hydroxide/magnesium hydroxide/simethicone Suspension 30 milliLiter(s) Oral every 4 hours PRN Dyspepsia  melatonin 3 milliGRAM(s) Oral at bedtime PRN Insomnia  ondansetron    Tablet 4 milliGRAM(s) Oral three times a day PRN Nausea and/or Vomiting  ondansetron Injectable 4 milliGRAM(s) IV Push every 8 hours PRN Nausea and/or Vomiting  oxycodone    5 mG/acetaminophen 325 mG 1 Tablet(s) Oral every 6 hours PRN Severe Pain (7 - 10)    Singles Doses Administered  (ADM OVERRIDE) 1 each &lt;see task&gt; GiveOnce  (ADM OVERRIDE) 1 each &lt;see task&gt; GiveOnce  (ADM OVERRIDE) 1 each &lt;see task&gt; GiveOnce  (ADM OVERRIDE) 1 each &lt;see task&gt; GiveOnce  (ADM OVERRIDE) 1 each &lt;see task&gt; GiveOnce  (ADM OVERRIDE) 1 each &lt;see task&gt; GiveOnce  (ADM OVERRIDE) 1 each &lt;see task&gt; GiveOnce  (ADM OVERRIDE) 1 each &lt;see task&gt; GiveOnce  (ADM OVERRIDE) 1 each &lt;see task&gt; GiveOnce  (ADM OVERRIDE) 1 each &lt;see task&gt; GiveOnce  (ADM OVERRIDE) 1 each &lt;see task&gt; GiveOnce  (ADM OVERRIDE) 1 each &lt;see task&gt; GiveOnce  (ADM OVERRIDE) 1 each &lt;see task&gt; GiveOnce  (ADM OVERRIDE) 1 each &lt;see task&gt; GiveOnce  (ADM OVERRIDE) 1 each &lt;see task&gt; GiveOnce  (ADM OVERRIDE) 1 each &lt;see task&gt; GiveOnce  (ADM OVERRIDE) 1 each &lt;see task&gt; GiveOnce  cefTRIAXone   IVPB 1000 milliGRAM(s) IV Intermittent every 24 hours  ciprofloxacin   IVPB 400 milliGRAM(s) IV Intermittent once  famotidine Injectable 20 milliGRAM(s) IV Push once  lactated ringers Bolus 1000 milliLiter(s) IV Bolus once  metroNIDAZOLE  IVPB 500 milliGRAM(s) IV Intermittent Once  morphine  - Injectable 2 milliGRAM(s) IV Push Once  morphine  - Injectable 2 milliGRAM(s) IV Push once  morphine  - Injectable 2 milliGRAM(s) IV Push once  ondansetron Injectable 4 milliGRAM(s) IV Push once  ondansetron Injectable 4 milliGRAM(s) IV Push once  piperacillin/tazobactam IVPB... 3.375 Gram(s) IV Intermittent once

## 2023-10-23 NOTE — CONSULT NOTE ADULT - REASON FOR ADMISSION
January 31, 2017      Greg Puente MD  [address] [place] [address] [address] [place] [address]           [facility] - Rheumatology  [address] [facility] [address] [place] [address]  Phone: [phone]  Fax: [phone]          Patient: Cornel Dick   MR Number: [id number]   YOB: 1973   Date of Visit: 1/31/2017       Dear Dr. Greg Puente:    Thank you for referring Cornel Dick to me for evaluation. Attached you will find relevant portions of my assessment and plan of care.    If you have questions, please do not hesitate to call me. I look forward to following Cornel Dick along with you.    Sincerely,    Luis Reyes MD    Enclosure  CC:  FRANCIS PIEDRA    If you would like to receive this communication electronically, please contact [email or web address] or [phone] to request more information on [email or web address] Link access.    For providers and/or their staff who would like to refer a patient to [facility], please contact us through our one-stop-shop provider referral line, [facility], at [phone].    If you feel you have received this communication in error or would no longer like to receive these types of communications, please e-mail [email or web address]          Suprapubic abdominal pain

## 2023-10-23 NOTE — CONSULT NOTE ADULT - ASSESSMENT
Assessment and Plan  Case of an 78 year old female patient with history of Anxiety, Depression, DL, HTN, hiatal hernia, and chronic back and knee pains s/p multiple surgeries who presented on 10/16 for evaluation of abdominal pain, nausea, and vomiting, found to be hemodynamically stable with evidence of sigmoid colitis/ diverticulitis without an abscess on imaging, admitted for further management. We are consulted for concern of uncomplicated diverticulitis.      Acute Sigmoid Diverticulitis - Uncomplicated  Stable Hepatic Cysts  * /64 mmHg,  bpm, T 98.4 degrees  * ED Labs WBC 18, Hb 12.7, Plt 355, Na 143, K 4, BUN 5, Cr 0.7, LFT <0.2/46/40/25; Lipase 23  * Labs 10/22: WBC trended down to 10.51, Hb 10.8, Plt 346  * CT Abdomen and Pelvis w/ IV Cont (10.16.23 @ 04:33) Colonic diverticulosis. Diffuse sigmoid thickening with surrounding fat stranding. Likely inflamed diverticulum on series 4 image 264. Avulsion, pneumoperitoneum or pneumatosis.  * Last colonoscopy was last year by Dr Vásquez: few benign polyps and diverticulosis; asked to come back in 5 years  * Family history of Crohn Disease in daughter    RECOMMENDATIONS   - Would benefit from a non urgent outpatient colonoscopy in 6 weeks after completion of antibiotics and resolution of diverticulitis  - Continue IV Rocephin and IV Metronidazole   - Advance diet as tolerated: currently attempting to eat regular DASH diet. IV fluids LR 50mL/hour until appetite improves  - Antiemetics: agree with IV Zofran 4mg Q8h PRN if QTc normal  - Pain control: agree with tylenol PRN and Percocet PRN   - Monitor BM. In case diarrhea persists, discontinue senna. Consider sending stool studies if diarrhea persists after discontinuing laxatives  - Can check ESR and CRP in setting of diarrhea  - Follow up with our [facility] located at [address] [place] [address]. Phone Number: [phone]        History of GERD- Controlled   History of Hiatal Hernia  * Last EGD was a few months ago with Dr Vásquez: scar noted near site of prior hiatal hernia    * Was started on PPI but does not recall name    RECOMMENDATIONS  - Continue po protonix 40mg daily 30 minutes before breakfast  - Avoid NSAIDs  - Educated about lifestyle modifications: avoid spicy foods or late time dinners      Thank you for your consult.  - Please note that plan was communicated with medical team.   - Please reach GI on [phone] during weekdays till 5pm.  - Please call the GI service line after 5pm on Weekdays and anytime on Weekends: [phone].      Cindy Mckeon MD  PGY - 4 Gastroenterology Fellow   [facility]          - Follow up with our [facility] located at [address] [place] [address]. Phone Number: [phone]    - Follow up with our [facility] located at [address] [place] [id number]. Telephone No: [phone]    Thank you for your consult.  - Please note that plan was discussed with Dr. STOUT and communicated with medical team.   - Please reach GI on [phone] during weekdays till 5pm.  - Please call the GI service line after 5pm on Weekdays and anytime on Weekends: [phone].      Cindy Mckeon MD  PGY - 4 Gastroenterology Fellow   [facility]

## 2023-10-24 LAB
ALBUMIN SERPL ELPH-MCNC: 3.3 G/DL — LOW (ref 3.5–5.2)
ALBUMIN SERPL ELPH-MCNC: 3.3 G/DL — LOW (ref 3.5–5.2)
ALP SERPL-CCNC: 46 U/L — SIGNIFICANT CHANGE UP (ref 30–115)
ALP SERPL-CCNC: 46 U/L — SIGNIFICANT CHANGE UP (ref 30–115)
ALT FLD-CCNC: 21 U/L — SIGNIFICANT CHANGE UP (ref 0–41)
ALT FLD-CCNC: 21 U/L — SIGNIFICANT CHANGE UP (ref 0–41)
ANION GAP SERPL CALC-SCNC: 9 MMOL/L — SIGNIFICANT CHANGE UP (ref 7–14)
ANION GAP SERPL CALC-SCNC: 9 MMOL/L — SIGNIFICANT CHANGE UP (ref 7–14)
AST SERPL-CCNC: 23 U/L — SIGNIFICANT CHANGE UP (ref 0–41)
AST SERPL-CCNC: 23 U/L — SIGNIFICANT CHANGE UP (ref 0–41)
BASOPHILS # BLD AUTO: 0.11 K/UL — SIGNIFICANT CHANGE UP (ref 0–0.2)
BASOPHILS # BLD AUTO: 0.11 K/UL — SIGNIFICANT CHANGE UP (ref 0–0.2)
BASOPHILS NFR BLD AUTO: 1 % — SIGNIFICANT CHANGE UP (ref 0–1)
BASOPHILS NFR BLD AUTO: 1 % — SIGNIFICANT CHANGE UP (ref 0–1)
BILIRUB SERPL-MCNC: 0.2 MG/DL — SIGNIFICANT CHANGE UP (ref 0.2–1.2)
BILIRUB SERPL-MCNC: 0.2 MG/DL — SIGNIFICANT CHANGE UP (ref 0.2–1.2)
BUN SERPL-MCNC: 6 MG/DL — LOW (ref 10–20)
BUN SERPL-MCNC: 6 MG/DL — LOW (ref 10–20)
C DIFF BY PCR RESULT: SIGNIFICANT CHANGE UP
C DIFF BY PCR RESULT: SIGNIFICANT CHANGE UP
CALCIUM SERPL-MCNC: 8.6 MG/DL — SIGNIFICANT CHANGE UP (ref 8.4–10.5)
CALCIUM SERPL-MCNC: 8.6 MG/DL — SIGNIFICANT CHANGE UP (ref 8.4–10.5)
CHLORIDE SERPL-SCNC: 110 MMOL/L — SIGNIFICANT CHANGE UP (ref 98–110)
CHLORIDE SERPL-SCNC: 110 MMOL/L — SIGNIFICANT CHANGE UP (ref 98–110)
CO2 SERPL-SCNC: 29 MMOL/L — SIGNIFICANT CHANGE UP (ref 17–32)
CO2 SERPL-SCNC: 29 MMOL/L — SIGNIFICANT CHANGE UP (ref 17–32)
CREAT SERPL-MCNC: 0.7 MG/DL — SIGNIFICANT CHANGE UP (ref 0.7–1.5)
CREAT SERPL-MCNC: 0.7 MG/DL — SIGNIFICANT CHANGE UP (ref 0.7–1.5)
CRP SERPL-MCNC: 5.8 MG/L — HIGH
CRP SERPL-MCNC: 5.8 MG/L — HIGH
EGFR: 87 ML/MIN/1.73M2 — SIGNIFICANT CHANGE UP
EGFR: 87 ML/MIN/1.73M2 — SIGNIFICANT CHANGE UP
EOSINOPHIL # BLD AUTO: 0.54 K/UL — SIGNIFICANT CHANGE UP (ref 0–0.7)
EOSINOPHIL # BLD AUTO: 0.54 K/UL — SIGNIFICANT CHANGE UP (ref 0–0.7)
EOSINOPHIL NFR BLD AUTO: 5.1 % — SIGNIFICANT CHANGE UP (ref 0–8)
EOSINOPHIL NFR BLD AUTO: 5.1 % — SIGNIFICANT CHANGE UP (ref 0–8)
ERYTHROCYTE [SEDIMENTATION RATE] IN BLOOD: 8 MM/HR — SIGNIFICANT CHANGE UP (ref 0–20)
ERYTHROCYTE [SEDIMENTATION RATE] IN BLOOD: 8 MM/HR — SIGNIFICANT CHANGE UP (ref 0–20)
GLUCOSE SERPL-MCNC: 96 MG/DL — SIGNIFICANT CHANGE UP (ref 70–99)
GLUCOSE SERPL-MCNC: 96 MG/DL — SIGNIFICANT CHANGE UP (ref 70–99)
HCT VFR BLD CALC: 34.4 % — LOW (ref 37–47)
HCT VFR BLD CALC: 34.4 % — LOW (ref 37–47)
HGB BLD-MCNC: 10.7 G/DL — LOW (ref 12–16)
HGB BLD-MCNC: 10.7 G/DL — LOW (ref 12–16)
IMM GRANULOCYTES NFR BLD AUTO: 0.5 % — HIGH (ref 0.1–0.3)
IMM GRANULOCYTES NFR BLD AUTO: 0.5 % — HIGH (ref 0.1–0.3)
LYMPHOCYTES # BLD AUTO: 1.59 K/UL — SIGNIFICANT CHANGE UP (ref 1.2–3.4)
LYMPHOCYTES # BLD AUTO: 1.59 K/UL — SIGNIFICANT CHANGE UP (ref 1.2–3.4)
LYMPHOCYTES # BLD AUTO: 15.1 % — LOW (ref 20.5–51.1)
LYMPHOCYTES # BLD AUTO: 15.1 % — LOW (ref 20.5–51.1)
MCHC RBC-ENTMCNC: 25.2 PG — LOW (ref 27–31)
MCHC RBC-ENTMCNC: 25.2 PG — LOW (ref 27–31)
MCHC RBC-ENTMCNC: 31.1 G/DL — LOW (ref 32–37)
MCHC RBC-ENTMCNC: 31.1 G/DL — LOW (ref 32–37)
MCV RBC AUTO: 81.1 FL — SIGNIFICANT CHANGE UP (ref 81–99)
MCV RBC AUTO: 81.1 FL — SIGNIFICANT CHANGE UP (ref 81–99)
MONOCYTES # BLD AUTO: 0.77 K/UL — HIGH (ref 0.1–0.6)
MONOCYTES # BLD AUTO: 0.77 K/UL — HIGH (ref 0.1–0.6)
MONOCYTES NFR BLD AUTO: 7.3 % — SIGNIFICANT CHANGE UP (ref 1.7–9.3)
MONOCYTES NFR BLD AUTO: 7.3 % — SIGNIFICANT CHANGE UP (ref 1.7–9.3)
NEUTROPHILS # BLD AUTO: 7.45 K/UL — HIGH (ref 1.4–6.5)
NEUTROPHILS # BLD AUTO: 7.45 K/UL — HIGH (ref 1.4–6.5)
NEUTROPHILS NFR BLD AUTO: 71 % — SIGNIFICANT CHANGE UP (ref 42.2–75.2)
NEUTROPHILS NFR BLD AUTO: 71 % — SIGNIFICANT CHANGE UP (ref 42.2–75.2)
NRBC # BLD: 0 /100 WBCS — SIGNIFICANT CHANGE UP (ref 0–0)
NRBC # BLD: 0 /100 WBCS — SIGNIFICANT CHANGE UP (ref 0–0)
PLATELET # BLD AUTO: 359 K/UL — SIGNIFICANT CHANGE UP (ref 130–400)
PLATELET # BLD AUTO: 359 K/UL — SIGNIFICANT CHANGE UP (ref 130–400)
PMV BLD: 11.1 FL — HIGH (ref 7.4–10.4)
PMV BLD: 11.1 FL — HIGH (ref 7.4–10.4)
POTASSIUM SERPL-MCNC: 3.8 MMOL/L — SIGNIFICANT CHANGE UP (ref 3.5–5)
POTASSIUM SERPL-MCNC: 3.8 MMOL/L — SIGNIFICANT CHANGE UP (ref 3.5–5)
POTASSIUM SERPL-SCNC: 3.8 MMOL/L — SIGNIFICANT CHANGE UP (ref 3.5–5)
POTASSIUM SERPL-SCNC: 3.8 MMOL/L — SIGNIFICANT CHANGE UP (ref 3.5–5)
PROT SERPL-MCNC: 5.3 G/DL — LOW (ref 6–8)
PROT SERPL-MCNC: 5.3 G/DL — LOW (ref 6–8)
RBC # BLD: 4.24 M/UL — SIGNIFICANT CHANGE UP (ref 4.2–5.4)
RBC # BLD: 4.24 M/UL — SIGNIFICANT CHANGE UP (ref 4.2–5.4)
RBC # FLD: 16.5 % — HIGH (ref 11.5–14.5)
RBC # FLD: 16.5 % — HIGH (ref 11.5–14.5)
SODIUM SERPL-SCNC: 148 MMOL/L — HIGH (ref 135–146)
SODIUM SERPL-SCNC: 148 MMOL/L — HIGH (ref 135–146)
WBC # BLD: 10.51 K/UL — SIGNIFICANT CHANGE UP (ref 4.8–10.8)
WBC # BLD: 10.51 K/UL — SIGNIFICANT CHANGE UP (ref 4.8–10.8)
WBC # FLD AUTO: 10.51 K/UL — SIGNIFICANT CHANGE UP (ref 4.8–10.8)
WBC # FLD AUTO: 10.51 K/UL — SIGNIFICANT CHANGE UP (ref 4.8–10.8)

## 2023-10-24 PROCEDURE — 99232 SBSQ HOSP IP/OBS MODERATE 35: CPT

## 2023-10-24 PROCEDURE — 93010 ELECTROCARDIOGRAM REPORT: CPT

## 2023-10-24 RX ORDER — ALPRAZOLAM 0.25 MG
0.5 TABLET ORAL
Refills: 0 | Status: DISCONTINUED | OUTPATIENT
Start: 2023-10-24 | End: 2023-10-26

## 2023-10-24 RX ORDER — ALPRAZOLAM 0.25 MG
0.5 TABLET ORAL ONCE
Refills: 0 | Status: DISCONTINUED | OUTPATIENT
Start: 2023-10-24 | End: 2023-10-24

## 2023-10-24 RX ADMIN — Medication 100 MILLIGRAM(S): at 05:27

## 2023-10-24 RX ADMIN — ENOXAPARIN SODIUM 40 MILLIGRAM(S): 100 INJECTION SUBCUTANEOUS at 21:58

## 2023-10-24 RX ADMIN — TAMSULOSIN HYDROCHLORIDE 0.4 MILLIGRAM(S): 0.4 CAPSULE ORAL at 21:58

## 2023-10-24 RX ADMIN — SERTRALINE 50 MILLIGRAM(S): 25 TABLET, FILM COATED ORAL at 21:57

## 2023-10-24 RX ADMIN — Medication 100 MILLIGRAM(S): at 22:00

## 2023-10-24 RX ADMIN — CHLORHEXIDINE GLUCONATE 1 APPLICATION(S): 213 SOLUTION TOPICAL at 05:27

## 2023-10-24 RX ADMIN — PANTOPRAZOLE SODIUM 40 MILLIGRAM(S): 20 TABLET, DELAYED RELEASE ORAL at 05:27

## 2023-10-24 RX ADMIN — PANTOPRAZOLE SODIUM 40 MILLIGRAM(S): 20 TABLET, DELAYED RELEASE ORAL at 17:32

## 2023-10-24 RX ADMIN — Medication 0.5 MILLIGRAM(S): at 12:56

## 2023-10-24 RX ADMIN — SODIUM CHLORIDE 50 MILLILITER(S): 9 INJECTION, SOLUTION INTRAVENOUS at 22:01

## 2023-10-24 RX ADMIN — Medication 0.5 MILLIGRAM(S): at 23:06

## 2023-10-24 RX ADMIN — ONDANSETRON 4 MILLIGRAM(S): 8 TABLET, FILM COATED ORAL at 08:16

## 2023-10-24 RX ADMIN — Medication 0.5 MILLIGRAM(S): at 17:32

## 2023-10-24 RX ADMIN — Medication 0.5 MILLIGRAM(S): at 05:27

## 2023-10-24 RX ADMIN — CEFTRIAXONE 100 MILLIGRAM(S): 500 INJECTION, POWDER, FOR SOLUTION INTRAMUSCULAR; INTRAVENOUS at 12:57

## 2023-10-24 RX ADMIN — Medication 0.5 MILLIGRAM(S): at 01:48

## 2023-10-24 RX ADMIN — ATORVASTATIN CALCIUM 20 MILLIGRAM(S): 80 TABLET, FILM COATED ORAL at 21:57

## 2023-10-24 RX ADMIN — Medication 100 MILLIGRAM(S): at 13:33

## 2023-10-25 LAB
ANION GAP SERPL CALC-SCNC: 12 MMOL/L — SIGNIFICANT CHANGE UP (ref 7–14)
ANION GAP SERPL CALC-SCNC: 12 MMOL/L — SIGNIFICANT CHANGE UP (ref 7–14)
BASOPHILS # BLD AUTO: 0.08 K/UL — SIGNIFICANT CHANGE UP (ref 0–0.2)
BASOPHILS # BLD AUTO: 0.08 K/UL — SIGNIFICANT CHANGE UP (ref 0–0.2)
BASOPHILS NFR BLD AUTO: 0.9 % — SIGNIFICANT CHANGE UP (ref 0–1)
BASOPHILS NFR BLD AUTO: 0.9 % — SIGNIFICANT CHANGE UP (ref 0–1)
BUN SERPL-MCNC: 8 MG/DL — LOW (ref 10–20)
BUN SERPL-MCNC: 8 MG/DL — LOW (ref 10–20)
CALCIUM SERPL-MCNC: 8.5 MG/DL — SIGNIFICANT CHANGE UP (ref 8.4–10.5)
CALCIUM SERPL-MCNC: 8.5 MG/DL — SIGNIFICANT CHANGE UP (ref 8.4–10.5)
CHLORIDE SERPL-SCNC: 109 MMOL/L — SIGNIFICANT CHANGE UP (ref 98–110)
CHLORIDE SERPL-SCNC: 109 MMOL/L — SIGNIFICANT CHANGE UP (ref 98–110)
CO2 SERPL-SCNC: 26 MMOL/L — SIGNIFICANT CHANGE UP (ref 17–32)
CO2 SERPL-SCNC: 26 MMOL/L — SIGNIFICANT CHANGE UP (ref 17–32)
CREAT SERPL-MCNC: 0.7 MG/DL — SIGNIFICANT CHANGE UP (ref 0.7–1.5)
CREAT SERPL-MCNC: 0.7 MG/DL — SIGNIFICANT CHANGE UP (ref 0.7–1.5)
EGFR: 87 ML/MIN/1.73M2 — SIGNIFICANT CHANGE UP
EGFR: 87 ML/MIN/1.73M2 — SIGNIFICANT CHANGE UP
EOSINOPHIL # BLD AUTO: 0.46 K/UL — SIGNIFICANT CHANGE UP (ref 0–0.7)
EOSINOPHIL # BLD AUTO: 0.46 K/UL — SIGNIFICANT CHANGE UP (ref 0–0.7)
EOSINOPHIL NFR BLD AUTO: 5.2 % — SIGNIFICANT CHANGE UP (ref 0–8)
EOSINOPHIL NFR BLD AUTO: 5.2 % — SIGNIFICANT CHANGE UP (ref 0–8)
GI PCR PANEL: SIGNIFICANT CHANGE UP
GI PCR PANEL: SIGNIFICANT CHANGE UP
GLUCOSE SERPL-MCNC: 93 MG/DL — SIGNIFICANT CHANGE UP (ref 70–99)
GLUCOSE SERPL-MCNC: 93 MG/DL — SIGNIFICANT CHANGE UP (ref 70–99)
HCT VFR BLD CALC: 34.1 % — LOW (ref 37–47)
HCT VFR BLD CALC: 34.1 % — LOW (ref 37–47)
HGB BLD-MCNC: 10.8 G/DL — LOW (ref 12–16)
HGB BLD-MCNC: 10.8 G/DL — LOW (ref 12–16)
IMM GRANULOCYTES NFR BLD AUTO: 0.5 % — HIGH (ref 0.1–0.3)
IMM GRANULOCYTES NFR BLD AUTO: 0.5 % — HIGH (ref 0.1–0.3)
LYMPHOCYTES # BLD AUTO: 1.84 K/UL — SIGNIFICANT CHANGE UP (ref 1.2–3.4)
LYMPHOCYTES # BLD AUTO: 1.84 K/UL — SIGNIFICANT CHANGE UP (ref 1.2–3.4)
LYMPHOCYTES # BLD AUTO: 20.8 % — SIGNIFICANT CHANGE UP (ref 20.5–51.1)
LYMPHOCYTES # BLD AUTO: 20.8 % — SIGNIFICANT CHANGE UP (ref 20.5–51.1)
MCHC RBC-ENTMCNC: 25.6 PG — LOW (ref 27–31)
MCHC RBC-ENTMCNC: 25.6 PG — LOW (ref 27–31)
MCHC RBC-ENTMCNC: 31.7 G/DL — LOW (ref 32–37)
MCHC RBC-ENTMCNC: 31.7 G/DL — LOW (ref 32–37)
MCV RBC AUTO: 80.8 FL — LOW (ref 81–99)
MCV RBC AUTO: 80.8 FL — LOW (ref 81–99)
MONOCYTES # BLD AUTO: 0.61 K/UL — HIGH (ref 0.1–0.6)
MONOCYTES # BLD AUTO: 0.61 K/UL — HIGH (ref 0.1–0.6)
MONOCYTES NFR BLD AUTO: 6.9 % — SIGNIFICANT CHANGE UP (ref 1.7–9.3)
MONOCYTES NFR BLD AUTO: 6.9 % — SIGNIFICANT CHANGE UP (ref 1.7–9.3)
NEUTROPHILS # BLD AUTO: 5.82 K/UL — SIGNIFICANT CHANGE UP (ref 1.4–6.5)
NEUTROPHILS # BLD AUTO: 5.82 K/UL — SIGNIFICANT CHANGE UP (ref 1.4–6.5)
NEUTROPHILS NFR BLD AUTO: 65.7 % — SIGNIFICANT CHANGE UP (ref 42.2–75.2)
NEUTROPHILS NFR BLD AUTO: 65.7 % — SIGNIFICANT CHANGE UP (ref 42.2–75.2)
NRBC # BLD: 0 /100 WBCS — SIGNIFICANT CHANGE UP (ref 0–0)
NRBC # BLD: 0 /100 WBCS — SIGNIFICANT CHANGE UP (ref 0–0)
PLATELET # BLD AUTO: 351 K/UL — SIGNIFICANT CHANGE UP (ref 130–400)
PLATELET # BLD AUTO: 351 K/UL — SIGNIFICANT CHANGE UP (ref 130–400)
PMV BLD: 11.1 FL — HIGH (ref 7.4–10.4)
PMV BLD: 11.1 FL — HIGH (ref 7.4–10.4)
POTASSIUM SERPL-MCNC: 3.8 MMOL/L — SIGNIFICANT CHANGE UP (ref 3.5–5)
POTASSIUM SERPL-MCNC: 3.8 MMOL/L — SIGNIFICANT CHANGE UP (ref 3.5–5)
POTASSIUM SERPL-SCNC: 3.8 MMOL/L — SIGNIFICANT CHANGE UP (ref 3.5–5)
POTASSIUM SERPL-SCNC: 3.8 MMOL/L — SIGNIFICANT CHANGE UP (ref 3.5–5)
RBC # BLD: 4.22 M/UL — SIGNIFICANT CHANGE UP (ref 4.2–5.4)
RBC # BLD: 4.22 M/UL — SIGNIFICANT CHANGE UP (ref 4.2–5.4)
RBC # FLD: 16.6 % — HIGH (ref 11.5–14.5)
RBC # FLD: 16.6 % — HIGH (ref 11.5–14.5)
SODIUM SERPL-SCNC: 147 MMOL/L — HIGH (ref 135–146)
SODIUM SERPL-SCNC: 147 MMOL/L — HIGH (ref 135–146)
WBC # BLD: 8.85 K/UL — SIGNIFICANT CHANGE UP (ref 4.8–10.8)
WBC # BLD: 8.85 K/UL — SIGNIFICANT CHANGE UP (ref 4.8–10.8)
WBC # FLD AUTO: 8.85 K/UL — SIGNIFICANT CHANGE UP (ref 4.8–10.8)
WBC # FLD AUTO: 8.85 K/UL — SIGNIFICANT CHANGE UP (ref 4.8–10.8)
WRIGHT STN STL: NEGATIVE — SIGNIFICANT CHANGE UP
WRIGHT STN STL: NEGATIVE — SIGNIFICANT CHANGE UP

## 2023-10-25 PROCEDURE — 99232 SBSQ HOSP IP/OBS MODERATE 35: CPT

## 2023-10-25 RX ORDER — SODIUM CHLORIDE 9 MG/ML
1000 INJECTION, SOLUTION INTRAVENOUS
Refills: 0 | Status: DISCONTINUED | OUTPATIENT
Start: 2023-10-25 | End: 2023-10-26

## 2023-10-25 RX ADMIN — SODIUM CHLORIDE 75 MILLILITER(S): 9 INJECTION, SOLUTION INTRAVENOUS at 09:42

## 2023-10-25 RX ADMIN — CHLORHEXIDINE GLUCONATE 1 APPLICATION(S): 213 SOLUTION TOPICAL at 05:18

## 2023-10-25 RX ADMIN — TAMSULOSIN HYDROCHLORIDE 0.4 MILLIGRAM(S): 0.4 CAPSULE ORAL at 21:53

## 2023-10-25 RX ADMIN — Medication 0.5 MILLIGRAM(S): at 05:17

## 2023-10-25 RX ADMIN — Medication 0.5 MILLIGRAM(S): at 12:04

## 2023-10-25 RX ADMIN — Medication 100 MILLIGRAM(S): at 05:18

## 2023-10-25 RX ADMIN — PANTOPRAZOLE SODIUM 40 MILLIGRAM(S): 20 TABLET, DELAYED RELEASE ORAL at 17:01

## 2023-10-25 RX ADMIN — Medication 0.5 MILLIGRAM(S): at 18:27

## 2023-10-25 RX ADMIN — CEFTRIAXONE 100 MILLIGRAM(S): 500 INJECTION, POWDER, FOR SOLUTION INTRAMUSCULAR; INTRAVENOUS at 12:04

## 2023-10-25 RX ADMIN — Medication 100 MILLIGRAM(S): at 13:25

## 2023-10-25 RX ADMIN — SERTRALINE 50 MILLIGRAM(S): 25 TABLET, FILM COATED ORAL at 21:49

## 2023-10-25 RX ADMIN — Medication 100 MILLIGRAM(S): at 21:49

## 2023-10-25 RX ADMIN — ENOXAPARIN SODIUM 40 MILLIGRAM(S): 100 INJECTION SUBCUTANEOUS at 21:55

## 2023-10-25 RX ADMIN — PANTOPRAZOLE SODIUM 40 MILLIGRAM(S): 20 TABLET, DELAYED RELEASE ORAL at 05:18

## 2023-10-25 RX ADMIN — ATORVASTATIN CALCIUM 20 MILLIGRAM(S): 80 TABLET, FILM COATED ORAL at 21:50

## 2023-10-25 RX ADMIN — Medication 0.5 MILLIGRAM(S): at 23:21

## 2023-10-25 NOTE — PROGRESS NOTE ADULT - SUBJECTIVE AND OBJECTIVE BOX
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 (specify:    )    SKIN:   ( X ) No rashes or lesions     (  ) maculopapular rash     (  ) pustules     (  ) vesicles     (  ) ulcer     (  ) ecchymosis     (specify location:     )    AMPAC score:    (  ) Indwelling Jacinto Catheter:   Date insterted:    Reason (  ) Critical illness     (  ) urinary retention    (  ) Accurate Ins/Outs Monitoring     (  ) CMO patient    (  ) Central Line:   Date inserted:  Location: (  ) Right IJ     (  ) Left IJ     (  ) Right Fem     (  ) Left Fem    (  ) SPC        (  ) pigtail       (  ) PEG tube       (  ) colostomy       (  ) jejunostomy  (  ) U-Dall    LABS:                          11.3   9.66  )-----------( 342      ( 20 Oct 2023 05:48 )             35.4     10-20    141  |  107  |  4<L>  ----------------------------<  105<H>  4.0   |  24  |  0.7    Ca    8.0<L>      20 Oct 2023 05:48  Phos  3.0     10-19  Mg     2.1     10-19    TPro  5.3<L>  /  Alb  3.4<L>  /  TBili  <0.2  /  DBili  x   /  AST  18  /  ALT  21  /  AlkPhos  50  10-19    LIVER FUNCTIONS - ( 19 Oct 2023 08:04 )  Alb: 3.4 g/dL / Pro: 5.3 g/dL / ALK PHOS: 50 U/L / ALT: 21 U/L / AST: 18 U/L / GGT: x             Urinalysis Basic - ( 20 Oct 2023 05:48 )    Color: x / Appearance: x / SG: x / pH: x  Gluc: 105 mg/dL / Ketone: x  / Bili: x / Urobili: x   Blood: x / Protein: x / Nitrite: x   Leuk Esterase: x / RBC: x / WBC x   Sq Epi: x / Non Sq Epi: x / Bacteria: x        RADIOLOGY:    US Kidney and Bladder (10.17.23 @ 09:37)   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  No hydronephrosis or stone in either kidney.  Collapsed nondiagnostic urinary bladder.    CT Abdomen and Pelvis w/ IV Cont (10.16.23)   Sigmoid colitis/diverticulitis. No abscess.        
Gastroenterology Follow Up Note      Location: [facility] 4B 007 A ([facility]B)  Patient Name: MANUEL ROSENBAUM  Age: 78y  Gender: Female      Chief Complaint  Patient is a 78y old Female who presents with a chief complaint of Suprapubic abdominal pain (24 Oct 2023 10:30)  Primary diagnosis of Diverticulitis of intestine without perforation or abscess without bleeding      Reason for Consult  Diverticulitis      Progress Note  This morning patient was seen and examined at bedside.    Today is hospital day 8d.  Patient is doing fine. No acute events overnight.   Patient's appetite is reduced, and she is tolerating diet.  She notes some improvement in nausea and abdominal pain.  She has muddy stools: 3 episodes today (as compared to 4 on 10/23).      Vital Signs in the last 24 hours   Vitals Summary T(C): 36.6 (10-24-23 @ 15:20), Max: 37.2 (10-24-23 @ 13:00)  HR: 51 (10-24-23 @ 15:20) (51 - 59)  BP: 141/65 (10-24-23 @ 15:20) (131/58 - 141/65)  RR: 18 (10-24-23 @ 15:20) (18 - 18)  SpO2: --  Vent Data   Intake/ Output   10-23-23 @ 07:01  -  10-24-23 @ 07:00  --------------------------------------------------------  IN: 730 mL / OUT: 0 mL / NET: 730 mL      Physical Exam  * General Appearance: Alert, cooperative, interactive, oriented to time, place, and person, in no acute distress  * Neck: Supple, symmetrical, trachea midline, no adenopathy   * Lungs: Good bilateral air entry, normal breath sounds  * Heart: Regular Rate and Rhythm, normal S1 and S2, no audible murmur, rub, or gallop  * Abdomen: Symmetric, non-distended, soft, minimal tenderness near LLQ, bowel sounds active all four quadrants, no masses, no organomegaly noted      Investigations   Laboratory Workup      - CBC:                        10.7   10.51 )-----------( 359      ( 24 Oct 2023 06:06 )             34.4       - Hgb Trend:  10.7  10-24-23 @ 06:06  10.3  10-23-23 @ 07:11  10.8  10-22-23 @ 05:43          - Chemistry:  10-24    148<H>  |  110  |  6<L>  ----------------------------<  96  3.8   |  29  |  0.7    Ca    8.6      24 Oct 2023 06:06  Phos  3.6     10-23  Mg     1.8     10-23    TPro  5.3<L>  /  Alb  3.3<L>  /  TBili  0.2  /  DBili  x   /  AST  23  /  ALT  21  /  AlkPhos  46  10-24    Liver panel trend:  TBili 0.2   /   AST 23   /   ALT 21   /   AlkP 46   /   Tptn 5.3   /   Alb 3.3    /   DBili --      10-24  TBili <0.2   /   AST 29   /   ALT 22   /   AlkP 42   /   Tptn 4.8   /   Alb 2.9    /   DBili --      10-23  TBili <0.2   /   AST 40   /   ALT 25   /   AlkP 46   /   Tptn 4.9   /   Alb 3.1    /   DBili --      10-22  TBili <0.2   /   AST 36   /   ALT 23   /   AlkP 47   /   Tptn 5.3   /   Alb 3.5    /   DBili --      10-21  TBili <0.2   /   AST 18   /   ALT 21   /   AlkP 50   /   Tptn 5.3   /   Alb 3.4    /   DBili --      10-19  TBili 0.6   /   AST 26   /   ALT 23   /   AlkP 52   /   Tptn 5.9   /   Alb 3.8    /   DBili --      10-17  TBili 0.6   /   AST 15   /   ALT 15   /   AlkP 58   /   Tptn 6.4   /   Alb 4.0    /   DBili --      10-16      Microbiological Workup  Urinalysis Basic - ( 24 Oct 2023 06:06 )    Color: x / Appearance: x / SG: x / pH: x  Gluc: 96 mg/dL / Ketone: x  / Bili: x / Urobili: x   Blood: x / Protein: x / Nitrite: x   Leuk Esterase: x / RBC: x / WBC x   Sq Epi: x / Non Sq Epi: x / Bacteria: x        Current Medications  Standing Medications  ALPRAZolam 0.5 milliGRAM(s) Oral four times a day  atorvastatin 20 milliGRAM(s) Oral at bedtime  budesonide  80 MICROgram(s)/formoterol 4.5 MICROgram(s) Inhaler 1 Puff(s) Inhalation once  cefTRIAXone   IVPB 1000 milliGRAM(s) IV Intermittent every 24 hours  chlorhexidine 2% Cloths 1 Application(s) Topical <User Schedule>  enoxaparin Injectable 40 milliGRAM(s) SubCutaneous every 24 hours  influenza  Vaccine (HIGH DOSE) 0.7 milliLiter(s) IntraMuscular once  lactated ringers. 1000 milliLiter(s) (50 mL/Hr) IV Continuous <Continuous>  metroNIDAZOLE  IVPB 500 milliGRAM(s) IV Intermittent every 8 hours  pantoprazole    Tablet 40 milliGRAM(s) Oral two times a day  sertraline 50 milliGRAM(s) Oral at bedtime  tamsulosin 0.4 milliGRAM(s) Oral at bedtime    PRN Medications  acetaminophen     Tablet .. 650 milliGRAM(s) Oral every 6 hours PRN Temp greater or equal to 38C (100.4F), Mild Pain (1 - 3)  albuterol    90 MICROgram(s) HFA Inhaler 2 Puff(s) Inhalation every 6 hours PRN Shortness of Breath and/or Wheezing  aluminum hydroxide/magnesium hydroxide/simethicone Suspension 30 milliLiter(s) Oral every 4 hours PRN Dyspepsia  melatonin 3 milliGRAM(s) Oral at bedtime PRN Insomnia  ondansetron    Tablet 4 milliGRAM(s) Oral three times a day PRN Nausea and/or Vomiting  ondansetron Injectable 4 milliGRAM(s) IV Push every 8 hours PRN Nausea and/or Vomiting  oxycodone    5 mG/acetaminophen 325 mG 1 Tablet(s) Oral every 6 hours PRN Severe Pain (7 - 10)    Singles Doses Administered  (ADM OVERRIDE) 1 each &lt;see task&gt; GiveOnce  (ADM OVERRIDE) 1 each &lt;see task&gt; GiveOnce  (ADM OVERRIDE) 1 each &lt;see task&gt; GiveOnce  (ADM OVERRIDE) 1 each &lt;see task&gt; GiveOnce  (ADM OVERRIDE) 1 each &lt;see task&gt; GiveOnce  (ADM OVERRIDE) 1 each &lt;see task&gt; GiveOnce  (ADM OVERRIDE) 1 each &lt;see task&gt; GiveOnce  (ADM OVERRIDE) 1 each &lt;see task&gt; GiveOnce  (ADM OVERRIDE) 1 each &lt;see task&gt; GiveOnce  (ADM OVERRIDE) 1 each &lt;see task&gt; GiveOnce  (ADM OVERRIDE) 1 each &lt;see task&gt; GiveOnce  (ADM OVERRIDE) 1 each &lt;see task&gt; GiveOnce  (ADM OVERRIDE) 1 each &lt;see task&gt; GiveOnce  (ADM OVERRIDE) 1 each &lt;see task&gt; GiveOnce  (ADM OVERRIDE) 1 each &lt;see task&gt; GiveOnce  (ADM OVERRIDE) 1 each &lt;see task&gt; GiveOnce  (ADM OVERRIDE) 1 each &lt;see task&gt; GiveOnce  ALPRAZolam 0.5 milliGRAM(s) Oral once  ALPRAZolam 0.5 milliGRAM(s) Oral four times a day  cefTRIAXone   IVPB 1000 milliGRAM(s) IV Intermittent every 24 hours  ciprofloxacin   IVPB 400 milliGRAM(s) IV Intermittent once  famotidine Injectable 20 milliGRAM(s) IV Push once  lactated ringers Bolus 1000 milliLiter(s) IV Bolus once  metroNIDAZOLE  IVPB 500 milliGRAM(s) IV Intermittent Once  morphine  - Injectable 2 milliGRAM(s) IV Push once  morphine  - Injectable 2 milliGRAM(s) IV Push once  morphine  - Injectable 2 milliGRAM(s) IV Push Once  ondansetron Injectable 4 milliGRAM(s) IV Push once  ondansetron Injectable 4 milliGRAM(s) IV Push once  piperacillin/tazobactam IVPB... 3.375 Gram(s) IV Intermittent once      
Patient is a 78y old  Female who presents with a chief complaint of Suprapubic abdominal pain (16 Oct 2023 08:27)    Patient was seen and examined.  c// nausea  C/o decrease appetite  Denies any other complaints.  All systems reviewed positive history as mentioned above.    PAST MEDICAL & SURGICAL HISTORY:  HTN (hypertension)  HLD (hyperlipidemia)  Chronic back pain s/p back sx and stimulator  Gastroesophageal reflux disease with hiatal hernia  H/O vaginal hysterectomy  History of laparotomy    Allergies  No Known Allergies    MEDICATIONS  (STANDING):  ALPRAZolam 0.5 milliGRAM(s) Oral four times a day  atorvastatin 20 milliGRAM(s) Oral at bedtime  budesonide  80 MICROgram(s)/formoterol 4.5 MICROgram(s) Inhaler 1 Puff(s) Inhalation once  cefTRIAXone   IVPB 1000 milliGRAM(s) IV Intermittent every 24 hours  chlorhexidine 2% Cloths 1 Application(s) Topical <User Schedule>  enoxaparin Injectable 40 milliGRAM(s) SubCutaneous every 24 hours  influenza  Vaccine (HIGH DOSE) 0.7 milliLiter(s) IntraMuscular once  metroNIDAZOLE  IVPB 500 milliGRAM(s) IV Intermittent every 8 hours  pantoprazole    Tablet 40 milliGRAM(s) Oral two times a day  senna 2 Tablet(s) Oral at bedtime  sertraline 50 milliGRAM(s) Oral daily  tamsulosin 0.4 milliGRAM(s) Oral at bedtime    MEDICATIONS  (PRN):  acetaminophen     Tablet .. 650 milliGRAM(s) Oral every 6 hours PRN Temp greater or equal to 38C (100.4F), Mild Pain (1 - 3)  albuterol    90 MICROgram(s) HFA Inhaler 2 Puff(s) Inhalation every 6 hours PRN Shortness of Breath and/or Wheezing  aluminum hydroxide/magnesium hydroxide/simethicone Suspension 30 milliLiter(s) Oral every 4 hours PRN Dyspepsia  melatonin 3 milliGRAM(s) Oral at bedtime PRN Insomnia  ondansetron Injectable 4 milliGRAM(s) IV Push every 8 hours PRN Nausea and/or Vomiting  oxycodone    5 mG/acetaminophen 325 mG 1 Tablet(s) Oral every 8 hours PRN Severe Pain (7 - 10)    Vital Signs Last 24 Hrs  T(C): 36.8  T(F): 98.3  HR: 69  BP: 83/53  BP(mean): 74  RR: 16  SpO2: 98%    O/E:  Awake, alert, not in distress.  HEENT: atraumatic, EOMI.  Chest: clear.  CVS: SIS2 +, no murmur.  P/A: Soft, BS+  CNS: awake, alert  Ext: no edema feet.  All systems reviewed positive findings as above.                            11.2<L>  15.05<H> )-----------( 336      ( 17 Oct 2023 06:07 )             35.5<L>                        12.7   18.00<H> )-----------( 355      ( 16 Oct 2023 02:12 )             38.9     10-17    134<L>  |  98  |  8<L>  ----------------------------<  114<H>  3.5   |  22  |  0.8  10-16    138  |  102  |  10  ----------------------------<  139<H>  3.9   |  23  |  0.7    Ca    8.3<L>      17 Oct 2023 06:07  Ca    9.1      16 Oct 2023 02:12  Mg     2.1     10-17    TPro  5.9<L>  /  Alb  3.8  /  TBili  0.6  /  DBili  x   /  AST  26  /  ALT  23  /  AlkPhos  52  10-17  TPro  6.4  /  Alb  4.0  /  TBili  0.6  /  DBili  x   /  AST  15  /  ALT  15  /  AlkPhos  58  10-16          PT/INR - ( 16 Oct 2023 02:32 )   PT: 11.20 sec;   INR: 0.98 ratio         PTT - ( 16 Oct 2023 02:32 )  PTT:26.9 sec  Urinalysis Basic - ( 17 Oct 2023 06:07 )    Color: x / Appearance: x / SG: x / pH: x  Gluc: 114 mg/dL / Ketone: x  / Bili: x / Urobili: x   Blood: x / Protein: x / Nitrite: x   Leuk Esterase: x / RBC: x / WBC x   Sq Epi: x / Non Sq Epi: x / Bacteria: x

## 2023-10-25 NOTE — PROGRESS NOTE ADULT - REASON FOR ADMISSION
Suprapubic abdominal pain

## 2023-10-25 NOTE — PROGRESS NOTE ADULT - PROVIDER SPECIALTY LIST ADULT
Internal Medicine
Hospitalist
Internal Medicine
Gastroenterology
Internal Medicine
Internal Medicine
Hospitalist
Internal Medicine
Internal Medicine
Hospitalist

## 2023-10-25 NOTE — PROGRESS NOTE ADULT - TIME BILLING
Direct patient care. Discussed on rounds with Housestaff.
Direct patient care. Discussed on rounds with Housestaff

## 2023-10-25 NOTE — PROGRESS NOTE ADULT - ASSESSMENT
78 year old female with PMH of HLD, HTN, chronic pain, anxiety, depression, hiatal hernia, s/p vaginal hysterectomy and laparotomy, presents to the ED for suprapubic abdominal pain that radiates diffusely across all abdominal quadrants with associated nausea and vomiting since the morning of 10/15/23. Patient was found to have colonic diverticulitis and urinary retention on CT A/P    #Colonic diverticulitis  - CTAP (10/16): Colonic diverticulosis. Diffuse sigmoid thickening with surrounding fat stranding. Likely inflamed diverticulum on series 4 image 264. Avulsion, pneumoperitoneum or pneumatosis.  - WBC: 18K (10/16)-->15-->8 (10/19)  - s/p ciprofloxacin and Zosyn   - switched abx to rocephine and flagyl 10/17  - Pain control: continue morphine  - Continue zofran 4mg IV q8h PRN for nausea   - soft diet for now  - started IVF as pt is hypotensive and dizzy likely 2/2 decreased PO intake  - held anti hypertensive medication  - improving clinically, no nausea, diarrhea    #Urinary rentention   - Started on 10/15/23, endorses suprapubic pain and urgency with inability to urinate, per patient never experienced this  - Continue home tamsulosin 0.4mg daily  - UA 10/16: negative  - Ucx 10/16: pending  - Bladder US: no hydronephrosis  - passing urine, no more retaining    #GERD 2/2 hiatal hernia  - takes omeprazole 30mg BID  - Start pantoprazole 40mg BID    #HTN  #HLD  - C/w amlodipine 10mg daily, atorvastatin 20mg daily (takes rosuvastatin 5mg QHS at home), and valsartan 160mg daily     #Pt lifelong non-smoker, 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 Amaya, pulmonology   - Takes Advair HFA at home  - Symbicort PRN for dyspnea    #Anxiety  #Depression  - Continue home medication of sertraline 50mg oral and Xanax 0.5mg four times a day - prescribed by PCP    #Chronic pain  - Sees Dr. Joya  - Takes oxycodone 7.5mg /acetaminophen 325mg 2-3 tablets times a day as needed for severe pain - confirmed by Dr. Joya's office on 10/16/23, this meds is sent to [facility]   - Will start oxycodone 5mg /acetaminophen 325mg 2-3 tablets times a day as needed for severe pain     #Constipation  - Last BM 10/15- small quantity   - Start home senna 2 tablet QHS  - Also take colace at home    #DVT prophylaxis: Lovenox 40mg subQ q24h  #Activity: as tolerated  #Diet: regular  #Dispo: Acute  #Progress note hand off: hold anti hypertensive medications if soft BP    
78 year old female with PMH of HLD, HTN, chronic pain, anxiety, depression, hiatal hernia, s/p vaginal hysterectomy and laparotomy, presents to the ED for suprapubic abdominal pain that radiates diffusely across all abdominal quadrants with associated nausea and vomiting since the morning of 10/15/23. Patient was found to have colonic diverticulitis and urinary retention on CT A/P    #Colonic diverticulitis  - CTAP (10/16): Colonic diverticulosis. Diffuse sigmoid thickening with surrounding fat stranding. Likely inflamed diverticulum on series 4 image 264. Avulsion, pneumoperitoneum or pneumatosis.  - WBC: 18K (10/16)-->15-->8 (10/19)  - s/p ciprofloxacin and Zosyn   - switched abx to rocephine and flagyl 10/17  - Pain control: continue morphine  - Continue zofran 4mg IV q8h PRN for nausea   - soft diet for now  - started IVF as pt is hypotensive and dizzy likely 2/2 decreased PO intake  - held anti hypertensive medication  - improving clinically, no nausea, diarrhea    #Urinary rentention   - Started on 10/15/23, endorses suprapubic pain and urgency with inability to urinate, per patient never experienced this  - Continue home tamsulosin 0.4mg daily  - UA 10/16: negative  - Ucx 10/16: pending  - Bladder US: no hydronephrosis  - passing urine, no more retaining    #GERD 2/2 hiatal hernia  - takes omeprazole 30mg BID  - Start pantoprazole 40mg BID    #HTN  #HLD  - C/w amlodipine 10mg daily, atorvastatin 20mg daily (takes rosuvastatin 5mg QHS at home), and valsartan 160mg daily     #Pt lifelong non-smoker, 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 Amaya, pulmonology   - Takes Advair HFA at home  - Symbicort PRN for dyspnea    #Anxiety  #Depression  - Continue home medication of sertraline 50mg oral and Xanax 0.5mg four times a day - prescribed by PCP    #Chronic pain  - Sees Dr. Joya  - Takes oxycodone 7.5mg /acetaminophen 325mg 2-3 tablets times a day as needed for severe pain - confirmed by Dr. Joya's office on 10/16/23, this meds is sent to [facility]   - Will start oxycodone 5mg /acetaminophen 325mg 2-3 tablets times a day as needed for severe pain     #Constipation  - Last BM 10/15- small quantity   - Start home senna 2 tablet QHS  - Also take colace at home    #DVT prophylaxis: Lovenox 40mg subQ q24h  #Activity: as tolerated  #Diet: regular  #Dispo: Acute  #Progress note hand off: hold anti hypertensive medications if soft BP    
Patient is an 78 year old female with PMH of HLD, HTN, chronic pain (multiple back and knee surgeries on oxycodone), anxiety and depression (on Xanax),  hiatal hernia, s/p vaginal hysterectomy and laparotomy to remove bowel adhesion, last EGD 2-3 months ago (per pt found scar tissue on put on PPI), last colonoscopy 1 year ago (per pt, found polyps), presents to the ED for suprapubic abdominal pain that radiates diffusely across all abdominal quadrants with associated nausea and vomiting since the morning of 10/15/23. Patient explains that she woke up on the 15th with diffuse pain, loss of appetite (only able to eat oatmeal for breakfast inability to urinate, and inability to defecate.    # Sigmoid  diverticulitis   ceftriaxone and flagyl   zofran po    awaiting gi eval    on iv fluids     # Urinary rentention -resolved  - c/w flomax    # GERD     # Hiatal hernia   protonix    # Dyslipidemia   statin    # COPD   symbicort    #Anxiety/ depression    # Chronic pain    #Hypernatremia on iv fluids     # DVT prophylaxis    # full code    PROGRESS NOTE HANDOFF    Pending gi eval     Family discussion: patient verbalized understanding and agreeable to plan of care     Disposition: Home
Patient is an 78 year old female with PMH of HLD, HTN, chronic pain (multiple back and knee surgeries on oxycodone), anxiety and depression (on Xanax),  hiatal hernia, s/p vaginal hysterectomy and laparotomy to remove bowel adhesion, last EGD 2-3 months ago (per pt found scar tissue on put on PPI), last colonoscopy 1 year ago (per pt, found polyps), presents to the ED for suprapubic abdominal pain that radiates diffusely across all abdominal quadrants with associated nausea and vomiting since the morning of 10/15/23. Patient explains that she woke up on the 15th with diffuse pain, loss of appetite (only able to eat oatmeal for breakfast inability to urinate, and inability to defecate.    # Sigmoid  diverticulitis   ceftriaxone and flagyl   zofran po   not improving  , will consult gi for evaluation , as cannot tolerate po   maintain on iv fluids     # Urinary rentention -resolved  - c/w flomax    # GERD     # Hiatal hernia   protonix    # Hypotension  resolved post fluids     # Dyslipidemia   statin    # COPD   symbicort    #Anxiety/ depression    # Chronic pain    # DVT prophylaxis    # full code    PROGRESS NOTE HANDOFF    Pending gi eval     Family discussion: patient verbalized understanding and agreeable to plan of care     Disposition: Home
Patient is an 78 year old female with PMH of HLD, HTN, chronic pain (multiple back and knee surgeries on oxycodone), anxiety and depression (on Xanax),  hiatal hernia, s/p vaginal hysterectomy and laparotomy to remove bowel adhesion, last EGD 2-3 months ago (per pt found scar tissue on put on PPI), last colonoscopy 1 year ago (per pt, found polyps), presents to the ED for suprapubic abdominal pain that radiates diffusely across all abdominal quadrants with associated nausea and vomiting since the morning of 10/15/23. Patient explains that she woke up on the 15th with diffuse pain, loss of appetite (only able to eat oatmeal for breakfast inability to urinate, and inability to defecate.    # Sigmoid  diverticulitis   ceftriaxone and flagyl   zofran po , although patient endorses continuous nausea with inability tolerate a full meal   transition to cipro and flagyl upon dc     # Urinary rentention -resolved  - c/w flomax    # GERD     # Hiatal hernia   protonix    # Hypotension  resolved post fluids     # Dyslipidemia   statin    # COPD   symbicort    #Anxiety/ depression    # Chronic pain    # DVT prophylaxis    # full code    PROGRESS NOTE HANDOFF    Pending: improving , if able to tolerate lunch dc home     Family discussion: patient verbalized understanding and agreeable to plan of care     Disposition: Home
Patient is an 78 year old female with PMH of HLD, HTN, chronic pain (multiple back and knee surgeries on oxycodone), anxiety and depression (on Xanax),  hiatal hernia, s/p vaginal hysterectomy and laparotomy to remove bowel adhesion, last EGD 2-3 months ago (per pt found scar tissue on put on PPI), last colonoscopy 1 year ago (per pt, found polyps), presents to the ED for suprapubic abdominal pain that radiates diffusely across all abdominal quadrants with associated nausea and vomiting since the morning of 10/15/23. Patient explains that she woke up on the 15th with diffuse pain, loss of appetite (only able to eat oatmeal for breakfast inability to urinate, and inability to defecate.    # Sigmoid  diverticulitis   ceftriaxone and flagyl  advance diet     # Urinary rentention -resolved  - c/w flomax    # GERD     # Hiatal hernia   protonix    # Hypotension  intravenous fluids     # Dyslipidemia   statin    # COPD   symbicort    #Anxiety/ depression    # Chronic pain    # DVT prophylaxis    # full code    PROGRESS NOTE HANDOFF    Pending: advance diet , ambulate , anticipate dc within 24-48 hours     Family discussion: patient verbalized understanding and agreeable to plan of care     Disposition: Home
Patient is an 78 year old female with PMH of HLD, HTN, chronic pain (multiple back and knee surgeries on oxycodone), anxiety and depression (on Xanax),  hiatal hernia, s/p vaginal hysterectomy and laparotomy to remove bowel adhesion, last EGD 2-3 months ago (per pt found scar tissue on put on PPI), last colonoscopy 1 year ago (per pt, found polyps), presents to the ED for suprapubic abdominal pain that radiates diffusely across all abdominal quadrants with associated nausea and vomiting since the morning of 10/15/23. Patient explains that she woke up on the 15th with diffuse pain, loss of appetite (only able to eat oatmeal for breakfast inability to urinate, and inability to defecate.    # Sigmoid  diverticulitis   ceftriaxone and flagyl  tolerating diet   transition to cipro and flagyl upon dc     # Urinary rentention -resolved  - c/w flomax    # GERD     # Hiatal hernia   protonix    # Hypotension  resolved post fluids     # Dyslipidemia   statin    # COPD   symbicort    #Anxiety/ depression    # Chronic pain    # DVT prophylaxis    # full code    PROGRESS NOTE HANDOFF    Pending: dc planning     Family discussion: patient verbalized understanding and agreeable to plan of care     Disposition: Home
Patient is an 78 year old female with PMH of HLD, HTN, chronic pain (multiple back and knee surgeries on oxycodone), anxiety and depression (on Xanax),  hiatal hernia, s/p vaginal hysterectomy and laparotomy to remove bowel adhesion, last EGD 2-3 months ago (per pt found scar tissue on put on PPI), last colonoscopy 1 year ago (per pt, found polyps), presents to the ED for suprapubic abdominal pain that radiates diffusely across all abdominal quadrants with associated nausea and vomiting since the morning of 10/15/23. Patient explains that she woke up on the 15th with diffuse pain, loss of appetite (only able to eat oatmeal for breakfast inability to urinate, and inability to defecate.    # Sigmoid  diverticulitis  # hyponatremia, probably sec to decrease appetite  -  CT Abdomen and Pelvis w/ IV Cont (10.16.23 @ 04:33) >Sigmoid colitis/diverticulitis. No abscess.  - Start on ceftriaxone and flagyl  - start on full liquid diet    # Urinary rentention -resolved  - UA- neg  - bladder scan  - c/w flomax    # GERD   # Hiatal hernia  - c/w protonix    # Hypotension  - hold norvasc, valsartan  - monitor BP    # Dyslipidemia  - c/w statin    # COPD  - c/w symbicort    #Anxiety/ depression  - c/w home meds    # Chronic pain  - c/w home meds    # DVT prophylaxis    # full code    # Pending: clinical improvement, bladder scan, monitor BP  # Discussed plan of care with patient  # Disposition: Home when stable      
Patient is an 78 year old female with PMH of HLD, HTN, chronic pain (multiple back and knee surgeries on oxycodone), anxiety and depression (on Xanax),  hiatal hernia, s/p vaginal hysterectomy and laparotomy to remove bowel adhesion, last EGD 2-3 months ago (per pt found scar tissue on put on PPI), last colonoscopy 1 year ago (per pt, found polyps), presents to the ED for suprapubic abdominal pain that radiates diffusely across all abdominal quadrants with associated nausea and vomiting since the morning of 10/15/23. Patient explains that she woke up on the 15th with diffuse pain, loss of appetite (only able to eat oatmeal for breakfast inability to urinate, and inability to defecate.    # Sigmoid  diverticulitis  # hyponatremia, probably sec to decrease appetite  -  CT Abdomen and Pelvis w/ IV Cont (10.16.23 @ 04:33) >Sigmoid colitis/diverticulitis. No abscess.  - c/w ceftriaxone and flagyl  -  lactose free diet    # Hyponatremia  - Start D5w1/2 NS @ 75 ml/hr  monitor BMP       # Urinary rentention -resolved  - UA- neg  - c/w flomax    # GERD   # Hiatal hernia  - c/w protonix    # Hypertension   - restart  valsartan  - monitor BP    # Dyslipidemia  - c/w statin    # COPD  - c/w symbicort    #Anxiety/ depression  - c/w home meds    # Chronic pain  - c/w home meds    # DVT prophylaxis    # full code    # Pending: clinical improvement, monitor BMP  # Discussed plan of care with patient  # Disposition: Home when stable      
78 year old female with PMH of HLD, HTN, chronic pain, anxiety, depression, hiatal hernia, s/p vaginal hysterectomy and laparotomy, presents to the ED for suprapubic abdominal pain that radiates diffusely across all abdominal quadrants with associated nausea and vomiting since the morning of 10/15/23. Patient was found to have colonic diverticulitis and urinary retention on CT A/P    #Colonic diverticulitis  - CTAP (10/16): Colonic diverticulosis. Diffuse sigmoid thickening with surrounding fat stranding. Likely inflamed diverticulum on series 4 image 264. Avulsion, pneumoperitoneum or pneumatosis.  - WBC: 18K (10/16)-->15 (10/17)  - on exam: tender lower abdomen on deep palpation  - s/p ciprofloxacin and Zosyn   - switched abx to rocephine and flagyl 10/17  - Pain control: continue morphine  - Continue zofran 4mg IV q8h PRN for nausea   - liquid diet for now    #Urinary rentention   - Started on 10/15/23, endorses suprapubic pain and urgency with inability to urinate, per patient never experienced this  - Continue home tamsulosin 0.4mg daily  - UA 10/16: negative  - Ucx 10/16: pending  - Bladder US: no hydronephrosis  - passed urine this morning    #GERD 2/2 hiatal hernia  - takes omeprazole 30mg BID  - Start pantoprazole 40mg BID    #HTN  #HLD  - C/w amlodipine 10mg daily, atorvastatin 20mg daily (takes rosuvastatin 5mg QHS at home), and valsartan 160mg daily     #Pt lifelong non-smoker, 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 Amaya, pulmonology   - Takes Advair HFA at home  - Symbicort PRN for dyspnea    #Anxiety  #Depression  - Continue home medication of sertraline 50mg oral and Xanax 0.5mg four times a day - prescribed by PCP    #Chronic pain  - Sees Dr. Joya  - Takes oxycodone 7.5mg /acetaminophen 325mg 2-3 tablets times a day as needed for severe pain - confirmed by Dr. Joya's office on 10/16/23, this meds is sent to [facility]   - Will start oxycodone 5mg /acetaminophen 325mg 2-3 tablets times a day as needed for severe pain     #Constipation  - Last BM 10/15- small quantity   - Start home senna 2 tablet QHS  - Also take colace at home    #DVT prophylaxis: Lovenox 40mg subQ q24h  #Activity: as tolerated  #Diet: regular  #Dispo: Acute    
78 year old female with PMH of HLD, HTN, chronic pain, anxiety, depression, hiatal hernia, s/p vaginal hysterectomy and laparotomy, presents to the ED for suprapubic abdominal pain that radiates diffusely across all abdominal quadrants with associated nausea and vomiting since the morning of 10/15/23. Patient was found to have colonic diverticulitis and urinary retention on CT A/P    #Colonic diverticulitis  - CTAP (10/16): Colonic diverticulosis. Diffuse sigmoid thickening with surrounding fat stranding. Likely inflamed diverticulum on series 4 image 264. Avulsion, pneumoperitoneum or pneumatosis.  - WBC: 18K (10/16)-->15-->8 (10/19)  - s/p ciprofloxacin and Zosyn   - switched abx to rocephine and flagyl 10/17  - Pain control: continue morphine  - Continue zofran 4mg IV q8h PRN for nausea   - held anti hypertensive medication  - improving clinically, no nausea, diarrhea  - GI PCR and c.diff negative  - GI consulted: op follow up    #Urinary rentention   - Started on 10/15/23, endorses suprapubic pain and urgency with inability to urinate, per patient never experienced this  - Continue home tamsulosin 0.4mg daily  - UA 10/16: negative  - Ucx 10/16: pending  - Bladder US: no hydronephrosis  - passing urine, no more retaining    #GERD 2/2 hiatal hernia  - takes omeprazole 30mg BID  - Start pantoprazole 40mg BID    #HTN  #HLD  - C/w amlodipine 10mg daily, atorvastatin 20mg daily (takes rosuvastatin 5mg QHS at home), and valsartan 160mg daily     #Pt lifelong non-smoker, 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 Amaya, pulmonology   - Takes Advair HFA at home  - Symbicort PRN for dyspnea    #Anxiety  #Depression  - Continue home medication of sertraline 50mg oral and Xanax 0.5mg four times a day - prescribed by PCP    #Chronic pain  - Sees Dr. Joya  - Takes oxycodone 7.5mg /acetaminophen 325mg 2-3 tablets times a day as needed for severe pain - confirmed by Dr. Joya's office on 10/16/23, this meds is sent to [facility]   - Will start oxycodone 5mg /acetaminophen 325mg 2-3 tablets times a day as needed for severe pain     #Constipation  - Last BM 10/15- small quantity   - Start home senna 2 tablet QHS  - Also take colace at home    #DVT prophylaxis: Lovenox 40mg subQ q24h  #Activity: as tolerated  #Diet: regular  #Dispo: anticipate for dc  #Progress note hand off: hold anti hypertensive medications if soft BP    
78 year old female with PMH of HLD, HTN, chronic pain, anxiety, depression, hiatal hernia, s/p vaginal hysterectomy and laparotomy, presents to the ED for suprapubic abdominal pain that radiates diffusely across all abdominal quadrants with associated nausea and vomiting since the morning of 10/15/23. Patient was found to have colonic diverticulitis and urinary retention on CT A/P    #Colonic diverticulitis  - CTAP (10/16): Colonic diverticulosis. Diffuse sigmoid thickening with surrounding fat stranding. Likely inflamed diverticulum on series 4 image 264. Avulsion, pneumoperitoneum or pneumatosis.  - WBC: 18K (10/16)-->15-->8 (10/19)  - s/p ciprofloxacin and Zosyn   - switched abx to rocephine and flagyl 10/17  - Pain control: continue morphine  - Continue zofran 4mg IV q8h PRN for nausea   - soft diet for now  - started IVF as pt is hypotensive and dizzy likely 2/2 decreased PO intake  - held anti hypertensive medication  - improving clinically, no nausea, diarrhea  - GI consulted: op follow up    #Urinary rentention   - Started on 10/15/23, endorses suprapubic pain and urgency with inability to urinate, per patient never experienced this  - Continue home tamsulosin 0.4mg daily  - UA 10/16: negative  - Ucx 10/16: pending  - Bladder US: no hydronephrosis  - passing urine, no more retaining    #GERD 2/2 hiatal hernia  - takes omeprazole 30mg BID  - Start pantoprazole 40mg BID    #HTN  #HLD  - C/w amlodipine 10mg daily, atorvastatin 20mg daily (takes rosuvastatin 5mg QHS at home), and valsartan 160mg daily     #Pt lifelong non-smoker, 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 Amaya, pulmonology   - Takes Advair HFA at home  - Symbicort PRN for dyspnea    #Anxiety  #Depression  - Continue home medication of sertraline 50mg oral and Xanax 0.5mg four times a day - prescribed by PCP    #Chronic pain  - Sees Dr. Joya  - Takes oxycodone 7.5mg /acetaminophen 325mg 2-3 tablets times a day as needed for severe pain - confirmed by Dr. Joya's office on 10/16/23, this meds is sent to [facility]   - Will start oxycodone 5mg /acetaminophen 325mg 2-3 tablets times a day as needed for severe pain     #Constipation  - Last BM 10/15- small quantity   - Start home senna 2 tablet QHS  - Also take colace at home    #DVT prophylaxis: Lovenox 40mg subQ q24h  #Activity: as tolerated  #Diet: regular  #Dispo: anticipate for dc  #Progress note hand off: hold anti hypertensive medications if soft BP    
Assessment and Plan  Case of an 78 year old female patient with history of Anxiety, Depression, DL, HTN, hiatal hernia, and chronic back and knee pains s/p multiple surgeries who presented on 10/16 for evaluation of abdominal pain, nausea, and vomiting, found to be hemodynamically stable with evidence of sigmoid colitis/ diverticulitis without an abscess on imaging, admitted for further management. We are consulted for concern of uncomplicated diverticulitis.      Acute Sigmoid Diverticulitis - Uncomplicated  Stable Hepatic Cysts  * /64 mmHg,  bpm, T 98.4 degrees  * ED Labs WBC 18, Hb 12.7, Plt 355, Na 143, K 4, BUN 5, Cr 0.7, LFT <0.2/46/40/25; Lipase 23  * Labs 10/22: WBC trended down to 9.9, Hb 10.3, Plt 346  * Sedimentation Rate, Erythrocyte (10.24.23 @ 06:06)    Sedimentation Rate, Erythrocyte: 8 mm/Hr  * C-Reactive Protein, Serum (10.24.23 @ 06:06)    C-Reactive Protein, Serum: 5.8 mg/L  * CT Abdomen and Pelvis w/ IV Cont (10.16.23 @ 04:33) Colonic diverticulosis. Diffuse sigmoid thickening with surrounding fat stranding. Likely inflamed diverticulum on series 4 image 264. Avulsion, pneumoperitoneum or pneumatosis.  * Last colonoscopy was last year by Dr Vásquez: few benign polyps and diverticulosis; asked to come back in 5 years  * Family history of Crohn Disease in daughter    RECOMMENDATIONS   - Would benefit from a non urgent outpatient colonoscopy in 6 weeks after completion of antibiotics and resolution of diverticulitis  - Continue IV Rocephin and IV Metronidazole   - Monitor BM. Please follow up stool studies and send clostridium difficile (contacted medical team) in setting of recent antibiotics  - Advance diet as tolerated: currently tolerating DASH diet  - Antiemetics: continue IV Zofran 4mg Q8h PRN if QTc normal  - Pain control: agree with tylenol PRN and Percocet PRN   - Follow up with our [facility] located at [address] [place] [address]. Phone Number: [phone]        History of GERD- Controlled   History of Hiatal Hernia  * Last EGD was a few months ago with Dr Vásquez: scar noted near site of prior hiatal hernia    * Was started on PPI but does not recall name    RECOMMENDATIONS  - Continue po protonix 40mg daily 30 minutes before breakfast  - Avoid NSAIDs  - Educated about lifestyle modifications: avoid spicy foods or late time dinners      Thank you for your consult.  - Please note that plan was communicated with medical team.   - Please reach GI on [phone] during weekdays till 5pm.  - Please call the GI service line after 5pm on Weekdays and anytime on Weekends: [phone].      Cindy Mckeon MD  PGY - 4 Gastroenterology Fellow   [facility]    
78 year old female with PMH of HLD, HTN, chronic pain, anxiety, depression, hiatal hernia, s/p vaginal hysterectomy and laparotomy, presents to the ED for suprapubic abdominal pain that radiates diffusely across all abdominal quadrants with associated nausea and vomiting since the morning of 10/15/23. Patient was found to have colonic diverticulitis and urinary retention on CT A/P    #Colonic diverticulitis  - CTAP (10/16): Colonic diverticulosis. Diffuse sigmoid thickening with surrounding fat stranding. Likely inflamed diverticulum on series 4 image 264. Avulsion, pneumoperitoneum or pneumatosis.  - WBC: 18K (10/16)-->15 (10/17)  - on exam: tender lower abdomen on deep palpation  - 3 episodes id watery diarrhea last night 10/18  - s/p ciprofloxacin and Zosyn   - switched abx to rocephine and flagyl 10/17  - Pain control: continue morphine  - Continue zofran 4mg IV q8h PRN for nausea   - liquid diet for now  - started IVF as pt is hypotensive and dizzy likely 2/2 decreased PO intake  - held anti hypertensive medication    #Urinary rentention   - Started on 10/15/23, endorses suprapubic pain and urgency with inability to urinate, per patient never experienced this  - Continue home tamsulosin 0.4mg daily  - UA 10/16: negative  - Ucx 10/16: pending  - Bladder US: no hydronephrosis  - passing urine, no more retaining    #GERD 2/2 hiatal hernia  - takes omeprazole 30mg BID  - Start pantoprazole 40mg BID    #HTN  #HLD  - C/w amlodipine 10mg daily, atorvastatin 20mg daily (takes rosuvastatin 5mg QHS at home), and valsartan 160mg daily     #Pt lifelong non-smoker, 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 Amaya, pulmonology   - Takes Advair HFA at home  - Symbicort PRN for dyspnea    #Anxiety  #Depression  - Continue home medication of sertraline 50mg oral and Xanax 0.5mg four times a day - prescribed by PCP    #Chronic pain  - Sees Dr. Joya  - Takes oxycodone 7.5mg /acetaminophen 325mg 2-3 tablets times a day as needed for severe pain - confirmed by Dr. Joya's office on 10/16/23, this meds is sent to [facility]   - Will start oxycodone 5mg /acetaminophen 325mg 2-3 tablets times a day as needed for severe pain     #Constipation  - Last BM 10/15- small quantity   - Start home senna 2 tablet QHS  - Also take colace at home    #DVT prophylaxis: Lovenox 40mg subQ q24h  #Activity: as tolerated  #Diet: regular  #Dispo: Acute  #Progress note hand off: hold anti hypertensive medications    
Patient is an 78 year old female with PMH of HLD, HTN, chronic pain (multiple back and knee surgeries on oxycodone), anxiety and depression (on Xanax),  hiatal hernia, s/p vaginal hysterectomy and laparotomy to remove bowel adhesion, last EGD 2-3 months ago (per pt found scar tissue on put on PPI), last colonoscopy 1 year ago (per pt, found polyps), presents to the ED for suprapubic abdominal pain that radiates diffusely across all abdominal quadrants with associated nausea and vomiting since the morning of 10/15/23. Patient explains that she woke up on the 15th with diffuse pain, loss of appetite (only able to eat oatmeal for breakfast inability to urinate, and inability to defecate.    # Sigmoid  diverticulitis   ceftriaxone and flagyl   zofran po   gi eval appreciated    on iv fluids     # Urinary rentention -resolved  - c/w flomax    # GERD     # Hiatal hernia   protonix    # Dyslipidemia   statin    # COPD   symbicort    #Anxiety/ depression    # Chronic pain    #Hypernatremia  monitor      # DVT prophylaxis    # full code    PROGRESS NOTE HANDOFF    Pending stool culture, gi eval     Family discussion: patient verbalized understanding and agreeable to plan of care     Disposition: Home
Patient is an 78 year old female with PMH of HLD, HTN, chronic pain (multiple back and knee surgeries on oxycodone), anxiety and depression (on Xanax),  hiatal hernia, s/p vaginal hysterectomy and laparotomy to remove bowel adhesion, last EGD 2-3 months ago (per pt found scar tissue on put on PPI), last colonoscopy 1 year ago (per pt, found polyps), presents to the ED for suprapubic abdominal pain that radiates diffusely across all abdominal quadrants with associated nausea and vomiting since the morning of 10/15/23. Patient explains that she woke up on the 15th with diffuse pain, loss of appetite (only able to eat oatmeal for breakfast inability to urinate, and inability to defecate.    # Sigmoid  diverticulitis   ceftriaxone and flagyl  episode of nausea and diarrhea,  monitor for now to ensure patient does not develop dehydration   transition to cipro and flagyl upon dc     # Urinary rentention -resolved  - c/w flomax    # GERD     # Hiatal hernia   protonix    # Hypotension  resolved post fluids     # Dyslipidemia   statin    # COPD   symbicort    #Anxiety/ depression    # Chronic pain    # DVT prophylaxis    # full code    PROGRESS NOTE HANDOFF    Pending: monitor overnight to ensure adequate po intake   Family discussion: patient verbalized understanding and agreeable to plan of care     Disposition: Home

## 2023-10-26 ENCOUNTER — TRANSCRIPTION ENCOUNTER (OUTPATIENT)
Age: 78
End: 2023-10-26

## 2023-10-26 VITALS — SYSTOLIC BLOOD PRESSURE: 157 MMHG | DIASTOLIC BLOOD PRESSURE: 69 MMHG | TEMPERATURE: 98 F | HEART RATE: 60 BPM

## 2023-10-26 LAB
ANION GAP SERPL CALC-SCNC: 10 MMOL/L — SIGNIFICANT CHANGE UP (ref 7–14)
ANION GAP SERPL CALC-SCNC: 10 MMOL/L — SIGNIFICANT CHANGE UP (ref 7–14)
ANION GAP SERPL CALC-SCNC: 9 MMOL/L — SIGNIFICANT CHANGE UP (ref 7–14)
ANION GAP SERPL CALC-SCNC: 9 MMOL/L — SIGNIFICANT CHANGE UP (ref 7–14)
BASOPHILS # BLD AUTO: 0.09 K/UL — SIGNIFICANT CHANGE UP (ref 0–0.2)
BASOPHILS # BLD AUTO: 0.09 K/UL — SIGNIFICANT CHANGE UP (ref 0–0.2)
BASOPHILS NFR BLD AUTO: 1 % — SIGNIFICANT CHANGE UP (ref 0–1)
BASOPHILS NFR BLD AUTO: 1 % — SIGNIFICANT CHANGE UP (ref 0–1)
BUN SERPL-MCNC: 10 MG/DL — SIGNIFICANT CHANGE UP (ref 10–20)
BUN SERPL-MCNC: 10 MG/DL — SIGNIFICANT CHANGE UP (ref 10–20)
BUN SERPL-MCNC: 7 MG/DL — LOW (ref 10–20)
BUN SERPL-MCNC: 7 MG/DL — LOW (ref 10–20)
CALCIUM SERPL-MCNC: 8 MG/DL — LOW (ref 8.4–10.5)
CALCIUM SERPL-MCNC: 8 MG/DL — LOW (ref 8.4–10.5)
CALCIUM SERPL-MCNC: 8.3 MG/DL — LOW (ref 8.4–10.5)
CALCIUM SERPL-MCNC: 8.3 MG/DL — LOW (ref 8.4–10.5)
CHLORIDE SERPL-SCNC: 108 MMOL/L — SIGNIFICANT CHANGE UP (ref 98–110)
CHLORIDE SERPL-SCNC: 108 MMOL/L — SIGNIFICANT CHANGE UP (ref 98–110)
CHLORIDE SERPL-SCNC: 109 MMOL/L — SIGNIFICANT CHANGE UP (ref 98–110)
CHLORIDE SERPL-SCNC: 109 MMOL/L — SIGNIFICANT CHANGE UP (ref 98–110)
CO2 SERPL-SCNC: 26 MMOL/L — SIGNIFICANT CHANGE UP (ref 17–32)
CO2 SERPL-SCNC: 26 MMOL/L — SIGNIFICANT CHANGE UP (ref 17–32)
CO2 SERPL-SCNC: 27 MMOL/L — SIGNIFICANT CHANGE UP (ref 17–32)
CO2 SERPL-SCNC: 27 MMOL/L — SIGNIFICANT CHANGE UP (ref 17–32)
CREAT SERPL-MCNC: 0.7 MG/DL — SIGNIFICANT CHANGE UP (ref 0.7–1.5)
CREAT SERPL-MCNC: 0.7 MG/DL — SIGNIFICANT CHANGE UP (ref 0.7–1.5)
CREAT SERPL-MCNC: 0.9 MG/DL — SIGNIFICANT CHANGE UP (ref 0.7–1.5)
CREAT SERPL-MCNC: 0.9 MG/DL — SIGNIFICANT CHANGE UP (ref 0.7–1.5)
CULTURE RESULTS: ABNORMAL
CULTURE RESULTS: ABNORMAL
EGFR: 65 ML/MIN/1.73M2 — SIGNIFICANT CHANGE UP
EGFR: 65 ML/MIN/1.73M2 — SIGNIFICANT CHANGE UP
EGFR: 87 ML/MIN/1.73M2 — SIGNIFICANT CHANGE UP
EGFR: 87 ML/MIN/1.73M2 — SIGNIFICANT CHANGE UP
EOSINOPHIL # BLD AUTO: 0.58 K/UL — SIGNIFICANT CHANGE UP (ref 0–0.7)
EOSINOPHIL # BLD AUTO: 0.58 K/UL — SIGNIFICANT CHANGE UP (ref 0–0.7)
EOSINOPHIL NFR BLD AUTO: 6.1 % — SIGNIFICANT CHANGE UP (ref 0–8)
EOSINOPHIL NFR BLD AUTO: 6.1 % — SIGNIFICANT CHANGE UP (ref 0–8)
GLUCOSE SERPL-MCNC: 131 MG/DL — HIGH (ref 70–99)
GLUCOSE SERPL-MCNC: 131 MG/DL — HIGH (ref 70–99)
GLUCOSE SERPL-MCNC: 96 MG/DL — SIGNIFICANT CHANGE UP (ref 70–99)
GLUCOSE SERPL-MCNC: 96 MG/DL — SIGNIFICANT CHANGE UP (ref 70–99)
HCT VFR BLD CALC: 34 % — LOW (ref 37–47)
HCT VFR BLD CALC: 34 % — LOW (ref 37–47)
HGB BLD-MCNC: 10.8 G/DL — LOW (ref 12–16)
HGB BLD-MCNC: 10.8 G/DL — LOW (ref 12–16)
IMM GRANULOCYTES NFR BLD AUTO: 0.3 % — SIGNIFICANT CHANGE UP (ref 0.1–0.3)
IMM GRANULOCYTES NFR BLD AUTO: 0.3 % — SIGNIFICANT CHANGE UP (ref 0.1–0.3)
LYMPHOCYTES # BLD AUTO: 2.12 K/UL — SIGNIFICANT CHANGE UP (ref 1.2–3.4)
LYMPHOCYTES # BLD AUTO: 2.12 K/UL — SIGNIFICANT CHANGE UP (ref 1.2–3.4)
LYMPHOCYTES # BLD AUTO: 22.5 % — SIGNIFICANT CHANGE UP (ref 20.5–51.1)
LYMPHOCYTES # BLD AUTO: 22.5 % — SIGNIFICANT CHANGE UP (ref 20.5–51.1)
MCHC RBC-ENTMCNC: 26.2 PG — LOW (ref 27–31)
MCHC RBC-ENTMCNC: 26.2 PG — LOW (ref 27–31)
MCHC RBC-ENTMCNC: 31.8 G/DL — LOW (ref 32–37)
MCHC RBC-ENTMCNC: 31.8 G/DL — LOW (ref 32–37)
MCV RBC AUTO: 82.5 FL — SIGNIFICANT CHANGE UP (ref 81–99)
MCV RBC AUTO: 82.5 FL — SIGNIFICANT CHANGE UP (ref 81–99)
MONOCYTES # BLD AUTO: 0.68 K/UL — HIGH (ref 0.1–0.6)
MONOCYTES # BLD AUTO: 0.68 K/UL — HIGH (ref 0.1–0.6)
MONOCYTES NFR BLD AUTO: 7.2 % — SIGNIFICANT CHANGE UP (ref 1.7–9.3)
MONOCYTES NFR BLD AUTO: 7.2 % — SIGNIFICANT CHANGE UP (ref 1.7–9.3)
NEUTROPHILS # BLD AUTO: 5.94 K/UL — SIGNIFICANT CHANGE UP (ref 1.4–6.5)
NEUTROPHILS # BLD AUTO: 5.94 K/UL — SIGNIFICANT CHANGE UP (ref 1.4–6.5)
NEUTROPHILS NFR BLD AUTO: 62.9 % — SIGNIFICANT CHANGE UP (ref 42.2–75.2)
NEUTROPHILS NFR BLD AUTO: 62.9 % — SIGNIFICANT CHANGE UP (ref 42.2–75.2)
NRBC # BLD: 0 /100 WBCS — SIGNIFICANT CHANGE UP (ref 0–0)
NRBC # BLD: 0 /100 WBCS — SIGNIFICANT CHANGE UP (ref 0–0)
PLATELET # BLD AUTO: 319 K/UL — SIGNIFICANT CHANGE UP (ref 130–400)
PLATELET # BLD AUTO: 319 K/UL — SIGNIFICANT CHANGE UP (ref 130–400)
PMV BLD: 11.3 FL — HIGH (ref 7.4–10.4)
PMV BLD: 11.3 FL — HIGH (ref 7.4–10.4)
POTASSIUM SERPL-MCNC: 3.3 MMOL/L — LOW (ref 3.5–5)
POTASSIUM SERPL-MCNC: 3.3 MMOL/L — LOW (ref 3.5–5)
POTASSIUM SERPL-MCNC: 3.7 MMOL/L — SIGNIFICANT CHANGE UP (ref 3.5–5)
POTASSIUM SERPL-MCNC: 3.7 MMOL/L — SIGNIFICANT CHANGE UP (ref 3.5–5)
POTASSIUM SERPL-SCNC: 3.3 MMOL/L — LOW (ref 3.5–5)
POTASSIUM SERPL-SCNC: 3.3 MMOL/L — LOW (ref 3.5–5)
POTASSIUM SERPL-SCNC: 3.7 MMOL/L — SIGNIFICANT CHANGE UP (ref 3.5–5)
POTASSIUM SERPL-SCNC: 3.7 MMOL/L — SIGNIFICANT CHANGE UP (ref 3.5–5)
RBC # BLD: 4.12 M/UL — LOW (ref 4.2–5.4)
RBC # BLD: 4.12 M/UL — LOW (ref 4.2–5.4)
RBC # FLD: 16.5 % — HIGH (ref 11.5–14.5)
RBC # FLD: 16.5 % — HIGH (ref 11.5–14.5)
SODIUM SERPL-SCNC: 144 MMOL/L — SIGNIFICANT CHANGE UP (ref 135–146)
SODIUM SERPL-SCNC: 144 MMOL/L — SIGNIFICANT CHANGE UP (ref 135–146)
SODIUM SERPL-SCNC: 145 MMOL/L — SIGNIFICANT CHANGE UP (ref 135–146)
SODIUM SERPL-SCNC: 145 MMOL/L — SIGNIFICANT CHANGE UP (ref 135–146)
SPECIMEN SOURCE: SIGNIFICANT CHANGE UP
SPECIMEN SOURCE: SIGNIFICANT CHANGE UP
WBC # BLD: 9.44 K/UL — SIGNIFICANT CHANGE UP (ref 4.8–10.8)
WBC # BLD: 9.44 K/UL — SIGNIFICANT CHANGE UP (ref 4.8–10.8)
WBC # FLD AUTO: 9.44 K/UL — SIGNIFICANT CHANGE UP (ref 4.8–10.8)
WBC # FLD AUTO: 9.44 K/UL — SIGNIFICANT CHANGE UP (ref 4.8–10.8)

## 2023-10-26 PROCEDURE — 99239 HOSP IP/OBS DSCHRG MGMT >30: CPT

## 2023-10-26 RX ORDER — AMLODIPINE BESYLATE 2.5 MG/1
1 TABLET ORAL
Qty: 0 | Refills: 0 | DISCHARGE

## 2023-10-26 RX ORDER — VALSARTAN 80 MG/1
160 TABLET ORAL DAILY
Refills: 0 | Status: DISCONTINUED | OUTPATIENT
Start: 2023-10-26 | End: 2023-10-26

## 2023-10-26 RX ADMIN — CHLORHEXIDINE GLUCONATE 1 APPLICATION(S): 213 SOLUTION TOPICAL at 05:29

## 2023-10-26 RX ADMIN — Medication 0.5 MILLIGRAM(S): at 05:27

## 2023-10-26 RX ADMIN — CEFTRIAXONE 100 MILLIGRAM(S): 500 INJECTION, POWDER, FOR SOLUTION INTRAMUSCULAR; INTRAVENOUS at 11:04

## 2023-10-26 RX ADMIN — PANTOPRAZOLE SODIUM 40 MILLIGRAM(S): 20 TABLET, DELAYED RELEASE ORAL at 05:27

## 2023-10-26 RX ADMIN — PANTOPRAZOLE SODIUM 40 MILLIGRAM(S): 20 TABLET, DELAYED RELEASE ORAL at 17:22

## 2023-10-26 RX ADMIN — Medication 0.5 MILLIGRAM(S): at 17:22

## 2023-10-26 RX ADMIN — Medication 100 MILLIGRAM(S): at 05:28

## 2023-10-26 RX ADMIN — VALSARTAN 160 MILLIGRAM(S): 80 TABLET ORAL at 10:46

## 2023-10-26 RX ADMIN — Medication 100 MILLIGRAM(S): at 14:43

## 2023-10-26 RX ADMIN — Medication 0.5 MILLIGRAM(S): at 11:04

## 2023-10-26 NOTE — DISCHARGE NOTE NURSING/CASE MANAGEMENT/SOCIAL WORK - NSDCPEFALRISK_GEN_ALL_CORE
For information on Fall & Injury Prevention, visit: [email or web address] OR  [email or web address] OR  https://www.cdc.gov/steadi/patient.html

## 2023-10-26 NOTE — DISCHARGE NOTE NURSING/CASE MANAGEMENT/SOCIAL WORK - CAREGIVER RELATION TO PATIENT
spouse 1) Please follow up with your Primary Care Provider in 24-48 hours  2) Seek immediate medical care for any new or returning symptoms including but not limited severe pain, high fevers, difficulty breathing

## 2023-10-26 NOTE — DISCHARGE NOTE NURSING/CASE MANAGEMENT/SOCIAL WORK - PATIENT PORTAL LINK FT
You can access the FollowMyHealth Patient Portal offered by [facility] by registering at the following website: [email or web address]. By joining [facility]’s FollowMyHealth portal, you will also be able to view your health information using other applications (apps) compatible with our system.

## 2023-10-27 LAB
CALPROTECTIN STL-MCNT: 23 UG/G — SIGNIFICANT CHANGE UP (ref 0–120)
CALPROTECTIN STL-MCNT: 23 UG/G — SIGNIFICANT CHANGE UP (ref 0–120)

## 2023-10-30 LAB
FAT STL QN: NORMAL — SIGNIFICANT CHANGE UP

## 2023-11-02 DIAGNOSIS — E78.5 HYPERLIPIDEMIA, UNSPECIFIED: ICD-10-CM

## 2023-11-02 DIAGNOSIS — R33.9 RETENTION OF URINE, UNSPECIFIED: ICD-10-CM

## 2023-11-02 DIAGNOSIS — M54.59 OTHER LOW BACK PAIN: ICD-10-CM

## 2023-11-02 DIAGNOSIS — F41.9 ANXIETY DISORDER, UNSPECIFIED: ICD-10-CM

## 2023-11-02 DIAGNOSIS — E87.1 HYPO-OSMOLALITY AND HYPONATREMIA: ICD-10-CM

## 2023-11-02 DIAGNOSIS — Z96.82 PRESENCE OF NEUROSTIMULATOR: ICD-10-CM

## 2023-11-02 DIAGNOSIS — Z79.891 LONG TERM (CURRENT) USE OF OPIATE ANALGESIC: ICD-10-CM

## 2023-11-02 DIAGNOSIS — K57.32 DIVERTICULITIS OF LARGE INTESTINE WITHOUT PERFORATION OR ABSCESS WITHOUT BLEEDING: ICD-10-CM

## 2023-11-02 DIAGNOSIS — K44.9 DIAPHRAGMATIC HERNIA WITHOUT OBSTRUCTION OR GANGRENE: ICD-10-CM

## 2023-11-02 DIAGNOSIS — R10.9 UNSPECIFIED ABDOMINAL PAIN: ICD-10-CM

## 2023-11-02 DIAGNOSIS — I10 ESSENTIAL (PRIMARY) HYPERTENSION: ICD-10-CM

## 2023-11-02 DIAGNOSIS — F32.A DEPRESSION, UNSPECIFIED: ICD-10-CM

## 2023-11-02 DIAGNOSIS — I95.9 HYPOTENSION, UNSPECIFIED: ICD-10-CM

## 2023-11-02 DIAGNOSIS — K59.00 CONSTIPATION, UNSPECIFIED: ICD-10-CM

## 2023-11-02 DIAGNOSIS — K21.9 GASTRO-ESOPHAGEAL REFLUX DISEASE WITHOUT ESOPHAGITIS: ICD-10-CM

## 2023-11-02 DIAGNOSIS — J43.9 EMPHYSEMA, UNSPECIFIED: ICD-10-CM

## 2023-11-02 DIAGNOSIS — Z90.710 ACQUIRED ABSENCE OF BOTH CERVIX AND UTERUS: ICD-10-CM

## 2023-11-06 PROBLEM — K21.9 GASTRO-ESOPHAGEAL REFLUX DISEASE WITHOUT ESOPHAGITIS: Chronic | Status: ACTIVE | Noted: 2023-10-16

## 2023-11-06 LAB
LACTOFERRIN STL-MCNC: 1.71 CD:794062635 — SIGNIFICANT CHANGE UP (ref 0–7.24)
LACTOFERRIN STL-MCNC: 1.71 CD:794062635 — SIGNIFICANT CHANGE UP (ref 0–7.24)

## 2023-11-15 ENCOUNTER — OUTPATIENT (OUTPATIENT)
Dept: OUTPATIENT SERVICES | Facility: HOSPITAL | Age: 78
LOS: 1 days | End: 2023-11-15
Payer: MEDICARE

## 2023-11-15 DIAGNOSIS — Z90.710 ACQUIRED ABSENCE OF BOTH CERVIX AND UTERUS: Chronic | ICD-10-CM

## 2023-11-15 DIAGNOSIS — Z98.890 OTHER SPECIFIED POSTPROCEDURAL STATES: Chronic | ICD-10-CM

## 2023-11-15 DIAGNOSIS — M25.551 PAIN IN RIGHT HIP: ICD-10-CM

## 2023-11-15 PROCEDURE — 73502 X-RAY EXAM HIP UNI 2-3 VIEWS: CPT | Mod: 26,RT

## 2023-11-15 PROCEDURE — 73502 X-RAY EXAM HIP UNI 2-3 VIEWS: CPT | Mod: RT

## 2023-11-16 ENCOUNTER — APPOINTMENT (OUTPATIENT)
Dept: VASCULAR SURGERY | Facility: CLINIC | Age: 78
End: 2023-11-16
Payer: MEDICARE

## 2023-11-16 VITALS — WEIGHT: 160 LBS | HEIGHT: 65 IN | BODY MASS INDEX: 26.66 KG/M2

## 2023-11-16 DIAGNOSIS — M25.551 PAIN IN RIGHT HIP: ICD-10-CM

## 2023-11-16 DIAGNOSIS — M79.661 PAIN IN RIGHT LOWER LEG: ICD-10-CM

## 2023-11-16 DIAGNOSIS — M79.89 PAIN IN RIGHT LOWER LEG: ICD-10-CM

## 2023-11-16 PROCEDURE — 93971 EXTREMITY STUDY: CPT

## 2023-11-16 PROCEDURE — 99213 OFFICE O/P EST LOW 20 MIN: CPT

## 2023-12-12 ENCOUNTER — APPOINTMENT (OUTPATIENT)
Dept: PULMONOLOGY | Facility: CLINIC | Age: 78
End: 2023-12-12
Payer: MEDICARE

## 2023-12-12 VITALS
OXYGEN SATURATION: 93 % | WEIGHT: 160 LBS | BODY MASS INDEX: 26.66 KG/M2 | RESPIRATION RATE: 12 BRPM | SYSTOLIC BLOOD PRESSURE: 120 MMHG | HEART RATE: 80 BPM | HEIGHT: 65 IN | DIASTOLIC BLOOD PRESSURE: 60 MMHG

## 2023-12-12 DIAGNOSIS — J47.9 BRONCHIECTASIS, UNCOMPLICATED: ICD-10-CM

## 2023-12-12 DIAGNOSIS — J98.19 OTHER PULMONARY COLLAPSE: ICD-10-CM

## 2023-12-12 PROCEDURE — 99213 OFFICE O/P EST LOW 20 MIN: CPT

## 2023-12-26 ENCOUNTER — INPATIENT (INPATIENT)
Facility: HOSPITAL | Age: 78
LOS: 6 days | Discharge: HOME HEALTH | DRG: 872 | End: 2024-01-02
Attending: STUDENT IN AN ORGANIZED HEALTH CARE EDUCATION/TRAINING PROGRAM | Admitting: INTERNAL MEDICINE
Payer: MEDICARE

## 2023-12-26 VITALS
WEIGHT: 160.06 LBS | OXYGEN SATURATION: 98 % | SYSTOLIC BLOOD PRESSURE: 170 MMHG | TEMPERATURE: 98 F | RESPIRATION RATE: 20 BRPM | DIASTOLIC BLOOD PRESSURE: 72 MMHG | HEART RATE: 98 BPM

## 2023-12-26 DIAGNOSIS — Z90.710 ACQUIRED ABSENCE OF BOTH CERVIX AND UTERUS: Chronic | ICD-10-CM

## 2023-12-26 DIAGNOSIS — R10.9 UNSPECIFIED ABDOMINAL PAIN: ICD-10-CM

## 2023-12-26 DIAGNOSIS — Z98.890 OTHER SPECIFIED POSTPROCEDURAL STATES: Chronic | ICD-10-CM

## 2023-12-26 LAB
ALBUMIN SERPL ELPH-MCNC: 4.5 G/DL — SIGNIFICANT CHANGE UP (ref 3.5–5.2)
ALBUMIN SERPL ELPH-MCNC: 4.5 G/DL — SIGNIFICANT CHANGE UP (ref 3.5–5.2)
ALP SERPL-CCNC: 62 U/L — SIGNIFICANT CHANGE UP (ref 30–115)
ALP SERPL-CCNC: 62 U/L — SIGNIFICANT CHANGE UP (ref 30–115)
ALT FLD-CCNC: 18 U/L — SIGNIFICANT CHANGE UP (ref 0–41)
ALT FLD-CCNC: 18 U/L — SIGNIFICANT CHANGE UP (ref 0–41)
ANION GAP SERPL CALC-SCNC: 16 MMOL/L — HIGH (ref 7–14)
ANION GAP SERPL CALC-SCNC: 16 MMOL/L — HIGH (ref 7–14)
AST SERPL-CCNC: 28 U/L — SIGNIFICANT CHANGE UP (ref 0–41)
AST SERPL-CCNC: 28 U/L — SIGNIFICANT CHANGE UP (ref 0–41)
BASE EXCESS BLDV CALC-SCNC: -1.5 MMOL/L — SIGNIFICANT CHANGE UP (ref -2–3)
BASE EXCESS BLDV CALC-SCNC: -1.5 MMOL/L — SIGNIFICANT CHANGE UP (ref -2–3)
BASOPHILS # BLD AUTO: 0.04 K/UL — SIGNIFICANT CHANGE UP (ref 0–0.2)
BASOPHILS # BLD AUTO: 0.04 K/UL — SIGNIFICANT CHANGE UP (ref 0–0.2)
BASOPHILS NFR BLD AUTO: 0.2 % — SIGNIFICANT CHANGE UP (ref 0–1)
BASOPHILS NFR BLD AUTO: 0.2 % — SIGNIFICANT CHANGE UP (ref 0–1)
BILIRUB SERPL-MCNC: 0.5 MG/DL — SIGNIFICANT CHANGE UP (ref 0.2–1.2)
BILIRUB SERPL-MCNC: 0.5 MG/DL — SIGNIFICANT CHANGE UP (ref 0.2–1.2)
BUN SERPL-MCNC: 15 MG/DL — SIGNIFICANT CHANGE UP (ref 10–20)
BUN SERPL-MCNC: 15 MG/DL — SIGNIFICANT CHANGE UP (ref 10–20)
CA-I SERPL-SCNC: 1.1 MMOL/L — LOW (ref 1.15–1.33)
CA-I SERPL-SCNC: 1.1 MMOL/L — LOW (ref 1.15–1.33)
CALCIUM SERPL-MCNC: 9.4 MG/DL — SIGNIFICANT CHANGE UP (ref 8.4–10.5)
CALCIUM SERPL-MCNC: 9.4 MG/DL — SIGNIFICANT CHANGE UP (ref 8.4–10.5)
CHLORIDE SERPL-SCNC: 103 MMOL/L — SIGNIFICANT CHANGE UP (ref 98–110)
CHLORIDE SERPL-SCNC: 103 MMOL/L — SIGNIFICANT CHANGE UP (ref 98–110)
CO2 SERPL-SCNC: 22 MMOL/L — SIGNIFICANT CHANGE UP (ref 17–32)
CO2 SERPL-SCNC: 22 MMOL/L — SIGNIFICANT CHANGE UP (ref 17–32)
CREAT SERPL-MCNC: 0.7 MG/DL — SIGNIFICANT CHANGE UP (ref 0.7–1.5)
CREAT SERPL-MCNC: 0.7 MG/DL — SIGNIFICANT CHANGE UP (ref 0.7–1.5)
EGFR: 87 ML/MIN/1.73M2 — SIGNIFICANT CHANGE UP
EGFR: 87 ML/MIN/1.73M2 — SIGNIFICANT CHANGE UP
EOSINOPHIL # BLD AUTO: 0.02 K/UL — SIGNIFICANT CHANGE UP (ref 0–0.7)
EOSINOPHIL # BLD AUTO: 0.02 K/UL — SIGNIFICANT CHANGE UP (ref 0–0.7)
EOSINOPHIL NFR BLD AUTO: 0.1 % — SIGNIFICANT CHANGE UP (ref 0–8)
EOSINOPHIL NFR BLD AUTO: 0.1 % — SIGNIFICANT CHANGE UP (ref 0–8)
GAS PNL BLDV: 133 MMOL/L — LOW (ref 136–145)
GAS PNL BLDV: 133 MMOL/L — LOW (ref 136–145)
GAS PNL BLDV: SIGNIFICANT CHANGE UP
GAS PNL BLDV: SIGNIFICANT CHANGE UP
GLUCOSE SERPL-MCNC: 119 MG/DL — HIGH (ref 70–99)
GLUCOSE SERPL-MCNC: 119 MG/DL — HIGH (ref 70–99)
HCO3 BLDV-SCNC: 23 MMOL/L — SIGNIFICANT CHANGE UP (ref 22–29)
HCO3 BLDV-SCNC: 23 MMOL/L — SIGNIFICANT CHANGE UP (ref 22–29)
HCT VFR BLD CALC: 40.2 % — SIGNIFICANT CHANGE UP (ref 37–47)
HCT VFR BLD CALC: 40.2 % — SIGNIFICANT CHANGE UP (ref 37–47)
HCT VFR BLDA CALC: 34 % — LOW (ref 34.5–46.5)
HCT VFR BLDA CALC: 34 % — LOW (ref 34.5–46.5)
HGB BLD CALC-MCNC: 11.4 G/DL — LOW (ref 11.7–16.1)
HGB BLD CALC-MCNC: 11.4 G/DL — LOW (ref 11.7–16.1)
HGB BLD-MCNC: 13.3 G/DL — SIGNIFICANT CHANGE UP (ref 12–16)
HGB BLD-MCNC: 13.3 G/DL — SIGNIFICANT CHANGE UP (ref 12–16)
IMM GRANULOCYTES NFR BLD AUTO: 0.5 % — HIGH (ref 0.1–0.3)
IMM GRANULOCYTES NFR BLD AUTO: 0.5 % — HIGH (ref 0.1–0.3)
LACTATE BLDV-MCNC: 1.5 MMOL/L — SIGNIFICANT CHANGE UP (ref 0.5–2)
LACTATE BLDV-MCNC: 1.5 MMOL/L — SIGNIFICANT CHANGE UP (ref 0.5–2)
LACTATE SERPL-SCNC: 3.1 MMOL/L — HIGH (ref 0.7–2)
LACTATE SERPL-SCNC: 3.1 MMOL/L — HIGH (ref 0.7–2)
LIDOCAIN IGE QN: 18 U/L — SIGNIFICANT CHANGE UP (ref 7–60)
LIDOCAIN IGE QN: 18 U/L — SIGNIFICANT CHANGE UP (ref 7–60)
LYMPHOCYTES # BLD AUTO: 1.25 K/UL — SIGNIFICANT CHANGE UP (ref 1.2–3.4)
LYMPHOCYTES # BLD AUTO: 1.25 K/UL — SIGNIFICANT CHANGE UP (ref 1.2–3.4)
LYMPHOCYTES # BLD AUTO: 5.9 % — LOW (ref 20.5–51.1)
LYMPHOCYTES # BLD AUTO: 5.9 % — LOW (ref 20.5–51.1)
MCHC RBC-ENTMCNC: 25.6 PG — LOW (ref 27–31)
MCHC RBC-ENTMCNC: 25.6 PG — LOW (ref 27–31)
MCHC RBC-ENTMCNC: 33.1 G/DL — SIGNIFICANT CHANGE UP (ref 32–37)
MCHC RBC-ENTMCNC: 33.1 G/DL — SIGNIFICANT CHANGE UP (ref 32–37)
MCV RBC AUTO: 77.3 FL — LOW (ref 81–99)
MCV RBC AUTO: 77.3 FL — LOW (ref 81–99)
MONOCYTES # BLD AUTO: 0.48 K/UL — SIGNIFICANT CHANGE UP (ref 0.1–0.6)
MONOCYTES # BLD AUTO: 0.48 K/UL — SIGNIFICANT CHANGE UP (ref 0.1–0.6)
MONOCYTES NFR BLD AUTO: 2.3 % — SIGNIFICANT CHANGE UP (ref 1.7–9.3)
MONOCYTES NFR BLD AUTO: 2.3 % — SIGNIFICANT CHANGE UP (ref 1.7–9.3)
NEUTROPHILS # BLD AUTO: 19.16 K/UL — HIGH (ref 1.4–6.5)
NEUTROPHILS # BLD AUTO: 19.16 K/UL — HIGH (ref 1.4–6.5)
NEUTROPHILS NFR BLD AUTO: 91 % — HIGH (ref 42.2–75.2)
NEUTROPHILS NFR BLD AUTO: 91 % — HIGH (ref 42.2–75.2)
NRBC # BLD: 0 /100 WBCS — SIGNIFICANT CHANGE UP (ref 0–0)
NRBC # BLD: 0 /100 WBCS — SIGNIFICANT CHANGE UP (ref 0–0)
PCO2 BLDV: 36 MMHG — LOW (ref 39–42)
PCO2 BLDV: 36 MMHG — LOW (ref 39–42)
PH BLDV: 7.41 — SIGNIFICANT CHANGE UP (ref 7.32–7.43)
PH BLDV: 7.41 — SIGNIFICANT CHANGE UP (ref 7.32–7.43)
PLATELET # BLD AUTO: 400 K/UL — SIGNIFICANT CHANGE UP (ref 130–400)
PLATELET # BLD AUTO: 400 K/UL — SIGNIFICANT CHANGE UP (ref 130–400)
PMV BLD: 11.8 FL — HIGH (ref 7.4–10.4)
PMV BLD: 11.8 FL — HIGH (ref 7.4–10.4)
PO2 BLDV: 52 MMHG — HIGH (ref 25–45)
PO2 BLDV: 52 MMHG — HIGH (ref 25–45)
POTASSIUM BLDV-SCNC: 3.8 MMOL/L — SIGNIFICANT CHANGE UP (ref 3.5–5.1)
POTASSIUM BLDV-SCNC: 3.8 MMOL/L — SIGNIFICANT CHANGE UP (ref 3.5–5.1)
POTASSIUM SERPL-MCNC: 4.5 MMOL/L — SIGNIFICANT CHANGE UP (ref 3.5–5)
POTASSIUM SERPL-MCNC: 4.5 MMOL/L — SIGNIFICANT CHANGE UP (ref 3.5–5)
POTASSIUM SERPL-SCNC: 4.5 MMOL/L — SIGNIFICANT CHANGE UP (ref 3.5–5)
POTASSIUM SERPL-SCNC: 4.5 MMOL/L — SIGNIFICANT CHANGE UP (ref 3.5–5)
PROT SERPL-MCNC: 7.1 G/DL — SIGNIFICANT CHANGE UP (ref 6–8)
PROT SERPL-MCNC: 7.1 G/DL — SIGNIFICANT CHANGE UP (ref 6–8)
RBC # BLD: 5.2 M/UL — SIGNIFICANT CHANGE UP (ref 4.2–5.4)
RBC # BLD: 5.2 M/UL — SIGNIFICANT CHANGE UP (ref 4.2–5.4)
RBC # FLD: 16.5 % — HIGH (ref 11.5–14.5)
RBC # FLD: 16.5 % — HIGH (ref 11.5–14.5)
SAO2 % BLDV: 81 % — SIGNIFICANT CHANGE UP (ref 67–88)
SAO2 % BLDV: 81 % — SIGNIFICANT CHANGE UP (ref 67–88)
SODIUM SERPL-SCNC: 141 MMOL/L — SIGNIFICANT CHANGE UP (ref 135–146)
SODIUM SERPL-SCNC: 141 MMOL/L — SIGNIFICANT CHANGE UP (ref 135–146)
WBC # BLD: 21.06 K/UL — HIGH (ref 4.8–10.8)
WBC # BLD: 21.06 K/UL — HIGH (ref 4.8–10.8)
WBC # FLD AUTO: 21.06 K/UL — HIGH (ref 4.8–10.8)
WBC # FLD AUTO: 21.06 K/UL — HIGH (ref 4.8–10.8)

## 2023-12-26 PROCEDURE — 74018 RADEX ABDOMEN 1 VIEW: CPT

## 2023-12-26 PROCEDURE — 74177 CT ABD & PELVIS W/CONTRAST: CPT | Mod: 26,MA

## 2023-12-26 PROCEDURE — 99223 1ST HOSP IP/OBS HIGH 75: CPT

## 2023-12-26 PROCEDURE — 97162 PT EVAL MOD COMPLEX 30 MIN: CPT | Mod: GP

## 2023-12-26 PROCEDURE — 87046 STOOL CULTR AEROBIC BACT EA: CPT

## 2023-12-26 PROCEDURE — 86140 C-REACTIVE PROTEIN: CPT

## 2023-12-26 PROCEDURE — 87507 IADNA-DNA/RNA PROBE TQ 12-25: CPT

## 2023-12-26 PROCEDURE — 97116 GAIT TRAINING THERAPY: CPT | Mod: GP

## 2023-12-26 PROCEDURE — 87045 FECES CULTURE AEROBIC BACT: CPT

## 2023-12-26 PROCEDURE — 85025 COMPLETE CBC W/AUTO DIFF WBC: CPT

## 2023-12-26 PROCEDURE — 80053 COMPREHEN METABOLIC PANEL: CPT

## 2023-12-26 PROCEDURE — 71045 X-RAY EXAM CHEST 1 VIEW: CPT | Mod: 26

## 2023-12-26 PROCEDURE — 87635 SARS-COV-2 COVID-19 AMP PRB: CPT

## 2023-12-26 PROCEDURE — 81001 URINALYSIS AUTO W/SCOPE: CPT

## 2023-12-26 PROCEDURE — 0225U NFCT DS DNA&RNA 21 SARSCOV2: CPT

## 2023-12-26 PROCEDURE — 97530 THERAPEUTIC ACTIVITIES: CPT | Mod: GP

## 2023-12-26 PROCEDURE — 99285 EMERGENCY DEPT VISIT HI MDM: CPT | Mod: FS

## 2023-12-26 PROCEDURE — 36415 COLL VENOUS BLD VENIPUNCTURE: CPT

## 2023-12-26 PROCEDURE — 83735 ASSAY OF MAGNESIUM: CPT

## 2023-12-26 PROCEDURE — 83605 ASSAY OF LACTIC ACID: CPT

## 2023-12-26 PROCEDURE — 85652 RBC SED RATE AUTOMATED: CPT

## 2023-12-26 PROCEDURE — 80048 BASIC METABOLIC PNL TOTAL CA: CPT

## 2023-12-26 PROCEDURE — 87086 URINE CULTURE/COLONY COUNT: CPT

## 2023-12-26 RX ORDER — ENOXAPARIN SODIUM 100 MG/ML
40 INJECTION SUBCUTANEOUS EVERY 24 HOURS
Refills: 0 | Status: DISCONTINUED | OUTPATIENT
Start: 2023-12-26 | End: 2024-01-02

## 2023-12-26 RX ORDER — METRONIDAZOLE 500 MG
500 TABLET ORAL ONCE
Refills: 0 | Status: COMPLETED | OUTPATIENT
Start: 2023-12-26 | End: 2023-12-26

## 2023-12-26 RX ORDER — ALPRAZOLAM 0.25 MG
0.5 TABLET ORAL
Refills: 0 | Status: DISCONTINUED | OUTPATIENT
Start: 2023-12-26 | End: 2024-01-02

## 2023-12-26 RX ORDER — ATORVASTATIN CALCIUM 80 MG/1
20 TABLET, FILM COATED ORAL AT BEDTIME
Refills: 0 | Status: DISCONTINUED | OUTPATIENT
Start: 2023-12-26 | End: 2024-01-02

## 2023-12-26 RX ORDER — TAMSULOSIN HYDROCHLORIDE 0.4 MG/1
0.4 CAPSULE ORAL AT BEDTIME
Refills: 0 | Status: DISCONTINUED | OUTPATIENT
Start: 2023-12-26 | End: 2024-01-02

## 2023-12-26 RX ORDER — OXYCODONE AND ACETAMINOPHEN 5; 325 MG/1; MG/1
1 TABLET ORAL EVERY 8 HOURS
Refills: 0 | Status: DISCONTINUED | OUTPATIENT
Start: 2023-12-26 | End: 2023-12-27

## 2023-12-26 RX ORDER — VALSARTAN 80 MG/1
160 TABLET ORAL DAILY
Refills: 0 | Status: DISCONTINUED | OUTPATIENT
Start: 2023-12-26 | End: 2023-12-27

## 2023-12-26 RX ORDER — SODIUM CHLORIDE 9 MG/ML
1000 INJECTION INTRAMUSCULAR; INTRAVENOUS; SUBCUTANEOUS ONCE
Refills: 0 | Status: COMPLETED | OUTPATIENT
Start: 2023-12-26 | End: 2023-12-26

## 2023-12-26 RX ORDER — INFLUENZA VIRUS VACCINE 15; 15; 15; 15 UG/.5ML; UG/.5ML; UG/.5ML; UG/.5ML
0.7 SUSPENSION INTRAMUSCULAR ONCE
Refills: 0 | Status: DISCONTINUED | OUTPATIENT
Start: 2023-12-26 | End: 2024-01-02

## 2023-12-26 RX ORDER — SERTRALINE 25 MG/1
50 TABLET, FILM COATED ORAL DAILY
Refills: 0 | Status: DISCONTINUED | OUTPATIENT
Start: 2023-12-26 | End: 2024-01-02

## 2023-12-26 RX ORDER — KETOROLAC TROMETHAMINE 30 MG/ML
15 SYRINGE (ML) INJECTION ONCE
Refills: 0 | Status: DISCONTINUED | OUTPATIENT
Start: 2023-12-26 | End: 2023-12-26

## 2023-12-26 RX ORDER — AMLODIPINE BESYLATE 2.5 MG/1
1 TABLET ORAL
Refills: 0 | DISCHARGE

## 2023-12-26 RX ORDER — PANTOPRAZOLE SODIUM 20 MG/1
40 TABLET, DELAYED RELEASE ORAL
Refills: 0 | Status: DISCONTINUED | OUTPATIENT
Start: 2023-12-26 | End: 2024-01-02

## 2023-12-26 RX ORDER — BUDESONIDE AND FORMOTEROL FUMARATE DIHYDRATE 160; 4.5 UG/1; UG/1
2 AEROSOL RESPIRATORY (INHALATION)
Refills: 0 | Status: DISCONTINUED | OUTPATIENT
Start: 2023-12-26 | End: 2024-01-02

## 2023-12-26 RX ORDER — METOCLOPRAMIDE HCL 10 MG
10 TABLET ORAL ONCE
Refills: 0 | Status: COMPLETED | OUTPATIENT
Start: 2023-12-26 | End: 2023-12-26

## 2023-12-26 RX ORDER — MORPHINE SULFATE 50 MG/1
4 CAPSULE, EXTENDED RELEASE ORAL ONCE
Refills: 0 | Status: DISCONTINUED | OUTPATIENT
Start: 2023-12-26 | End: 2023-12-26

## 2023-12-26 RX ORDER — FAMOTIDINE 10 MG/ML
20 INJECTION INTRAVENOUS ONCE
Refills: 0 | Status: COMPLETED | OUTPATIENT
Start: 2023-12-26 | End: 2023-12-26

## 2023-12-26 RX ORDER — ACETAMINOPHEN 500 MG
650 TABLET ORAL ONCE
Refills: 0 | Status: COMPLETED | OUTPATIENT
Start: 2023-12-26 | End: 2023-12-26

## 2023-12-26 RX ORDER — ACETAMINOPHEN 500 MG
650 TABLET ORAL EVERY 6 HOURS
Refills: 0 | Status: DISCONTINUED | OUTPATIENT
Start: 2023-12-26 | End: 2023-12-27

## 2023-12-26 RX ORDER — SODIUM CHLORIDE 9 MG/ML
1000 INJECTION, SOLUTION INTRAVENOUS
Refills: 0 | Status: DISCONTINUED | OUTPATIENT
Start: 2023-12-26 | End: 2023-12-27

## 2023-12-26 RX ORDER — SODIUM CHLORIDE 9 MG/ML
300 INJECTION, SOLUTION INTRAVENOUS ONCE
Refills: 0 | Status: COMPLETED | OUTPATIENT
Start: 2023-12-26 | End: 2023-12-26

## 2023-12-26 RX ORDER — SODIUM CHLORIDE 9 MG/ML
1000 INJECTION, SOLUTION INTRAVENOUS ONCE
Refills: 0 | Status: COMPLETED | OUTPATIENT
Start: 2023-12-26 | End: 2023-12-26

## 2023-12-26 RX ORDER — ONDANSETRON 8 MG/1
4 TABLET, FILM COATED ORAL ONCE
Refills: 0 | Status: COMPLETED | OUTPATIENT
Start: 2023-12-26 | End: 2023-12-26

## 2023-12-26 RX ORDER — CEFEPIME 1 G/1
2000 INJECTION, POWDER, FOR SOLUTION INTRAMUSCULAR; INTRAVENOUS ONCE
Refills: 0 | Status: COMPLETED | OUTPATIENT
Start: 2023-12-26 | End: 2023-12-26

## 2023-12-26 RX ADMIN — ONDANSETRON 4 MILLIGRAM(S): 8 TABLET, FILM COATED ORAL at 10:14

## 2023-12-26 RX ADMIN — Medication 15 MILLIGRAM(S): at 17:58

## 2023-12-26 RX ADMIN — SODIUM CHLORIDE 1000 MILLILITER(S): 9 INJECTION, SOLUTION INTRAVENOUS at 12:09

## 2023-12-26 RX ADMIN — Medication 100 MILLIGRAM(S): at 12:05

## 2023-12-26 RX ADMIN — SODIUM CHLORIDE 600 MILLILITER(S): 9 INJECTION, SOLUTION INTRAVENOUS at 12:31

## 2023-12-26 RX ADMIN — Medication 650 MILLIGRAM(S): at 14:46

## 2023-12-26 RX ADMIN — SODIUM CHLORIDE 1000 MILLILITER(S): 9 INJECTION INTRAMUSCULAR; INTRAVENOUS; SUBCUTANEOUS at 10:53

## 2023-12-26 RX ADMIN — FAMOTIDINE 20 MILLIGRAM(S): 10 INJECTION INTRAVENOUS at 11:08

## 2023-12-26 RX ADMIN — MORPHINE SULFATE 4 MILLIGRAM(S): 50 CAPSULE, EXTENDED RELEASE ORAL at 10:54

## 2023-12-26 RX ADMIN — Medication 10 MILLIGRAM(S): at 12:30

## 2023-12-26 RX ADMIN — CEFEPIME 100 MILLIGRAM(S): 1 INJECTION, POWDER, FOR SOLUTION INTRAMUSCULAR; INTRAVENOUS at 12:04

## 2023-12-26 NOTE — H&P ADULT - HISTORY OF PRESENT ILLNESS
Ms. Chow is an 78 year old female with PMH of HLD, HTN, chronic pain (multiple back and knee surgeries on oxycodone w/ spinal stimulator), anxiety and depression (on Xanax),  hiatal hernia, vaginal hysterectomy and laparotomy for adhesiolysis.  Ms. Chow is an 78 year old female with PMH of COPD, HLD, HTN, diverticulosis, chronic pain (multiple back and knee surgeries on oxycodone w/ spinal stimulator), anxiety and depression (on Xanax),  hiatal hernia, vaginal hysterectomy and laparotomy for adhesiolysis.    She presented to the ED complaining of abdominal pain of 1 day duration.     The pain is diffuse, and was associated with multiple episodes of NBNB vomiting and diarrhea. Denied sick contacts, fevers, chills, chest pain, or SOB.     Of note, she was recently managed as inpatient in 10/2023 for acute diverticulitis with ceftriaxone and metronidazole. She was evaluated by GI during that admission and was advised to undergo an outpatient colonoscopy but  has not followed up with them yet.    In the ED,  Ms. Chow is an 78 year old female with PMH of COPD, HLD, HTN, diverticulosis, chronic pain (multiple back and knee surgeries on oxycodone w/ spinal stimulator), anxiety and depression (on Xanax),  hiatal hernia, vaginal hysterectomy and laparotomy for adhesiolysis.    She presented to the ED complaining of abdominal pain of 1 day duration.     The pain is diffuse, and was associated with multiple episodes of NBNB vomiting and diarrhea. Denied sick contacts, fevers, chills, chest pain, or SOB.     Of note, she was recently managed as inpatient in 10/2023 for acute diverticulitis with ceftriaxone and metronidazole. She was evaluated by GI during that admission and was advised to undergo an outpatient colonoscopy but  has not followed up with them yet.    In the ED, vital signs were stable (/72, HR 98, RR 20, T 98.3, SpO2 98% on RA).    On examination, the patient was lying in bed, NA Ms. Chow is an 78 year old female with PMH of HLD, HTN, diverticulosis, chronic pain (multiple back and knee surgeries on oxycodone w/ spinal stimulator), right middle lobe syndrome, anxiety and depression (on Xanax),  hiatal hernia, vaginal hysterectomy and laparotomy for adhesiolysis.    She presented to the ED complaining of abdominal pain of 1 day duration.     The pain woke her up from sleep at night, was diffuse, and was associated with multiple episodes of NBNB vomiting, chills and cough. Last BM was one day ago. Denied diarrhea, sick contacts, chest pain, or SOB.     Of note, she was recently managed as inpatient in 10/2023 for acute diverticulitis with ceftriaxone and metronidazole. She was evaluated by GI during that admission and underwent outpatient colonoscopy with Dr. Vásquez a few weeks ago, per patient multiple polyps were found and resected, all were benign on histopathology. 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    In the ED, the patient was febrile (101.2 F), remainder of the vital signs were stable (/72, HR 98, RR 20, SpO2 98% on RA)    Physical examination was remarkable for diffuse tenderness to palpation in the abdomen, particularly the lower quadrants. Bowel sounds were present. No guarding or rebound tenderness. Pardo sign was negative.     Labs were significant for leukocytosis (WBC 21.06), and a lactate of 3.1 (trended down to 1.5 on VBG 6 hours later).    CXR showed low lung volumes (the patient has right middle lobe syndrome and follows with Dr. Amaya)    CT A/P was negative for diverticulitis, and only showed a mild dilation of a small bowel loop in the left lower abdomen. Follow up was recommended.    The patient was seen by general surgery, who gave the impression of viral/bacterial enteritis, and recommended NPO, IVF with no surgical intervention. They will continue to follow. Ms. Chow is an 78 year old female with PMH of HLD, HTN, diverticulosis, chronic pain (multiple back and knee surgeries on oxycodone w/ spinal stimulator), right middle lobe syndrome, anxiety and depression (on Xanax),  hiatal hernia, vaginal hysterectomy and laparotomy for adhesiolysis.    She presented to the ED complaining of abdominal pain of 1 day duration.     The pain woke her up from sleep at night, was diffuse, and was associated with multiple episodes of NBNB vomiting, chills and cough. Last BM was one day ago. Denied diarrhea, sick contacts, chest pain, or SOB.     Of note, she was recently managed as inpatient in 10/2023 for acute diverticulitis with ceftriaxone and metronidazole. She was evaluated by GI during that admission and underwent outpatient colonoscopy with Dr. Vásquez a few weeks ago, per patient multiple polyps were found and resected, all were benign on histopathology. 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    In the ED, the patient was febrile (101.2 F), remainder of the vital signs were stable (/72, HR 98, RR 20, SpO2 98% on RA)    Physical examination was remarkable for diffuse tenderness to palpation in the abdomen, particularly the lower quadrants. Bowel sounds were present. No guarding or rebound tenderness. Pardo sign was negative.     Labs were significant for leukocytosis (WBC 21.06), and a lactate of 3.1 (trended down to 1.5 on VBG 6 hours later).    CXR showed low lung volumes (the patient has right middle lobe syndrome and follows with Dr. Amaya)    CT A/P w/ IV contrast was negative for diverticulitis, and only showed a mild dilation of a small bowel loop in the left lower abdomen. Follow up was recommended.    The patient was seen by general surgery, who gave the impression of viral/bacterial enteritis, and recommended NPO, IVF with no surgical intervention. They will continue to follow.

## 2023-12-26 NOTE — ED PROVIDER NOTE - OBJECTIVE STATEMENT
78-year-old female with a past medical history of hypertension, hyperlipidemia, anxiety, chronic back pain presents emerged part with complaint of abdominal pain.  Patient with acute onset of epigastric abdominal pain earlier this morning with associated multiple episodes of nonbloody vomitus and diarrhea.  No palliating provoking factors.  No sick contacts, no fevers, no chills no chest pain or shortness of breath.

## 2023-12-26 NOTE — ED PROVIDER NOTE - NS ED ATTENDING STATEMENT MOD
No This was a shared visit with the NAVIN. I reviewed and verified the documentation and independently performed the documented:

## 2023-12-26 NOTE — ED PROVIDER NOTE - PHYSICAL EXAMINATION
CONST: Well appearing in NAD  EYES: PERRL, EOMI, Sclera and conjunctiva clear.   ENT: Oropharynx normal appearing, no erythema or exudates. Uvula midline.  CARD: Normal S1 S2; Normal rate and rhythm  RESP: Equal BS B/L, No wheezes, rhonchi or rales. No distress  GI: TTP diffusely w/o rebound or guarding. Abd soft, non distended.   MS: Normal ROM in all extremities. No midline spinal tenderness.  SKIN: Warm, dry, no acute rashes.   NEURO: A&Ox3, No focal deficits. Strength 5/5 with no sensory deficits.

## 2023-12-26 NOTE — H&P ADULT - NSHPPHYSICALEXAM_GEN_ALL_CORE
CONSTITUTIONAL: Well-developed; well-nourished; in no acute distress.  SKIN: Skin exam is warm and dry, no acute rash.  HEAD: Normocephalic; atraumatic.  EYES: Pupils equal round reactive to light, Extraocular movements intact; conjunctiva and sclera clear.  ENT: No nasal discharge; airway clear. Moist mucus membranes.  NECK: Supple; non tender. No rigidity  CARD: Regular rate and rhythm. Normal S1, S2; no murmurs, gallops, or rubs.  RESP: Lungs clear to auscultation bilaterally. No wheezes, rales or rhonchi.  ABD: Abdomen soft; tender to palpation diffusely, no rebound tenderness or guarding, BS+  EXT: Normal ROM. No clubbing, cyanosis or edema. No calf tenderness to palpation.  NEURO: Alert and oriented x 3. No focal deficits.  PSYCH: Cooperative, appropriate.

## 2023-12-26 NOTE — CONSULT NOTE ADULT - ATTENDING COMMENTS
This note reflects my exam and care of this patient on 12/26/2023.    This is 77 y/o female w PMH of HTN, HLD, GERD, anxiety, depression and chronic back pain s/p multiple surgical interventions and spinal cord stimulator placement that comes in w chief complaint of nausea and vomiting. Pt reports non bloody non bilious emesis that began yesterday after her last meal of the day. Pt also reports diffuse abdominal pain of high tensity that is constant and is accompanied with non bloody diarrhea since yesterday also. Pt denies any previous similar episodes. Last gas was yesterday and last BM was this morning.     PE:  AAO x3w  Chest: clear.  CV : rrr  Abdomen: soft, mildly tender diffusely, no rebound    CT was reviewed and is consistent with possible enteritis, no evidence of obstruction.    ASSESSMENT:  77 y/o female with Possible Acute Enterocolitis.    PLAN:  - IV hydration  - intermittent abdominal exams  - consider antibiotics  Patient is admitted to Medical Service.  Will follow up.    This note reflects my exam and care of this patient on 12/26/2023. This note reflects my exam and care of this patient on 12/26/2023.    This is 79 y/o female w PMH of HTN, HLD, GERD, anxiety, depression and chronic back pain s/p multiple surgical interventions and spinal cord stimulator placement that comes in w chief complaint of nausea and vomiting. Pt reports non bloody non bilious emesis that began yesterday after her last meal of the day. Pt also reports diffuse abdominal pain of high tensity that is constant and is accompanied with non bloody diarrhea since yesterday also. Pt denies any previous similar episodes. Last gas was yesterday and last BM was this morning.     PE:  AAO x3w  Chest: clear.  CV : rrr  Abdomen: soft, mildly tender diffusely, no rebound    CT was reviewed and is consistent with possible enteritis, no evidence of obstruction.    ASSESSMENT:  79 y/o female with Possible Acute Enterocolitis.    PLAN:  - IV hydration  - intermittent abdominal exams  - consider antibiotics  Patient is admitted to Medical Service.  Will follow up.    This note reflects my exam and care of this patient on 12/26/2023. This note reflects my exam and care of this patient on 12/26/2023.    This is 79 y/o female w PMH of HTN, HLD, GERD, anxiety, depression and chronic back pain s/p multiple surgical interventions and spinal cord stimulator placement that comes in w chief complaint of nausea and vomiting. Pt reports non bloody non bilious emesis that began yesterday after her last meal of the day. Pt also reports diffuse abdominal pain of high tensity that is constant and is accompanied with non bloody diarrhea since yesterday also. Pt denies any previous similar episodes. Last gas was yesterday and last BM was this morning.     PE:  AAO x3w  Chest: clear.  CV : rrr  Abdomen: soft, mildly tender diffusely, no rebound    CT was reviewed and is consistent with possible enteritis, no evidence of obstruction.    ASSESSMENT:  79 y/o female with Possible Acute Enterocolitis.  Abdominal pain.    PLAN:  - IV hydration  - intermittent abdominal exams  - consider antibiotics  Patient is admitted to Medical Service.  Will follow up.    This note reflects my exam and care of this patient on 12/26/2023. This note reflects my exam and care of this patient on 12/26/2023.    This is 77 y/o female w PMH of HTN, HLD, GERD, anxiety, depression and chronic back pain s/p multiple surgical interventions and spinal cord stimulator placement that comes in w chief complaint of nausea and vomiting. Pt reports non bloody non bilious emesis that began yesterday after her last meal of the day. Pt also reports diffuse abdominal pain of high tensity that is constant and is accompanied with non bloody diarrhea since yesterday also. Pt denies any previous similar episodes. Last gas was yesterday and last BM was this morning.     PE:  AAO x3w  Chest: clear.  CV : rrr  Abdomen: soft, mildly tender diffusely, no rebound    CT was reviewed and is consistent with possible enteritis, no evidence of obstruction.    ASSESSMENT:  77 y/o female with Acute Enterocolitis, possibly Infectious.  Abdominal pain.    PLAN:  - IV hydration  - intermittent abdominal exams  - consider antibiotics  Patient is admitted to Medical Service.  Will follow up.    This note reflects my exam and care of this patient on 12/26/2023.

## 2023-12-26 NOTE — PATIENT PROFILE ADULT - FALL HARM RISK - HARM RISK INTERVENTIONS
Assistance with ambulation/Assistance OOB with selected safe patient handling equipment/Communicate Risk of Fall with Harm to all staff/Monitor gait and stability/Reinforce activity limits and safety measures with patient and family/Sit up slowly, dangle for a short time, stand at bedside before walking/Tailored Fall Risk Interventions/Visual Cue: Yellow wristband and red socks/Bed in lowest position, wheels locked, appropriate side rails in place/Call bell, personal items and telephone in reach/Instruct patient to call for assistance before getting out of bed or chair/Non-slip footwear when patient is out of bed/[place] to call system/Physically safe environment - no spills, clutter or unnecessary equipment/Purposeful Proactive Rounding/Room/bathroom lighting operational, light cord in reach Assistance with ambulation/Assistance OOB with selected safe patient handling equipment/Communicate Risk of Fall with Harm to all staff/Monitor gait and stability/Reinforce activity limits and safety measures with patient and family/Sit up slowly, dangle for a short time, stand at bedside before walking/Tailored Fall Risk Interventions/Visual Cue: Yellow wristband and red socks/Bed in lowest position, wheels locked, appropriate side rails in place/Call bell, personal items and telephone in reach/Instruct patient to call for assistance before getting out of bed or chair/Non-slip footwear when patient is out of bed/[place] to call system/Physically safe environment - no spills, clutter or unnecessary equipment/Purposeful Proactive Rounding/Room/bathroom lighting operational, light cord in reach

## 2023-12-26 NOTE — CONSULT NOTE ADULT - ASSESSMENT
ASSESSMENT:  79 y/o female w PMH of HTN, HLD, GERD, anxiety, depression and chronic back pain s/p multiple surgical interventions and spinal cord stimulator placement that comes in w chief complaint of nausea and vomiting. Of note in the ED pt was noted to be febrile to 101.2, rest of VS WNL. Labs are significant for WBC of 21 and Lactate of 3.1, no significant findings on CT A/P, clinical presentation is more consistent with viral vs bacterial enteritis. No acute surgical intervention indicated.   Physical exam findings, imaging, and labs as documented above.     PLAN:  -no acute surgical intervention indicated  -npo  -IVFs  -monitor bowel function  -pain control  -trend lactate  -surgery will follow    Above plan discussed with Attending Surgeon Dr. Cain  , patient, patient family, and Primary team  12-26-23 @ 18:31    CONSULT SPECTRA: [id number]   ASSESSMENT:  77 y/o female w PMH of HTN, HLD, GERD, anxiety, depression and chronic back pain s/p multiple surgical interventions and spinal cord stimulator placement that comes in w chief complaint of nausea and vomiting. Of note in the ED pt was noted to be febrile to 101.2, rest of VS WNL. Labs are significant for WBC of 21 and Lactate of 3.1, no significant findings on CT A/P, clinical presentation is more consistent with viral vs bacterial enteritis. No acute surgical intervention indicated.   Physical exam findings, imaging, and labs as documented above.     PLAN:  -no acute surgical intervention indicated  -npo  -IVFs  -monitor bowel function  -pain control  -trend lactate  -surgery will follow    Above plan discussed with Attending Surgeon Dr. Cain  , patient, patient family, and Primary team  12-26-23 @ 18:31    CONSULT SPECTRA: [id number]

## 2023-12-26 NOTE — H&P ADULT - ASSESSMENT
Ms. Chow is an 78 year old female with PMH of HLD, HTN, diverticulitis, chronic pain (multiple back and knee surgeries on oxycodone w/ spinal stimulator), right middle lobe syndrome, anxiety and depression (on Xanax),  hiatal hernia, vaginal hysterectomy and laparotomy for adhesiolysis. Admitted for evaluation and management of acute abdominal pain.    Abdominal Pain 2/2 Diverticulitis vs. Infectious Enteritis   Sepsis POA  -treated as inpatient for diverticulitis in 10/2023  -colonoscopy done a few weeks ago, multiple polyps were removed, benign pathology  -     Ms. Chow is an 78 year old female with PMH of HLD, HTN, diverticulitis, chronic pain (multiple back and knee surgeries on oxycodone w/ spinal stimulator), right middle lobe syndrome, anxiety and depression (on Xanax),  hiatal hernia, vaginal hysterectomy and laparotomy for adhesiolysis. Admitted for evaluation and management of acute abdominal pain.    Abdominal Pain 2/2 Diverticulitis vs. Infectious Enteritis   Sepsis, Present on Admission  -treated as inpatient for diverticulitis in 10/2023  -colonoscopy done a few weeks ago, with Dr. Vásquez, multiple polyps were removed, benign pathology  -EGD was done 1 year ago with Dr. Vásquez, scar noted near site of prior hiatal hernia. Started on PPIs  -lactate downtrended from 3.1 to 1.5       Ms. Chow is an 78 year old female with PMH of HLD, HTN, diverticulitis, chronic pain (multiple back and knee surgeries on oxycodone w/ spinal stimulator), right middle lobe syndrome, anxiety and depression (on Xanax),  hiatal hernia, vaginal hysterectomy and laparotomy for adhesiolysis. Admitted for evaluation and management of acute abdominal pain.    Abdominal Pain 2/2 Uncomplicated Diverticulitis vs. Infectious Enteritis   Sepsis, Present on Admission  -treated as inpatient for uncomplicated diverticulitis in 10/2023  -colonoscopy done a few weeks ago, with Dr. Vásquez, multiple polyps were removed, benign pathology  -EGD was done 1 year ago with Dr. Vásquez, scar noted near site of prior hiatal hernia. Started on PPIs  -CT A/P was negative for diverticulitis however given recent history and strong clinical suspicion it cannot be ruled out  -lactate downtrended from 3.1 to 1.5    Plan:         Ms. Chow is an 78 year old female with PMH of HLD, HTN, diverticulitis, chronic pain (multiple back and knee surgeries on oxycodone w/ spinal stimulator), right middle lobe syndrome, anxiety and depression (on Xanax),  hiatal hernia, vaginal hysterectomy and laparotomy for adhesiolysis. Admitted for evaluation and management of acute abdominal pain.    Abdominal Pain 2/2 Uncomplicated Diverticulitis vs. Infectious Enteritis   Sepsis, Present on Admission  -treated as inpatient for uncomplicated diverticulitis in 10/2023  -colonoscopy done a few weeks ago, with Dr. Vásquez, multiple polyps were removed, benign pathology  -EGD was done 1 year ago with Dr. Vásquez, scar noted near site of prior hiatal hernia. Started on PPIs  -CT A/P was negative for diverticulitis however given recent history and strong clinical suspicion it cannot be ruled out  -lactate downtrended from 3.1 to 1.5    Plan:  ·	Flagyl 500 mg IV q8h + cefepime 2 g IV q8h as patient is high risk (advanced age, recent hx of diverticulitis)  ·	NPO + LR @ 70 cc/hr  ·	trend one more lactate reading  ·	follow up with surgery  ·	follow up blood and urine cultures, RVP  ·	possible repeat of CT A/P with oral and IV contrast    GERD  HO Hiatal Hernia  -will continue home omeprazole 20 mg as pantoprazole 40 mg    HTN  -c/w losartan 160 mg qd and amlodipine 10 mg qhs    HLD  -will continue home Crestor 5 mg as atorvastatin 20 mg qhs    Chronic Back Pain s/p Spinal Cord Stimulator  -follows with Dr. Joya  -c/w Percocet    Right Middle Lobe Syndrome  -follows with Dr. Amaya  -on Advair Diskus at home, reported that she never needs to use it    Anxiety  Depression  -c/w Xanax 0.5 mg qid prn  -c/w sertraline 50 mg qd    DVT prophylaxis: enoxaparin  GI prophylaxis: pantoprazole  Diet: NPO except meds  Code status: full code  Activity: RIANA               Ms. Chow is an 78 year old female with PMH of HLD, HTN, diverticulitis, chronic pain (multiple back and knee surgeries on oxycodone w/ spinal stimulator), right middle lobe syndrome, anxiety and depression (on Xanax),  hiatal hernia, vaginal hysterectomy and laparotomy for adhesiolysis. Admitted for evaluation and management of acute abdominal pain.    Abdominal Pain 2/2 Uncomplicated Diverticulitis vs. Infectious Enteritis   Sepsis, Present on Admission  -treated as inpatient for uncomplicated diverticulitis in 10/2023  -colonoscopy done a few weeks ago, with Dr. Vásquez, multiple polyps were removed, benign pathology  -EGD was done 1 year ago with Dr. Vásquez, scar noted near site of prior hiatal hernia. Started on PPIs  -CT A/P was negative for diverticulitis however given recent history and strong clinical suspicion it cannot be ruled out  -lactate downtrended from 3.1 to 1.5    Plan:  ·	Zosyn 3.375 mg IVPB q6h   ·	NPO + LR @ 70 cc/hr  ·	trend one more lactate reading  ·	follow up with surgery  ·	GI consult  ·	follow up blood and urine cultures, RVP  ·	possible repeat of CT A/P with oral and IV contrast    GERD  HO Hiatal Hernia  -will continue home omeprazole 20 mg as pantoprazole 40 mg    HTN  -c/w losartan 160 mg qd and amlodipine 10 mg qhs    HLD  -will continue home Crestor 5 mg as atorvastatin 20 mg qhs    Chronic Back Pain s/p Spinal Cord Stimulator  -follows with Dr. Joya  -c/w Percocet    Right Middle Lobe Syndrome  -follows with Dr. Amaya  -on Advair Diskus at home, reported that she never needs to use it    Anxiety  Depression  -c/w Xanax 0.5 mg qid prn  -c/w sertraline 50 mg qd    DVT prophylaxis: enoxaparin  GI prophylaxis: pantoprazole  Diet: NPO except meds  Code status: full code  Activity: RIANA               Ms. Chow is an 78 year old female with PMH of HLD, HTN, diverticulitis, chronic pain (multiple back and knee surgeries on oxycodone w/ spinal stimulator), right middle lobe syndrome, anxiety and depression (on Xanax),  hiatal hernia, vaginal hysterectomy and laparotomy for adhesiolysis. Admitted for evaluation and management of acute abdominal pain.    Abdominal Pain 2/2 Uncomplicated Diverticulitis vs. Infectious Enteritis   Sepsis, Present on Admission  -treated as inpatient for uncomplicated diverticulitis in 10/2023  -colonoscopy done a few weeks ago, with Dr. Vásquez, multiple polyps were removed, benign pathology  -EGD was done 1 year ago with Dr. Vásquez, scar noted near site of prior hiatal hernia. Started on PPIs  -CT A/P was negative for diverticulitis however given recent history and strong clinical suspicion it cannot be ruled out  -lactate downtrended from 3.1 to 1.5    Plan:  ·	Zosyn 3.375 mg IVPB q6h   ·	NPO + LR @ 70 cc/hr  ·	trend one more lactate reading  ·	follow up with surgery  ·	GI consult  ·	follow up blood and urine cultures, RVP  ·	possible repeat of CT A/P with oral and IV contrast    GERD  HO Hiatal Hernia  -will continue home omeprazole 20 mg as pantoprazole 40 mg    HTN  -holding losartan 160 mg qd and amlodipine 10 mg qhs, resume if BP stable    HLD  -will continue home Crestor 5 mg as atorvastatin 20 mg qhs    Chronic Back Pain s/p Spinal Cord Stimulator  -follows with Dr. Joya  -c/w Percocet    Right Middle Lobe Syndrome  -follows with Dr. Amaya  -on Advair Diskus at home, reported that she never needs to use it    Anxiety  Depression  -c/w Xanax 0.5 mg qid prn  -c/w sertraline 50 mg qd    DVT prophylaxis: enoxaparin  GI prophylaxis: pantoprazole  Diet: NPO except meds  Code status: full code  Activity: AAT

## 2023-12-26 NOTE — H&P ADULT - ATTENDING COMMENTS
*In the event of discrepancy, my note supersedes the resident note.    Date seen: 12/26/23     Agree with HPI above.   Labs and radiology as above.   ROS negative except per HPI.    PHYSICAL EXAM:  GENERAL: NAD, lying in bed comfortably  HEAD:  Atraumatic, Normocephalic  EYES: EOMI, PERRLA, conjunctiva and sclera clear  ENT: Moist mucous membranes  NECK: Supple, No JVD  CHEST/LUNG: Clear to auscultation bilaterally; No rales, rhonchi, wheezing, or rubs. Unlabored respirations  HEART: Regular rate and rhythm; No murmurs, rubs, or gallops  ABDOMEN: Bowel sounds present; Soft, Nontender, Nondistended. No hepatomegally  EXTREMITIES:  2+ Peripheral Pulses, brisk capillary refill. No clubbing, cyanosis, or edema  NERVOUS SYSTEM:  Alert & Oriented X3, speech clear. No deficits   MSK: FROM all 4 extremities, full and equal strength  SKIN: No rashes or lesions    Assessment/Plan:  79yo F w/ PMH of HLD, HTN, diverticulitis, chronic pain (multiple back and knee surgeries on oxycodone w/ spinal stimulator), right middle lobe syndrome, anxiety and depression (on Xanax),  hiatal hernia, vaginal hysterectomy and laparotomy for adhesiolysis, overactive bladder?, presents to ED for vomiting and abdominal pain. Admitted for sepsis likely secondary to acute enterocolitis. Recently discharged from [facility] in 10/2023 for acute diverticulitis.     #sepsis likely secondary to acute enterocolitis  #recent diverticulitis 10/2023  #lactic acidosis - resolved   - colonoscopy done a few weeks ago, with Dr. Vásquez, multiple polyps were removed, benign pathology  - leukocytosis 21k and fever 101.2F   - CT a/p: Mildly dilated loop of small bowel in the left lower abdomen  - f/u bcx; if there is diarrhea, consider GI PCR and C. diff PCR (both were negative in 10/2023)   - start zosyn IV  - NPO and advance diet as tolerated; LR 70cc/hr   - f/u surgery  - consider GI consult     #GERD: c/w ppi  #HTN: c/w losartan and amlodipine  #HLD: c/w statin   #chronic back pain s/p spinal cord stimulator: c/w home narcotics, f/u o/p Dr. Joya  #RML syndrome: f/u o/p Dr. Amaya  #anxiety depression: c/w xanax and sertraline  #overactive bladder?: c/w flomax    DVT ppx: lovenox  Dispo: from home; f/u cultures, monitor for fevers, f/u surgery *In the event of discrepancy, my note supersedes the resident note.    Date seen: 12/26/23     Agree with HPI above.   Labs and radiology as above.   ROS negative except per HPI.    PHYSICAL EXAM:  GENERAL: NAD, lying in bed comfortably  HEAD:  Atraumatic, Normocephalic  EYES: EOMI, PERRLA, conjunctiva and sclera clear  ENT: Moist mucous membranes  NECK: Supple, No JVD  CHEST/LUNG: Clear to auscultation bilaterally; No rales, rhonchi, wheezing, or rubs. Unlabored respirations  HEART: Regular rate and rhythm; No murmurs, rubs, or gallops  ABDOMEN: Bowel sounds present; Soft, Nontender, Nondistended. No hepatomegally  EXTREMITIES:  2+ Peripheral Pulses, brisk capillary refill. No clubbing, cyanosis, or edema  NERVOUS SYSTEM:  Alert & Oriented X3, speech clear. No deficits   MSK: FROM all 4 extremities, full and equal strength  SKIN: No rashes or lesions    Assessment/Plan:  77yo F w/ PMH of HLD, HTN, diverticulitis, chronic pain (multiple back and knee surgeries on oxycodone w/ spinal stimulator), right middle lobe syndrome, anxiety and depression (on Xanax),  hiatal hernia, vaginal hysterectomy and laparotomy for adhesiolysis, overactive bladder?, presents to ED for vomiting and abdominal pain. Admitted for sepsis likely secondary to acute enterocolitis. Recently discharged from [facility] in 10/2023 for acute diverticulitis.     #sepsis likely secondary to acute enterocolitis  #recent diverticulitis 10/2023  #lactic acidosis - resolved   - colonoscopy done a few weeks ago, with Dr. Vásquez, multiple polyps were removed, benign pathology  - leukocytosis 21k and fever 101.2F   - CT a/p: Mildly dilated loop of small bowel in the left lower abdomen  - f/u bcx; if there is diarrhea, consider GI PCR and C. diff PCR (both were negative in 10/2023)   - start zosyn IV  - NPO and advance diet as tolerated; LR 70cc/hr   - f/u surgery  - consider GI consult     #GERD: c/w ppi  #HTN: c/w losartan and amlodipine  #HLD: c/w statin   #chronic back pain s/p spinal cord stimulator: c/w home narcotics, f/u o/p Dr. Joya  #RML syndrome: f/u o/p Dr. Amaya  #anxiety depression: c/w xanax and sertraline  #overactive bladder?: c/w flomax    DVT ppx: lovenox  Dispo: from home; f/u cultures, monitor for fevers, f/u surgery *In the event of discrepancy, my note supersedes the resident note.    Date seen: 12/26/23     Agree with HPI above.   Labs and radiology as above.   ROS negative except per HPI.    PHYSICAL EXAM:  GENERAL: NAD, lying in bed comfortably  HEAD:  Atraumatic, Normocephalic  EYES: EOMI, PERRLA, conjunctiva and sclera clear  ENT: Moist mucous membranes  NECK: Supple, No JVD  CHEST/LUNG: Clear to auscultation bilaterally; No rales, rhonchi, wheezing, or rubs. Unlabored respirations  HEART: Regular rate and rhythm; No murmurs, rubs, or gallops  ABDOMEN: Bowel sounds present; Soft, Nontender, Nondistended. No hepatomegally  EXTREMITIES:  2+ Peripheral Pulses, brisk capillary refill. No clubbing, cyanosis, or edema  NERVOUS SYSTEM:  Alert & Oriented X3, speech clear. No deficits   MSK: FROM all 4 extremities, full and equal strength  SKIN: No rashes or lesions    Assessment/Plan:  79yo F w/ PMH of HLD, HTN, diverticulitis, chronic pain (multiple back and knee surgeries on oxycodone w/ spinal stimulator), right middle lobe syndrome, anxiety and depression (on Xanax),  hiatal hernia, vaginal hysterectomy and laparotomy for adhesiolysis, overactive bladder?, presents to ED for vomiting and abdominal pain. Admitted for sepsis likely secondary to acute enterocolitis. Recently discharged from [facility] in 10/2023 for acute diverticulitis.     #sepsis likely secondary to acute enterocolitis  #recent diverticulitis 10/2023  #lactic acidosis - resolved   - colonoscopy done a few weeks ago, with Dr. Vásquez, multiple polyps were removed, benign pathology  - leukocytosis 21k and fever 101.2F   - CT a/p: Mildly dilated loop of small bowel in the left lower abdomen  - f/u bcx; if there is diarrhea, consider GI PCR and C. diff PCR (both were negative in 10/2023)   - start zosyn IV  - NPO and advance diet as tolerated; LR 70cc/hr   - f/u surgery  - consider GI consult     #GERD: c/w ppi  #HTN: hold home ARB and amlodipine and can resume if BP is stable in setting of sepsis   #HLD: c/w statin   #chronic back pain s/p spinal cord stimulator: c/w home narcotics, f/u o/p Dr. Joya  #RML syndrome: f/u o/p Dr. Amaya  #anxiety depression: c/w xanax and sertraline  #overactive bladder?: c/w flomax    DVT ppx: lovenox  Dispo: from home; f/u cultures, monitor for fevers, f/u surgery *In the event of discrepancy, my note supersedes the resident note.    Date seen: 12/26/23     Agree with HPI above.   Labs and radiology as above.   ROS negative except per HPI.    PHYSICAL EXAM:  GENERAL: NAD, lying in bed comfortably  HEAD:  Atraumatic, Normocephalic  EYES: EOMI, PERRLA, conjunctiva and sclera clear  ENT: Moist mucous membranes  NECK: Supple, No JVD  CHEST/LUNG: Clear to auscultation bilaterally; No rales, rhonchi, wheezing, or rubs. Unlabored respirations  HEART: Regular rate and rhythm; No murmurs, rubs, or gallops  ABDOMEN: Bowel sounds present; Soft, Nontender, Nondistended. No hepatomegally  EXTREMITIES:  2+ Peripheral Pulses, brisk capillary refill. No clubbing, cyanosis, or edema  NERVOUS SYSTEM:  Alert & Oriented X3, speech clear. No deficits   MSK: FROM all 4 extremities, full and equal strength  SKIN: No rashes or lesions    Assessment/Plan:  77yo F w/ PMH of HLD, HTN, diverticulitis, chronic pain (multiple back and knee surgeries on oxycodone w/ spinal stimulator), right middle lobe syndrome, anxiety and depression (on Xanax),  hiatal hernia, vaginal hysterectomy and laparotomy for adhesiolysis, overactive bladder?, presents to ED for vomiting and abdominal pain. Admitted for sepsis likely secondary to acute enterocolitis. Recently discharged from [facility] in 10/2023 for acute diverticulitis.     #sepsis likely secondary to acute enterocolitis  #recent diverticulitis 10/2023  #lactic acidosis - resolved   - colonoscopy done a few weeks ago, with Dr. Vásquez, multiple polyps were removed, benign pathology  - leukocytosis 21k and fever 101.2F   - CT a/p: Mildly dilated loop of small bowel in the left lower abdomen  - f/u bcx; if there is diarrhea, consider GI PCR and C. diff PCR (both were negative in 10/2023)   - start zosyn IV  - NPO and advance diet as tolerated; LR 70cc/hr   - f/u surgery  - consider GI consult     #GERD: c/w ppi  #HTN: hold home ARB and amlodipine and can resume if BP is stable in setting of sepsis   #HLD: c/w statin   #chronic back pain s/p spinal cord stimulator: c/w home narcotics, f/u o/p Dr. Joya  #RML syndrome: f/u o/p Dr. Amaya  #anxiety depression: c/w xanax and sertraline  #overactive bladder?: c/w flomax    DVT ppx: lovenox  Dispo: from home; f/u cultures, monitor for fevers, f/u surgery

## 2023-12-26 NOTE — ED PROVIDER NOTE - ATTENDING APP SHARED VISIT CONTRIBUTION OF CARE
78-year-old female with a past medical history of hypertension, hyperlipidemia, chronic pain status post neurostimulator, status post hysterectomy, presents with abdominal pain.  Started within the last day, diffuse, associated nonbloody, nonbilious emesis and loose nonbloody stool.  Denies any chest pain, shortness of breath, fever or cough.  No GI bleed symptoms.  On exam nontoxic, vital signs noted, slightly uncomfortable appearing, holding emesis bag.  Normal work of breathing, abdomen soft, nondistended, diffusely tender to palpation, no peritonitis.  Labs notable for leukocytosis to 21,000 with shift, patient lactate 3.1, CT with mildly dilated loop of small bowel in the left lower abdomen without clear obstruction.  Given exam, CT read, lab abnormalities will consult surgery.  Empiric broad-spectrum IV antibiotics given already, cultures drawn, anticipate admission regardless pending surgical consultation.

## 2023-12-26 NOTE — ED PROVIDER NOTE - CLINICAL SUMMARY MEDICAL DECISION MAKING FREE TEXT BOX
ccruz - pt signed out to me by Dr Sotelo, no acute surg intervention, iv abx already given, admit to medicine

## 2023-12-26 NOTE — CONSULT NOTE ADULT - SUBJECTIVE AND OBJECTIVE BOX
GENERAL SURGERY CONSULT NOTE    Patient: MANUEL ROSENBAUM , 78y ([birth year])Female   MRN: [id number]  Location: [facility] ED Hold 030 A  Visit: 12-26-23 Inpatient  Date: 12-26-23 @ 18:31    HPI:    77 y/o female w PMH of HTN, HLD, GERD, anxiety, depression and chronic back pain s/p multiple surgical interventions and spinal cord stimulator placement that comes in w chief complaint of nausea and vomiting. Pt reports non bloody non bilious emesis that began yesterday after her last meal of the day. Pt also reports diffuse abdominal pain of high tensity that is constant and is accompanied with non bloody diarrhea since yesterday also. Pt denies any previous similar episodes. Last gas was yesterday and last BM was this morning. Pt reports recent colonoscopy with Dr Vásquez with multiple polyp resected but where found to be benign.     Of note in the ED pt was noted to be febrile to 101.2, rest of VS WNL. Labs are significant for WBC of 21 and Lactate of 3.1, no significant findings on CT A/P, clinical presentation is more consistent with viral vs bacterial enteritis. No acute surgical intervention indicated.      PAST MEDICAL & SURGICAL HISTORY:  HTN (hypertension)      HLD (hyperlipidemia)      Chronic back pain  s/p back sx and stimulator      Gastroesophageal reflux disease with hiatal hernia      H/O vaginal hysterectomy      History of laparotomy          Home Medications:  Advair Diskus 250 mcg-50 mcg inhalation powder: 1 puff(s) inhaled 2 times a day (16 Oct 2023 09:57)  ALPRAZolam 0.5 mg oral tablet: 1 tab(s) orally 4 times a day (16 Oct 2023 09:59)  Crestor 5 mg oral tablet: 1 tab(s) orally once a day (16 Oct 2023 09:59)  Diovan 160 mg oral capsule: 1 tab(s) orally once a day (16 Oct 2023 09:59)  omeprazole 20 mg oral delayed release tablet: 1 tab(s) orally 2 times a day (16 Oct 2023 09:57)  oxyCODONE-acetaminophen 7.5 mg-325 mg oral tablet: 1 tab(s) orally every 8 hours, As Needed (16 Oct 2023 15:07)  sertraline 50 mg oral tablet: 1 tab(s) orally once a day (16 Oct 2023 09:58)  tamsulosin 0.4 mg oral capsule: 1 cap(s) orally once a day (16 Oct 2023 09:59)        VITALS:  T(F): 101.2 (12-26-23 @ 14:20), Max: 101.2 (12-26-23 @ 14:20)  HR: 95 (12-26-23 @ 12:15) (95 - 98)  BP: 173/81 (12-26-23 @ 12:15) (170/72 - 173/81)  RR: 16 (12-26-23 @ 12:15) (16 - 20)  SpO2: 98% (12-26-23 @ 12:15) (98% - 98%)    PHYSICAL EXAM:  General: NAD, AAOx3, calm and cooperative  Cardiac: RRR   Respiratory: CTAB, normal respiratory effort  Abdomen: Soft, non-distended, diffuse tenderness to palpation  Musculoskeletal: ROM intact, compartments soft      MEDICATIONS  (STANDING):    MEDICATIONS  (PRN):      LAB/STUDIES:                        13.3   21.06 )-----------( 400      ( 26 Dec 2023 11:00 )             40.2     12-26    141  |  103  |  15  ----------------------------<  119<H>  4.5   |  22  |  0.7    Ca    9.4      26 Dec 2023 11:00    TPro  7.1  /  Alb  4.5  /  TBili  0.5  /  DBili  x   /  AST  28  /  ALT  18  /  AlkPhos  62  12-26      LIVER FUNCTIONS - ( 26 Dec 2023 11:00 )  Alb: 4.5 g/dL / Pro: 7.1 g/dL / ALK PHOS: 62 U/L / ALT: 18 U/L / AST: 28 U/L / GGT: x           Urinalysis Basic - ( 26 Dec 2023 11:00 )    Color: x / Appearance: x / SG: x / pH: x  Gluc: 119 mg/dL / Ketone: x  / Bili: x / Urobili: x   Blood: x / Protein: x / Nitrite: x   Leuk Esterase: x / RBC: x / WBC x   Sq Epi: x / Non Sq Epi: x / Bacteria: x      IMAGING:  < from: CT Abdomen and Pelvis w/ IV Cont (12.26.23 @ 13:21) >    IMPRESSION:    Mildly dilated loop of small bowel in the left lower abdomen,   nonspecific. Follow-up is recommended    --- End of Report ---            LIBRADO OLIVA MD; Attending Radiologist  This document has been electronically signed. Dec 26 2023  2:17PM    < end of copied text >      ACCESS DEVICES:  [ x] Peripheral IV  [ ] Central Venous Line	[ ] R	[ ] L	[ ] IJ	[ ] Fem	[ ] SC	Placed:   [ ] Arterial Line		[ ] R	[ ] L	[ ] Fem	[ ] Rad	[ ] Ax	Placed:   [ ] PICC:					[ ] Mediport  [ ] Urinary Catheter, Date Placed:      GENERAL SURGERY CONSULT NOTE    Patient: MANUEL ROSENBAUM , 78y ([birth year])Female   MRN: [id number]  Location: [facility] ED Hold 030 A  Visit: 12-26-23 Inpatient  Date: 12-26-23 @ 18:31    HPI:    77 y/o female w PMH of HTN, HLD, GERD, anxiety, depression and chronic back pain s/p multiple surgical interventions and spinal cord stimulator placement that comes in w chief complaint of nausea and vomiting. Pt reports non bloody non bilious emesis that began yesterday after her last meal of the day. Pt also reports diffuse abdominal pain of high tensity that is constant and is accompanied with non bloody diarrhea since yesterday also. Pt denies any previous similar episodes. Last gas was yesterday and last BM was this morning. Pt reports recent colonoscopy with Dr Vásquez with multiple polyp resected but where found to be benign.     Of note in the ED pt was noted to be febrile to 101.2, rest of VS WNL. Labs are significant for WBC of 21 and Lactate of 3.1, no significant findings on CT A/P, clinical presentation is more consistent with viral vs bacterial enteritis. No acute surgical intervention indicated.      PAST MEDICAL & SURGICAL HISTORY:  HTN (hypertension)      HLD (hyperlipidemia)      Chronic back pain  s/p back sx and stimulator      Gastroesophageal reflux disease with hiatal hernia      H/O vaginal hysterectomy      History of laparotomy          Home Medications:  Advair Diskus 250 mcg-50 mcg inhalation powder: 1 puff(s) inhaled 2 times a day (16 Oct 2023 09:57)  ALPRAZolam 0.5 mg oral tablet: 1 tab(s) orally 4 times a day (16 Oct 2023 09:59)  Crestor 5 mg oral tablet: 1 tab(s) orally once a day (16 Oct 2023 09:59)  Diovan 160 mg oral capsule: 1 tab(s) orally once a day (16 Oct 2023 09:59)  omeprazole 20 mg oral delayed release tablet: 1 tab(s) orally 2 times a day (16 Oct 2023 09:57)  oxyCODONE-acetaminophen 7.5 mg-325 mg oral tablet: 1 tab(s) orally every 8 hours, As Needed (16 Oct 2023 15:07)  sertraline 50 mg oral tablet: 1 tab(s) orally once a day (16 Oct 2023 09:58)  tamsulosin 0.4 mg oral capsule: 1 cap(s) orally once a day (16 Oct 2023 09:59)        VITALS:  T(F): 101.2 (12-26-23 @ 14:20), Max: 101.2 (12-26-23 @ 14:20)  HR: 95 (12-26-23 @ 12:15) (95 - 98)  BP: 173/81 (12-26-23 @ 12:15) (170/72 - 173/81)  RR: 16 (12-26-23 @ 12:15) (16 - 20)  SpO2: 98% (12-26-23 @ 12:15) (98% - 98%)    PHYSICAL EXAM:  General: NAD, AAOx3, calm and cooperative  Cardiac: RRR   Respiratory: CTAB, normal respiratory effort  Abdomen: Soft, non-distended, diffuse tenderness to palpation  Musculoskeletal: ROM intact, compartments soft      MEDICATIONS  (STANDING):    MEDICATIONS  (PRN):      LAB/STUDIES:                        13.3   21.06 )-----------( 400      ( 26 Dec 2023 11:00 )             40.2     12-26    141  |  103  |  15  ----------------------------<  119<H>  4.5   |  22  |  0.7    Ca    9.4      26 Dec 2023 11:00    TPro  7.1  /  Alb  4.5  /  TBili  0.5  /  DBili  x   /  AST  28  /  ALT  18  /  AlkPhos  62  12-26      LIVER FUNCTIONS - ( 26 Dec 2023 11:00 )  Alb: 4.5 g/dL / Pro: 7.1 g/dL / ALK PHOS: 62 U/L / ALT: 18 U/L / AST: 28 U/L / GGT: x           Urinalysis Basic - ( 26 Dec 2023 11:00 )    Color: x / Appearance: x / SG: x / pH: x  Gluc: 119 mg/dL / Ketone: x  / Bili: x / Urobili: x   Blood: x / Protein: x / Nitrite: x   Leuk Esterase: x / RBC: x / WBC x   Sq Epi: x / Non Sq Epi: x / Bacteria: x      IMAGING:  < from: CT Abdomen and Pelvis w/ IV Cont (12.26.23 @ 13:21) >    IMPRESSION:    Mildly dilated loop of small bowel in the left lower abdomen,   nonspecific. Follow-up is recommended    --- End of Report ---            LIBRADO OLIVA MD; Attending Radiologist  This document has been electronically signed. Dec 26 2023  2:17PM    < end of copied text >      ACCESS DEVICES:  [ x] Peripheral IV  [ ] Central Venous Line	[ ] R	[ ] L	[ ] IJ	[ ] Fem	[ ] SC	Placed:   [ ] Arterial Line		[ ] R	[ ] L	[ ] Fem	[ ] Rad	[ ] Ax	Placed:   [ ] PICC:					[ ] Mediport  [ ] Urinary Catheter, Date Placed:

## 2023-12-27 LAB
ALBUMIN SERPL ELPH-MCNC: 3.9 G/DL — SIGNIFICANT CHANGE UP (ref 3.5–5.2)
ALBUMIN SERPL ELPH-MCNC: 3.9 G/DL — SIGNIFICANT CHANGE UP (ref 3.5–5.2)
ALP SERPL-CCNC: 56 U/L — SIGNIFICANT CHANGE UP (ref 30–115)
ALP SERPL-CCNC: 56 U/L — SIGNIFICANT CHANGE UP (ref 30–115)
ALT FLD-CCNC: 14 U/L — SIGNIFICANT CHANGE UP (ref 0–41)
ALT FLD-CCNC: 14 U/L — SIGNIFICANT CHANGE UP (ref 0–41)
ANION GAP SERPL CALC-SCNC: 11 MMOL/L — SIGNIFICANT CHANGE UP (ref 7–14)
ANION GAP SERPL CALC-SCNC: 11 MMOL/L — SIGNIFICANT CHANGE UP (ref 7–14)
AST SERPL-CCNC: 19 U/L — SIGNIFICANT CHANGE UP (ref 0–41)
AST SERPL-CCNC: 19 U/L — SIGNIFICANT CHANGE UP (ref 0–41)
BASOPHILS # BLD AUTO: 0.06 K/UL — SIGNIFICANT CHANGE UP (ref 0–0.2)
BASOPHILS # BLD AUTO: 0.06 K/UL — SIGNIFICANT CHANGE UP (ref 0–0.2)
BASOPHILS NFR BLD AUTO: 0.5 % — SIGNIFICANT CHANGE UP (ref 0–1)
BASOPHILS NFR BLD AUTO: 0.5 % — SIGNIFICANT CHANGE UP (ref 0–1)
BILIRUB SERPL-MCNC: 0.6 MG/DL — SIGNIFICANT CHANGE UP (ref 0.2–1.2)
BILIRUB SERPL-MCNC: 0.6 MG/DL — SIGNIFICANT CHANGE UP (ref 0.2–1.2)
BUN SERPL-MCNC: 10 MG/DL — SIGNIFICANT CHANGE UP (ref 10–20)
BUN SERPL-MCNC: 10 MG/DL — SIGNIFICANT CHANGE UP (ref 10–20)
CALCIUM SERPL-MCNC: 8.6 MG/DL — SIGNIFICANT CHANGE UP (ref 8.4–10.5)
CALCIUM SERPL-MCNC: 8.6 MG/DL — SIGNIFICANT CHANGE UP (ref 8.4–10.5)
CHLORIDE SERPL-SCNC: 107 MMOL/L — SIGNIFICANT CHANGE UP (ref 98–110)
CHLORIDE SERPL-SCNC: 107 MMOL/L — SIGNIFICANT CHANGE UP (ref 98–110)
CO2 SERPL-SCNC: 23 MMOL/L — SIGNIFICANT CHANGE UP (ref 17–32)
CO2 SERPL-SCNC: 23 MMOL/L — SIGNIFICANT CHANGE UP (ref 17–32)
CREAT SERPL-MCNC: 0.7 MG/DL — SIGNIFICANT CHANGE UP (ref 0.7–1.5)
CREAT SERPL-MCNC: 0.7 MG/DL — SIGNIFICANT CHANGE UP (ref 0.7–1.5)
EGFR: 87 ML/MIN/1.73M2 — SIGNIFICANT CHANGE UP
EGFR: 87 ML/MIN/1.73M2 — SIGNIFICANT CHANGE UP
EOSINOPHIL # BLD AUTO: 0.09 K/UL — SIGNIFICANT CHANGE UP (ref 0–0.7)
EOSINOPHIL # BLD AUTO: 0.09 K/UL — SIGNIFICANT CHANGE UP (ref 0–0.7)
EOSINOPHIL NFR BLD AUTO: 0.7 % — SIGNIFICANT CHANGE UP (ref 0–8)
EOSINOPHIL NFR BLD AUTO: 0.7 % — SIGNIFICANT CHANGE UP (ref 0–8)
GLUCOSE SERPL-MCNC: 92 MG/DL — SIGNIFICANT CHANGE UP (ref 70–99)
GLUCOSE SERPL-MCNC: 92 MG/DL — SIGNIFICANT CHANGE UP (ref 70–99)
HCT VFR BLD CALC: 36.7 % — LOW (ref 37–47)
HCT VFR BLD CALC: 36.7 % — LOW (ref 37–47)
HGB BLD-MCNC: 11.7 G/DL — LOW (ref 12–16)
HGB BLD-MCNC: 11.7 G/DL — LOW (ref 12–16)
IMM GRANULOCYTES NFR BLD AUTO: 0.5 % — HIGH (ref 0.1–0.3)
IMM GRANULOCYTES NFR BLD AUTO: 0.5 % — HIGH (ref 0.1–0.3)
LACTATE SERPL-SCNC: 1 MMOL/L — SIGNIFICANT CHANGE UP (ref 0.7–2)
LACTATE SERPL-SCNC: 1 MMOL/L — SIGNIFICANT CHANGE UP (ref 0.7–2)
LYMPHOCYTES # BLD AUTO: 16.6 % — LOW (ref 20.5–51.1)
LYMPHOCYTES # BLD AUTO: 16.6 % — LOW (ref 20.5–51.1)
LYMPHOCYTES # BLD AUTO: 2.14 K/UL — SIGNIFICANT CHANGE UP (ref 1.2–3.4)
LYMPHOCYTES # BLD AUTO: 2.14 K/UL — SIGNIFICANT CHANGE UP (ref 1.2–3.4)
MAGNESIUM SERPL-MCNC: 2.2 MG/DL — SIGNIFICANT CHANGE UP (ref 1.8–2.4)
MAGNESIUM SERPL-MCNC: 2.2 MG/DL — SIGNIFICANT CHANGE UP (ref 1.8–2.4)
MCHC RBC-ENTMCNC: 25.5 PG — LOW (ref 27–31)
MCHC RBC-ENTMCNC: 25.5 PG — LOW (ref 27–31)
MCHC RBC-ENTMCNC: 31.9 G/DL — LOW (ref 32–37)
MCHC RBC-ENTMCNC: 31.9 G/DL — LOW (ref 32–37)
MCV RBC AUTO: 80.1 FL — LOW (ref 81–99)
MCV RBC AUTO: 80.1 FL — LOW (ref 81–99)
MONOCYTES # BLD AUTO: 0.83 K/UL — HIGH (ref 0.1–0.6)
MONOCYTES # BLD AUTO: 0.83 K/UL — HIGH (ref 0.1–0.6)
MONOCYTES NFR BLD AUTO: 6.5 % — SIGNIFICANT CHANGE UP (ref 1.7–9.3)
MONOCYTES NFR BLD AUTO: 6.5 % — SIGNIFICANT CHANGE UP (ref 1.7–9.3)
NEUTROPHILS # BLD AUTO: 9.67 K/UL — HIGH (ref 1.4–6.5)
NEUTROPHILS # BLD AUTO: 9.67 K/UL — HIGH (ref 1.4–6.5)
NEUTROPHILS NFR BLD AUTO: 75.2 % — SIGNIFICANT CHANGE UP (ref 42.2–75.2)
NEUTROPHILS NFR BLD AUTO: 75.2 % — SIGNIFICANT CHANGE UP (ref 42.2–75.2)
NRBC # BLD: 0 /100 WBCS — SIGNIFICANT CHANGE UP (ref 0–0)
NRBC # BLD: 0 /100 WBCS — SIGNIFICANT CHANGE UP (ref 0–0)
PLATELET # BLD AUTO: 366 K/UL — SIGNIFICANT CHANGE UP (ref 130–400)
PLATELET # BLD AUTO: 366 K/UL — SIGNIFICANT CHANGE UP (ref 130–400)
PMV BLD: 12 FL — HIGH (ref 7.4–10.4)
PMV BLD: 12 FL — HIGH (ref 7.4–10.4)
POTASSIUM SERPL-MCNC: 3.7 MMOL/L — SIGNIFICANT CHANGE UP (ref 3.5–5)
POTASSIUM SERPL-MCNC: 3.7 MMOL/L — SIGNIFICANT CHANGE UP (ref 3.5–5)
POTASSIUM SERPL-SCNC: 3.7 MMOL/L — SIGNIFICANT CHANGE UP (ref 3.5–5)
POTASSIUM SERPL-SCNC: 3.7 MMOL/L — SIGNIFICANT CHANGE UP (ref 3.5–5)
PROT SERPL-MCNC: 6 G/DL — SIGNIFICANT CHANGE UP (ref 6–8)
PROT SERPL-MCNC: 6 G/DL — SIGNIFICANT CHANGE UP (ref 6–8)
RBC # BLD: 4.58 M/UL — SIGNIFICANT CHANGE UP (ref 4.2–5.4)
RBC # BLD: 4.58 M/UL — SIGNIFICANT CHANGE UP (ref 4.2–5.4)
RBC # FLD: 17.1 % — HIGH (ref 11.5–14.5)
RBC # FLD: 17.1 % — HIGH (ref 11.5–14.5)
SARS-COV-2 RNA SPEC QL NAA+PROBE: SIGNIFICANT CHANGE UP
SARS-COV-2 RNA SPEC QL NAA+PROBE: SIGNIFICANT CHANGE UP
SODIUM SERPL-SCNC: 141 MMOL/L — SIGNIFICANT CHANGE UP (ref 135–146)
SODIUM SERPL-SCNC: 141 MMOL/L — SIGNIFICANT CHANGE UP (ref 135–146)
WBC # BLD: 12.86 K/UL — HIGH (ref 4.8–10.8)
WBC # BLD: 12.86 K/UL — HIGH (ref 4.8–10.8)
WBC # FLD AUTO: 12.86 K/UL — HIGH (ref 4.8–10.8)
WBC # FLD AUTO: 12.86 K/UL — HIGH (ref 4.8–10.8)

## 2023-12-27 PROCEDURE — 99223 1ST HOSP IP/OBS HIGH 75: CPT

## 2023-12-27 PROCEDURE — 99232 SBSQ HOSP IP/OBS MODERATE 35: CPT

## 2023-12-27 PROCEDURE — 99222 1ST HOSP IP/OBS MODERATE 55: CPT

## 2023-12-27 PROCEDURE — 74018 RADEX ABDOMEN 1 VIEW: CPT | Mod: 26

## 2023-12-27 RX ORDER — CEFEPIME 1 G/1
2000 INJECTION, POWDER, FOR SOLUTION INTRAMUSCULAR; INTRAVENOUS EVERY 8 HOURS
Refills: 0 | Status: DISCONTINUED | OUTPATIENT
Start: 2023-12-27 | End: 2023-12-27

## 2023-12-27 RX ORDER — SENNA PLUS 8.6 MG/1
2 TABLET ORAL AT BEDTIME
Refills: 0 | Status: DISCONTINUED | OUTPATIENT
Start: 2023-12-27 | End: 2023-12-28

## 2023-12-27 RX ORDER — POLYETHYLENE GLYCOL 3350 17 G/17G
17 POWDER, FOR SOLUTION ORAL DAILY
Refills: 0 | Status: DISCONTINUED | OUTPATIENT
Start: 2023-12-27 | End: 2023-12-28

## 2023-12-27 RX ORDER — PIPERACILLIN AND TAZOBACTAM 4; .5 G/20ML; G/20ML
3.38 INJECTION, POWDER, LYOPHILIZED, FOR SOLUTION INTRAVENOUS EVERY 6 HOURS
Refills: 0 | Status: DISCONTINUED | OUTPATIENT
Start: 2023-12-27 | End: 2023-12-30

## 2023-12-27 RX ORDER — ONDANSETRON 8 MG/1
4 TABLET, FILM COATED ORAL EVERY 8 HOURS
Refills: 0 | Status: DISCONTINUED | OUTPATIENT
Start: 2023-12-27 | End: 2024-01-02

## 2023-12-27 RX ORDER — SODIUM CHLORIDE 9 MG/ML
1000 INJECTION, SOLUTION INTRAVENOUS
Refills: 0 | Status: DISCONTINUED | OUTPATIENT
Start: 2023-12-27 | End: 2023-12-29

## 2023-12-27 RX ORDER — CEFTRIAXONE 500 MG/1
2000 INJECTION, POWDER, FOR SOLUTION INTRAMUSCULAR; INTRAVENOUS EVERY 24 HOURS
Refills: 0 | Status: DISCONTINUED | OUTPATIENT
Start: 2023-12-27 | End: 2023-12-27

## 2023-12-27 RX ORDER — METRONIDAZOLE 500 MG
500 TABLET ORAL EVERY 8 HOURS
Refills: 0 | Status: DISCONTINUED | OUTPATIENT
Start: 2023-12-27 | End: 2023-12-27

## 2023-12-27 RX ORDER — PIPERACILLIN AND TAZOBACTAM 4; .5 G/20ML; G/20ML
3.38 INJECTION, POWDER, LYOPHILIZED, FOR SOLUTION INTRAVENOUS EVERY 8 HOURS
Refills: 0 | Status: DISCONTINUED | OUTPATIENT
Start: 2023-12-27 | End: 2023-12-27

## 2023-12-27 RX ADMIN — ATORVASTATIN CALCIUM 20 MILLIGRAM(S): 80 TABLET, FILM COATED ORAL at 21:17

## 2023-12-27 RX ADMIN — SERTRALINE 50 MILLIGRAM(S): 25 TABLET, FILM COATED ORAL at 12:37

## 2023-12-27 RX ADMIN — SENNA PLUS 2 TABLET(S): 8.6 TABLET ORAL at 23:54

## 2023-12-27 RX ADMIN — PANTOPRAZOLE SODIUM 40 MILLIGRAM(S): 20 TABLET, DELAYED RELEASE ORAL at 08:26

## 2023-12-27 RX ADMIN — TAMSULOSIN HYDROCHLORIDE 0.4 MILLIGRAM(S): 0.4 CAPSULE ORAL at 21:17

## 2023-12-27 RX ADMIN — SODIUM CHLORIDE 100 MILLILITER(S): 9 INJECTION, SOLUTION INTRAVENOUS at 10:04

## 2023-12-27 RX ADMIN — PIPERACILLIN AND TAZOBACTAM 25 GRAM(S): 4; .5 INJECTION, POWDER, LYOPHILIZED, FOR SOLUTION INTRAVENOUS at 05:23

## 2023-12-27 RX ADMIN — Medication 0.5 MILLIGRAM(S): at 10:05

## 2023-12-27 RX ADMIN — ONDANSETRON 4 MILLIGRAM(S): 8 TABLET, FILM COATED ORAL at 15:46

## 2023-12-27 RX ADMIN — Medication 600 MILLIGRAM(S): at 11:53

## 2023-12-27 RX ADMIN — PIPERACILLIN AND TAZOBACTAM 25 GRAM(S): 4; .5 INJECTION, POWDER, LYOPHILIZED, FOR SOLUTION INTRAVENOUS at 12:36

## 2023-12-27 RX ADMIN — PIPERACILLIN AND TAZOBACTAM 25 GRAM(S): 4; .5 INJECTION, POWDER, LYOPHILIZED, FOR SOLUTION INTRAVENOUS at 23:55

## 2023-12-27 RX ADMIN — Medication 0.5 MILLIGRAM(S): at 03:35

## 2023-12-27 RX ADMIN — ENOXAPARIN SODIUM 40 MILLIGRAM(S): 100 INJECTION SUBCUTANEOUS at 17:10

## 2023-12-27 RX ADMIN — PIPERACILLIN AND TAZOBACTAM 25 GRAM(S): 4; .5 INJECTION, POWDER, LYOPHILIZED, FOR SOLUTION INTRAVENOUS at 17:09

## 2023-12-27 NOTE — PROGRESS NOTE ADULT - ATTENDING COMMENTS
Patient still complains of pain today but better.  WBC 12. Afebrile.    Abdomen: mildly tender diffusely, no rebound    ASSESSMENT:  79 y/o female with Acute Enterocolitis, possibly Infectious.  Abdominal pain.    PLAN:  - ok for liquid diet  - OOB  - DVT proph  Will follow up

## 2023-12-27 NOTE — CONSULT NOTE ADULT - ASSESSMENT
Assessment and Plan  Case of an 78 year old female patient with history of Anxiety, Depression, DL, HTN, hiatal hernia, chronic back and knee pains s/p multiple surgeries, and recent admission for sigmoid diverticulitis in 10/2023 who presented to the ED on 12/26 for evaluation of abdominal pain, nausea, and vomiting, found to be febrile with evidence of leukocytosis and lactic acidosis on blood work and evidence of dilated loops of small bowel on imaging, admitted for further management. We are consulted for small bowel dilation and concern of enteritis.      Sepsis with Mildly Dilated Loops of Small Bowel   Nausea, Vomiting, and Abdominal Pain  Rule Out Viral/Bacterial Enteritis  Recent Uncomplicated Sigmoid Diverticulitis in 10/2023  * Recent admission in 10/2023 for sigmoid diverticulitis s/p antibiotics course. Sedimentation Rate, Erythrocyte (10.24.23 @ 06:06) 8 mm/Hr; C-Reactive Protein, Serum (10.24.23 @ 06:06) 5.8 mg/L  * CT Abdomen and Pelvis w/ IV Cont (10.16.23 @ 04:33) Colonic diverticulosis. Diffuse sigmoid thickening with surrounding fat stranding. Likely inflamed diverticulum on series 4 image 264. Avulsion, pneumoperitoneum or pneumatosis.  * Had colonoscopy 3 weeks ago at Dr Vásquez's office: several polyps (benign pathology per patient) and internal hemorrhoids -> no records  * Current presentation 12/26: 1 day of diffuse abdominal pain associated with nausea, 7x non bilious vomiting, chills, subjective fevers  * /72 mmHg; HR 98 bpm; RR 20 bpm; T 101.2 degrees   * ED Labs WBC 21.06, Hb 13.3, Plt 400; Na 141, K 4.5, BUN 15, Cr 0.7, LFT 0.5/62/28/18; Lipase 18; LA 3.1 to 1.5 on 12/26  * CT Abdomen and Pelvis w/ IV Cont (12.26.23 @ 13:21) Mildly dilated loop of small bowel in the left lower abdomen, nonspecific. Follow-up is recommended  * Family history of Crohn Disease in daughter    RECOMMENDATIONS   - Surgical evaluation appreciated: no acute surgical intervention  - Will obtain outpatient records from Dr Vásquez's office  - Monitor T for fever: spiked T 101.2 at 2PM on 12/26. Follow up sepsis workup (blood cultures received; RVP and urine studies pending collection)  - Trend WBC and LA. Please check ESR and CRP  - Monitor BM (last BM on 12/25). Patient notes constipation on senna and colace. In case of diarrhea, would send stool studies. In case continues to be constipated, would recommend bowel regimen (senna 2 tabs at bedtime with home colace)  - Continue IV antibiotics (currently on IV Zosyn)  - Monitor nausea and vomiting: nausea improved on 12/27 and last non bilious vomiting episode was on 12/26 x7  - Antiemetics: recommend starting IV Zofran 4mg Q8h PRN   - Advance diet as tolerated: currently NPO   - Pain control: agree with tylenol PRN and Percocet PRN   - Follow up with our [facility] located at [address] [place] [address]. Phone Number: [phone]        History of GERD- Controlled   History of Hiatal Hernia  * Last EGD was a few months ago with Dr Vásquez: scar noted near site of prior hiatal hernia    * Was started on PPI     RECOMMENDATIONS  - Continue po protonix 40mg daily 30 minutes before breakfast  - Avoid NSAIDs  - Educated about lifestyle modifications: avoid spicy foods or late time dinners      Stable Hepatic Cysts and Subcentimeter Hypodensities  * Noted on prior imaging  * Stable on current imaging as above  * LFTs 0.5/62/28/18    RECOMMENDATIONS  - Trend LFTs   - Monitor for symptoms      Thank you for your consult.  - Please note that plan was communicated with medical team.   - Please reach GI on [phone] during weekdays till 5pm.  - Please call the GI service line after 5pm on Weekdays and anytime on Weekends: [phone].      Cindy Mckeon MD  PGY - 4 Gastroenterology Fellow   [facility] Assessment and Plan  Case of an 78 year old female patient with history of Anxiety, Depression, DL, HTN, hiatal hernia, chronic back and knee pains s/p multiple surgeries, and recent admission for sigmoid diverticulitis in 10/2023 who presented to the ED on 12/26 for evaluation of abdominal pain, nausea, and vomiting, found to be febrile with evidence of leukocytosis and lactic acidosis on blood work and evidence of dilated loops of small bowel on imaging, admitted for further management. We are consulted for small bowel dilation and concern of enteritis.      Sepsis with Mildly Dilated Loops of Small Bowel   Nausea, Vomiting, and Abdominal Pain  Rule Out Viral/Bacterial Enteritis  Recent Uncomplicated Sigmoid Diverticulitis in 10/2023  * Recent admission in 10/2023 for sigmoid diverticulitis s/p antibiotics course. Sedimentation Rate, Erythrocyte (10.24.23 @ 06:06) 8 mm/Hr; C-Reactive Protein, Serum (10.24.23 @ 06:06) 5.8 mg/L  * CT Abdomen and Pelvis w/ IV Cont (10.16.23 @ 04:33) Colonic diverticulosis. Diffuse sigmoid thickening with surrounding fat stranding. Likely inflamed diverticulum on series 4 image 264. Avulsion, pneumoperitoneum or pneumatosis.  * Had colonoscopy 3 weeks ago at Dr Vásquez's office: several polyps (benign pathology per patient) and internal hemorrhoids -> no records  * Current presentation 12/26: 1 day of diffuse abdominal pain associated with nausea, 7x non bilious vomiting, chills, subjective fevers  * /72 mmHg; HR 98 bpm; RR 20 bpm; T 101.2 degrees   * ED Labs WBC 21.06, Hb 13.3, Plt 400; Na 141, K 4.5, BUN 15, Cr 0.7, LFT 0.5/62/28/18; Lipase 18; LA 3.1 to 1.5 on 12/26  * CT Abdomen and Pelvis w/ IV Cont (12.26.23 @ 13:21) Mildly dilated loop of small bowel in the left lower abdomen, nonspecific. Follow-up is recommended  * Family history of Crohn Disease in daughter    RECOMMENDATIONS   - Surgical evaluation appreciated: no acute surgical intervention  - Will obtain outpatient records from Dr Vásquez's office  - Monitor T for fever: spiked T 101.2 at 2PM on 12/26. Follow up sepsis workup (blood cultures received; RVP and urine studies pending collection)  - Trend WBC and LA. Please check ESR and CRP  - Monitor BM (last BM on 12/25). Patient notes constipation on senna and colace. In case of diarrhea, would send stool studies. In case continues to be constipated, would recommend bowel regimen (senna 2 tabs at bedtime with home colace)  - Continue IV antibiotics (currently on IV Zosyn)  - Monitor nausea and vomiting: nausea improved on 12/27 and last non bilious vomiting episode was on 12/26 x7  - Antiemetics: recommend starting IV Zofran 4mg Q8h PRN   - Advance diet as tolerated: currently NPO   - Pain control: agree with tylenol PRN and Percocet PRN   - Follow up with our [facility] located at [address] [place] [address]. Phone Number: [phone]        History of GERD- Controlled   History of Hiatal Hernia  * Last EGD was a few months ago with Dr Vásquez: scar noted near site of prior hiatal hernia    * Was started on PPI     RECOMMENDATIONS  - Continue po protonix 40mg daily 30 minutes before breakfast  - Avoid NSAIDs  - Educated about lifestyle modifications: avoid spicy foods or late time dinners      Stable Hepatic Cysts and Subcentimeter Hypodensities  * Noted on prior imaging  * Stable on current imaging as above  * LFTs 0.5/62/28/18    RECOMMENDATIONS  - Trend LFTs   - Monitor for symptoms      Thank you for your consult.  - Please note that plan was communicated with medical team.   - Please reach GI on [phone] during weekdays till 5pm.  - Please call the GI service line after 5pm on Weekdays and anytime on Weekends: [phone].      Cindy Mckeon MD  PGY - 4 Gastroenterology Fellow   [facility] Assessment and Plan  Case of an 78 year old female patient with history of Anxiety, Depression, DL, HTN, hiatal hernia, chronic back and knee pains s/p multiple surgeries, and recent admission for sigmoid diverticulitis in 10/2023 who presented to the ED on 12/26 for evaluation of abdominal pain, nausea, and vomiting, found to be febrile with evidence of leukocytosis and lactic acidosis on blood work and evidence of dilated loops of small bowel on imaging, admitted for further management. We are consulted for small bowel dilation and concern of enteritis.      Sepsis with Mildly Dilated Loops of Small Bowel   Nausea, Vomiting, and Abdominal Pain  Rule Out Viral/Bacterial Enteritis  Recent Uncomplicated Sigmoid Diverticulitis in 10/2023  * Recent admission in 10/2023 for sigmoid diverticulitis s/p antibiotics course. Sedimentation Rate, Erythrocyte (10.24.23 @ 06:06) 8 mm/Hr; C-Reactive Protein, Serum (10.24.23 @ 06:06) 5.8 mg/L  * CT Abdomen and Pelvis w/ IV Cont (10.16.23 @ 04:33) Colonic diverticulosis. Diffuse sigmoid thickening with surrounding fat stranding. Likely inflamed diverticulum on series 4 image 264. Avulsion, pneumoperitoneum or pneumatosis.  * Had colonoscopy 3 weeks ago at Dr Vásquez's office: several polyps (benign pathology per patient) and internal hemorrhoids -> no records  * Current presentation 12/26: 1 day of diffuse abdominal pain associated with nausea, 7x non bilious vomiting, chills, subjective fevers  * /72 mmHg; HR 98 bpm; RR 20 bpm; T 101.2 degrees   * ED Labs WBC 21.06, Hb 13.3, Plt 400; Na 141, K 4.5, BUN 15, Cr 0.7, LFT 0.5/62/28/18; Lipase 18; LA 3.1 to 1.5 on 12/26  * CT Abdomen and Pelvis w/ IV Cont (12.26.23 @ 13:21) Mildly dilated loop of small bowel in the left lower abdomen, nonspecific. Follow-up is recommended  * Family history of Crohn Disease in daughter    RECOMMENDATIONS   - Surgical evaluation appreciated: no acute surgical intervention  - Monitor T for fever: spiked T 101.2 at 2PM on 12/26. Follow up sepsis workup (blood cultures received; RVP and urine studies pending collection)  - Trend WBC and LA. Please check ESR and CRP  - Monitor BM (last BM on 12/25). Patient notes constipation on senna and colace at home. Please resume home bowel regimen (senna 2 tabs at bedtime with colace)  - Continue IV antibiotics (currently on IV Zosyn)  - Monitor nausea and vomiting: nausea improved on 12/27 and last non bilious vomiting episode was on 12/26 x7  - Antiemetics: recommend starting IV Zofran 4mg Q8h PRN   - Advance diet as tolerated: tolerating clear liquid diet  - Pain control: agree with tylenol PRN and Percocet PRN   - Follow up with our [facility] located at [address] [place] [address]. Phone Number: [phone]        History of GERD- Controlled   History of Hiatal Hernia  * Last EGD was a few months ago with Dr Vásquez: scar noted near site of prior hiatal hernia    * Was started on PPI     RECOMMENDATIONS  - Continue po protonix 40mg daily 30 minutes before breakfast  - Avoid NSAIDs  - Educated about lifestyle modifications: avoid spicy foods or late time dinners      Stable Hepatic Cysts and Subcentimeter Hypodensities  * Noted on prior imaging  * Stable on current imaging as above  * LFTs 0.5/62/28/18    RECOMMENDATIONS  - Trend LFTs   - Monitor for symptoms      Thank you for your consult.  - Please note that plan was communicated with medical team.   - Please reach GI on [phone] during weekdays till 5pm.  - Please call the GI service line after 5pm on Weekdays and anytime on Weekends: [phone].      Cindy Mckeon MD  PGY - 4 Gastroenterology Fellow   [facility] Assessment and Plan  Case of an 78 year old female patient with history of Anxiety, Depression, DL, HTN, hiatal hernia, chronic back and knee pains s/p multiple surgeries, and recent admission for sigmoid diverticulitis in 10/2023 who presented to the ED on 12/26 for evaluation of abdominal pain, nausea, and vomiting, found to be febrile with evidence of leukocytosis and lactic acidosis on blood work and evidence of dilated loops of small bowel on imaging, admitted for further management. We are consulted for small bowel dilation and concern of enteritis.      Sepsis with Mildly Dilated Loops of Small Bowel   Nausea, Vomiting, and Abdominal Pain  Rule Out Viral/Bacterial Enteritis  Recent Uncomplicated Sigmoid Diverticulitis in 10/2023  * Recent admission in 10/2023 for sigmoid diverticulitis s/p antibiotics course. Sedimentation Rate, Erythrocyte (10.24.23 @ 06:06) 8 mm/Hr; C-Reactive Protein, Serum (10.24.23 @ 06:06) 5.8 mg/L  * CT Abdomen and Pelvis w/ IV Cont (10.16.23 @ 04:33) Colonic diverticulosis. Diffuse sigmoid thickening with surrounding fat stranding. Likely inflamed diverticulum on series 4 image 264. Avulsion, pneumoperitoneum or pneumatosis.  * Had colonoscopy 3 weeks ago at Dr Vásquez's office: several polyps (benign pathology per patient) and internal hemorrhoids -> no records  * Current presentation 12/26: 1 day of diffuse abdominal pain associated with nausea, 7x non bilious vomiting, chills, subjective fevers  * /72 mmHg; HR 98 bpm; RR 20 bpm; T 101.2 degrees   * ED Labs WBC 21.06, Hb 13.3, Plt 400; Na 141, K 4.5, BUN 15, Cr 0.7, LFT 0.5/62/28/18; Lipase 18; LA 3.1 to 1.5 on 12/26  * CT Abdomen and Pelvis w/ IV Cont (12.26.23 @ 13:21) Mildly dilated loop of small bowel in the left lower abdomen, nonspecific. Follow-up is recommended  * Family history of Crohn Disease in daughter    RECOMMENDATIONS   - Surgical evaluation appreciated: no acute surgical intervention  - Monitor T for fever: spiked T 101.2 at 2PM on 12/26. Follow up sepsis workup (blood cultures received; RVP and urine studies pending collection)  - Trend WBC and LA. Please check ESR and CRP  - Monitor BM (last BM on 12/25). Patient notes constipation on senna and colace at home. Please resume home bowel regimen (senna 2 tabs at bedtime with colace)  - Continue IV antibiotics (currently on IV Zosyn)  - Monitor nausea and vomiting: nausea improved on 12/27 and last non bilious vomiting episode was on 12/26 x7  - Antiemetics: recommend starting IV Zofran 4mg Q8h PRN   - Advance diet as tolerated: tolerating clear liquid diet  - Pain control: agree with tylenol PRN and Percocet PRN   - Follow up with our [facility] located at [address] [place] [address]. Phone Number: [phone]        History of GERD- Controlled   History of Hiatal Hernia  * Last EGD was a few months ago with Dr Vásquez: scar noted near site of prior hiatal hernia    * Was started on PPI     RECOMMENDATIONS  - Continue po protonix 40mg daily 30 minutes before breakfast  - Avoid NSAIDs  - Educated about lifestyle modifications: avoid spicy foods or late time dinners      Stable Hepatic Cysts and Subcentimeter Hypodensities  * Noted on prior imaging  * Stable on current imaging as above  * LFTs 0.5/62/28/18    RECOMMENDATIONS  - Trend LFTs   - Monitor for symptoms      Thank you for your consult.  - Please note that plan was communicated with medical team.   - Please reach GI on [phone] during weekdays till 5pm.  - Please call the GI service line after 5pm on Weekdays and anytime on Weekends: [phone].      Cindy Mckeon MD  PGY - 4 Gastroenterology Fellow   [facility]

## 2023-12-27 NOTE — CONSULT NOTE ADULT - ATTENDING COMMENTS
79yo female presenting with abdominal pain, n/v with CT imaging showing ileus vs enteritis. No diarrhea reported, notes constipation at baseline. Feels slightly better.. Lactate normal. Recommend supportive measures, clear liquid diet as tolerated. Continue serial abd exams and follow up KUB. If loose stool check stool studies. If symptoms persist recommend repeat CT with oral contrast. 77yo female presenting with abdominal pain, n/v with CT imaging showing ileus vs enteritis. No diarrhea reported, notes constipation at baseline. Feels slightly better.. Lactate normal. Recommend supportive measures, clear liquid diet as tolerated. Continue serial abd exams and follow up KUB. If loose stool check stool studies. If symptoms persist recommend repeat CT with oral contrast.

## 2023-12-27 NOTE — PROGRESS NOTE ADULT - SUBJECTIVE AND OBJECTIVE BOX
Patient is a 78y old  Female who presents with a chief complaint of Abdominal Pain (27 Dec 2023 08:00)      Patient seen and examined at bedside.  Patient reports abd pain, denies any chest pain or shortness of breath   ALLERGIES:  No Known Allergies    MEDICATIONS:  ALPRAZolam 0.5 milliGRAM(s) Oral four times a day PRN  atorvastatin 20 milliGRAM(s) Oral at bedtime  budesonide  80 MICROgram(s)/formoterol 4.5 MICROgram(s) Inhaler 2 Puff(s) Inhalation two times a day  enoxaparin Injectable 40 milliGRAM(s) SubCutaneous every 24 hours  guaiFENesin  milliGRAM(s) Oral every 12 hours PRN  influenza  Vaccine (HIGH DOSE) 0.7 milliLiter(s) IntraMuscular once  lactated ringers. 1000 milliLiter(s) IV Continuous <Continuous>  ondansetron Injectable 4 milliGRAM(s) IV Push every 8 hours PRN  oxycodone    5 mG/acetaminophen 325 mG 2 Tablet(s) Oral every 8 hours  pantoprazole    Tablet 40 milliGRAM(s) Oral before breakfast  piperacillin/tazobactam IVPB.. 3.375 Gram(s) IV Intermittent every 6 hours  sertraline 50 milliGRAM(s) Oral daily  tamsulosin 0.4 milliGRAM(s) Oral at bedtime    Vital Signs Last 24 Hrs  T(F): 97.8 (27 Dec 2023 15:49), Max: 99.7 (26 Dec 2023 19:30)  HR: 73 (27 Dec 2023 15:49) (67 - 78)  BP: 144/72 (27 Dec 2023 15:49) (106/53 - 144/72)  RR: 18 (27 Dec 2023 15:49) (18 - 18)  SpO2: 97% (27 Dec 2023 15:49) (87% - 97%)  I&O's Summary    26 Dec 2023 07:01  -  27 Dec 2023 07:00  --------------------------------------------------------  IN: 0 mL / OUT: 500 mL / NET: -500 mL        PHYSICAL EXAM:  General: NAD, A/O x 3  ENT: MMM  Neck: Supple, No JVD  Lungs: Clear to auscultation bilaterally, no wheezing   Cardio: RRR, S1/S2, No murmurs  Abdomen: Soft, +tender, +distended  Extremities: No cyanosis, No edema    LABS:                        11.7   12.86 )-----------( 366      ( 27 Dec 2023 08:06 )             36.7     12-27    141  |  107  |  10  ----------------------------<  92  3.7   |  23  |  0.7    Ca    8.6      27 Dec 2023 08:06  Mg     2.2     12-27    TPro  6.0  /  Alb  3.9  /  TBili  0.6  /  DBili  x   /  AST  19  /  ALT  14  /  AlkPhos  56  12-27        Lactate, Blood: 1.0 mmol/L (12-27 @ 08:06)  Lactate, Blood: 3.1 mmol/L (12-26 @ 11:00)        10-17 Chol 129 mg/dL LDL -- HDL 50 mg/dL Trig 137 mg/dL    16:14 - VBG - pH: 7.41  | pCO2: 36    | pO2: 52    | Lactate: 1.5                  Urinalysis Basic - ( 27 Dec 2023 08:06 )    Color: x / Appearance: x / SG: x / pH: x  Gluc: 92 mg/dL / Ketone: x  / Bili: x / Urobili: x   Blood: x / Protein: x / Nitrite: x   Leuk Esterase: x / RBC: x / WBC x   Sq Epi: x / Non Sq Epi: x / Bacteria: x        COVID-19 PCR: NotDetec (12-27-23 @ 12:44)      RADIOLOGY & ADDITIONAL TESTS:  < from: Xray Abdomen 1 View PORTABLE -Urgent (Xray Abdomen 1 View PORTABLE -Urgent .) (12.27.23 @ 12:05) >  IMPRESSION:    Non-obstructive bowel gas pattern.    < end of copied text >    Care Discussed with Consultants/Other Providers:

## 2023-12-27 NOTE — PROGRESS NOTE ADULT - SUBJECTIVE AND OBJECTIVE BOX
GENERAL SURGERY PROGRESS NOTE    Patient: MANUEL ROSENBAUM , 78y ([birth year])Female   MRN: [id number]  Location: [address] (Back) 018 [address]  Visit: 12-26-23 Inpatient  Date: 12-27-23 @ 07:56    Hospital Day #:  Post-Op Day #:    Procedure/Dx/Injuries:    Events of past 24 hours:    PAST MEDICAL & SURGICAL HISTORY:  HTN (hypertension)      HLD (hyperlipidemia)      Chronic back pain  s/p back sx and stimulator      Gastroesophageal reflux disease with hiatal hernia      H/O vaginal hysterectomy      History of laparotomy          Vitals:   T(F): 97.5 (12-26-23 @ 23:15), Max: 101.2 (12-26-23 @ 14:20)  HR: 72 (12-26-23 @ 23:15)  BP: 133/65 (12-26-23 @ 23:15)  RR: 18 (12-26-23 @ 23:15)  SpO2: 97% (12-26-23 @ 23:15)      Diet, NPO:   Except Medications      Fluids: lactated ringers.: Solution, 1000 milliLiter(s) infuse at 70 mL/Hr      I & O's:    12-26-23 @ 07:01  -  12-27-23 @ 07:00  --------------------------------------------------------  IN:  Total IN: 0 mL    OUT:    Voided (mL): 500 mL  Total OUT: 500 mL    Total NET: -500 mL        Bowel Movement: : [] YES [] NO  Flatus: : [] YES [] NO    PHYSICAL EXAM:  General: NAD, AAOx3, calm and cooperative  HEENT: NCAT, DONNELL, EOMI, Trachea ML, Neck supple  Cardiac: RRR S1, S2, no Murmurs, rubs or gallops  Respiratory: CTAB, normal respiratory effort, breath sounds equal BL, no wheeze, rhonchi or crackles  Abdomen: Soft, non-distended, non-tender, no rebound, no guarding. +BS.  Rectal: Good tone, +stool, no blood, no carlin-anal masses/lesions, no fistulas, fissures, hemorrhoids  Musculoskeletal: Strength 5/5 BL UE/LE, ROM intact, compartments soft  Neuro: Sensation grossly intact and equal throughout, no focal deficits  Vascular: Pulses 2+ throughout, extremities well perfused  Skin: Warm/dry, normal color, no jaundice  Incision/wound: healing well, dressings in place, clean, dry and intact    MEDICATIONS  (STANDING):  atorvastatin 20 milliGRAM(s) Oral at bedtime  budesonide  80 MICROgram(s)/formoterol 4.5 MICROgram(s) Inhaler 2 Puff(s) Inhalation two times a day  enoxaparin Injectable 40 milliGRAM(s) SubCutaneous every 24 hours  influenza  Vaccine (HIGH DOSE) 0.7 milliLiter(s) IntraMuscular once  lactated ringers. 1000 milliLiter(s) (70 mL/Hr) IV Continuous <Continuous>  pantoprazole    Tablet 40 milliGRAM(s) Oral before breakfast  piperacillin/tazobactam IVPB.. 3.375 Gram(s) IV Intermittent every 6 hours  sertraline 50 milliGRAM(s) Oral daily  tamsulosin 0.4 milliGRAM(s) Oral at bedtime    MEDICATIONS  (PRN):  acetaminophen     Tablet .. 650 milliGRAM(s) Oral every 6 hours PRN Temp greater or equal to 38C (100.4F)  ALPRAZolam 0.5 milliGRAM(s) Oral four times a day PRN anxiety  guaiFENesin  milliGRAM(s) Oral every 12 hours PRN Cough  oxycodone    5 mG/acetaminophen 325 mG 1 Tablet(s) Oral every 8 hours PRN back pain      DVT PROPHYLAXIS: enoxaparin Injectable 40 milliGRAM(s) SubCutaneous every 24 hours    GI PROPHYLAXIS: pantoprazole    Tablet 40 milliGRAM(s) Oral before breakfast    ANTICOAGULATION:   ANTIBIOTICS:  piperacillin/tazobactam IVPB.. 3.375 Gram(s)            LAB/STUDIES:  Labs:  CAPILLARY BLOOD GLUCOSE                              13.3   21.06 )-----------( 400      ( 26 Dec 2023 11:00 )             40.2       Auto Neutrophil %: 91.0 % (12-26-23 @ 11:00)  Auto Immature Granulocyte %: 0.5 % (12-26-23 @ 11:00)    12-26    141  |  103  |  15  ----------------------------<  119<H>  4.5   |  22  |  0.7      Calcium: 9.4 mg/dL (12-26-23 @ 11:00)      LFTs:             7.1  | 0.5  | 28       ------------------[62      ( 26 Dec 2023 11:00 )  4.5  | x    | 18          Lipase:18     Amylase:x         Blood Gas Venous - Lactate: 1.5 mmol/L (12-26-23 @ 16:14)  Lactate, Blood: 3.1 mmol/L (12-26-23 @ 11:00)      Coags:            Urinalysis Basic - ( 26 Dec 2023 11:00 )    Color: x / Appearance: x / SG: x / pH: x  Gluc: 119 mg/dL / Ketone: x  / Bili: x / Urobili: x   Blood: x / Protein: x / Nitrite: x   Leuk Esterase: x / RBC: x / WBC x   Sq Epi: x / Non Sq Epi: x / Bacteria: x      ASSESSMENT:  Patient is a 79 y/o female w PMH of HTN, HLD, GERD, anxiety, depression and chronic back pain s/p multiple surgical interventions and spinal cord stimulator placement that comes in w chief complaint of nausea and vomiting. Of note in the ED pt was noted to be febrile to 101.2, rest of VS WNL. Labs are significant for WBC of 21 and Lactate of 3.1, no significant findings on CT A/P, clinical presentation is more consistent with viral vs bacterial enteritis. No acute surgical intervention indicated.   Physical exam findings, imaging, and labs as documented above.     PLAN:  -no acute surgical intervention indicated, patient has enteritis   -npo, IVFs  -monitor bowel function  -pain control  -trend lactate  -surgery will follow    Above plan discussed with Attending Surgeon Dr. Cain  , patient, patient family, and Primary team  12-26-23 @ 18:31    CONSULT SPECTRA: [id number]     GENERAL SURGERY PROGRESS NOTE    Patient: MANUEL ROSENBAUM , 78y ([birth year])Female   MRN: [id number]  Location: [address] (Back) 018 [address]  Visit: 12-26-23 Inpatient  Date: 12-27-23 @ 07:56    Hospital Day #:  Post-Op Day #:    Procedure/Dx/Injuries:    Events of past 24 hours:    PAST MEDICAL & SURGICAL HISTORY:  HTN (hypertension)      HLD (hyperlipidemia)      Chronic back pain  s/p back sx and stimulator      Gastroesophageal reflux disease with hiatal hernia      H/O vaginal hysterectomy      History of laparotomy          Vitals:   T(F): 97.5 (12-26-23 @ 23:15), Max: 101.2 (12-26-23 @ 14:20)  HR: 72 (12-26-23 @ 23:15)  BP: 133/65 (12-26-23 @ 23:15)  RR: 18 (12-26-23 @ 23:15)  SpO2: 97% (12-26-23 @ 23:15)      Diet, NPO:   Except Medications      Fluids: lactated ringers.: Solution, 1000 milliLiter(s) infuse at 70 mL/Hr      I & O's:    12-26-23 @ 07:01  -  12-27-23 @ 07:00  --------------------------------------------------------  IN:  Total IN: 0 mL    OUT:    Voided (mL): 500 mL  Total OUT: 500 mL    Total NET: -500 mL        Bowel Movement: : [] YES [] NO  Flatus: : [] YES [] NO    PHYSICAL EXAM:  General: NAD, AAOx3, calm and cooperative  HEENT: NCAT, DONNELL, EOMI, Trachea ML, Neck supple  Cardiac: RRR S1, S2, no Murmurs, rubs or gallops  Respiratory: CTAB, normal respiratory effort, breath sounds equal BL, no wheeze, rhonchi or crackles  Abdomen: Soft, non-distended, non-tender, no rebound, no guarding. +BS.  Rectal: Good tone, +stool, no blood, no carlin-anal masses/lesions, no fistulas, fissures, hemorrhoids  Musculoskeletal: Strength 5/5 BL UE/LE, ROM intact, compartments soft  Neuro: Sensation grossly intact and equal throughout, no focal deficits  Vascular: Pulses 2+ throughout, extremities well perfused  Skin: Warm/dry, normal color, no jaundice  Incision/wound: healing well, dressings in place, clean, dry and intact    MEDICATIONS  (STANDING):  atorvastatin 20 milliGRAM(s) Oral at bedtime  budesonide  80 MICROgram(s)/formoterol 4.5 MICROgram(s) Inhaler 2 Puff(s) Inhalation two times a day  enoxaparin Injectable 40 milliGRAM(s) SubCutaneous every 24 hours  influenza  Vaccine (HIGH DOSE) 0.7 milliLiter(s) IntraMuscular once  lactated ringers. 1000 milliLiter(s) (70 mL/Hr) IV Continuous <Continuous>  pantoprazole    Tablet 40 milliGRAM(s) Oral before breakfast  piperacillin/tazobactam IVPB.. 3.375 Gram(s) IV Intermittent every 6 hours  sertraline 50 milliGRAM(s) Oral daily  tamsulosin 0.4 milliGRAM(s) Oral at bedtime    MEDICATIONS  (PRN):  acetaminophen     Tablet .. 650 milliGRAM(s) Oral every 6 hours PRN Temp greater or equal to 38C (100.4F)  ALPRAZolam 0.5 milliGRAM(s) Oral four times a day PRN anxiety  guaiFENesin  milliGRAM(s) Oral every 12 hours PRN Cough  oxycodone    5 mG/acetaminophen 325 mG 1 Tablet(s) Oral every 8 hours PRN back pain      DVT PROPHYLAXIS: enoxaparin Injectable 40 milliGRAM(s) SubCutaneous every 24 hours    GI PROPHYLAXIS: pantoprazole    Tablet 40 milliGRAM(s) Oral before breakfast    ANTICOAGULATION:   ANTIBIOTICS:  piperacillin/tazobactam IVPB.. 3.375 Gram(s)            LAB/STUDIES:  Labs:  CAPILLARY BLOOD GLUCOSE                              13.3   21.06 )-----------( 400      ( 26 Dec 2023 11:00 )             40.2       Auto Neutrophil %: 91.0 % (12-26-23 @ 11:00)  Auto Immature Granulocyte %: 0.5 % (12-26-23 @ 11:00)    12-26    141  |  103  |  15  ----------------------------<  119<H>  4.5   |  22  |  0.7      Calcium: 9.4 mg/dL (12-26-23 @ 11:00)      LFTs:             7.1  | 0.5  | 28       ------------------[62      ( 26 Dec 2023 11:00 )  4.5  | x    | 18          Lipase:18     Amylase:x         Blood Gas Venous - Lactate: 1.5 mmol/L (12-26-23 @ 16:14)  Lactate, Blood: 3.1 mmol/L (12-26-23 @ 11:00)      Coags:            Urinalysis Basic - ( 26 Dec 2023 11:00 )    Color: x / Appearance: x / SG: x / pH: x  Gluc: 119 mg/dL / Ketone: x  / Bili: x / Urobili: x   Blood: x / Protein: x / Nitrite: x   Leuk Esterase: x / RBC: x / WBC x   Sq Epi: x / Non Sq Epi: x / Bacteria: x      ASSESSMENT:  Patient is a 79 y/o female w PMH of HTN, HLD, GERD, anxiety, depression and chronic back pain s/p multiple surgical interventions and spinal cord stimulator placement that comes in w chief complaint of nausea and vomiting. Of note in the ED pt was noted to be febrile to 101.2, rest of VS WNL. Labs are significant for WBC of 21 and Lactate of 3.1, no significant findings on CT A/P, clinical presentation is more consistent with viral vs bacterial enteritis. No acute surgical intervention indicated.   Physical exam findings, imaging, and labs as documented above.     PLAN:  -no acute surgical intervention indicated, patient has enteritis   -npo, IVFs  -monitor bowel function  -pain control  -trend lactate  -surgery will follow    Above plan discussed with Attending Surgeon Dr. Cain  , patient, patient family, and Primary team  12-26-23 @ 18:31    CONSULT SPECTRA: [id number]     GENERAL SURGERY PROGRESS NOTE    Patient: MANUEL ROSENBAUM , 78y ([birth year])Female   MRN: [id number]  Location: [address] (Back) 018 [address]  Visit: 12-26-23 Inpatient  Date: 12-27-23 @ 07:56    Hospital Day #:  Post-Op Day #:    Procedure/Dx/Injuries:    Events of past 24 hours:    PAST MEDICAL & SURGICAL HISTORY:  HTN (hypertension)      HLD (hyperlipidemia)      Chronic back pain  s/p back sx and stimulator      Gastroesophageal reflux disease with hiatal hernia      H/O vaginal hysterectomy      History of laparotomy          Vitals:   T(F): 97.5 (12-26-23 @ 23:15), Max: 101.2 (12-26-23 @ 14:20)  HR: 72 (12-26-23 @ 23:15)  BP: 133/65 (12-26-23 @ 23:15)  RR: 18 (12-26-23 @ 23:15)  SpO2: 97% (12-26-23 @ 23:15)      Diet, NPO:   Except Medications      Fluids: lactated ringers.: Solution, 1000 milliLiter(s) infuse at 70 mL/Hr      I & O's:    12-26-23 @ 07:01  -  12-27-23 @ 07:00  --------------------------------------------------------  IN:  Total IN: 0 mL    OUT:    Voided (mL): 500 mL  Total OUT: 500 mL    Total NET: -500 mL        Bowel Movement: : [] YES [] NO  Flatus: : [] YES [] NO    PHYSICAL EXAM:  General: NAD, AAOx3, calm and cooperative  HEENT: NCAT, DONNELL, EOMI, Trachea ML, Neck supple  Cardiac: RRR S1, S2, no Murmurs, rubs or gallops  Respiratory: CTAB, normal respiratory effort, breath sounds equal BL, no wheeze, rhonchi or crackles  Abdomen: Soft, non-distended, non-tender, no rebound, no guarding. +BS.  Rectal: Good tone, +stool, no blood, no carlin-anal masses/lesions, no fistulas, fissures, hemorrhoids  Musculoskeletal: Strength 5/5 BL UE/LE, ROM intact, compartments soft  Neuro: Sensation grossly intact and equal throughout, no focal deficits  Vascular: Pulses 2+ throughout, extremities well perfused  Skin: Warm/dry, normal color, no jaundice  Incision/wound: healing well, dressings in place, clean, dry and intact    MEDICATIONS  (STANDING):  atorvastatin 20 milliGRAM(s) Oral at bedtime  budesonide  80 MICROgram(s)/formoterol 4.5 MICROgram(s) Inhaler 2 Puff(s) Inhalation two times a day  enoxaparin Injectable 40 milliGRAM(s) SubCutaneous every 24 hours  influenza  Vaccine (HIGH DOSE) 0.7 milliLiter(s) IntraMuscular once  lactated ringers. 1000 milliLiter(s) (70 mL/Hr) IV Continuous <Continuous>  pantoprazole    Tablet 40 milliGRAM(s) Oral before breakfast  piperacillin/tazobactam IVPB.. 3.375 Gram(s) IV Intermittent every 6 hours  sertraline 50 milliGRAM(s) Oral daily  tamsulosin 0.4 milliGRAM(s) Oral at bedtime    MEDICATIONS  (PRN):  acetaminophen     Tablet .. 650 milliGRAM(s) Oral every 6 hours PRN Temp greater or equal to 38C (100.4F)  ALPRAZolam 0.5 milliGRAM(s) Oral four times a day PRN anxiety  guaiFENesin  milliGRAM(s) Oral every 12 hours PRN Cough  oxycodone    5 mG/acetaminophen 325 mG 1 Tablet(s) Oral every 8 hours PRN back pain      DVT PROPHYLAXIS: enoxaparin Injectable 40 milliGRAM(s) SubCutaneous every 24 hours    GI PROPHYLAXIS: pantoprazole    Tablet 40 milliGRAM(s) Oral before breakfast    ANTICOAGULATION:   ANTIBIOTICS:  piperacillin/tazobactam IVPB.. 3.375 Gram(s)            LAB/STUDIES:  Labs:  CAPILLARY BLOOD GLUCOSE                              13.3   21.06 )-----------( 400      ( 26 Dec 2023 11:00 )             40.2       Auto Neutrophil %: 91.0 % (12-26-23 @ 11:00)  Auto Immature Granulocyte %: 0.5 % (12-26-23 @ 11:00)    12-26    141  |  103  |  15  ----------------------------<  119<H>  4.5   |  22  |  0.7      Calcium: 9.4 mg/dL (12-26-23 @ 11:00)      LFTs:             7.1  | 0.5  | 28       ------------------[62      ( 26 Dec 2023 11:00 )  4.5  | x    | 18          Lipase:18     Amylase:x         Blood Gas Venous - Lactate: 1.5 mmol/L (12-26-23 @ 16:14)  Lactate, Blood: 3.1 mmol/L (12-26-23 @ 11:00)      Coags:            Urinalysis Basic - ( 26 Dec 2023 11:00 )    Color: x / Appearance: x / SG: x / pH: x  Gluc: 119 mg/dL / Ketone: x  / Bili: x / Urobili: x   Blood: x / Protein: x / Nitrite: x   Leuk Esterase: x / RBC: x / WBC x   Sq Epi: x / Non Sq Epi: x / Bacteria: x      ASSESSMENT:  Patient is a 79 y/o female w PMH of HTN, HLD, GERD, anxiety, depression and chronic back pain s/p multiple surgical interventions and spinal cord stimulator placement that comes in w chief complaint of nausea and vomiting. Of note in the ED pt was noted to be febrile to 101.2, rest of VS WNL. Labs are significant for WBC of 21 and Lactate of 3.1, no significant findings on CT A/P, clinical presentation is more consistent with viral vs bacterial enteritis. No acute surgical intervention indicated.   Physical exam findings, imaging, and labs as documented above.     PLAN:  -no acute surgical intervention indicated, patient has enteritis   - Recommend Clear liquid diet  -monitor bowel function  -pain control  -trend lactate  -surgery will follow    Above plan discussed with Attending Surgeon Dr. Cain  , patient, patient family, and Primary team  12-26-23 @ 18:31    CONSULT SPECTRA: [id number]     GENERAL SURGERY PROGRESS NOTE    Patient: MANUEL ROSENBAUM , 78y ([birth year])Female   MRN: [id number]  Location: [address] (Back) 018 [address]  Visit: 12-26-23 Inpatient  Date: 12-27-23 @ 07:56    Hospital Day #:  Post-Op Day #:    Procedure/Dx/Injuries:    Events of past 24 hours:    PAST MEDICAL & SURGICAL HISTORY:  HTN (hypertension)      HLD (hyperlipidemia)      Chronic back pain  s/p back sx and stimulator      Gastroesophageal reflux disease with hiatal hernia      H/O vaginal hysterectomy      History of laparotomy          Vitals:   T(F): 97.5 (12-26-23 @ 23:15), Max: 101.2 (12-26-23 @ 14:20)  HR: 72 (12-26-23 @ 23:15)  BP: 133/65 (12-26-23 @ 23:15)  RR: 18 (12-26-23 @ 23:15)  SpO2: 97% (12-26-23 @ 23:15)      Diet, NPO:   Except Medications      Fluids: lactated ringers.: Solution, 1000 milliLiter(s) infuse at 70 mL/Hr      I & O's:    12-26-23 @ 07:01  -  12-27-23 @ 07:00  --------------------------------------------------------  IN:  Total IN: 0 mL    OUT:    Voided (mL): 500 mL  Total OUT: 500 mL    Total NET: -500 mL        Bowel Movement: : [] YES [] NO  Flatus: : [] YES [] NO    PHYSICAL EXAM:  General: NAD, AAOx3, calm and cooperative  HEENT: NCAT, DONNELL, EOMI, Trachea ML, Neck supple  Cardiac: RRR S1, S2, no Murmurs, rubs or gallops  Respiratory: CTAB, normal respiratory effort, breath sounds equal BL, no wheeze, rhonchi or crackles  Abdomen: Soft, non-distended, non-tender, no rebound, no guarding. +BS.  Rectal: Good tone, +stool, no blood, no carlin-anal masses/lesions, no fistulas, fissures, hemorrhoids  Musculoskeletal: Strength 5/5 BL UE/LE, ROM intact, compartments soft  Neuro: Sensation grossly intact and equal throughout, no focal deficits  Vascular: Pulses 2+ throughout, extremities well perfused  Skin: Warm/dry, normal color, no jaundice  Incision/wound: healing well, dressings in place, clean, dry and intact    MEDICATIONS  (STANDING):  atorvastatin 20 milliGRAM(s) Oral at bedtime  budesonide  80 MICROgram(s)/formoterol 4.5 MICROgram(s) Inhaler 2 Puff(s) Inhalation two times a day  enoxaparin Injectable 40 milliGRAM(s) SubCutaneous every 24 hours  influenza  Vaccine (HIGH DOSE) 0.7 milliLiter(s) IntraMuscular once  lactated ringers. 1000 milliLiter(s) (70 mL/Hr) IV Continuous <Continuous>  pantoprazole    Tablet 40 milliGRAM(s) Oral before breakfast  piperacillin/tazobactam IVPB.. 3.375 Gram(s) IV Intermittent every 6 hours  sertraline 50 milliGRAM(s) Oral daily  tamsulosin 0.4 milliGRAM(s) Oral at bedtime    MEDICATIONS  (PRN):  acetaminophen     Tablet .. 650 milliGRAM(s) Oral every 6 hours PRN Temp greater or equal to 38C (100.4F)  ALPRAZolam 0.5 milliGRAM(s) Oral four times a day PRN anxiety  guaiFENesin  milliGRAM(s) Oral every 12 hours PRN Cough  oxycodone    5 mG/acetaminophen 325 mG 1 Tablet(s) Oral every 8 hours PRN back pain      DVT PROPHYLAXIS: enoxaparin Injectable 40 milliGRAM(s) SubCutaneous every 24 hours    GI PROPHYLAXIS: pantoprazole    Tablet 40 milliGRAM(s) Oral before breakfast    ANTICOAGULATION:   ANTIBIOTICS:  piperacillin/tazobactam IVPB.. 3.375 Gram(s)            LAB/STUDIES:  Labs:  CAPILLARY BLOOD GLUCOSE                              13.3   21.06 )-----------( 400      ( 26 Dec 2023 11:00 )             40.2       Auto Neutrophil %: 91.0 % (12-26-23 @ 11:00)  Auto Immature Granulocyte %: 0.5 % (12-26-23 @ 11:00)    12-26    141  |  103  |  15  ----------------------------<  119<H>  4.5   |  22  |  0.7      Calcium: 9.4 mg/dL (12-26-23 @ 11:00)      LFTs:             7.1  | 0.5  | 28       ------------------[62      ( 26 Dec 2023 11:00 )  4.5  | x    | 18          Lipase:18     Amylase:x         Blood Gas Venous - Lactate: 1.5 mmol/L (12-26-23 @ 16:14)  Lactate, Blood: 3.1 mmol/L (12-26-23 @ 11:00)      Coags:            Urinalysis Basic - ( 26 Dec 2023 11:00 )    Color: x / Appearance: x / SG: x / pH: x  Gluc: 119 mg/dL / Ketone: x  / Bili: x / Urobili: x   Blood: x / Protein: x / Nitrite: x   Leuk Esterase: x / RBC: x / WBC x   Sq Epi: x / Non Sq Epi: x / Bacteria: x      ASSESSMENT:  Patient is a 77 y/o female w PMH of HTN, HLD, GERD, anxiety, depression and chronic back pain s/p multiple surgical interventions and spinal cord stimulator placement that comes in w chief complaint of nausea and vomiting. Of note in the ED pt was noted to be febrile to 101.2, rest of VS WNL. Labs are significant for WBC of 21 and Lactate of 3.1, no significant findings on CT A/P, clinical presentation is more consistent with viral vs bacterial enteritis. No acute surgical intervention indicated.   Physical exam findings, imaging, and labs as documented above.     PLAN:  -no acute surgical intervention indicated, patient has enteritis   - Recommend Clear liquid diet  -monitor bowel function  -pain control  -trend lactate  -surgery will follow    Above plan discussed with Attending Surgeon Dr. Cain  , patient, patient family, and Primary team  12-26-23 @ 18:31    CONSULT SPECTRA: [id number]     GENERAL SURGERY PROGRESS NOTE    Patient: MANUEL ROSENBAUM , 78y ([birth year])Female   MRN: [id number]  Location: [address] (Back) 018 [address]  Visit: 12-26-23 Inpatient  Date: 12-27-23 @ 07:56      PAST MEDICAL & SURGICAL HISTORY:  HTN (hypertension)      HLD (hyperlipidemia)      Chronic back pain  s/p back sx and stimulator      Gastroesophageal reflux disease with hiatal hernia      H/O vaginal hysterectomy      History of laparotomy          Vitals:   T(F): 97.5 (12-26-23 @ 23:15), Max: 101.2 (12-26-23 @ 14:20)  HR: 72 (12-26-23 @ 23:15)  BP: 133/65 (12-26-23 @ 23:15)  RR: 18 (12-26-23 @ 23:15)  SpO2: 97% (12-26-23 @ 23:15)      Diet, NPO:   Except Medications      Fluids: lactated ringers.: Solution, 1000 milliLiter(s) infuse at 70 mL/Hr      I & O's:    12-26-23 @ 07:01  -  12-27-23 @ 07:00  --------------------------------------------------------  IN:  Total IN: 0 mL    OUT:    Voided (mL): 500 mL  Total OUT: 500 mL    Total NET: -500 mL        PHYSICAL EXAM:  General: NAD, AAOx3, calm and cooperative  Cardiac: RRR   Respiratory: CTAB, normal respiratory effort  Abdomen: Soft, non-distended, diffuse tenderness to palpation  Musculoskeletal: ROM intact, compartments soft    MEDICATIONS  (STANDING):  atorvastatin 20 milliGRAM(s) Oral at bedtime  budesonide  80 MICROgram(s)/formoterol 4.5 MICROgram(s) Inhaler 2 Puff(s) Inhalation two times a day  enoxaparin Injectable 40 milliGRAM(s) SubCutaneous every 24 hours  influenza  Vaccine (HIGH DOSE) 0.7 milliLiter(s) IntraMuscular once  lactated ringers. 1000 milliLiter(s) (70 mL/Hr) IV Continuous <Continuous>  pantoprazole    Tablet 40 milliGRAM(s) Oral before breakfast  piperacillin/tazobactam IVPB.. 3.375 Gram(s) IV Intermittent every 6 hours  sertraline 50 milliGRAM(s) Oral daily  tamsulosin 0.4 milliGRAM(s) Oral at bedtime    MEDICATIONS  (PRN):  acetaminophen     Tablet .. 650 milliGRAM(s) Oral every 6 hours PRN Temp greater or equal to 38C (100.4F)  ALPRAZolam 0.5 milliGRAM(s) Oral four times a day PRN anxiety  guaiFENesin  milliGRAM(s) Oral every 12 hours PRN Cough  oxycodone    5 mG/acetaminophen 325 mG 1 Tablet(s) Oral every 8 hours PRN back pain      DVT PROPHYLAXIS: enoxaparin Injectable 40 milliGRAM(s) SubCutaneous every 24 hours    GI PROPHYLAXIS: pantoprazole    Tablet 40 milliGRAM(s) Oral before breakfast    ANTICOAGULATION:   ANTIBIOTICS:  piperacillin/tazobactam IVPB.. 3.375 Gram(s)            LAB/STUDIES:  Labs:  CAPILLARY BLOOD GLUCOSE                              13.3   21.06 )-----------( 400      ( 26 Dec 2023 11:00 )             40.2       Auto Neutrophil %: 91.0 % (12-26-23 @ 11:00)  Auto Immature Granulocyte %: 0.5 % (12-26-23 @ 11:00)    12-26    141  |  103  |  15  ----------------------------<  119<H>  4.5   |  22  |  0.7      Calcium: 9.4 mg/dL (12-26-23 @ 11:00)      LFTs:             7.1  | 0.5  | 28       ------------------[62      ( 26 Dec 2023 11:00 )  4.5  | x    | 18          Lipase:18     Amylase:x         Blood Gas Venous - Lactate: 1.5 mmol/L (12-26-23 @ 16:14)  Lactate, Blood: 3.1 mmol/L (12-26-23 @ 11:00)      Coags:            Urinalysis Basic - ( 26 Dec 2023 11:00 )    Color: x / Appearance: x / SG: x / pH: x  Gluc: 119 mg/dL / Ketone: x  / Bili: x / Urobili: x   Blood: x / Protein: x / Nitrite: x   Leuk Esterase: x / RBC: x / WBC x   Sq Epi: x / Non Sq Epi: x / Bacteria: x      ASSESSMENT:  Patient is a 79 y/o female w PMH of HTN, HLD, GERD, anxiety, depression and chronic back pain s/p multiple surgical interventions and spinal cord stimulator placement that comes in w chief complaint of nausea and vomiting. Of note in the ED pt was noted to be febrile to 101.2, rest of VS WNL. Labs are significant for WBC of 21 and Lactate of 3.1, no significant findings on CT A/P, clinical presentation is more consistent with viral vs bacterial enteritis. No acute surgical intervention indicated.   Physical exam findings, imaging, and labs as documented above.     PLAN:  -no acute surgical intervention indicated, patient has enteritis   - Recommend Clear liquid diet  - Appreciate GI recs  -monitor bowel function  -pain control  -trend lactate  -surgery will follow    Above plan discussed with Attending Surgeon Dr. Cain  , patient, patient family, and Primary team  12-26-23 @ 18:31    CONSULT SPECTRA: [id number]     GENERAL SURGERY PROGRESS NOTE    Patient: MANUEL ROSENBAUM , 78y ([birth year])Female   MRN: [id number]  Location: [address] (Back) 018 [address]  Visit: 12-26-23 Inpatient  Date: 12-27-23 @ 07:56      PAST MEDICAL & SURGICAL HISTORY:  HTN (hypertension)      HLD (hyperlipidemia)      Chronic back pain  s/p back sx and stimulator      Gastroesophageal reflux disease with hiatal hernia      H/O vaginal hysterectomy      History of laparotomy          Vitals:   T(F): 97.5 (12-26-23 @ 23:15), Max: 101.2 (12-26-23 @ 14:20)  HR: 72 (12-26-23 @ 23:15)  BP: 133/65 (12-26-23 @ 23:15)  RR: 18 (12-26-23 @ 23:15)  SpO2: 97% (12-26-23 @ 23:15)      Diet, NPO:   Except Medications      Fluids: lactated ringers.: Solution, 1000 milliLiter(s) infuse at 70 mL/Hr      I & O's:    12-26-23 @ 07:01  -  12-27-23 @ 07:00  --------------------------------------------------------  IN:  Total IN: 0 mL    OUT:    Voided (mL): 500 mL  Total OUT: 500 mL    Total NET: -500 mL        PHYSICAL EXAM:  General: NAD, AAOx3, calm and cooperative  Cardiac: RRR   Respiratory: CTAB, normal respiratory effort  Abdomen: Soft, non-distended, diffuse tenderness to palpation  Musculoskeletal: ROM intact, compartments soft    MEDICATIONS  (STANDING):  atorvastatin 20 milliGRAM(s) Oral at bedtime  budesonide  80 MICROgram(s)/formoterol 4.5 MICROgram(s) Inhaler 2 Puff(s) Inhalation two times a day  enoxaparin Injectable 40 milliGRAM(s) SubCutaneous every 24 hours  influenza  Vaccine (HIGH DOSE) 0.7 milliLiter(s) IntraMuscular once  lactated ringers. 1000 milliLiter(s) (70 mL/Hr) IV Continuous <Continuous>  pantoprazole    Tablet 40 milliGRAM(s) Oral before breakfast  piperacillin/tazobactam IVPB.. 3.375 Gram(s) IV Intermittent every 6 hours  sertraline 50 milliGRAM(s) Oral daily  tamsulosin 0.4 milliGRAM(s) Oral at bedtime    MEDICATIONS  (PRN):  acetaminophen     Tablet .. 650 milliGRAM(s) Oral every 6 hours PRN Temp greater or equal to 38C (100.4F)  ALPRAZolam 0.5 milliGRAM(s) Oral four times a day PRN anxiety  guaiFENesin  milliGRAM(s) Oral every 12 hours PRN Cough  oxycodone    5 mG/acetaminophen 325 mG 1 Tablet(s) Oral every 8 hours PRN back pain      DVT PROPHYLAXIS: enoxaparin Injectable 40 milliGRAM(s) SubCutaneous every 24 hours    GI PROPHYLAXIS: pantoprazole    Tablet 40 milliGRAM(s) Oral before breakfast    ANTICOAGULATION:   ANTIBIOTICS:  piperacillin/tazobactam IVPB.. 3.375 Gram(s)            LAB/STUDIES:  Labs:  CAPILLARY BLOOD GLUCOSE                              13.3   21.06 )-----------( 400      ( 26 Dec 2023 11:00 )             40.2       Auto Neutrophil %: 91.0 % (12-26-23 @ 11:00)  Auto Immature Granulocyte %: 0.5 % (12-26-23 @ 11:00)    12-26    141  |  103  |  15  ----------------------------<  119<H>  4.5   |  22  |  0.7      Calcium: 9.4 mg/dL (12-26-23 @ 11:00)      LFTs:             7.1  | 0.5  | 28       ------------------[62      ( 26 Dec 2023 11:00 )  4.5  | x    | 18          Lipase:18     Amylase:x         Blood Gas Venous - Lactate: 1.5 mmol/L (12-26-23 @ 16:14)  Lactate, Blood: 3.1 mmol/L (12-26-23 @ 11:00)      Coags:            Urinalysis Basic - ( 26 Dec 2023 11:00 )    Color: x / Appearance: x / SG: x / pH: x  Gluc: 119 mg/dL / Ketone: x  / Bili: x / Urobili: x   Blood: x / Protein: x / Nitrite: x   Leuk Esterase: x / RBC: x / WBC x   Sq Epi: x / Non Sq Epi: x / Bacteria: x      ASSESSMENT:  Patient is a 79 y/o female w PMH of HTN, HLD, GERD, anxiety, depression and chronic back pain s/p multiple surgical interventions and spinal cord stimulator placement that comes in w chief complaint of nausea and vomiting. Of note in the ED pt was noted to be febrile to 101.2, rest of VS WNL. Labs are significant for WBC of 21 and Lactate of 3.1, no significant findings on CT A/P, clinical presentation is more consistent with viral vs bacterial enteritis. No acute surgical intervention indicated.   Physical exam findings, imaging, and labs as documented above.     PLAN:  -no acute surgical intervention indicated, patient has enteritis   - Recommend Clear liquid diet  - Appreciate GI recs  -monitor bowel function  -pain control  -trend lactate  -surgery will follow    Above plan discussed with Attending Surgeon Dr. Cain  , patient, patient family, and Primary team  12-26-23 @ 18:31    CONSULT SPECTRA: [id number]

## 2023-12-27 NOTE — CONSULT NOTE ADULT - SUBJECTIVE AND OBJECTIVE BOX
Gastroenterology Initial Consult Note      Location: [address] (Back) 018 B [facility]  Patient Name: MANUEL ROSENBAUM  Age: 78y  Gender: Female      Chief Complaint  Patient is a 78y old Female who presents with a chief complaint of Abdominal Pain (27 Dec 2023 07:56)  Primary diagnosis of Abdominal pain      Reason for Consult  Small Bowel Dilation      History of Present Illness  78 year old female patient with history of Anxiety, Depression, DL, HTN, hiatal hernia, chronic back and knee pains s/p multiple surgeries, and recent admission for sigmoid diverticulitis in 10/2023 who presented to the ED on 12/26 for evaluation of abdominal pain, nausea, and vomiting, found to be febrile with evidence of leukocytosis and lactic acidosis on blood work and evidence of dilated loops of small bowel on imaging, admitted for further management. We are consulted for small bowel dilation and concern of enteritis.      Summary:  * Recent admission in 10/2023 for sigmoid diverticulitis s/p antibiotics course. Sedimentation Rate, Erythrocyte (10.24.23 @ 06:06) 8 mm/Hr; C-Reactive Protein, Serum (10.24.23 @ 06:06) 5.8 mg/L  * CT Abdomen and Pelvis w/ IV Cont (10.16.23 @ 04:33) Colonic diverticulosis. Diffuse sigmoid thickening with surrounding fat stranding. Likely inflamed diverticulum on series 4 image 264. Avulsion, pneumoperitoneum or pneumatosis.  * Had colonoscopy 3 weeks ago at Dr Vásquez's office: several polyps (benign pathology per patient) and internal hemorrhoids -> no records  * Current presentation 12/26: 1 day of diffuse abdominal pain associated with nausea, 7x non bilious vomiting, chills, subjective fevers  * /72 mmHg; HR 98 bpm; RR 20 bpm; T 101.2 degrees   * ED Labs WBC 21.06, Hb 13.3, Plt 400; Na 141, K 4.5, BUN 15, Cr 0.7, LFT 0.5/62/28/18; Lipase 18; LA 3.1 to 1.5 on 12/26  * CT Abdomen and Pelvis w/ IV Cont (12.26.23 @ 13:21) Mildly dilated loop of small bowel in the left lower abdomen, nonspecific. Follow-up is recommended  * Family history of Crohn Disease in daughter      Prior EGD:  as below      Prior Colonoscopy:  as below      Past Medical and Surgical History:  HTN (hypertension)  HLD (hyperlipidemia)  Chronic back pain  s/p back sx and stimulator  Gastroesophageal reflux disease with hiatal hernia  H/O vaginal hysterectomy  History of laparotomy      Home Medications:  Home Medications:  Advair Diskus 250 mcg-50 mcg inhalation powder: 1 puff(s) inhaled 2 times a day (16 Oct 2023 09:57)  ALPRAZolam 0.5 mg oral tablet: 1 tab(s) orally 4 times a day (16 Oct 2023 09:59)  Crestor 5 mg oral tablet: 1 tab(s) orally once a day (16 Oct 2023 09:59)  Diovan 160 mg oral capsule: 1 tab(s) orally once a day (16 Oct 2023 09:59)  Norvasc 10 mg oral tablet: 1 tab(s) orally once a day (at bedtime) (26 Dec 2023 23:54)  omeprazole 20 mg oral delayed release tablet: 1 tab(s) orally 2 times a day (16 Oct 2023 09:57)  oxyCODONE-acetaminophen 7.5 mg-325 mg oral tablet: 1 tab(s) orally every 8 hours, As Needed (16 Oct 2023 15:07)  sertraline 50 mg oral tablet: 1 tab(s) orally once a day (16 Oct 2023 09:58)  tamsulosin 0.4 mg oral capsule: 1 cap(s) orally once a day (16 Oct 2023 09:59)      Social History:  Tobacco: denies  Alcohol: occasional  Drugs: denies      Allergies:  No Known Allergies      Family History:  FAMILY HISTORY:  Crohn Disease in daughter      Vital Signs in the last 24 hours   Vitals Summary T(C): 36.7 (12-27-23 @ 08:03), Max: 38.4 (12-26-23 @ 14:20)  HR: 67 (12-27-23 @ 08:03) (67 - 98)  BP: 142/65 (12-27-23 @ 08:03) (106/53 - 173/81)  RR: 18 (12-27-23 @ 08:03) (16 - 20)  SpO2: 97% (12-27-23 @ 08:03) (87% - 98%)  Vent Data   Intake/ Output   12-26-23 @ 07:01  -  12-27-23 @ 07:00  --------------------------------------------------------  IN: 0 mL / OUT: 500 mL / NET: -500 mL      Physical Exam  * General Appearance: Alert, cooperative, interactive, oriented to time, place, and person, in no acute distress  * Lungs: Good bilateral air entry, normal breath sounds   * Heart: Regular Rate and Rhythm, normal S1 and S2, no audible murmur, rub, or gallop  * Abdomen: Symmetric, non-distended, soft, tenderness noted along LLQ>RLQ, no guarding or rebound tenderness, bowel sounds active all four quadrants, no masses      Investigations   Laboratory Workup      - CBC:                        13.3   21.06 )-----------( 400      ( 26 Dec 2023 11:00 )             40.2       - Hgb Trend:  13.3  12-26-23 @ 11:00          - Chemistry:  12-26    141  |  103  |  15  ----------------------------<  119<H>  4.5   |  22  |  0.7    Ca    9.4      26 Dec 2023 11:00    TPro  7.1  /  Alb  4.5  /  TBili  0.5  /  DBili  x   /  AST  28  /  ALT  18  /  AlkPhos  62  12-26    Liver panel trend:  TBili 0.5   /   AST 28   /   ALT 18   /   AlkP 62   /   Tptn 7.1   /   Alb 4.5    /   DBili --      12-26      - Coagulation Studies:      - ABG:      - Cardiac Markers:        Microbiological Workup  Urinalysis Basic - ( 26 Dec 2023 11:00 )    Color: x / Appearance: x / SG: x / pH: x  Gluc: 119 mg/dL / Ketone: x  / Bili: x / Urobili: x   Blood: x / Protein: x / Nitrite: x   Leuk Esterase: x / RBC: x / WBC x   Sq Epi: x / Non Sq Epi: x / Bacteria: x          Radiological Workup  CT Abdomen and Pelvis w/ IV Cont:   ACC: [id number] EXAM:  CT ABDOMEN AND PELVIS IC   ORDERED BY: NEO BAEZ     PROCEDURE DATE:  12/26/2023          INTERPRETATION:  CLINICAL STATEMENT: Abdominal pain    TECHNIQUE: Contiguous axial CT images were obtained from the lower chest   to the pubic symphysis following administration of intravenous contrast.    100 cc administered of Omnipaque 350 (0 cc discarded).  Oral contrast was   not administered.  Reformatted images in the coronal and sagittal planes   were acquired.    COMPARISON CT: 10/16/2023    Study is limited by motion and by streak artifact from a right posterior   flank subcutaneous spinal generator and spinal hardware    FINDINGS:    LOWER CHEST: Dependent and subsegmental atelectasis.    HEPATOBILIARY: There arehepatic cysts and subcentimeter hypodensities,   too small to characterize. The gallbladder is present.    SPLEEN: Unremarkable..    PANCREAS: Fatty replacement of the pancreas    ADRENAL GLANDS: Unremarkable..    KIDNEYS: Subcentimeter hypodensitiesare too small to characterize. There   is no hydronephrosis    ABDOMINOPELVIC NODES: Unremarkable....    PELVIC ORGANS: Bladder is distended. The uterus is not seen and is likely   surgically removed    PERITONEUM/MESENTERY/BOWEL: There is a mildly dilated loop of small bowel   in the left lower abdomen, nonspecific. Follow-up is recommended. There   is no definite evidence of obstruction this time. There is no free air.   There is diverticulosis.. There is a small hiatal hernia..    BONES/SOFT TISSUES: Degenerative change. There are postoperative changes   to the lumbosacral spine with hardware present. There is a spinal   scoliosis.  There is a small fat-containing ventral hernia...    OTHER: Vascular calcifications...      IMPRESSION:    Mildly dilated loop of small bowel in the left lower abdomen,   nonspecific. Follow-up is recommended    --- End of Report ---        Current Medications  Standing Medications  atorvastatin 20 milliGRAM(s) Oral at bedtime  budesonide  80 MICROgram(s)/formoterol 4.5 MICROgram(s) Inhaler 2 Puff(s) Inhalation two times a day  enoxaparin Injectable 40 milliGRAM(s) SubCutaneous every 24 hours  influenza  Vaccine (HIGH DOSE) 0.7 milliLiter(s) IntraMuscular once  lactated ringers. 1000 milliLiter(s) (70 mL/Hr) IV Continuous <Continuous>  pantoprazole    Tablet 40 milliGRAM(s) Oral before breakfast  piperacillin/tazobactam IVPB.. 3.375 Gram(s) IV Intermittent every 6 hours  sertraline 50 milliGRAM(s) Oral daily  tamsulosin 0.4 milliGRAM(s) Oral at bedtime    PRN Medications  acetaminophen     Tablet .. 650 milliGRAM(s) Oral every 6 hours PRN Temp greater or equal to 38C (100.4F)  ALPRAZolam 0.5 milliGRAM(s) Oral four times a day PRN anxiety  guaiFENesin  milliGRAM(s) Oral every 12 hours PRN Cough  oxycodone    5 mG/acetaminophen 325 mG 1 Tablet(s) Oral every 8 hours PRN back pain    Singles Doses Administered  (ADM OVERRIDE) 1 each &lt;see task&gt; GiveOnce  (ADM OVERRIDE) 1 each &lt;see task&gt; GiveOnce  (ADM OVERRIDE) 1 each &lt;see task&gt; GiveOnce  (ADM OVERRIDE) 1 each &lt;see task&gt; GiveOnce  (ADM OVERRIDE) 1 each &lt;see task&gt; GiveOnce  (ADM OVERRIDE) 2 each &lt;see task&gt; GiveOnce  acetaminophen     Tablet .. 650 milliGRAM(s) Oral once  cefepime   IVPB 2000 milliGRAM(s) IV Intermittent once  famotidine Injectable 20 milliGRAM(s) IV Push once  ketorolac   Injectable 15 milliGRAM(s) IV Push Once  lactated ringers Bolus 300 milliLiter(s) IV Bolus once  lactated ringers Bolus 1000 milliLiter(s) IV Bolus once  metoclopramide Injectable 10 milliGRAM(s) IV Push once  metroNIDAZOLE  IVPB 500 milliGRAM(s) IV Intermittent once  morphine  - Injectable 4 milliGRAM(s) IV Push once  ondansetron Injectable 4 milliGRAM(s) IV Push once  sodium chloride 0.9% Bolus 1000 milliLiter(s) IV Bolus once       Gastroenterology Initial Consult Note      [facility] [address] (Back) [facility]  Patient Name: MANUEL ROSENBAUM  Age: 78y  Gender: Female      Chief Complaint  Patient is a 78y old Female who presents with a chief complaint of Abdominal Pain (27 Dec 2023 07:56)  Primary diagnosis of Abdominal pain      Reason for Consult  Small Bowel Dilation      History of Present Illness  78 year old female patient with history of Anxiety, Depression, DL, HTN, hiatal hernia, chronic back and knee pains s/p multiple surgeries, and recent admission for sigmoid diverticulitis in 10/2023 who presented to the ED on 12/26 for evaluation of abdominal pain, nausea, and vomiting, found to be febrile with evidence of leukocytosis and lactic acidosis on blood work and evidence of dilated loops of small bowel on imaging, admitted for further management. We are consulted for small bowel dilation and concern of enteritis.      Summary:  * Recent admission in 10/2023 for sigmoid diverticulitis s/p antibiotics course. Sedimentation Rate, Erythrocyte (10.24.23 @ 06:06) 8 mm/Hr; C-Reactive Protein, Serum (10.24.23 @ 06:06) 5.8 mg/L  * CT Abdomen and Pelvis w/ IV Cont (10.16.23 @ 04:33) Colonic diverticulosis. Diffuse sigmoid thickening with surrounding fat stranding. Likely inflamed diverticulum on series 4 image 264. Avulsion, pneumoperitoneum or pneumatosis.  * Had colonoscopy 3 weeks ago at Dr Vásquez's office: several polyps (benign pathology per patient) and internal hemorrhoids -> no records  * Current presentation 12/26: 1 day of diffuse abdominal pain associated with nausea, 7x non bilious vomiting, chills, subjective fevers  * /72 mmHg; HR 98 bpm; RR 20 bpm; T 101.2 degrees   * ED Labs WBC 21.06, Hb 13.3, Plt 400; Na 141, K 4.5, BUN 15, Cr 0.7, LFT 0.5/62/28/18; Lipase 18; LA 3.1 to 1.5 on 12/26  * CT Abdomen and Pelvis w/ IV Cont (12.26.23 @ 13:21) Mildly dilated loop of small bowel in the left lower abdomen, nonspecific. Follow-up is recommended  * Family history of Crohn Disease in daughter      Prior EGD:  as below      Prior Colonoscopy:  as below      Past Medical and Surgical History:  HTN (hypertension)  HLD (hyperlipidemia)  Chronic back pain  s/p back sx and stimulator  Gastroesophageal reflux disease with hiatal hernia  H/O vaginal hysterectomy  History of laparotomy      Home Medications:  Home Medications:  Advair Diskus 250 mcg-50 mcg inhalation powder: 1 puff(s) inhaled 2 times a day (16 Oct 2023 09:57)  ALPRAZolam 0.5 mg oral tablet: 1 tab(s) orally 4 times a day (16 Oct 2023 09:59)  Crestor 5 mg oral tablet: 1 tab(s) orally once a day (16 Oct 2023 09:59)  Diovan 160 mg oral capsule: 1 tab(s) orally once a day (16 Oct 2023 09:59)  Norvasc 10 mg oral tablet: 1 tab(s) orally once a day (at bedtime) (26 Dec 2023 23:54)  omeprazole 20 mg oral delayed release tablet: 1 tab(s) orally 2 times a day (16 Oct 2023 09:57)  oxyCODONE-acetaminophen 7.5 mg-325 mg oral tablet: 1 tab(s) orally every 8 hours, As Needed (16 Oct 2023 15:07)  sertraline 50 mg oral tablet: 1 tab(s) orally once a day (16 Oct 2023 09:58)  tamsulosin 0.4 mg oral capsule: 1 cap(s) orally once a day (16 Oct 2023 09:59)      Social History:  Tobacco: denies  Alcohol: occasional  Drugs: denies      Allergies:  No Known Allergies      Family History:  FAMILY HISTORY:  Crohn Disease in daughter      Vital Signs in the last 24 hours   Vitals Summary T(C): 36.7 (12-27-23 @ 08:03), Max: 38.4 (12-26-23 @ 14:20)  HR: 67 (12-27-23 @ 08:03) (67 - 98)  BP: 142/65 (12-27-23 @ 08:03) (106/53 - 173/81)  RR: 18 (12-27-23 @ 08:03) (16 - 20)  SpO2: 97% (12-27-23 @ 08:03) (87% - 98%)  Vent Data   Intake/ Output   12-26-23 @ 07:01  -  12-27-23 @ 07:00  --------------------------------------------------------  IN: 0 mL / OUT: 500 mL / NET: -500 mL      Physical Exam  * General Appearance: Alert, cooperative, interactive, oriented to time, place, and person, in no acute distress  * Lungs: Good bilateral air entry, normal breath sounds   * Heart: Regular Rate and Rhythm, normal S1 and S2, no audible murmur, rub, or gallop  * Abdomen: Symmetric, non-distended, soft, tenderness noted along LLQ>RLQ, no guarding or rebound tenderness, bowel sounds active all four quadrants, no masses      Investigations   Laboratory Workup      - CBC:                        13.3   21.06 )-----------( 400      ( 26 Dec 2023 11:00 )             40.2       - Hgb Trend:  13.3  12-26-23 @ 11:00          - Chemistry:  12-26    141  |  103  |  15  ----------------------------<  119<H>  4.5   |  22  |  0.7    Ca    9.4      26 Dec 2023 11:00    TPro  7.1  /  Alb  4.5  /  TBili  0.5  /  DBili  x   /  AST  28  /  ALT  18  /  AlkPhos  62  12-26    Liver panel trend:  TBili 0.5   /   AST 28   /   ALT 18   /   AlkP 62   /   Tptn 7.1   /   Alb 4.5    /   DBili --      12-26      - Coagulation Studies:      - ABG:      - Cardiac Markers:        Microbiological Workup  Urinalysis Basic - ( 26 Dec 2023 11:00 )    Color: x / Appearance: x / SG: x / pH: x  Gluc: 119 mg/dL / Ketone: x  / Bili: x / Urobili: x   Blood: x / Protein: x / Nitrite: x   Leuk Esterase: x / RBC: x / WBC x   Sq Epi: x / Non Sq Epi: x / Bacteria: x          Radiological Workup  CT Abdomen and Pelvis w/ IV Cont:   ACC: [id number] EXAM:  CT ABDOMEN AND PELVIS IC   ORDERED BY: NEO BAEZ     PROCEDURE DATE:  12/26/2023          INTERPRETATION:  CLINICAL STATEMENT: Abdominal pain    TECHNIQUE: Contiguous axial CT images were obtained from the lower chest   to the pubic symphysis following administration of intravenous contrast.    100 cc administered of Omnipaque 350 (0 cc discarded).  Oral contrast was   not administered.  Reformatted images in the coronal and sagittal planes   were acquired.    COMPARISON CT: 10/16/2023    Study is limited by motion and by streak artifact from a right posterior   flank subcutaneous spinal generator and spinal hardware    FINDINGS:    LOWER CHEST: Dependent and subsegmental atelectasis.    HEPATOBILIARY: There arehepatic cysts and subcentimeter hypodensities,   too small to characterize. The gallbladder is present.    SPLEEN: Unremarkable..    PANCREAS: Fatty replacement of the pancreas    ADRENAL GLANDS: Unremarkable..    KIDNEYS: Subcentimeter hypodensitiesare too small to characterize. There   is no hydronephrosis    ABDOMINOPELVIC NODES: Unremarkable....    PELVIC ORGANS: Bladder is distended. The uterus is not seen and is likely   surgically removed    PERITONEUM/MESENTERY/BOWEL: There is a mildly dilated loop of small bowel   in the left lower abdomen, nonspecific. Follow-up is recommended. There   is no definite evidence of obstruction this time. There is no free air.   There is diverticulosis.. There is a small hiatal hernia..    BONES/SOFT TISSUES: Degenerative change. There are postoperative changes   to the lumbosacral spine with hardware present. There is a spinal   scoliosis.  There is a small fat-containing ventral hernia...    OTHER: Vascular calcifications...      IMPRESSION:    Mildly dilated loop of small bowel in the left lower abdomen,   nonspecific. Follow-up is recommended    --- End of Report ---        Current Medications  Standing Medications  atorvastatin 20 milliGRAM(s) Oral at bedtime  budesonide  80 MICROgram(s)/formoterol 4.5 MICROgram(s) Inhaler 2 Puff(s) Inhalation two times a day  enoxaparin Injectable 40 milliGRAM(s) SubCutaneous every 24 hours  influenza  Vaccine (HIGH DOSE) 0.7 milliLiter(s) IntraMuscular once  lactated ringers. 1000 milliLiter(s) (70 mL/Hr) IV Continuous <Continuous>  pantoprazole    Tablet 40 milliGRAM(s) Oral before breakfast  piperacillin/tazobactam IVPB.. 3.375 Gram(s) IV Intermittent every 6 hours  sertraline 50 milliGRAM(s) Oral daily  tamsulosin 0.4 milliGRAM(s) Oral at bedtime    PRN Medications  acetaminophen     Tablet .. 650 milliGRAM(s) Oral every 6 hours PRN Temp greater or equal to 38C (100.4F)  ALPRAZolam 0.5 milliGRAM(s) Oral four times a day PRN anxiety  guaiFENesin  milliGRAM(s) Oral every 12 hours PRN Cough  oxycodone    5 mG/acetaminophen 325 mG 1 Tablet(s) Oral every 8 hours PRN back pain    Singles Doses Administered  (ADM OVERRIDE) 1 each &lt;see task&gt; GiveOnce  (ADM OVERRIDE) 1 each &lt;see task&gt; GiveOnce  (ADM OVERRIDE) 1 each &lt;see task&gt; GiveOnce  (ADM OVERRIDE) 1 each &lt;see task&gt; GiveOnce  (ADM OVERRIDE) 1 each &lt;see task&gt; GiveOnce  (ADM OVERRIDE) 2 each &lt;see task&gt; GiveOnce  acetaminophen     Tablet .. 650 milliGRAM(s) Oral once  cefepime   IVPB 2000 milliGRAM(s) IV Intermittent once  famotidine Injectable 20 milliGRAM(s) IV Push once  ketorolac   Injectable 15 milliGRAM(s) IV Push Once  lactated ringers Bolus 300 milliLiter(s) IV Bolus once  lactated ringers Bolus 1000 milliLiter(s) IV Bolus once  metoclopramide Injectable 10 milliGRAM(s) IV Push once  metroNIDAZOLE  IVPB 500 milliGRAM(s) IV Intermittent once  morphine  - Injectable 4 milliGRAM(s) IV Push once  ondansetron Injectable 4 milliGRAM(s) IV Push once  sodium chloride 0.9% Bolus 1000 milliLiter(s) IV Bolus once

## 2023-12-27 NOTE — PROGRESS NOTE ADULT - ASSESSMENT
Ms. Chow is an 78 year old female with PMH of HLD, HTN, diverticulitis, chronic pain (multiple back and knee surgeries on oxycodone w/ spinal stimulator), right middle lobe syndrome, anxiety and depression (on Xanax),  hiatal hernia, vaginal hysterectomy and laparotomy for adhesiolysis. Admitted for evaluation and management of acute abdominal pain.    #COVID exposure  -pt exposed to pt 12/27  -covid swab 12/17 negative  -isolation from 12/27-5 days if tests negative     #Abdominal Pain 2/2 Uncomplicated Diverticulitis vs. Infectious Enteritis   #Sepsis, Present on Admission  -treated as inpatient for uncomplicated diverticulitis in 10/2023  -colonoscopy done a few weeks ago, with Dr. Vásquez, multiple polyps were removed, benign pathology  -EGD was done 1 year ago with Dr. Vásquez  -CT A/P was negative for diverticulitis however given recent history and strong clinical suspicion it cannot be ruled out  -lactate downtrended from 3.1 to 1.5  -continue Zosyn 3.375 mg IVPB q6h   -clear fluids LR 100cc/hr   -WBC - 21->12.86  -surgery consult appreciated  -GI consult appreciated   -follow up blood and urine cultures, RVP    #GERD  #HO Hiatal Hernia  -will continue home omeprazole 20 mg as pantoprazole 40 mg    #HTN  -holding losartan 160 mg qd and amlodipine 10 mg qhs, resume if BP stable    #HLD  -will continue home Crestor 5 mg as atorvastatin 20 mg qhs    #Chronic Back Pain s/p Spinal Cord Stimulator  -follows with Dr. Joya  -c/w Percocet    #Right Middle Lobe Syndrome  -follows with Dr. Amaya  -on Advair Diskus at home, reported that she never needs to use it    Anxiety  Depression  -c/w Xanax 0.5 mg qid prn  -c/w sertraline 50 mg qd    DVT prophylaxis: enoxaparin  GI prophylaxis: pantoprazole  Diet: clears   Code status: full code  Activity: AAT  Pending: food tolerance, temp, wbc trend

## 2023-12-28 LAB
ALBUMIN SERPL ELPH-MCNC: 3.6 G/DL — SIGNIFICANT CHANGE UP (ref 3.5–5.2)
ALBUMIN SERPL ELPH-MCNC: 3.6 G/DL — SIGNIFICANT CHANGE UP (ref 3.5–5.2)
ALP SERPL-CCNC: 46 U/L — SIGNIFICANT CHANGE UP (ref 30–115)
ALP SERPL-CCNC: 46 U/L — SIGNIFICANT CHANGE UP (ref 30–115)
ALT FLD-CCNC: 14 U/L — SIGNIFICANT CHANGE UP (ref 0–41)
ALT FLD-CCNC: 14 U/L — SIGNIFICANT CHANGE UP (ref 0–41)
ANION GAP SERPL CALC-SCNC: 9 MMOL/L — SIGNIFICANT CHANGE UP (ref 7–14)
ANION GAP SERPL CALC-SCNC: 9 MMOL/L — SIGNIFICANT CHANGE UP (ref 7–14)
APPEARANCE UR: CLEAR — SIGNIFICANT CHANGE UP
APPEARANCE UR: CLEAR — SIGNIFICANT CHANGE UP
AST SERPL-CCNC: 19 U/L — SIGNIFICANT CHANGE UP (ref 0–41)
AST SERPL-CCNC: 19 U/L — SIGNIFICANT CHANGE UP (ref 0–41)
BACTERIA # UR AUTO: NEGATIVE /HPF — SIGNIFICANT CHANGE UP
BACTERIA # UR AUTO: NEGATIVE /HPF — SIGNIFICANT CHANGE UP
BASOPHILS # BLD AUTO: 0.06 K/UL — SIGNIFICANT CHANGE UP (ref 0–0.2)
BASOPHILS # BLD AUTO: 0.06 K/UL — SIGNIFICANT CHANGE UP (ref 0–0.2)
BASOPHILS NFR BLD AUTO: 0.6 % — SIGNIFICANT CHANGE UP (ref 0–1)
BASOPHILS NFR BLD AUTO: 0.6 % — SIGNIFICANT CHANGE UP (ref 0–1)
BILIRUB SERPL-MCNC: 0.5 MG/DL — SIGNIFICANT CHANGE UP (ref 0.2–1.2)
BILIRUB SERPL-MCNC: 0.5 MG/DL — SIGNIFICANT CHANGE UP (ref 0.2–1.2)
BILIRUB UR-MCNC: NEGATIVE — SIGNIFICANT CHANGE UP
BILIRUB UR-MCNC: NEGATIVE — SIGNIFICANT CHANGE UP
BUN SERPL-MCNC: 7 MG/DL — LOW (ref 10–20)
BUN SERPL-MCNC: 7 MG/DL — LOW (ref 10–20)
CALCIUM SERPL-MCNC: 8.3 MG/DL — LOW (ref 8.4–10.5)
CALCIUM SERPL-MCNC: 8.3 MG/DL — LOW (ref 8.4–10.5)
CAST: 1 /LPF — SIGNIFICANT CHANGE UP (ref 0–4)
CAST: 1 /LPF — SIGNIFICANT CHANGE UP (ref 0–4)
CHLORIDE SERPL-SCNC: 107 MMOL/L — SIGNIFICANT CHANGE UP (ref 98–110)
CHLORIDE SERPL-SCNC: 107 MMOL/L — SIGNIFICANT CHANGE UP (ref 98–110)
CO2 SERPL-SCNC: 26 MMOL/L — SIGNIFICANT CHANGE UP (ref 17–32)
CO2 SERPL-SCNC: 26 MMOL/L — SIGNIFICANT CHANGE UP (ref 17–32)
COLOR SPEC: YELLOW — SIGNIFICANT CHANGE UP
COLOR SPEC: YELLOW — SIGNIFICANT CHANGE UP
CREAT SERPL-MCNC: 0.7 MG/DL — SIGNIFICANT CHANGE UP (ref 0.7–1.5)
CREAT SERPL-MCNC: 0.7 MG/DL — SIGNIFICANT CHANGE UP (ref 0.7–1.5)
CRP SERPL-MCNC: 7.3 MG/L — HIGH
CRP SERPL-MCNC: 7.3 MG/L — HIGH
DIFF PNL FLD: NEGATIVE — SIGNIFICANT CHANGE UP
DIFF PNL FLD: NEGATIVE — SIGNIFICANT CHANGE UP
EGFR: 87 ML/MIN/1.73M2 — SIGNIFICANT CHANGE UP
EGFR: 87 ML/MIN/1.73M2 — SIGNIFICANT CHANGE UP
EOSINOPHIL # BLD AUTO: 0.41 K/UL — SIGNIFICANT CHANGE UP (ref 0–0.7)
EOSINOPHIL # BLD AUTO: 0.41 K/UL — SIGNIFICANT CHANGE UP (ref 0–0.7)
EOSINOPHIL NFR BLD AUTO: 4.3 % — SIGNIFICANT CHANGE UP (ref 0–8)
EOSINOPHIL NFR BLD AUTO: 4.3 % — SIGNIFICANT CHANGE UP (ref 0–8)
ERYTHROCYTE [SEDIMENTATION RATE] IN BLOOD: 6 MM/HR — SIGNIFICANT CHANGE UP (ref 0–20)
ERYTHROCYTE [SEDIMENTATION RATE] IN BLOOD: 6 MM/HR — SIGNIFICANT CHANGE UP (ref 0–20)
GLUCOSE SERPL-MCNC: 118 MG/DL — HIGH (ref 70–99)
GLUCOSE SERPL-MCNC: 118 MG/DL — HIGH (ref 70–99)
GLUCOSE UR QL: NEGATIVE MG/DL — SIGNIFICANT CHANGE UP
GLUCOSE UR QL: NEGATIVE MG/DL — SIGNIFICANT CHANGE UP
HCT VFR BLD CALC: 35.7 % — LOW (ref 37–47)
HCT VFR BLD CALC: 35.7 % — LOW (ref 37–47)
HGB BLD-MCNC: 11 G/DL — LOW (ref 12–16)
HGB BLD-MCNC: 11 G/DL — LOW (ref 12–16)
IMM GRANULOCYTES NFR BLD AUTO: 0.6 % — HIGH (ref 0.1–0.3)
IMM GRANULOCYTES NFR BLD AUTO: 0.6 % — HIGH (ref 0.1–0.3)
KETONES UR-MCNC: NEGATIVE MG/DL — SIGNIFICANT CHANGE UP
KETONES UR-MCNC: NEGATIVE MG/DL — SIGNIFICANT CHANGE UP
LEUKOCYTE ESTERASE UR-ACNC: ABNORMAL
LEUKOCYTE ESTERASE UR-ACNC: ABNORMAL
LYMPHOCYTES # BLD AUTO: 2.07 K/UL — SIGNIFICANT CHANGE UP (ref 1.2–3.4)
LYMPHOCYTES # BLD AUTO: 2.07 K/UL — SIGNIFICANT CHANGE UP (ref 1.2–3.4)
LYMPHOCYTES # BLD AUTO: 21.6 % — SIGNIFICANT CHANGE UP (ref 20.5–51.1)
LYMPHOCYTES # BLD AUTO: 21.6 % — SIGNIFICANT CHANGE UP (ref 20.5–51.1)
MCHC RBC-ENTMCNC: 25.1 PG — LOW (ref 27–31)
MCHC RBC-ENTMCNC: 25.1 PG — LOW (ref 27–31)
MCHC RBC-ENTMCNC: 30.8 G/DL — LOW (ref 32–37)
MCHC RBC-ENTMCNC: 30.8 G/DL — LOW (ref 32–37)
MCV RBC AUTO: 81.5 FL — SIGNIFICANT CHANGE UP (ref 81–99)
MCV RBC AUTO: 81.5 FL — SIGNIFICANT CHANGE UP (ref 81–99)
MONOCYTES # BLD AUTO: 0.67 K/UL — HIGH (ref 0.1–0.6)
MONOCYTES # BLD AUTO: 0.67 K/UL — HIGH (ref 0.1–0.6)
MONOCYTES NFR BLD AUTO: 7 % — SIGNIFICANT CHANGE UP (ref 1.7–9.3)
MONOCYTES NFR BLD AUTO: 7 % — SIGNIFICANT CHANGE UP (ref 1.7–9.3)
NEUTROPHILS # BLD AUTO: 6.31 K/UL — SIGNIFICANT CHANGE UP (ref 1.4–6.5)
NEUTROPHILS # BLD AUTO: 6.31 K/UL — SIGNIFICANT CHANGE UP (ref 1.4–6.5)
NEUTROPHILS NFR BLD AUTO: 65.9 % — SIGNIFICANT CHANGE UP (ref 42.2–75.2)
NEUTROPHILS NFR BLD AUTO: 65.9 % — SIGNIFICANT CHANGE UP (ref 42.2–75.2)
NITRITE UR-MCNC: NEGATIVE — SIGNIFICANT CHANGE UP
NITRITE UR-MCNC: NEGATIVE — SIGNIFICANT CHANGE UP
NRBC # BLD: 0 /100 WBCS — SIGNIFICANT CHANGE UP (ref 0–0)
NRBC # BLD: 0 /100 WBCS — SIGNIFICANT CHANGE UP (ref 0–0)
PH UR: 6.5 — SIGNIFICANT CHANGE UP (ref 5–8)
PH UR: 6.5 — SIGNIFICANT CHANGE UP (ref 5–8)
PLATELET # BLD AUTO: 317 K/UL — SIGNIFICANT CHANGE UP (ref 130–400)
PLATELET # BLD AUTO: 317 K/UL — SIGNIFICANT CHANGE UP (ref 130–400)
PMV BLD: 11.9 FL — HIGH (ref 7.4–10.4)
PMV BLD: 11.9 FL — HIGH (ref 7.4–10.4)
POTASSIUM SERPL-MCNC: 3.4 MMOL/L — LOW (ref 3.5–5)
POTASSIUM SERPL-MCNC: 3.4 MMOL/L — LOW (ref 3.5–5)
POTASSIUM SERPL-SCNC: 3.4 MMOL/L — LOW (ref 3.5–5)
POTASSIUM SERPL-SCNC: 3.4 MMOL/L — LOW (ref 3.5–5)
PROT SERPL-MCNC: 5.5 G/DL — LOW (ref 6–8)
PROT SERPL-MCNC: 5.5 G/DL — LOW (ref 6–8)
PROT UR-MCNC: SIGNIFICANT CHANGE UP MG/DL
PROT UR-MCNC: SIGNIFICANT CHANGE UP MG/DL
RBC # BLD: 4.38 M/UL — SIGNIFICANT CHANGE UP (ref 4.2–5.4)
RBC # BLD: 4.38 M/UL — SIGNIFICANT CHANGE UP (ref 4.2–5.4)
RBC # FLD: 16.9 % — HIGH (ref 11.5–14.5)
RBC # FLD: 16.9 % — HIGH (ref 11.5–14.5)
RBC CASTS # UR COMP ASSIST: 1 /HPF — SIGNIFICANT CHANGE UP (ref 0–4)
RBC CASTS # UR COMP ASSIST: 1 /HPF — SIGNIFICANT CHANGE UP (ref 0–4)
SODIUM SERPL-SCNC: 142 MMOL/L — SIGNIFICANT CHANGE UP (ref 135–146)
SODIUM SERPL-SCNC: 142 MMOL/L — SIGNIFICANT CHANGE UP (ref 135–146)
SP GR SPEC: 1.01 — SIGNIFICANT CHANGE UP (ref 1–1.03)
SP GR SPEC: 1.01 — SIGNIFICANT CHANGE UP (ref 1–1.03)
SQUAMOUS # UR AUTO: 0 /HPF — SIGNIFICANT CHANGE UP (ref 0–5)
SQUAMOUS # UR AUTO: 0 /HPF — SIGNIFICANT CHANGE UP (ref 0–5)
UROBILINOGEN FLD QL: 0.2 MG/DL — SIGNIFICANT CHANGE UP (ref 0.2–1)
UROBILINOGEN FLD QL: 0.2 MG/DL — SIGNIFICANT CHANGE UP (ref 0.2–1)
WBC # BLD: 9.58 K/UL — SIGNIFICANT CHANGE UP (ref 4.8–10.8)
WBC # BLD: 9.58 K/UL — SIGNIFICANT CHANGE UP (ref 4.8–10.8)
WBC # FLD AUTO: 9.58 K/UL — SIGNIFICANT CHANGE UP (ref 4.8–10.8)
WBC # FLD AUTO: 9.58 K/UL — SIGNIFICANT CHANGE UP (ref 4.8–10.8)
WBC UR QL: 121 /HPF — HIGH (ref 0–5)
WBC UR QL: 121 /HPF — HIGH (ref 0–5)

## 2023-12-28 PROCEDURE — 99232 SBSQ HOSP IP/OBS MODERATE 35: CPT

## 2023-12-28 PROCEDURE — 99233 SBSQ HOSP IP/OBS HIGH 50: CPT

## 2023-12-28 RX ORDER — POTASSIUM CHLORIDE 20 MEQ
20 PACKET (EA) ORAL ONCE
Refills: 0 | Status: COMPLETED | OUTPATIENT
Start: 2023-12-28 | End: 2023-12-28

## 2023-12-28 RX ADMIN — Medication 0.5 MILLIGRAM(S): at 20:58

## 2023-12-28 RX ADMIN — PIPERACILLIN AND TAZOBACTAM 25 GRAM(S): 4; .5 INJECTION, POWDER, LYOPHILIZED, FOR SOLUTION INTRAVENOUS at 05:44

## 2023-12-28 RX ADMIN — POLYETHYLENE GLYCOL 3350 17 GRAM(S): 17 POWDER, FOR SOLUTION ORAL at 11:26

## 2023-12-28 RX ADMIN — Medication 0.5 MILLIGRAM(S): at 05:43

## 2023-12-28 RX ADMIN — ATORVASTATIN CALCIUM 20 MILLIGRAM(S): 80 TABLET, FILM COATED ORAL at 21:07

## 2023-12-28 RX ADMIN — PANTOPRAZOLE SODIUM 40 MILLIGRAM(S): 20 TABLET, DELAYED RELEASE ORAL at 05:43

## 2023-12-28 RX ADMIN — TAMSULOSIN HYDROCHLORIDE 0.4 MILLIGRAM(S): 0.4 CAPSULE ORAL at 21:07

## 2023-12-28 RX ADMIN — ENOXAPARIN SODIUM 40 MILLIGRAM(S): 100 INJECTION SUBCUTANEOUS at 17:06

## 2023-12-28 RX ADMIN — PIPERACILLIN AND TAZOBACTAM 25 GRAM(S): 4; .5 INJECTION, POWDER, LYOPHILIZED, FOR SOLUTION INTRAVENOUS at 23:33

## 2023-12-28 RX ADMIN — Medication 20 MILLIEQUIVALENT(S): at 11:27

## 2023-12-28 RX ADMIN — PIPERACILLIN AND TAZOBACTAM 25 GRAM(S): 4; .5 INJECTION, POWDER, LYOPHILIZED, FOR SOLUTION INTRAVENOUS at 17:05

## 2023-12-28 NOTE — PHYSICAL THERAPY INITIAL EVALUATION ADULT - PERTINENT HX OF CURRENT PROBLEM, REHAB EVAL
Ms. Chow is an 78 year old female with PMH of HLD, HTN, diverticulosis, chronic pain (multiple back and knee surgeries on oxycodone w/ spinal stimulator), right middle lobe syndrome, anxiety and depression (on Xanax),  hiatal hernia, vaginal hysterectomy and laparotomy for adhesiolysis.    She presented to the ED complaining of abdominal pain of 1 day duration.     The pain woke her up from sleep at night, was diffuse, and was associated with multiple episodes of NBNB vomiting, chills and cough. Last BM was one day ago. Denied diarrhea, sick contacts, chest pain, or SOB.

## 2023-12-28 NOTE — PROGRESS NOTE ADULT - ASSESSMENT
Ms. Chow is an 78 year old female with PMH of HLD, HTN, diverticulitis, chronic pain (multiple back and knee surgeries on oxycodone w/ spinal stimulator), right middle lobe syndrome, anxiety and depression (on Xanax),  hiatal hernia, vaginal hysterectomy and laparotomy for adhesiolysis. Admitted for evaluation and management of acute abdominal pain.    #COVID exposure  -pt exposed to pt 12/27  -covid swab 12/17 negative  -isolation from 12/27-5 days if tests negative     #Abdominal Pain 2/2 Uncomplicated Diverticulitis vs. Infectious Enteritis   #Sepsis, Present on Admission  -treated as inpatient for uncomplicated diverticulitis in 10/2023  -colonoscopy done a few weeks ago, with Dr. Vásquez, multiple polyps were removed, benign pathology  -EGD was done 1 year ago with Dr. Vásquez  -CT A/P was negative for diverticulitis however given recent history and strong clinical suspicion it cannot be ruled out  -lactate downtrended from 3.1 to 1.5  -continue Zosyn 3.375 mg IVPB q6h   -clear fluids- LR 50cc/hr   -WBC - 21->12.86-->9.58  -surgery consult appreciated  -GI consult appreciated   -follow up blood-NTD and urine cultures, RVP    #Diarrhea  -mirlax and senna stopped  -gi pcr and stool culture are ordered     #GERD  #HO Hiatal Hernia  -will continue home omeprazole 20 mg as pantoprazole 40 mg    #HTN  -holding losartan 160 mg qd and amlodipine 10 mg qhs, resume if BP stable    #HLD  -will continue home Crestor 5 mg as atorvastatin 20 mg qhs    #Chronic Back Pain s/p Spinal Cord Stimulator  -follows with Dr. Joya  -c/w Percocet    #Right Middle Lobe Syndrome  -follows with Dr. Amaya  -on Advair Diskus at home, reported that she never needs to use it    Anxiety  Depression  -c/w Xanax 0.5 mg qid prn  -c/w sertraline 50 mg qd    DVT prophylaxis: enoxaparin  GI prophylaxis: pantoprazole  Diet: clears   Code status: full code  Activity: AAT  Pending: food tolerance, temp, wbc trend, stool culture, GI PCR

## 2023-12-28 NOTE — PROGRESS NOTE ADULT - SUBJECTIVE AND OBJECTIVE BOX
Gastroenterology Follow Up Note      Location: [facility] 4B (Back) 018 [address] ([facility] 4B (Back))  Patient Name: MANUEL ROSENBAUM  Age: 78y  Gender: Female      Chief Complaint  Patient is a 78y old Female who presents with a chief complaint of Abdominal Pain (28 Dec 2023 16:59)  Primary diagnosis of Abdominal pain      Reason for Consult  Small Bowel Dilation      Progress Note  This morning patient was seen and examined at bedside.    Today is hospital day 2d.  Patient is doing fine. No acute events overnight.   Patient's appetite is reduced, and she notes nausea and post prandial vomiting on 12/27.   Patient denies any abdominal pain (only tenderness on touching).  She had multiple episodes of loose stools on 12/28 after taking laxatives.      Vital Signs in the last 24 hours   Vitals Summary T(C): 36.9 (12-28-23 @ 15:26), Max: 36.9 (12-28-23 @ 15:26)  HR: 67 (12-28-23 @ 15:26) (61 - 68)  BP: 133/63 (12-28-23 @ 15:26) (107/54 - 133/63)  RR: 18 (12-28-23 @ 15:26) (18 - 18)  SpO2: 97% (12-28-23 @ 15:26) (97% - 97%)  Vent Data   Intake/ Output   Measurements       Physical Exam  * General Appearance: Alert, cooperative, interactive, oriented to time, place, and person, in no acute distress  * Lungs: Good bilateral air entry, normal breath sounds   * Heart: Regular Rate and Rhythm, normal S1 and S2, no audible murmur, rub, or gallop  * Abdomen: Symmetric, non-distended, soft, tenderness noted along LLQ>RLQ, no guarding or rebound tenderness, bowel sounds active all four quadrants, no masses      Investigations   Laboratory Workup      - CBC:                        11.0   9.58  )-----------( 317      ( 28 Dec 2023 08:18 )             35.7       - Hgb Trend:  11.0  12-28-23 @ 08:18  11.7  12-27-23 @ 08:06  13.3  12-26-23 @ 11:00          - Chemistry:  12-28    142  |  107  |  7<L>  ----------------------------<  118<H>  3.4<L>   |  26  |  0.7    Ca    8.3<L>      28 Dec 2023 08:18  Mg     2.2     12-27    TPro  5.5<L>  /  Alb  3.6  /  TBili  0.5  /  DBili  x   /  AST  19  /  ALT  14  /  AlkPhos  46  12-28    Liver panel trend:  TBili 0.5   /   AST 19   /   ALT 14   /   AlkP 46   /   Tptn 5.5   /   Alb 3.6    /   DBili --      12-28  TBili 0.6   /   AST 19   /   ALT 14   /   AlkP 56   /   Tptn 6.0   /   Alb 3.9    /   DBili --      12-27  TBili 0.5   /   AST 28   /   ALT 18   /   AlkP 62   /   Tptn 7.1   /   Alb 4.5    /   DBili --      12-26      Urinalysis Basic - ( 28 Dec 2023 08:18 )    Color: x / Appearance: x / SG: x / pH: x  Gluc: 118 mg/dL / Ketone: x  / Bili: x / Urobili: x   Blood: x / Protein: x / Nitrite: x   Leuk Esterase: x / RBC: x / WBC x   Sq Epi: x / Non Sq Epi: x / Bacteria: x      Culture - Blood (collected 26 Dec 2023 13:43)  Source: .Blood Blood  Preliminary Report (27 Dec 2023 23:02):    No growth at 24 hours    Culture - Blood (collected 26 Dec 2023 13:43)  Source: .Blood Blood  Preliminary Report (27 Dec 2023 23:02):    No growth at 24 hours      Current Medications  Standing Medications  atorvastatin 20 milliGRAM(s) Oral at bedtime  budesonide  80 MICROgram(s)/formoterol 4.5 MICROgram(s) Inhaler 2 Puff(s) Inhalation two times a day  enoxaparin Injectable 40 milliGRAM(s) SubCutaneous every 24 hours  influenza  Vaccine (HIGH DOSE) 0.7 milliLiter(s) IntraMuscular once  lactated ringers. 1000 milliLiter(s) (100 mL/Hr) IV Continuous <Continuous>  oxycodone    5 mG/acetaminophen 325 mG 2 Tablet(s) Oral every 8 hours  pantoprazole    Tablet 40 milliGRAM(s) Oral before breakfast  piperacillin/tazobactam IVPB.. 3.375 Gram(s) IV Intermittent every 6 hours  polyethylene glycol 3350 17 Gram(s) Oral daily  senna 2 Tablet(s) Oral at bedtime  sertraline 50 milliGRAM(s) Oral daily  tamsulosin 0.4 milliGRAM(s) Oral at bedtime    PRN Medications  ALPRAZolam 0.5 milliGRAM(s) Oral four times a day PRN anxiety  guaiFENesin  milliGRAM(s) Oral every 12 hours PRN Cough  ondansetron Injectable 4 milliGRAM(s) IV Push every 8 hours PRN Nausea and/or Vomiting    Singles Doses Administered  (ADM OVERRIDE) 1 each &lt;see task&gt; GiveOnce  (ADM OVERRIDE) 1 each &lt;see task&gt; GiveOnce  (ADM OVERRIDE) 1 each &lt;see task&gt; GiveOnce  (ADM OVERRIDE) 1 each &lt;see task&gt; GiveOnce  (ADM OVERRIDE) 1 each &lt;see task&gt; GiveOnce  (ADM OVERRIDE) 2 each &lt;see task&gt; GiveOnce  acetaminophen     Tablet .. 650 milliGRAM(s) Oral once  cefepime   IVPB 2000 milliGRAM(s) IV Intermittent once  famotidine Injectable 20 milliGRAM(s) IV Push once  ketorolac   Injectable 15 milliGRAM(s) IV Push Once  lactated ringers Bolus 300 milliLiter(s) IV Bolus once  lactated ringers Bolus 1000 milliLiter(s) IV Bolus once  metoclopramide Injectable 10 milliGRAM(s) IV Push once  metroNIDAZOLE  IVPB 500 milliGRAM(s) IV Intermittent once  morphine  - Injectable 4 milliGRAM(s) IV Push once  ondansetron Injectable 4 milliGRAM(s) IV Push once  potassium chloride   Powder 20 milliEquivalent(s) Oral once  sodium chloride 0.9% Bolus 1000 milliLiter(s) IV Bolus once       Gastroenterology Follow Up Note      Location: [facility] 4B (Back) 018 B ([facility] 4B (Back))  Patient Name: MANUEL ROSENBAUM  Age: 78y  Gender: Female      Chief Complaint  Patient is a 78y old Female who presents with a chief complaint of Abdominal Pain (28 Dec 2023 16:59)  Primary diagnosis of Abdominal pain      Reason for Consult  Small Bowel Dilation      Progress Note  This morning patient was seen and examined at bedside.    Today is hospital day 2d.  Patient is doing fine. No acute events overnight.   Patient's appetite is reduced, and she notes nausea and post prandial vomiting on 12/27.   Patient denies any abdominal pain (only tenderness on touching).  She had multiple episodes of loose stools on 12/28 after taking laxatives.      Vital Signs in the last 24 hours   Vitals Summary T(C): 36.9 (12-28-23 @ 15:26), Max: 36.9 (12-28-23 @ 15:26)  HR: 67 (12-28-23 @ 15:26) (61 - 68)  BP: 133/63 (12-28-23 @ 15:26) (107/54 - 133/63)  RR: 18 (12-28-23 @ 15:26) (18 - 18)  SpO2: 97% (12-28-23 @ 15:26) (97% - 97%)  Vent Data   Intake/ Output   Measurements       Physical Exam  * General Appearance: Alert, cooperative, interactive, oriented to time, place, and person, in no acute distress  * Lungs: Good bilateral air entry, normal breath sounds   * Heart: Regular Rate and Rhythm, normal S1 and S2, no audible murmur, rub, or gallop  * Abdomen: Symmetric, non-distended, soft, tenderness noted along LLQ>RLQ, no guarding or rebound tenderness, bowel sounds active all four quadrants, no masses      Investigations   Laboratory Workup      - CBC:                        11.0   9.58  )-----------( 317      ( 28 Dec 2023 08:18 )             35.7       - Hgb Trend:  11.0  12-28-23 @ 08:18  11.7  12-27-23 @ 08:06  13.3  12-26-23 @ 11:00          - Chemistry:  12-28    142  |  107  |  7<L>  ----------------------------<  118<H>  3.4<L>   |  26  |  0.7    Ca    8.3<L>      28 Dec 2023 08:18  Mg     2.2     12-27    TPro  5.5<L>  /  Alb  3.6  /  TBili  0.5  /  DBili  x   /  AST  19  /  ALT  14  /  AlkPhos  46  12-28    Liver panel trend:  TBili 0.5   /   AST 19   /   ALT 14   /   AlkP 46   /   Tptn 5.5   /   Alb 3.6    /   DBili --      12-28  TBili 0.6   /   AST 19   /   ALT 14   /   AlkP 56   /   Tptn 6.0   /   Alb 3.9    /   DBili --      12-27  TBili 0.5   /   AST 28   /   ALT 18   /   AlkP 62   /   Tptn 7.1   /   Alb 4.5    /   DBili --      12-26      Urinalysis Basic - ( 28 Dec 2023 08:18 )    Color: x / Appearance: x / SG: x / pH: x  Gluc: 118 mg/dL / Ketone: x  / Bili: x / Urobili: x   Blood: x / Protein: x / Nitrite: x   Leuk Esterase: x / RBC: x / WBC x   Sq Epi: x / Non Sq Epi: x / Bacteria: x      Culture - Blood (collected 26 Dec 2023 13:43)  Source: .Blood Blood  Preliminary Report (27 Dec 2023 23:02):    No growth at 24 hours    Culture - Blood (collected 26 Dec 2023 13:43)  Source: .Blood Blood  Preliminary Report (27 Dec 2023 23:02):    No growth at 24 hours      Current Medications  Standing Medications  atorvastatin 20 milliGRAM(s) Oral at bedtime  budesonide  80 MICROgram(s)/formoterol 4.5 MICROgram(s) Inhaler 2 Puff(s) Inhalation two times a day  enoxaparin Injectable 40 milliGRAM(s) SubCutaneous every 24 hours  influenza  Vaccine (HIGH DOSE) 0.7 milliLiter(s) IntraMuscular once  lactated ringers. 1000 milliLiter(s) (100 mL/Hr) IV Continuous <Continuous>  oxycodone    5 mG/acetaminophen 325 mG 2 Tablet(s) Oral every 8 hours  pantoprazole    Tablet 40 milliGRAM(s) Oral before breakfast  piperacillin/tazobactam IVPB.. 3.375 Gram(s) IV Intermittent every 6 hours  polyethylene glycol 3350 17 Gram(s) Oral daily  senna 2 Tablet(s) Oral at bedtime  sertraline 50 milliGRAM(s) Oral daily  tamsulosin 0.4 milliGRAM(s) Oral at bedtime    PRN Medications  ALPRAZolam 0.5 milliGRAM(s) Oral four times a day PRN anxiety  guaiFENesin  milliGRAM(s) Oral every 12 hours PRN Cough  ondansetron Injectable 4 milliGRAM(s) IV Push every 8 hours PRN Nausea and/or Vomiting    Singles Doses Administered  (ADM OVERRIDE) 1 each &lt;see task&gt; GiveOnce  (ADM OVERRIDE) 1 each &lt;see task&gt; GiveOnce  (ADM OVERRIDE) 1 each &lt;see task&gt; GiveOnce  (ADM OVERRIDE) 1 each &lt;see task&gt; GiveOnce  (ADM OVERRIDE) 1 each &lt;see task&gt; GiveOnce  (ADM OVERRIDE) 2 each &lt;see task&gt; GiveOnce  acetaminophen     Tablet .. 650 milliGRAM(s) Oral once  cefepime   IVPB 2000 milliGRAM(s) IV Intermittent once  famotidine Injectable 20 milliGRAM(s) IV Push once  ketorolac   Injectable 15 milliGRAM(s) IV Push Once  lactated ringers Bolus 300 milliLiter(s) IV Bolus once  lactated ringers Bolus 1000 milliLiter(s) IV Bolus once  metoclopramide Injectable 10 milliGRAM(s) IV Push once  metroNIDAZOLE  IVPB 500 milliGRAM(s) IV Intermittent once  morphine  - Injectable 4 milliGRAM(s) IV Push once  ondansetron Injectable 4 milliGRAM(s) IV Push once  potassium chloride   Powder 20 milliEquivalent(s) Oral once  sodium chloride 0.9% Bolus 1000 milliLiter(s) IV Bolus once

## 2023-12-28 NOTE — PROGRESS NOTE ADULT - SUBJECTIVE AND OBJECTIVE BOX
Patient is a 78y old  Female who presents with a chief complaint of Abdominal Pain (28 Dec 2023 16:59)      Patient seen and examined at bedside.  Patient reports some improvement in abd pain   ALLERGIES:  No Known Allergies    MEDICATIONS:  ALPRAZolam 0.5 milliGRAM(s) Oral four times a day PRN  atorvastatin 20 milliGRAM(s) Oral at bedtime  budesonide  80 MICROgram(s)/formoterol 4.5 MICROgram(s) Inhaler 2 Puff(s) Inhalation two times a day  enoxaparin Injectable 40 milliGRAM(s) SubCutaneous every 24 hours  guaiFENesin  milliGRAM(s) Oral every 12 hours PRN  influenza  Vaccine (HIGH DOSE) 0.7 milliLiter(s) IntraMuscular once  lactated ringers. 1000 milliLiter(s) IV Continuous <Continuous>  ondansetron Injectable 4 milliGRAM(s) IV Push every 8 hours PRN  oxycodone    5 mG/acetaminophen 325 mG 2 Tablet(s) Oral every 8 hours  pantoprazole    Tablet 40 milliGRAM(s) Oral before breakfast  piperacillin/tazobactam IVPB.. 3.375 Gram(s) IV Intermittent every 6 hours  sertraline 50 milliGRAM(s) Oral daily  tamsulosin 0.4 milliGRAM(s) Oral at bedtime    Vital Signs Last 24 Hrs  T(F): 98.5 (28 Dec 2023 15:26), Max: 98.5 (28 Dec 2023 15:26)  HR: 67 (28 Dec 2023 15:26) (61 - 68)  BP: 133/63 (28 Dec 2023 15:26) (107/54 - 133/63)  RR: 18 (28 Dec 2023 15:26) (18 - 18)  SpO2: 97% (28 Dec 2023 15:26) (97% - 97%)  I&O's Summary      PHYSICAL EXAM:  General: NAD, A/O x 3  ENT: MMM  Neck: Supple, No JVD  Lungs: right lower rhonchi  Cardio: RRR, S1/S2, 2/6 systolic murmur   Abdomen: Soft, mild tender, mild distended; Bowel sounds present  Extremities: No cyanosis, No edema    LABS:                        11.0   9.58  )-----------( 317      ( 28 Dec 2023 08:18 )             35.7     12-28    142  |  107  |  7   ----------------------------<  118  3.4   |  26  |  0.7    Ca    8.3      28 Dec 2023 08:18  Mg     2.2     12-27    TPro  5.5  /  Alb  3.6  /  TBili  0.5  /  DBili  x   /  AST  19  /  ALT  14  /  AlkPhos  46  12-28        Lactate, Blood: 1.0 mmol/L (12-27 @ 08:06)  Lactate, Blood: 3.1 mmol/L (12-26 @ 11:00)        10-17 Chol 129 mg/dL LDL -- HDL 50 mg/dL Trig 137 mg/dL    16:14 - VBG - pH: 7.41  | pCO2: 36    | pO2: 52    | Lactate: 1.5                  Urinalysis Basic - ( 28 Dec 2023 08:18 )    Color: x / Appearance: x / SG: x / pH: x  Gluc: 118 mg/dL / Ketone: x  / Bili: x / Urobili: x   Blood: x / Protein: x / Nitrite: x   Leuk Esterase: x / RBC: x / WBC x   Sq Epi: x / Non Sq Epi: x / Bacteria: x        Culture - Blood (collected 26 Dec 2023 13:43)  Source: .Blood Blood  Preliminary Report (27 Dec 2023 23:02):    No growth at 24 hours    Culture - Blood (collected 26 Dec 2023 13:43)  Source: .Blood Blood  Preliminary Report (27 Dec 2023 23:02):    No growth at 24 hours      COVID-19 PCR: NotDetec (12-27-23 @ 12:44)      RADIOLOGY & ADDITIONAL TESTS:    Care Discussed with Consultants/Other Providers:

## 2023-12-28 NOTE — PROGRESS NOTE ADULT - SUBJECTIVE AND OBJECTIVE BOX
GENERAL SURGERY PROGRESS NOTE    Patient: MANUEL ROSENBAUM , 78y (41)Female   MRN: [id number]  Location: [address] (Back) 018 B  Visit: 23 Inpatient  Date: 23 @ 05:22    PAST MEDICAL & SURGICAL HISTORY:  HTN (hypertension)  HLD (hyperlipidemia)  Chronic back pain  s/p back sx and stimulator  Gastroesophageal reflux disease with hiatal hernia  H/O vaginal hysterectomy  History of laparotomy    Vitals:   T(F): 98.1 (23 @ 23:25), Max: 98.1 (23 @ 08:03)  HR: 68 (23 @ 23:25)  BP: 107/54 (23 @ 23:25)  RR: 18 (23 @ 23:25)  SpO2: 97% (23 @ 23:25)    Diet, Clear Liquid:   DASH/TLC Sodium & Cholesterol Restricted    Fluids: lactated ringers.: Solution, 1000 milliLiter(s) infuse at 100 mL/Hr    I & O's:    23 @ 07:01  -  23 @ 07:00  --------------------------------------------------------  IN:  Total IN: 0 mL    OUT:    Voided (mL): 500 mL  Total OUT: 500 mL    PHYSICAL EXAM:  General: NAD, AAOx3, calm and cooperative  Cardiac: RRR   Respiratory: CTAB, normal respiratory effort  Abdomen: Soft, non-distended, diffuse tenderness to palpation  Musculoskeletal: ROM intact, compartments soft    MEDICATIONS  (STANDING):  atorvastatin 20 milliGRAM(s) Oral at bedtime  budesonide  80 MICROgram(s)/formoterol 4.5 MICROgram(s) Inhaler 2 Puff(s) Inhalation two times a day  enoxaparin Injectable 40 milliGRAM(s) SubCutaneous every 24 hours  influenza  Vaccine (HIGH DOSE) 0.7 milliLiter(s) IntraMuscular once  lactated ringers. 1000 milliLiter(s) (100 mL/Hr) IV Continuous <Continuous>  oxycodone    5 mG/acetaminophen 325 mG 2 Tablet(s) Oral every 8 hours  pantoprazole    Tablet 40 milliGRAM(s) Oral before breakfast  piperacillin/tazobactam IVPB.. 3.375 Gram(s) IV Intermittent every 6 hours  polyethylene glycol 3350 17 Gram(s) Oral daily  senna 2 Tablet(s) Oral at bedtime  sertraline 50 milliGRAM(s) Oral daily  tamsulosin 0.4 milliGRAM(s) Oral at bedtime    MEDICATIONS  (PRN):  ALPRAZolam 0.5 milliGRAM(s) Oral four times a day PRN anxiety  guaiFENesin  milliGRAM(s) Oral every 12 hours PRN Cough  ondansetron Injectable 4 milliGRAM(s) IV Push every 8 hours PRN Nausea and/or Vomiting    DVT PROPHYLAXIS: enoxaparin Injectable 40 milliGRAM(s) SubCutaneous every 24 hours    GI PROPHYLAXIS: pantoprazole    Tablet 40 milliGRAM(s) Oral before breakfast    ANTICOAGULATION:   ANTIBIOTICS:  piperacillin/tazobactam IVPB.. 3.375 Gram(s)    Isolation Precautions:     Isolation Type: DROPLET;  Indication for Isolation: COVID EXPOSURE (23 @ 09:50)    LAB/STUDIES:  Labs:  CAPILLARY BLOOD GLUCOSE                        11.7   12.86 )-----------( 366      ( 27 Dec 2023 08:06 )             36.7       Auto Neutrophil %: 75.2 % (23 @ 08:06)  Auto Immature Granulocyte %: 0.5 % (23 @ 08:06)        141  |  107  |  10  ----------------------------<  92  3.7   |  23  |  0.7    Calcium: 8.6 mg/dL (23 @ 08:06)    LFTs:             6.0  | 0.6  | 19       ------------------[56      ( 27 Dec 2023 08:06 )  3.9  | x    | 14          Lipase:x      Amylase:x         Lactate, Blood: 1.0 mmol/L (23 @ 08:06)  Blood Gas Venous - Lactate: 1.5 mmol/L (23 @ 16:14)  Lactate, Blood: 3.1 mmol/L (23 @ 11:00)    Urinalysis Basic - ( 28 Dec 2023 00:45 )    Color: Yellow / Appearance: Clear / S.014 / pH: x  Gluc: x / Ketone: Negative mg/dL  / Bili: Negative / Urobili: 0.2 mg/dL   Blood: x / Protein: Trace mg/dL / Nitrite: Negative   Leuk Esterase: Large / RBC: 1 /HPF /  /HPF   Sq Epi: x / Non Sq Epi: 0 /HPF / Bacteria: Negative /HPF    Culture - Blood (collected 26 Dec 2023 13:43)  Source: .Blood Blood  Preliminary Report (27 Dec 2023 23:02):    No growth at 24 hours    Culture - Blood (collected 26 Dec 2023 13:43)  Source: .Blood Blood  Preliminary Report (27 Dec 2023 23:02):    No growth at 24 hours         GENERAL SURGERY PROGRESS NOTE    Patient: MANUEL ROSENBAUM , 78y (41)Female   MRN: [id number]  Location: [address] (Back) 018 B  Visit: 23 Inpatient  Date: 23 @ 05:22    PAST MEDICAL & SURGICAL HISTORY:  HTN (hypertension)  HLD (hyperlipidemia)  Chronic back pain  s/p back sx and stimulator  Gastroesophageal reflux disease with hiatal hernia  H/O vaginal hysterectomy  History of laparotomy    Vitals:   T(F): 98.1 (23 @ 23:25), Max: 98.1 (23 @ 08:03)  HR: 68 (23 @ 23:25)  BP: 107/54 (23 @ 23:25)  RR: 18 (23 @ 23:25)  SpO2: 97% (23 @ 23:25)    Diet, Clear Liquid:   DASH/TLC Sodium & Cholesterol Restricted    Fluids: lactated ringers.: Solution, 1000 milliLiter(s) infuse at 100 mL/Hr    I & O's:    23 @ 07:01  -  23 @ 07:00  --------------------------------------------------------  IN:  Total IN: 0 mL    OUT:    Voided (mL): 500 mL  Total OUT: 500 mL    PHYSICAL EXAM:  General: NAD, AAOx3, calm and cooperative  Cardiac: RRR   Respiratory: CTAB, normal respiratory effort  Abdomen: Soft, non-distended, diffuse tenderness to palpation  Musculoskeletal: ROM intact, compartments soft    MEDICATIONS  (STANDING):  atorvastatin 20 milliGRAM(s) Oral at bedtime  budesonide  80 MICROgram(s)/formoterol 4.5 MICROgram(s) Inhaler 2 Puff(s) Inhalation two times a day  enoxaparin Injectable 40 milliGRAM(s) SubCutaneous every 24 hours  influenza  Vaccine (HIGH DOSE) 0.7 milliLiter(s) IntraMuscular once  lactated ringers. 1000 milliLiter(s) (100 mL/Hr) IV Continuous <Continuous>  oxycodone    5 mG/acetaminophen 325 mG 2 Tablet(s) Oral every 8 hours  pantoprazole    Tablet 40 milliGRAM(s) Oral before breakfast  piperacillin/tazobactam IVPB.. 3.375 Gram(s) IV Intermittent every 6 hours  polyethylene glycol 3350 17 Gram(s) Oral daily  senna 2 Tablet(s) Oral at bedtime  sertraline 50 milliGRAM(s) Oral daily  tamsulosin 0.4 milliGRAM(s) Oral at bedtime    MEDICATIONS  (PRN):  ALPRAZolam 0.5 milliGRAM(s) Oral four times a day PRN anxiety  guaiFENesin  milliGRAM(s) Oral every 12 hours PRN Cough  ondansetron Injectable 4 milliGRAM(s) IV Push every 8 hours PRN Nausea and/or Vomiting    DVT PROPHYLAXIS: enoxaparin Injectable 40 milliGRAM(s) SubCutaneous every 24 hours    GI PROPHYLAXIS: pantoprazole    Tablet 40 milliGRAM(s) Oral before breakfast    ANTICOAGULATION:   ANTIBIOTICS:  piperacillin/tazobactam IVPB.. 3.375 Gram(s)    Isolation Precautions:     Isolation Type: DROPLET;  Indication for Isolation: COVID EXPOSURE (23 @ 09:50)    LAB/STUDIES:  Labs:  CAPILLARY BLOOD GLUCOSE                        11.7   12.86 )-----------( 366      ( 27 Dec 2023 08:06 )             36.7       Auto Neutrophil %: 75.2 % (23 @ 08:06)  Auto Immature Granulocyte %: 0.5 % (23 @ 08:06)        141  |  107  |  10  ----------------------------<  92  3.7   |  23  |  0.7    Calcium: 8.6 mg/dL (23 @ 08:06)    LFTs:             6.0  | 0.6  | 19       ------------------[56      ( 27 Dec 2023 08:06 )  3.9  | x    | 14          Lipase:x      Amylase:x         Lactate, Blood: 1.0 mmol/L (23 @ 08:06)  Blood Gas Venous - Lactate: 1.5 mmol/L (23 @ 16:14)  Lactate, Blood: 3.1 mmol/L (23 @ 11:00)    Urinalysis Basic - ( 28 Dec 2023 00:45 )    Color: Yellow / Appearance: Clear / S.014 / pH: x  Gluc: x / Ketone: Negative mg/dL  / Bili: Negative / Urobili: 0.2 mg/dL   Blood: x / Protein: Trace mg/dL / Nitrite: Negative   Leuk Esterase: Large / RBC: 1 /HPF /  /HPF   Sq Epi: x / Non Sq Epi: 0 /HPF / Bacteria: Negative /HPF    Culture - Blood (collected 26 Dec 2023 13:43)  Source: .Blood Blood  Preliminary Report (27 Dec 2023 23:02):    No growth at 24 hours    Culture - Blood (collected 26 Dec 2023 13:43)  Source: .Blood Blood  Preliminary Report (27 Dec 2023 23:02):    No growth at 24 hours

## 2023-12-28 NOTE — PHYSICAL THERAPY INITIAL EVALUATION ADULT - ADDITIONAL COMMENTS
Patient lives with  in house with 11 steps to enter. Was independent in ADLs and ambulation using cane.

## 2023-12-28 NOTE — PROGRESS NOTE ADULT - ASSESSMENT
Assessment and Plan  Case of an 78 year old female patient with history of Anxiety, Depression, DL, HTN, hiatal hernia, chronic back and knee pains s/p multiple surgeries, and recent admission for sigmoid diverticulitis in 10/2023 who presented to the ED on 12/26 for evaluation of abdominal pain, nausea, and vomiting, found to be febrile with evidence of leukocytosis and lactic acidosis on blood work and evidence of dilated loops of small bowel on imaging, admitted for further management. We are consulted for small bowel dilation and concern of enteritis.      Sepsis with Mildly Dilated Loops of Small Bowel   Nausea, Vomiting, and Abdominal Pain  Rule Out Viral/Bacterial Enteritis  Recent Uncomplicated Sigmoid Diverticulitis in 10/2023  * Recent admission in 10/2023 for sigmoid diverticulitis s/p antibiotics course. Sedimentation Rate, Erythrocyte (10.24.23 @ 06:06) 8 mm/Hr; C-Reactive Protein, Serum (10.24.23 @ 06:06) 5.8 mg/L  * CT Abdomen and Pelvis w/ IV Cont (10.16.23 @ 04:33) Colonic diverticulosis. Diffuse sigmoid thickening with surrounding fat stranding. Likely inflamed diverticulum on series 4 image 264. Avulsion, pneumoperitoneum or pneumatosis.  * Had colonoscopy 3 weeks ago at Dr Vásquez's office: several polyps (benign pathology per patient) and internal hemorrhoids -> no records  * Current presentation 12/26: 1 day of diffuse abdominal pain associated with nausea, 7x non bilious vomiting, chills, subjective fevers  * /72 mmHg; HR 98 bpm; RR 20 bpm; T 101.2 degrees   * ED Labs WBC 21.06, Hb 13.3, Plt 400; Na 141, K 4.5, BUN 15, Cr 0.7, LFT 0.5/62/28/18; Lipase 18; LA 3.1 to 1.5 on 12/26  * RVP - and blood cultures NGTD on 12/26  * CT Abdomen and Pelvis w/ IV Cont (12.26.23 @ 13:21) Mildly dilated loop of small bowel in the left lower abdomen, nonspecific. Follow-up is recommended  * Family history of Crohn Disease in daughter    RECOMMENDATIONS   - Surgical evaluation appreciated: no acute surgical intervention  - Monitor T for fever: spiked T 101.2 at 2PM on 12/26  - Trend WBC and LA. Inflammatory markers noted: ESR 6; CRP 7.3  - Monitor BM: currently having diarrhea on senna and miralax. Would still recommend checking stool studies: GI PCR, C difficile, and stool cultures  - Continue IV antibiotics (currently on IV Zosyn)  - Monitor nausea and vomiting: had multiple episodes of vomiting on 12/27  - Antiemetics: IV Zofran 4mg Q8h PRN   - Advance diet as tolerated: tolerating clear liquid diet today  - Pain control: agree with tylenol PRN and Percocet PRN   - Follow up with our [facility] located at [address] [place] [address]. Phone Number: [phone]        History of GERD- Controlled   History of Hiatal Hernia  * Last EGD was a few months ago with Dr Vásquez: scar noted near site of prior hiatal hernia    * Was started on PPI     RECOMMENDATIONS  - Continue po protonix 40mg daily 30 minutes before breakfast  - Avoid NSAIDs  - Educated about lifestyle modifications: avoid spicy foods or late time dinners      Stable Hepatic Cysts and Subcentimeter Hypodensities  * Noted on prior imaging  * Stable on current imaging as above  * LFTs 0.5/62/28/18    RECOMMENDATIONS  - Trend LFTs   - Monitor for symptoms      Thank you for your consult.  - Please note that plan was communicated with medical team.   - Please reach GI on [phone] during weekdays till 5pm.  - Please call the GI service line after 5pm on Weekdays and anytime on Weekends: [phone].      Cindy Mckeon MD  PGY - 4 Gastroenterology Fellow   [facility]     Assessment and Plan  Case of an 78 year old female patient with history of Anxiety, Depression, DL, HTN, hiatal hernia, chronic back and knee pains s/p multiple surgeries, and recent admission for sigmoid diverticulitis in 10/2023 who presented to the ED on 12/26 for evaluation of abdominal pain, nausea, and vomiting, found to be febrile with evidence of leukocytosis and lactic acidosis on blood work and evidence of dilated loops of small bowel on imaging, admitted for further management. We are consulted for small bowel dilation and concern of enteritis.      Sepsis with Mildly Dilated Loops of Small Bowel   Nausea, Vomiting, and Abdominal Pain  Rule Out Viral/Bacterial Enteritis  Recent Uncomplicated Sigmoid Diverticulitis in 10/2023  * Recent admission in 10/2023 for sigmoid diverticulitis s/p antibiotics course. Sedimentation Rate, Erythrocyte (10.24.23 @ 06:06) 8 mm/Hr; C-Reactive Protein, Serum (10.24.23 @ 06:06) 5.8 mg/L  * CT Abdomen and Pelvis w/ IV Cont (10.16.23 @ 04:33) Colonic diverticulosis. Diffuse sigmoid thickening with surrounding fat stranding. Likely inflamed diverticulum on series 4 image 264. Avulsion, pneumoperitoneum or pneumatosis.  * Had colonoscopy 3 weeks ago at Dr Vásquez's office: several polyps (benign pathology per patient) and internal hemorrhoids -> no records  * Current presentation 12/26: 1 day of diffuse abdominal pain associated with nausea, 7x non bilious vomiting, chills, subjective fevers  * /72 mmHg; HR 98 bpm; RR 20 bpm; T 101.2 degrees   * ED Labs WBC 21.06, Hb 13.3, Plt 400; Na 141, K 4.5, BUN 15, Cr 0.7, LFT 0.5/62/28/18; Lipase 18; LA 3.1 to 1.5 on 12/26  * RVP - and blood cultures NGTD on 12/26  * CT Abdomen and Pelvis w/ IV Cont (12.26.23 @ 13:21) Mildly dilated loop of small bowel in the left lower abdomen, nonspecific. Follow-up is recommended  * Family history of Crohn Disease in daughter    RECOMMENDATIONS   - Surgical evaluation appreciated: no acute surgical intervention  - Monitor T for fever: spiked T 101.2 at 2PM on 12/26  - Trend WBC and LA. Inflammatory markers noted: ESR 6; CRP 7.3  - Monitor BM: currently having diarrhea on senna and miralax. Would still recommend checking stool studies: GI PCR, C difficile, and stool cultures  - Continue IV antibiotics (currently on IV Zosyn)  - Monitor nausea and vomiting: had multiple episodes of vomiting on 12/27  - Antiemetics: IV Zofran 4mg Q8h PRN   - Advance diet as tolerated: tolerating clear liquid diet today  - Pain control: agree with tylenol PRN and Percocet PRN   - Follow up with our [facility] located at [address] [place] [address]. Phone Number: [phone]        History of GERD- Controlled   History of Hiatal Hernia  * Last EGD was a few months ago with Dr Vásquez: scar noted near site of prior hiatal hernia    * Was started on PPI     RECOMMENDATIONS  - Continue po protonix 40mg daily 30 minutes before breakfast  - Avoid NSAIDs  - Educated about lifestyle modifications: avoid spicy foods or late time dinners      Stable Hepatic Cysts and Subcentimeter Hypodensities  * Noted on prior imaging  * Stable on current imaging as above  * LFTs 0.5/62/28/18    RECOMMENDATIONS  - Trend LFTs   - Monitor for symptoms      Thank you for your consult.  - Please note that plan was communicated with medical team.   - Please reach GI on [phone] during weekdays till 5pm.  - Please call the GI service line after 5pm on Weekdays and anytime on Weekends: [phone].      Cindy Mckeon MD  PGY - 4 Gastroenterology Fellow   [facility]     Assessment and Plan  Case of an 78 year old female patient with history of Anxiety, Depression, DL, HTN, hiatal hernia, chronic back and knee pains s/p multiple surgeries, and recent admission for sigmoid diverticulitis in 10/2023 who presented to the ED on 12/26 for evaluation of abdominal pain, nausea, and vomiting, found to be febrile with evidence of leukocytosis and lactic acidosis on blood work and evidence of dilated loops of small bowel on imaging, admitted for further management. We are consulted for small bowel dilation and concern of enteritis.      Sepsis with Mildly Dilated Loops of Small Bowel   Nausea, Vomiting, and Abdominal Pain  Rule Out Viral/Bacterial Enteritis  Recent Uncomplicated Sigmoid Diverticulitis in 10/2023  * Recent admission in 10/2023 for sigmoid diverticulitis s/p antibiotics course. Sedimentation Rate, Erythrocyte (10.24.23 @ 06:06) 8 mm/Hr; C-Reactive Protein, Serum (10.24.23 @ 06:06) 5.8 mg/L  * CT Abdomen and Pelvis w/ IV Cont (10.16.23 @ 04:33) Colonic diverticulosis. Diffuse sigmoid thickening with surrounding fat stranding. Likely inflamed diverticulum on series 4 image 264. Avulsion, pneumoperitoneum or pneumatosis.  * Had colonoscopy 3 weeks ago at Dr Vásquez's office: several polyps (benign pathology per patient) and internal hemorrhoids -> no records  * Current presentation 12/26: 1 day of diffuse abdominal pain associated with nausea, 7x non bilious vomiting, chills, subjective fevers  * /72 mmHg; HR 98 bpm; RR 20 bpm; T 101.2 degrees   * ED Labs WBC 21.06, Hb 13.3, Plt 400; Na 141, K 4.5, BUN 15, Cr 0.7, LFT 0.5/62/28/18; Lipase 18; LA 3.1 to 1.5 on 12/26  * RVP - and blood cultures NGTD on 12/26  * CT Abdomen and Pelvis w/ IV Cont (12.26.23 @ 13:21) Mildly dilated loop of small bowel in the left lower abdomen, nonspecific. Follow-up is recommended  * Family history of Crohn Disease in daughter    RECOMMENDATIONS   - Surgical evaluation appreciated: no acute surgical intervention  - Monitor T for fever: spiked T 101.2 at 2PM on 12/26  - Trend WBC and LA. Inflammatory markers noted: ESR 6; CRP 7.3  - Monitor BM: currently having diarrhea on senna and miralax. Would recommend checking stool studies: GI PCR, C difficile, and stool cultures  - Continue IV antibiotics (currently on IV Zosyn)  - Monitor nausea and vomiting: had multiple episodes of vomiting on 12/27  - Antiemetics: IV Zofran 4mg Q8h PRN   - Advance diet as tolerated: tolerating clear liquid diet today  - Pain control: agree with tylenol PRN and Percocet PRN   - Follow up with our [facility] located at [address] [place] [address]. Phone Number: [phone]        History of GERD- Controlled   History of Hiatal Hernia  * Last EGD was a few months ago with Dr Vásquez: scar noted near site of prior hiatal hernia    * Was started on PPI     RECOMMENDATIONS  - Continue po protonix 40mg daily 30 minutes before breakfast  - Avoid NSAIDs  - Educated about lifestyle modifications: avoid spicy foods or late time dinners      Stable Hepatic Cysts and Subcentimeter Hypodensities  * Noted on prior imaging  * Stable on current imaging as above  * LFTs 0.5/62/28/18    RECOMMENDATIONS  - Trend LFTs   - Monitor for symptoms      Thank you for your consult.  - Please note that plan was communicated with medical team.   - Please reach GI on [phone] during weekdays till 5pm.  - Please call the GI service line after 5pm on Weekdays and anytime on Weekends: [phone].      Cindy Mckeon MD  PGY - 4 Gastroenterology Fellow   [facility]     Assessment and Plan  Case of an 78 year old female patient with history of Anxiety, Depression, DL, HTN, hiatal hernia, chronic back and knee pains s/p multiple surgeries, and recent admission for sigmoid diverticulitis in 10/2023 who presented to the ED on 12/26 for evaluation of abdominal pain, nausea, and vomiting, found to be febrile with evidence of leukocytosis and lactic acidosis on blood work and evidence of dilated loops of small bowel on imaging, admitted for further management. We are consulted for small bowel dilation and concern of enteritis.      Sepsis with Mildly Dilated Loops of Small Bowel   Nausea, Vomiting, and Abdominal Pain  Rule Out Viral/Bacterial Enteritis  Recent Uncomplicated Sigmoid Diverticulitis in 10/2023  * Recent admission in 10/2023 for sigmoid diverticulitis s/p antibiotics course. Sedimentation Rate, Erythrocyte (10.24.23 @ 06:06) 8 mm/Hr; C-Reactive Protein, Serum (10.24.23 @ 06:06) 5.8 mg/L  * CT Abdomen and Pelvis w/ IV Cont (10.16.23 @ 04:33) Colonic diverticulosis. Diffuse sigmoid thickening with surrounding fat stranding. Likely inflamed diverticulum on series 4 image 264. Avulsion, pneumoperitoneum or pneumatosis.  * Had colonoscopy 3 weeks ago at Dr Vásquez's office: several polyps (benign pathology per patient) and internal hemorrhoids -> no records  * Current presentation 12/26: 1 day of diffuse abdominal pain associated with nausea, 7x non bilious vomiting, chills, subjective fevers  * /72 mmHg; HR 98 bpm; RR 20 bpm; T 101.2 degrees   * ED Labs WBC 21.06, Hb 13.3, Plt 400; Na 141, K 4.5, BUN 15, Cr 0.7, LFT 0.5/62/28/18; Lipase 18; LA 3.1 to 1.5 on 12/26  * RVP - and blood cultures NGTD on 12/26  * CT Abdomen and Pelvis w/ IV Cont (12.26.23 @ 13:21) Mildly dilated loop of small bowel in the left lower abdomen, nonspecific. Follow-up is recommended  * Family history of Crohn Disease in daughter    RECOMMENDATIONS   - Surgical evaluation appreciated: no acute surgical intervention  - Monitor T for fever: spiked T 101.2 at 2PM on 12/26  - Trend WBC and LA. Inflammatory markers noted: ESR 6; CRP 7.3  - Monitor BM: currently having diarrhea on senna and miralax. Would recommend checking stool studies: GI PCR, C difficile, and stool cultures  - Continue IV antibiotics (currently on IV Zosyn)  - Monitor nausea and vomiting: had multiple episodes of vomiting on 12/27  - Antiemetics: IV Zofran 4mg Q8h PRN   - Advance diet as tolerated: tolerating clear liquid diet today  - Pain control: agree with tylenol PRN and Percocet PRN   - Follow up with our [facility] located at [address] [place] [address]. Phone Number: [phone]        History of GERD- Controlled   History of Hiatal Hernia  * Last EGD was a few months ago with Dr Vásquez: scar noted near site of prior hiatal hernia    * Was started on PPI     RECOMMENDATIONS  - Continue po protonix 40mg daily 30 minutes before breakfast  - Avoid NSAIDs  - Educated about lifestyle modifications: avoid spicy foods or late time dinners      Stable Hepatic Cysts and Subcentimeter Hypodensities  * Noted on prior imaging  * Stable on current imaging as above  * LFTs 0.5/62/28/18    RECOMMENDATIONS  - Trend LFTs   - Monitor for symptoms      Thank you for your consult.  - Please note that plan was communicated with medical team.   - Please reach GI on [phone] during weekdays till 5pm.  - Please call the GI service line after 5pm on Weekdays and anytime on Weekends: [phone].      Cindy Mckeon MD  PGY - 4 Gastroenterology Fellow   [facility]

## 2023-12-28 NOTE — PROGRESS NOTE ADULT - ATTENDING COMMENTS
Pt with increased ostomy output though had taken laxatives, no blood. Denies abdominal pain. Await stool studies r/o infectious etiologies. Pt gradually improving, tolerating clear liquid diet. Continue serial abd exam. Await stool studies r/o infectious etiologies.

## 2023-12-28 NOTE — PROGRESS NOTE ADULT - ASSESSMENT
ASSESSMENT:  Patient is a 77 y/o female w PMH of HTN, HLD, GERD, anxiety, depression and chronic back pain s/p multiple surgical interventions and spinal cord stimulator placement that comes in w chief complaint of nausea and vomiting. Of note in the ED pt was noted to be febrile to 101.2, rest of VS WNL. Labs are significant for WBC of 21 and Lactate of 3.1, no significant findings on CT A/P, clinical presentation is more consistent with viral vs bacterial enteritis. No acute surgical intervention indicated.   Physical exam findings, imaging, and labs as documented above.     PLAN:  -no acute surgical intervention indicated, patient has enteritis   -pain control  -advance diet as tolerated  -recall as needed    [id number] ASSESSMENT:  Patient is a 79 y/o female w PMH of HTN, HLD, GERD, anxiety, depression and chronic back pain s/p multiple surgical interventions and spinal cord stimulator placement that comes in w chief complaint of nausea and vomiting. Of note in the ED pt was noted to be febrile to 101.2, rest of VS WNL. Labs are significant for WBC of 21 and Lactate of 3.1, no significant findings on CT A/P, clinical presentation is more consistent with viral vs bacterial enteritis. No acute surgical intervention indicated.   Physical exam findings, imaging, and labs as documented above.     PLAN:  -no acute surgical intervention indicated, patient has enteritis   -pain control  -advance diet as tolerated  -recall as needed    [phone]

## 2023-12-29 LAB
ALBUMIN SERPL ELPH-MCNC: 3.6 G/DL — SIGNIFICANT CHANGE UP (ref 3.5–5.2)
ALBUMIN SERPL ELPH-MCNC: 3.6 G/DL — SIGNIFICANT CHANGE UP (ref 3.5–5.2)
ALP SERPL-CCNC: 48 U/L — SIGNIFICANT CHANGE UP (ref 30–115)
ALP SERPL-CCNC: 48 U/L — SIGNIFICANT CHANGE UP (ref 30–115)
ALT FLD-CCNC: 14 U/L — SIGNIFICANT CHANGE UP (ref 0–41)
ALT FLD-CCNC: 14 U/L — SIGNIFICANT CHANGE UP (ref 0–41)
ANION GAP SERPL CALC-SCNC: 9 MMOL/L — SIGNIFICANT CHANGE UP (ref 7–14)
ANION GAP SERPL CALC-SCNC: 9 MMOL/L — SIGNIFICANT CHANGE UP (ref 7–14)
AST SERPL-CCNC: 16 U/L — SIGNIFICANT CHANGE UP (ref 0–41)
AST SERPL-CCNC: 16 U/L — SIGNIFICANT CHANGE UP (ref 0–41)
BASOPHILS # BLD AUTO: 0.07 K/UL — SIGNIFICANT CHANGE UP (ref 0–0.2)
BASOPHILS # BLD AUTO: 0.07 K/UL — SIGNIFICANT CHANGE UP (ref 0–0.2)
BASOPHILS NFR BLD AUTO: 0.8 % — SIGNIFICANT CHANGE UP (ref 0–1)
BASOPHILS NFR BLD AUTO: 0.8 % — SIGNIFICANT CHANGE UP (ref 0–1)
BILIRUB SERPL-MCNC: 0.4 MG/DL — SIGNIFICANT CHANGE UP (ref 0.2–1.2)
BILIRUB SERPL-MCNC: 0.4 MG/DL — SIGNIFICANT CHANGE UP (ref 0.2–1.2)
BUN SERPL-MCNC: 6 MG/DL — LOW (ref 10–20)
BUN SERPL-MCNC: 6 MG/DL — LOW (ref 10–20)
CALCIUM SERPL-MCNC: 8.3 MG/DL — LOW (ref 8.4–10.4)
CALCIUM SERPL-MCNC: 8.3 MG/DL — LOW (ref 8.4–10.4)
CHLORIDE SERPL-SCNC: 107 MMOL/L — SIGNIFICANT CHANGE UP (ref 98–110)
CHLORIDE SERPL-SCNC: 107 MMOL/L — SIGNIFICANT CHANGE UP (ref 98–110)
CO2 SERPL-SCNC: 26 MMOL/L — SIGNIFICANT CHANGE UP (ref 17–32)
CO2 SERPL-SCNC: 26 MMOL/L — SIGNIFICANT CHANGE UP (ref 17–32)
CREAT SERPL-MCNC: 0.6 MG/DL — LOW (ref 0.7–1.5)
CREAT SERPL-MCNC: 0.6 MG/DL — LOW (ref 0.7–1.5)
CULTURE RESULTS: NO GROWTH — SIGNIFICANT CHANGE UP
CULTURE RESULTS: NO GROWTH — SIGNIFICANT CHANGE UP
EGFR: 91 ML/MIN/1.73M2 — SIGNIFICANT CHANGE UP
EGFR: 91 ML/MIN/1.73M2 — SIGNIFICANT CHANGE UP
EOSINOPHIL # BLD AUTO: 0.49 K/UL — SIGNIFICANT CHANGE UP (ref 0–0.7)
EOSINOPHIL # BLD AUTO: 0.49 K/UL — SIGNIFICANT CHANGE UP (ref 0–0.7)
EOSINOPHIL NFR BLD AUTO: 5.5 % — SIGNIFICANT CHANGE UP (ref 0–8)
EOSINOPHIL NFR BLD AUTO: 5.5 % — SIGNIFICANT CHANGE UP (ref 0–8)
GLUCOSE SERPL-MCNC: 99 MG/DL — SIGNIFICANT CHANGE UP (ref 70–99)
GLUCOSE SERPL-MCNC: 99 MG/DL — SIGNIFICANT CHANGE UP (ref 70–99)
HCT VFR BLD CALC: 35 % — LOW (ref 37–47)
HCT VFR BLD CALC: 35 % — LOW (ref 37–47)
HGB BLD-MCNC: 11 G/DL — LOW (ref 12–16)
HGB BLD-MCNC: 11 G/DL — LOW (ref 12–16)
IMM GRANULOCYTES NFR BLD AUTO: 0.4 % — HIGH (ref 0.1–0.3)
IMM GRANULOCYTES NFR BLD AUTO: 0.4 % — HIGH (ref 0.1–0.3)
LYMPHOCYTES # BLD AUTO: 2.02 K/UL — SIGNIFICANT CHANGE UP (ref 1.2–3.4)
LYMPHOCYTES # BLD AUTO: 2.02 K/UL — SIGNIFICANT CHANGE UP (ref 1.2–3.4)
LYMPHOCYTES # BLD AUTO: 22.6 % — SIGNIFICANT CHANGE UP (ref 20.5–51.1)
LYMPHOCYTES # BLD AUTO: 22.6 % — SIGNIFICANT CHANGE UP (ref 20.5–51.1)
MAGNESIUM SERPL-MCNC: 2.1 MG/DL — SIGNIFICANT CHANGE UP (ref 1.8–2.4)
MAGNESIUM SERPL-MCNC: 2.1 MG/DL — SIGNIFICANT CHANGE UP (ref 1.8–2.4)
MCHC RBC-ENTMCNC: 25.4 PG — LOW (ref 27–31)
MCHC RBC-ENTMCNC: 25.4 PG — LOW (ref 27–31)
MCHC RBC-ENTMCNC: 31.4 G/DL — LOW (ref 32–37)
MCHC RBC-ENTMCNC: 31.4 G/DL — LOW (ref 32–37)
MCV RBC AUTO: 80.8 FL — LOW (ref 81–99)
MCV RBC AUTO: 80.8 FL — LOW (ref 81–99)
MONOCYTES # BLD AUTO: 0.65 K/UL — HIGH (ref 0.1–0.6)
MONOCYTES # BLD AUTO: 0.65 K/UL — HIGH (ref 0.1–0.6)
MONOCYTES NFR BLD AUTO: 7.3 % — SIGNIFICANT CHANGE UP (ref 1.7–9.3)
MONOCYTES NFR BLD AUTO: 7.3 % — SIGNIFICANT CHANGE UP (ref 1.7–9.3)
NEUTROPHILS # BLD AUTO: 5.68 K/UL — SIGNIFICANT CHANGE UP (ref 1.4–6.5)
NEUTROPHILS # BLD AUTO: 5.68 K/UL — SIGNIFICANT CHANGE UP (ref 1.4–6.5)
NEUTROPHILS NFR BLD AUTO: 63.4 % — SIGNIFICANT CHANGE UP (ref 42.2–75.2)
NEUTROPHILS NFR BLD AUTO: 63.4 % — SIGNIFICANT CHANGE UP (ref 42.2–75.2)
NRBC # BLD: 0 /100 WBCS — SIGNIFICANT CHANGE UP (ref 0–0)
NRBC # BLD: 0 /100 WBCS — SIGNIFICANT CHANGE UP (ref 0–0)
PLATELET # BLD AUTO: 299 K/UL — SIGNIFICANT CHANGE UP (ref 130–400)
PLATELET # BLD AUTO: 299 K/UL — SIGNIFICANT CHANGE UP (ref 130–400)
PMV BLD: 11.9 FL — HIGH (ref 7.4–10.4)
PMV BLD: 11.9 FL — HIGH (ref 7.4–10.4)
POTASSIUM SERPL-MCNC: 3.8 MMOL/L — SIGNIFICANT CHANGE UP (ref 3.5–5)
POTASSIUM SERPL-MCNC: 3.8 MMOL/L — SIGNIFICANT CHANGE UP (ref 3.5–5)
POTASSIUM SERPL-SCNC: 3.8 MMOL/L — SIGNIFICANT CHANGE UP (ref 3.5–5)
POTASSIUM SERPL-SCNC: 3.8 MMOL/L — SIGNIFICANT CHANGE UP (ref 3.5–5)
PROT SERPL-MCNC: 5.4 G/DL — LOW (ref 6–8)
PROT SERPL-MCNC: 5.4 G/DL — LOW (ref 6–8)
RAPID RVP RESULT: SIGNIFICANT CHANGE UP
RAPID RVP RESULT: SIGNIFICANT CHANGE UP
RBC # BLD: 4.33 M/UL — SIGNIFICANT CHANGE UP (ref 4.2–5.4)
RBC # BLD: 4.33 M/UL — SIGNIFICANT CHANGE UP (ref 4.2–5.4)
RBC # FLD: 16.9 % — HIGH (ref 11.5–14.5)
RBC # FLD: 16.9 % — HIGH (ref 11.5–14.5)
SARS-COV-2 RNA SPEC QL NAA+PROBE: SIGNIFICANT CHANGE UP
SARS-COV-2 RNA SPEC QL NAA+PROBE: SIGNIFICANT CHANGE UP
SODIUM SERPL-SCNC: 142 MMOL/L — SIGNIFICANT CHANGE UP (ref 135–146)
SODIUM SERPL-SCNC: 142 MMOL/L — SIGNIFICANT CHANGE UP (ref 135–146)
SPECIMEN SOURCE: SIGNIFICANT CHANGE UP
SPECIMEN SOURCE: SIGNIFICANT CHANGE UP
WBC # BLD: 8.95 K/UL — SIGNIFICANT CHANGE UP (ref 4.8–10.8)
WBC # BLD: 8.95 K/UL — SIGNIFICANT CHANGE UP (ref 4.8–10.8)
WBC # FLD AUTO: 8.95 K/UL — SIGNIFICANT CHANGE UP (ref 4.8–10.8)
WBC # FLD AUTO: 8.95 K/UL — SIGNIFICANT CHANGE UP (ref 4.8–10.8)

## 2023-12-29 PROCEDURE — 99233 SBSQ HOSP IP/OBS HIGH 50: CPT

## 2023-12-29 PROCEDURE — 99232 SBSQ HOSP IP/OBS MODERATE 35: CPT

## 2023-12-29 RX ADMIN — SERTRALINE 50 MILLIGRAM(S): 25 TABLET, FILM COATED ORAL at 11:16

## 2023-12-29 RX ADMIN — PIPERACILLIN AND TAZOBACTAM 25 GRAM(S): 4; .5 INJECTION, POWDER, LYOPHILIZED, FOR SOLUTION INTRAVENOUS at 17:29

## 2023-12-29 RX ADMIN — TAMSULOSIN HYDROCHLORIDE 0.4 MILLIGRAM(S): 0.4 CAPSULE ORAL at 22:15

## 2023-12-29 RX ADMIN — PANTOPRAZOLE SODIUM 40 MILLIGRAM(S): 20 TABLET, DELAYED RELEASE ORAL at 05:10

## 2023-12-29 RX ADMIN — ATORVASTATIN CALCIUM 20 MILLIGRAM(S): 80 TABLET, FILM COATED ORAL at 22:15

## 2023-12-29 RX ADMIN — ENOXAPARIN SODIUM 40 MILLIGRAM(S): 100 INJECTION SUBCUTANEOUS at 17:29

## 2023-12-29 RX ADMIN — Medication 0.5 MILLIGRAM(S): at 15:05

## 2023-12-29 RX ADMIN — PIPERACILLIN AND TAZOBACTAM 25 GRAM(S): 4; .5 INJECTION, POWDER, LYOPHILIZED, FOR SOLUTION INTRAVENOUS at 11:19

## 2023-12-29 RX ADMIN — Medication 0.5 MILLIGRAM(S): at 11:24

## 2023-12-29 RX ADMIN — PIPERACILLIN AND TAZOBACTAM 25 GRAM(S): 4; .5 INJECTION, POWDER, LYOPHILIZED, FOR SOLUTION INTRAVENOUS at 05:11

## 2023-12-29 RX ADMIN — Medication 0.5 MILLIGRAM(S): at 11:19

## 2023-12-29 NOTE — PROGRESS NOTE ADULT - ASSESSMENT
Ms. Chow is an 78 year old female with PMH of HLD, HTN, diverticulitis, chronic pain (multiple back and knee surgeries on oxycodone w/ spinal stimulator), right middle lobe syndrome, anxiety and depression (on Xanax),  hiatal hernia, vaginal hysterectomy and laparotomy for adhesiolysis. Admitted for evaluation and management of acute abdominal pain.    #COVID exposure  -pt exposed to pt 12/27  -covid swab 12/17 negative  -isolation from 12/27-5 days if tests negative     #Abdominal Pain 2/2 Uncomplicated Diverticulitis vs. Infectious Enteritis   #Sepsis, Present on Admission  -treated as inpatient for uncomplicated diverticulitis in 10/2023  -colonoscopy done a few weeks ago, with Dr. Vásquez, multiple polyps were removed, benign pathology  -EGD was done 1 year ago with Dr. Vásquez  -CT A/P was negative for diverticulitis however given recent history and strong clinical suspicion it cannot be ruled out  -lactate downtrended from 3.1 to 1.5  -continue Zosyn 3.375 mg IVPB q6h   -clear fluids- LR 50cc/hr   -WBC - 21->12.86-->9.58  -surgery consult appreciated  -GI consult appreciated   -follow up blood-NTD and urine cultures, RVP-neg  -gi pcr and stool culture in progress     #Diarrhea  -mirlax and senna stopped  -gi pcr and stool culture in progress     #GERD  #HO Hiatal Hernia  -will continue home omeprazole 20 mg as pantoprazole 40 mg    #HTN  -holding losartan 160 mg qd and amlodipine 10 mg qhs, resume if BP stable    #HLD  -will continue home Crestor 5 mg as atorvastatin 20 mg qhs    #Chronic Back Pain s/p Spinal Cord Stimulator  -follows with Dr. Joya  -c/w Percocet    #Right Middle Lobe Syndrome  -follows with Dr. Amaya  -on Advair Diskus at home, reported that she never needs to use it    Anxiety  Depression  -c/w Xanax 0.5 mg qid prn  -c/w sertraline 50 mg qd    DVT prophylaxis: enoxaparin  GI prophylaxis: pantoprazole  Diet: clears   Code status: full code  Activity: AAT  Pending: food tolerance, temp, wbc trend, stool culture, GI PCR

## 2023-12-29 NOTE — PROGRESS NOTE ADULT - SUBJECTIVE AND OBJECTIVE BOX
Gastroenterology Follow Up Note      Location: [facility] 4B (Back) 018 B [facility]  Patient Name: MANUEL ROSENBAUM  Age: 78y  Gender: Female      Chief Complaint  Patient is a 78y old Female who presents with a chief complaint of Abdominal Pain (29 Dec 2023 14:13)  Primary diagnosis of Abdominal pain      Reason for Consult  Small Bowel Dilation      Progress Note  This morning patient was seen and examined at bedside.    Today is hospital day 3d.  Patient is doing fine. No acute events overnight.   Patient's appetite is improved.  She denies vomiting on 12/28 (last one on 12/27).   Patient denies any abdominal pain (only tenderness on touching).  She had multiple episodes of loose stools x3 on 12/28 after taking laxatives and 1 on 12/29 overnight.      Vital Signs in the last 24 hours   Vitals Summary T(C): 37.1 (12-29-23 @ 15:50), Max: 37.1 (12-29-23 @ 15:50)  HR: 75 (12-29-23 @ 15:50) (53 - 75)  BP: 139/66 (12-29-23 @ 15:50) (136/64 - 139/66)  RR: 18 (12-29-23 @ 15:50) (18 - 18)  SpO2: 97% (12-29-23 @ 15:50) (97% - 97%)  Vent Data   Intake/ Output   12-28-23 @ 07:01  -  12-29-23 @ 07:00  --------------------------------------------------------  IN: 0 mL / OUT: 400 mL / NET: -400 mL        Physical Exam  * General Appearance: Alert, cooperative, interactive, oriented to time, place, and person, in no acute distress  * Lungs: Good bilateral air entry, normal breath sounds   * Heart: Regular Rate and Rhythm, normal S1 and S2, no audible murmur, rub, or gallop  * Abdomen: Symmetric, non-distended, soft, tenderness noted along LLQ>RLQ, no guarding or rebound tenderness, bowel sounds active all four quadrants, no masses      Investigations   Laboratory Workup      - CBC:                        11.0   8.95  )-----------( 299      ( 29 Dec 2023 06:55 )             35.0       - Hgb Trend:  11.0  12-29-23 @ 06:55  11.0  12-28-23 @ 08:18  11.7  12-27-23 @ 08:06          - Chemistry:  12-29    142  |  107  |  6<L>  ----------------------------<  99  3.8   |  26  |  0.6<L>    Ca    8.3<L>      29 Dec 2023 06:55  Mg     2.1     12-29    TPro  5.4<L>  /  Alb  3.6  /  TBili  0.4  /  DBili  x   /  AST  16  /  ALT  14  /  AlkPhos  48  12-29    Liver panel trend:  TBili 0.4   /   AST 16   /   ALT 14   /   AlkP 48   /   Tptn 5.4   /   Alb 3.6    /   DBili --      12-29  TBili 0.5   /   AST 19   /   ALT 14   /   AlkP 46   /   Tptn 5.5   /   Alb 3.6    /   DBili --      12-28  TBili 0.6   /   AST 19   /   ALT 14   /   AlkP 56   /   Tptn 6.0   /   Alb 3.9    /   DBili --      12-27  TBili 0.5   /   AST 28   /   ALT 18   /   AlkP 62   /   Tptn 7.1   /   Alb 4.5    /   DBili --      12-26        Microbiological Workup  Urinalysis Basic - ( 29 Dec 2023 06:55 )    Color: x / Appearance: x / SG: x / pH: x  Gluc: 99 mg/dL / Ketone: x  / Bili: x / Urobili: x   Blood: x / Protein: x / Nitrite: x   Leuk Esterase: x / RBC: x / WBC x   Sq Epi: x / Non Sq Epi: x / Bacteria: x        Culture - Urine (collected 28 Dec 2023 00:45)  Source: Clean Catch Clean Catch (Midstream)  Final Report (29 Dec 2023 07:52):    No growth        Radiological Workup            Current Medications  Standing Medications  atorvastatin 20 milliGRAM(s) Oral at bedtime  budesonide  80 MICROgram(s)/formoterol 4.5 MICROgram(s) Inhaler 2 Puff(s) Inhalation two times a day  enoxaparin Injectable 40 milliGRAM(s) SubCutaneous every 24 hours  influenza  Vaccine (HIGH DOSE) 0.7 milliLiter(s) IntraMuscular once  oxycodone    5 mG/acetaminophen 325 mG 2 Tablet(s) Oral every 8 hours  pantoprazole    Tablet 40 milliGRAM(s) Oral before breakfast  piperacillin/tazobactam IVPB.. 3.375 Gram(s) IV Intermittent every 6 hours  sertraline 50 milliGRAM(s) Oral daily  tamsulosin 0.4 milliGRAM(s) Oral at bedtime    PRN Medications  ALPRAZolam 0.5 milliGRAM(s) Oral four times a day PRN anxiety  guaiFENesin  milliGRAM(s) Oral every 12 hours PRN Cough  ondansetron Injectable 4 milliGRAM(s) IV Push every 8 hours PRN Nausea and/or Vomiting    Singles Doses Administered  (ADM OVERRIDE) 1 each &lt;see task&gt; GiveOnce  (ADM OVERRIDE) 1 each &lt;see task&gt; GiveOnce  (ADM OVERRIDE) 1 each &lt;see task&gt; GiveOnce  (ADM OVERRIDE) 1 each &lt;see task&gt; GiveOnce  (ADM OVERRIDE) 1 each &lt;see task&gt; GiveOnce  (ADM OVERRIDE) 2 each &lt;see task&gt; GiveOnce  acetaminophen     Tablet .. 650 milliGRAM(s) Oral once  cefepime   IVPB 2000 milliGRAM(s) IV Intermittent once  famotidine Injectable 20 milliGRAM(s) IV Push once  ketorolac   Injectable 15 milliGRAM(s) IV Push Once  lactated ringers Bolus 300 milliLiter(s) IV Bolus once  lactated ringers Bolus 1000 milliLiter(s) IV Bolus once  metoclopramide Injectable 10 milliGRAM(s) IV Push once  metroNIDAZOLE  IVPB 500 milliGRAM(s) IV Intermittent once  morphine  - Injectable 4 milliGRAM(s) IV Push once  ondansetron Injectable 4 milliGRAM(s) IV Push once  potassium chloride   Powder 20 milliEquivalent(s) Oral once  sodium chloride 0.9% Bolus 1000 milliLiter(s) IV Bolus once     Gastroenterology Follow Up Note      [facility]: [facility] 4B (Back[facility]  Patient Name: MANUEL ROSENBAUM  Age: 78y  Gender: Female      Chief Complaint  Patient is a 78y old Female who presents with a chief complaint of Abdominal Pain (29 Dec 2023 14:13)  Primary diagnosis of Abdominal pain      Reason for Consult  Small Bowel Dilation      Progress Note  This morning patient was seen and examined at bedside.    Today is hospital day 3d.  Patient is doing fine. No acute events overnight.   Patient's appetite is improved.  She denies vomiting on 12/28 (last one on 12/27).   Patient denies any abdominal pain (only tenderness on touching).  She had multiple episodes of loose stools x3 on 12/28 after taking laxatives and 1 on 12/29 overnight.      Vital Signs in the last 24 hours   Vitals Summary T(C): 37.1 (12-29-23 @ 15:50), Max: 37.1 (12-29-23 @ 15:50)  HR: 75 (12-29-23 @ 15:50) (53 - 75)  BP: 139/66 (12-29-23 @ 15:50) (136/64 - 139/66)  RR: 18 (12-29-23 @ 15:50) (18 - 18)  SpO2: 97% (12-29-23 @ 15:50) (97% - 97%)  Vent Data   Intake/ Output   12-28-23 @ 07:01  -  12-29-23 @ 07:00  --------------------------------------------------------  IN: 0 mL / OUT: 400 mL / NET: -400 mL        Physical Exam  * General Appearance: Alert, cooperative, interactive, oriented to time, place, and person, in no acute distress  * Lungs: Good bilateral air entry, normal breath sounds   * Heart: Regular Rate and Rhythm, normal S1 and S2, no audible murmur, rub, or gallop  * Abdomen: Symmetric, non-distended, soft, tenderness noted along LLQ>RLQ, no guarding or rebound tenderness, bowel sounds active all four quadrants, no masses      Investigations   Laboratory Workup      - CBC:                        11.0   8.95  )-----------( 299      ( 29 Dec 2023 06:55 )             35.0       - Hgb Trend:  11.0  12-29-23 @ 06:55  11.0  12-28-23 @ 08:18  11.7  12-27-23 @ 08:06          - Chemistry:  12-29    142  |  107  |  6<L>  ----------------------------<  99  3.8   |  26  |  0.6<L>    Ca    8.3<L>      29 Dec 2023 06:55  Mg     2.1     12-29    TPro  5.4<L>  /  Alb  3.6  /  TBili  0.4  /  DBili  x   /  AST  16  /  ALT  14  /  AlkPhos  48  12-29    Liver panel trend:  TBili 0.4   /   AST 16   /   ALT 14   /   AlkP 48   /   Tptn 5.4   /   Alb 3.6    /   DBili --      12-29  TBili 0.5   /   AST 19   /   ALT 14   /   AlkP 46   /   Tptn 5.5   /   Alb 3.6    /   DBili --      12-28  TBili 0.6   /   AST 19   /   ALT 14   /   AlkP 56   /   Tptn 6.0   /   Alb 3.9    /   DBili --      12-27  TBili 0.5   /   AST 28   /   ALT 18   /   AlkP 62   /   Tptn 7.1   /   Alb 4.5    /   DBili --      12-26        Microbiological Workup  Urinalysis Basic - ( 29 Dec 2023 06:55 )    Color: x / Appearance: x / SG: x / pH: x  Gluc: 99 mg/dL / Ketone: x  / Bili: x / Urobili: x   Blood: x / Protein: x / Nitrite: x   Leuk Esterase: x / RBC: x / WBC x   Sq Epi: x / Non Sq Epi: x / Bacteria: x        Culture - Urine (collected 28 Dec 2023 00:45)  Source: Clean Catch Clean Catch (Midstream)  Final Report (29 Dec 2023 07:52):    No growth        Radiological Workup            Current Medications  Standing Medications  atorvastatin 20 milliGRAM(s) Oral at bedtime  budesonide  80 MICROgram(s)/formoterol 4.5 MICROgram(s) Inhaler 2 Puff(s) Inhalation two times a day  enoxaparin Injectable 40 milliGRAM(s) SubCutaneous every 24 hours  influenza  Vaccine (HIGH DOSE) 0.7 milliLiter(s) IntraMuscular once  oxycodone    5 mG/acetaminophen 325 mG 2 Tablet(s) Oral every 8 hours  pantoprazole    Tablet 40 milliGRAM(s) Oral before breakfast  piperacillin/tazobactam IVPB.. 3.375 Gram(s) IV Intermittent every 6 hours  sertraline 50 milliGRAM(s) Oral daily  tamsulosin 0.4 milliGRAM(s) Oral at bedtime    PRN Medications  ALPRAZolam 0.5 milliGRAM(s) Oral four times a day PRN anxiety  guaiFENesin  milliGRAM(s) Oral every 12 hours PRN Cough  ondansetron Injectable 4 milliGRAM(s) IV Push every 8 hours PRN Nausea and/or Vomiting    Singles Doses Administered  (ADM OVERRIDE) 1 each &lt;see task&gt; GiveOnce  (ADM OVERRIDE) 1 each &lt;see task&gt; GiveOnce  (ADM OVERRIDE) 1 each &lt;see task&gt; GiveOnce  (ADM OVERRIDE) 1 each &lt;see task&gt; GiveOnce  (ADM OVERRIDE) 1 each &lt;see task&gt; GiveOnce  (ADM OVERRIDE) 2 each &lt;see task&gt; GiveOnce  acetaminophen     Tablet .. 650 milliGRAM(s) Oral once  cefepime   IVPB 2000 milliGRAM(s) IV Intermittent once  famotidine Injectable 20 milliGRAM(s) IV Push once  ketorolac   Injectable 15 milliGRAM(s) IV Push Once  lactated ringers Bolus 300 milliLiter(s) IV Bolus once  lactated ringers Bolus 1000 milliLiter(s) IV Bolus once  metoclopramide Injectable 10 milliGRAM(s) IV Push once  metroNIDAZOLE  IVPB 500 milliGRAM(s) IV Intermittent once  morphine  - Injectable 4 milliGRAM(s) IV Push once  ondansetron Injectable 4 milliGRAM(s) IV Push once  potassium chloride   Powder 20 milliEquivalent(s) Oral once  sodium chloride 0.9% Bolus 1000 milliLiter(s) IV Bolus once

## 2023-12-29 NOTE — PROGRESS NOTE ADULT - SUBJECTIVE AND OBJECTIVE BOX
Patient is a 78y old  Female who presents with a chief complaint of Abdominal Pain (29 Dec 2023 16:06)      Patient seen and examined at bedside.    ALLERGIES:  No Known Allergies    MEDICATIONS:  ALPRAZolam 0.5 milliGRAM(s) Oral four times a day PRN  atorvastatin 20 milliGRAM(s) Oral at bedtime  budesonide  80 MICROgram(s)/formoterol 4.5 MICROgram(s) Inhaler 2 Puff(s) Inhalation two times a day  enoxaparin Injectable 40 milliGRAM(s) SubCutaneous every 24 hours  guaiFENesin  milliGRAM(s) Oral every 12 hours PRN  influenza  Vaccine (HIGH DOSE) 0.7 milliLiter(s) IntraMuscular once  ondansetron Injectable 4 milliGRAM(s) IV Push every 8 hours PRN  oxycodone    5 mG/acetaminophen 325 mG 2 Tablet(s) Oral every 8 hours  pantoprazole    Tablet 40 milliGRAM(s) Oral before breakfast  piperacillin/tazobactam IVPB.. 3.375 Gram(s) IV Intermittent every 6 hours  sertraline 50 milliGRAM(s) Oral daily  tamsulosin 0.4 milliGRAM(s) Oral at bedtime    Vital Signs Last 24 Hrs  T(F): 98.7 (29 Dec 2023 15:50), Max: 98.7 (29 Dec 2023 15:50)  HR: 75 (29 Dec 2023 15:50) (53 - 75)  BP: 139/66 (29 Dec 2023 15:50) (136/64 - 139/66)  RR: 18 (29 Dec 2023 15:50) (18 - 18)  SpO2: 97% (29 Dec 2023 15:50) (97% - 97%)  I&O's Summary    28 Dec 2023 07:01  -  29 Dec 2023 07:00  --------------------------------------------------------  IN: 0 mL / OUT: 400 mL / NET: -400 mL        PHYSICAL EXAM:  General: NAD, A/O x 3  ENT: MMM  Neck: Supple, No JVD  Lungs: Clear to auscultation bilaterally  Cardio: RRR, S1/S2, No murmurs  Abdomen: Soft, Nontender, Nondistended; Bowel sounds present  Extremities: No cyanosis, No edema    LABS:                        11.0   8.95  )-----------( 299      ( 29 Dec 2023 06:55 )             35.0     12-29    142  |  107  |  6   ----------------------------<  99  3.8   |  26  |  0.6    Ca    8.3      29 Dec 2023 06:55  Mg     2.1     12-29    TPro  5.4  /  Alb  3.6  /  TBili  0.4  /  DBili  x   /  AST  16  /  ALT  14  /  AlkPhos  48  12-29        Lactate, Blood: 1.0 mmol/L (12-27 @ 08:06)        10-17 Chol 129 mg/dL LDL -- HDL 50 mg/dL Trig 137 mg/dL                  Urinalysis Basic - ( 29 Dec 2023 06:55 )    Color: x / Appearance: x / SG: x / pH: x  Gluc: 99 mg/dL / Ketone: x  / Bili: x / Urobili: x   Blood: x / Protein: x / Nitrite: x   Leuk Esterase: x / RBC: x / WBC x   Sq Epi: x / Non Sq Epi: x / Bacteria: x        Culture - Urine (collected 28 Dec 2023 00:45)  Source: Clean Catch Clean Catch (Midstream)  Final Report (29 Dec 2023 07:52):    No growth    Culture - Blood (collected 26 Dec 2023 13:43)  Source: .Blood Blood  Preliminary Report (28 Dec 2023 23:01):    No growth at 48 Hours    Culture - Blood (collected 26 Dec 2023 13:43)  Source: .Blood Blood  Preliminary Report (28 Dec 2023 23:01):    No growth at 48 Hours      COVID-19 PCR: NotDetec (12-27-23 @ 12:44)      RADIOLOGY & ADDITIONAL TESTS:    Care Discussed with Consultants/Other Providers:  Patient is a 78y old  Female who presents with a chief complaint of Abdominal Pain (29 Dec 2023 16:06)      Patient seen and examined at bedside.    ALLERGIES:  No Known Allergies    MEDICATIONS:  ALPRAZolam 0.5 milliGRAM(s) Oral four times a day PRN  atorvastatin 20 milliGRAM(s) Oral at bedtime  budesonide  80 MICROgram(s)/formoterol 4.5 MICROgram(s) Inhaler 2 Puff(s) Inhalation two times a day  enoxaparin Injectable 40 milliGRAM(s) SubCutaneous every 24 hours  guaiFENesin  milliGRAM(s) Oral every 12 hours PRN  influenza  Vaccine (HIGH DOSE) 0.7 milliLiter(s) IntraMuscular once  ondansetron Injectable 4 milliGRAM(s) IV Push every 8 hours PRN  oxycodone    5 mG/acetaminophen 325 mG 2 Tablet(s) Oral every 8 hours  pantoprazole    Tablet 40 milliGRAM(s) Oral before breakfast  piperacillin/tazobactam IVPB.. 3.375 Gram(s) IV Intermittent every 6 hours  sertraline 50 milliGRAM(s) Oral daily  tamsulosin 0.4 milliGRAM(s) Oral at bedtime    Vital Signs Last 24 Hrs  T(F): 98.7 (29 Dec 2023 15:50), Max: 98.7 (29 Dec 2023 15:50)  HR: 75 (29 Dec 2023 15:50) (53 - 75)  BP: 139/66 (29 Dec 2023 15:50) (136/64 - 139/66)  RR: 18 (29 Dec 2023 15:50) (18 - 18)  SpO2: 97% (29 Dec 2023 15:50) (97% - 97%)  I&O's Summary    28 Dec 2023 07:01  -  29 Dec 2023 07:00  --------------------------------------------------------  IN: 0 mL / OUT: 400 mL / NET: -400 mL        PHYSICAL EXAM:  General: NAD, A/O x 3  ENT: MMM  Neck: Supple, No JVD  Lungs: bilateral lower crackles   Cardio: RRR, S1/S2, No murmurs  Abdomen: Soft, mild tender, Nondistended; Bowel sounds present  Extremities: No cyanosis, No edema    LABS:                        11.0   8.95  )-----------( 299      ( 29 Dec 2023 06:55 )             35.0     12-29    142  |  107  |  6   ----------------------------<  99  3.8   |  26  |  0.6    Ca    8.3      29 Dec 2023 06:55  Mg     2.1     12-29    TPro  5.4  /  Alb  3.6  /  TBili  0.4  /  DBili  x   /  AST  16  /  ALT  14  /  AlkPhos  48  12-29        Lactate, Blood: 1.0 mmol/L (12-27 @ 08:06)        10-17 Chol 129 mg/dL LDL -- HDL 50 mg/dL Trig 137 mg/dL                  Urinalysis Basic - ( 29 Dec 2023 06:55 )    Color: x / Appearance: x / SG: x / pH: x  Gluc: 99 mg/dL / Ketone: x  / Bili: x / Urobili: x   Blood: x / Protein: x / Nitrite: x   Leuk Esterase: x / RBC: x / WBC x   Sq Epi: x / Non Sq Epi: x / Bacteria: x        Culture - Urine (collected 28 Dec 2023 00:45)  Source: Clean Catch Clean Catch (Midstream)  Final Report (29 Dec 2023 07:52):    No growth    Culture - Blood (collected 26 Dec 2023 13:43)  Source: .Blood Blood  Preliminary Report (28 Dec 2023 23:01):    No growth at 48 Hours    Culture - Blood (collected 26 Dec 2023 13:43)  Source: .Blood Blood  Preliminary Report (28 Dec 2023 23:01):    No growth at 48 Hours      COVID-19 PCR: NotDetec (12-27-23 @ 12:44)      RADIOLOGY & ADDITIONAL TESTS:    Care Discussed with Consultants/Other Providers:

## 2023-12-29 NOTE — PROGRESS NOTE ADULT - ASSESSMENT
Assessment and Plan  Case of an 78 year old female patient with history of Anxiety, Depression, DL, HTN, hiatal hernia, chronic back and knee pains s/p multiple surgeries, and recent admission for sigmoid diverticulitis in 10/2023 who presented to the ED on 12/26 for evaluation of abdominal pain, nausea, and vomiting, found to be febrile with evidence of leukocytosis and lactic acidosis on blood work and evidence of dilated loops of small bowel on imaging, admitted for further management. We are consulted for small bowel dilation and concern of enteritis.      Sepsis with Mildly Dilated Loops of Small Bowel   Nausea, Vomiting, and Abdominal Pain  Rule Out Viral/Bacterial Enteritis  Recent Uncomplicated Sigmoid Diverticulitis in 10/2023  * Recent admission in 10/2023 for sigmoid diverticulitis s/p antibiotics course. Sedimentation Rate, Erythrocyte (10.24.23 @ 06:06) 8 mm/Hr; C-Reactive Protein, Serum (10.24.23 @ 06:06) 5.8 mg/L  * CT Abdomen and Pelvis w/ IV Cont (10.16.23 @ 04:33) Colonic diverticulosis. Diffuse sigmoid thickening with surrounding fat stranding. Likely inflamed diverticulum on series 4 image 264. Avulsion, pneumoperitoneum or pneumatosis.  * Had colonoscopy 3 weeks ago at Dr Vásquez's office: several polyps (benign pathology per patient) and internal hemorrhoids -> no records  * Current presentation 12/26: 1 day of diffuse abdominal pain associated with nausea, 7x non bilious vomiting, chills, subjective fevers  * /72 mmHg; HR 98 bpm; RR 20 bpm; T 101.2 degrees   * ED Labs WBC 21.06, Hb 13.3, Plt 400; Na 141, K 4.5, BUN 15, Cr 0.7, LFT 0.5/62/28/18; Lipase 18; LA 3.1 to 1.5 on 12/26  * RVP - and blood cultures NGTD on 12/26  * CT Abdomen and Pelvis w/ IV Cont (12.26.23 @ 13:21) Mildly dilated loop of small bowel in the left lower abdomen, nonspecific. Follow-up is recommended  * Family history of Crohn Disease in daughter    RECOMMENDATIONS   - Surgical evaluation appreciated: no acute surgical intervention  - Monitor T for fever. Trend WBC and LA. Inflammatory markers noted: ESR 6; CRP 7.3  - Continue IV antibiotics (currently on IV Zosyn)  - Monitor BM for diarrhea (had 3 episodes on 12/28 and 1 on 12/29 overnight). Follow up stool studies: GI PCR (received), C difficile (cancelled), and stool cultures (received)  - Monitor nausea and vomiting. Antiemetics: IV Zofran 4mg Q8h PRN   - Advance diet as tolerated: advanced to full liquid diet today  - Pain control: agree with tylenol PRN and Percocet PRN   - In case of no improvement or in case of worsening pain, would consider CT abdomen with PO contrast  - Follow up with our [facility] located at [address] [place] [address]. Phone Number: [phone]        History of GERD- Controlled   History of Hiatal Hernia  * Last EGD was a few months ago with Dr Vásquez: scar noted near site of prior hiatal hernia    * Was started on PPI     RECOMMENDATIONS  - Continue PO protonix 40mg daily 30 minutes before breakfast  - Avoid NSAIDs  - Educated about lifestyle modifications: avoid spicy foods or late time dinners      Stable Hepatic Cysts and Subcentimeter Hypodensities  * Noted on prior imaging  * Stable on current imaging as above  * LFTs 0.5/62/28/18    RECOMMENDATIONS  - Trend LFTs   - Monitor for symptoms      Thank you for your consult.  - Please note that plan was communicated with medical team.   - Please reach GI on [phone] during weekdays till 5pm.  - Please call the GI service line after 5pm on Weekdays and anytime on Weekends: [phone].      Cindy Mckeon MD  PGY - 4 Gastroenterology Fellow   [facility]     Assessment and Plan  Case of an 78 year old female patient with history of Anxiety, Depression, DL, HTN, hiatal hernia, chronic back and knee pains s/p multiple surgeries, and recent admission for sigmoid diverticulitis in 10/2023 who presented to the ED on 12/26 for evaluation of abdominal pain, nausea, and vomiting, found to be febrile with evidence of leukocytosis and lactic acidosis on blood work and evidence of dilated loops of small bowel on imaging, admitted for further management. We are consulted for small bowel dilation and concern of enteritis.      Sepsis with Mildly Dilated Loops of Small Bowel   Nausea, Vomiting, and Abdominal Pain  Rule Out Viral/Bacterial Enteritis  Recent Uncomplicated Sigmoid Diverticulitis in 10/2023  * Recent admission in 10/2023 for sigmoid diverticulitis s/p antibiotics course. Sedimentation Rate, Erythrocyte (10.24.23 @ 06:06) 8 mm/Hr; C-Reactive Protein, Serum (10.24.23 @ 06:06) 5.8 mg/L  * CT Abdomen and Pelvis w/ IV Cont (10.16.23 @ 04:33) Colonic diverticulosis. Diffuse sigmoid thickening with surrounding fat stranding. Likely inflamed diverticulum on series 4 image 264. Avulsion, pneumoperitoneum or pneumatosis.  * Had colonoscopy 3 weeks ago at Dr Vásquez's office: several polyps (benign pathology per patient) and internal hemorrhoids -> no records  * Current presentation 12/26: 1 day of diffuse abdominal pain associated with nausea, 7x non bilious vomiting, chills, subjective fevers  * /72 mmHg; HR 98 bpm; RR 20 bpm; T 101.2 degrees   * ED Labs WBC 21.06, Hb 13.3, Plt 400; Na 141, K 4.5, BUN 15, Cr 0.7, LFT 0.5/62/28/18; Lipase 18; LA 3.1 to 1.5 on 12/26  * RVP - and blood cultures NGTD on 12/26  * CT Abdomen and Pelvis w/ IV Cont (12.26.23 @ 13:21) Mildly dilated loop of small bowel in the left lower abdomen, nonspecific. Follow-up is recommended  * Family history of Crohn Disease in daughter    RECOMMENDATIONS   - Surgical evaluation appreciated: no acute surgical intervention  - Monitor T for fever. Trend WBC and LA. Inflammatory markers noted: ESR 6; CRP 7.3  - Continue IV antibiotics (currently on IV Zosyn)  - Monitor BM for diarrhea (had 3 episodes on 12/28 and 1 on 12/29 overnight). Follow up stool studies: GI PCR (received), C difficile (cancelled), and stool cultures (received)  - Monitor nausea and vomiting. Antiemetics: IV Zofran 4mg Q8h PRN   - Advance diet as tolerated: advanced to full liquid diet today  - Pain control: agree with tylenol PRN and Percocet PRN   - In case of no improvement or in case of worsening pain, would consider CT abdomen with PO contrast  - Follow up with our [facility] located at [address] [place] [address]. Phone Number: [phone]        History of GERD- Controlled   History of Hiatal Hernia  * Last EGD was a few months ago with Dr Vásquez: scar noted near site of prior hiatal hernia    * Was started on PPI     RECOMMENDATIONS  - Continue PO protonix 40mg daily 30 minutes before breakfast  - Avoid NSAIDs  - Educated about lifestyle modifications: avoid spicy foods or late time dinners      Stable Hepatic Cysts and Subcentimeter Hypodensities  * Noted on prior imaging  * Stable on current imaging as above  * LFTs 0.5/62/28/18    RECOMMENDATIONS  - Trend LFTs   - Monitor for symptoms      Thank you for your consult.  - Please note that plan was communicated with medical team.   - Please reach GI on [phone] during weekdays till 5pm.  - Please call the GI service line after 5pm on Weekdays and anytime on Weekends: [phone].      Cindy Mckeon MD  PGY - 4 Gastroenterology Fellow   [facility]

## 2023-12-29 NOTE — PROGRESS NOTE ADULT - SUBJECTIVE AND OBJECTIVE BOX
MANUEL CHOW 78y Female  MRN#: [id number]   Hospital Day: 3d    SUBJECTIVE  Ms. Chow is an 78 year old female with PMH of HLD, HTN, diverticulosis, chronic pain (multiple back and knee surgeries on oxycodone w/ spinal stimulator), right middle lobe syndrome, anxiety and depression (on Xanax),  hiatal hernia, vaginal hysterectomy and laparotomy for adhesiolysis.    She presented to the ED complaining of abdominal pain of 1 day duration.     The pain woke her up from sleep at night, was diffuse, and was associated with multiple episodes of NBNB vomiting, chills and cough. Last BM was one day ago. Denied diarrhea, sick contacts, chest pain, or SOB.     Of note, she was recently managed as inpatient in 10/2023 for acute diverticulitis with ceftriaxone and metronidazole. She was evaluated by GI during that admission and underwent outpatient colonoscopy with Dr. Vásquez a few weeks ago, per patient multiple polyps were found and resected, all were benign on histopathology. 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    In the ED, the patient was febrile (101.2 F), remainder of the vital signs were stable (/72, HR 98, RR 20, SpO2 98% on RA)    Physical examination was remarkable for diffuse tenderness to palpation in the abdomen, particularly the lower quadrants. Bowel sounds were present. No guarding or rebound tenderness. Pardo sign was negative.     Labs were significant for leukocytosis (WBC 21.06), and a lactate of 3.1 (trended down to 1.5 on VBG 6 hours later).    CXR showed low lung volumes (the patient has right middle lobe syndrome and follows with Dr. Amaya)    CT A/P w/ IV contrast was negative for diverticulitis, and only showed a mild dilation of a small bowel loop in the left lower abdomen. Follow up was recommended.    The patient was seen by general surgery, who gave the impression of viral/bacterial enteritis, and recommended NPO, IVF with no surgical intervention. They will continue to follow.      Patient is a 78y old Female who presents with a chief complaint of Abdominal Pain (28 Dec 2023 17:27)  Currently admitted to medicine with the primary diagnosis of Abdominal pain      INTERVAL HPI AND OVERNIGHT EVENTS:  Patient was examined and seen at bedside. This morning she is resting comfortably in bed and reports no issues or overnight events.    OBJECTIVE  PAST MEDICAL & SURGICAL HISTORY  HTN (hypertension)    HLD (hyperlipidemia)    Chronic back pain  s/p back sx and stimulator    Gastroesophageal reflux disease with hiatal hernia    H/O vaginal hysterectomy    History of laparotomy      ALLERGIES:  No Known Allergies    MEDICATIONS:  STANDING MEDICATIONS  atorvastatin 20 milliGRAM(s) Oral at bedtime  budesonide  80 MICROgram(s)/formoterol 4.5 MICROgram(s) Inhaler 2 Puff(s) Inhalation two times a day  enoxaparin Injectable 40 milliGRAM(s) SubCutaneous every 24 hours  influenza  Vaccine (HIGH DOSE) 0.7 milliLiter(s) IntraMuscular once  oxycodone    5 mG/acetaminophen 325 mG 2 Tablet(s) Oral every 8 hours  pantoprazole    Tablet 40 milliGRAM(s) Oral before breakfast  piperacillin/tazobactam IVPB.. 3.375 Gram(s) IV Intermittent every 6 hours  sertraline 50 milliGRAM(s) Oral daily  tamsulosin 0.4 milliGRAM(s) Oral at bedtime    PRN MEDICATIONS  ALPRAZolam 0.5 milliGRAM(s) Oral four times a day PRN  guaiFENesin  milliGRAM(s) Oral every 12 hours PRN  ondansetron Injectable 4 milliGRAM(s) IV Push every 8 hours PRN      VITAL SIGNS: Last 24 Hours  T(C): 36.6 (29 Dec 2023 07:28), Max: 36.9 (28 Dec 2023 15:26)  T(F): 97.8 (29 Dec 2023 07:28), Max: 98.5 (28 Dec 2023 15:26)  HR: 53 (29 Dec 2023 07:28) (53 - 67)  BP: 136/64 (29 Dec 2023 07:28) (133/63 - 136/64)  BP(mean): --  RR: 18 (28 Dec 2023 15:26) (18 - 18)  SpO2: 97% (28 Dec 2023 15:26) (97% - 97%)    LABS:                        11.0   8.95  )-----------( 299      ( 29 Dec 2023 06:55 )             35.0     12-29    142  |  107  |  6<L>  ----------------------------<  99  3.8   |  26  |  0.6<L>    Ca    8.3<L>      29 Dec 2023 06:55  Mg     2.1     12-29    TPro  5.4<L>  /  Alb  3.6  /  TBili  0.4  /  DBili  x   /  AST  16  /  ALT  14  /  AlkPhos  48  12-29      Urinalysis Basic - ( 29 Dec 2023 06:55 )    Color: x / Appearance: x / SG: x / pH: x  Gluc: 99 mg/dL / Ketone: x  / Bili: x / Urobili: x   Blood: x / Protein: x / Nitrite: x   Leuk Esterase: x / RBC: x / WBC x   Sq Epi: x / Non Sq Epi: x / Bacteria: x            Culture - Urine (collected 28 Dec 2023 00:45)  Source: Clean Catch Clean Catch (Midstream)  Final Report (29 Dec 2023 07:52):    No growth          RADIOLOGY:      PHYSICAL EXAM:  CONSTITUTIONAL: awake in bed, No acute distress, AAOx3  HEAD: Atraumatic, normocephalic  EYES: EOM intact, PERRLA, conjunctiva and sclera clear  ENT: moist mucous membranes  PULMONARY: bibasilar crackles  CARDIOVASCULAR: Regular rate and rhythm  GASTROINTESTINAL: Soft, non-tender, non-distended; bowel sounds present  MUSCULOSKELETAL: no edema  NEUROLOGY: non-focal  SKIN: warm and dry     MANUEL CHOW 78y Female  MRN#: [id number]   Hospital Day: 3d    SUBJECTIVE  Ms. Chow is an 78 year old female with PMH of HLD, HTN, diverticulosis, chronic pain (multiple back and knee surgeries on oxycodone w/ spinal stimulator), right middle lobe syndrome, anxiety and depression (on Xanax),  hiatal hernia, vaginal hysterectomy and laparotomy for adhesiolysis.    She presented to the ED complaining of abdominal pain of 1 day duration.     The pain woke her up from sleep at night, was diffuse, and was associated with multiple episodes of NBNB vomiting, chills and cough. Last BM was one day ago. Denied diarrhea, sick contacts, chest pain, or SOB.     Of note, she was recently managed as inpatient in 10/2023 for acute diverticulitis with ceftriaxone and metronidazole. She was evaluated by GI during that admission and underwent outpatient colonoscopy with Dr. Vásquez a few weeks ago, per patient multiple polyps were found and resected, all were benign on histopathology. 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    In the ED, the patient was febrile (101.2 F), remainder of the vital signs were stable (/72, HR 98, RR 20, SpO2 98% on RA)    Physical examination was remarkable for diffuse tenderness to palpation in the abdomen, particularly the lower quadrants. Bowel sounds were present. No guarding or rebound tenderness. Pardo sign was negative.     Labs were significant for leukocytosis (WBC 21.06), and a lactate of 3.1 (trended down to 1.5 on VBG 6 hours later).    CXR showed low lung volumes (the patient has right middle lobe syndrome and follows with Dr. Amaya)    CT A/P w/ IV contrast was negative for diverticulitis, and only showed a mild dilation of a small bowel loop in the left lower abdomen. Follow up was recommended.    The patient was seen by general surgery, who gave the impression of viral/bacterial enteritis, and recommended NPO, IVF with no surgical intervention. They will continue to follow.      Patient is a 78y old Female who presents with a chief complaint of Abdominal Pain (28 Dec 2023 17:27)  Currently admitted to medicine with the primary diagnosis of Abdominal pain      INTERVAL HPI AND OVERNIGHT EVENTS:  Patient was examined and seen at bedside. This morning she is resting comfortably in bed and reports no issues or overnight events.    OBJECTIVE  PAST MEDICAL & SURGICAL HISTORY  HTN (hypertension)    HLD (hyperlipidemia)    Chronic back pain  s/p back sx and stimulator    Gastroesophageal reflux disease with hiatal hernia    H/O vaginal hysterectomy    History of laparotomy      ALLERGIES:  No Known Allergies    MEDICATIONS:  STANDING MEDICATIONS  atorvastatin 20 milliGRAM(s) Oral at bedtime  budesonide  80 MICROgram(s)/formoterol 4.5 MICROgram(s) Inhaler 2 Puff(s) Inhalation two times a day  enoxaparin Injectable 40 milliGRAM(s) SubCutaneous every 24 hours  influenza  Vaccine (HIGH DOSE) 0.7 milliLiter(s) IntraMuscular once  oxycodone    5 mG/acetaminophen 325 mG 2 Tablet(s) Oral every 8 hours  pantoprazole    Tablet 40 milliGRAM(s) Oral before breakfast  piperacillin/tazobactam IVPB.. 3.375 Gram(s) IV Intermittent every 6 hours  sertraline 50 milliGRAM(s) Oral daily  tamsulosin 0.4 milliGRAM(s) Oral at bedtime    PRN MEDICATIONS  ALPRAZolam 0.5 milliGRAM(s) Oral four times a day PRN  guaiFENesin  milliGRAM(s) Oral every 12 hours PRN  ondansetron Injectable 4 milliGRAM(s) IV Push every 8 hours PRN      VITAL SIGNS: Last 24 Hours  T(C): 36.6 (29 Dec 2023 07:28), Max: 36.9 (28 Dec 2023 15:26)  T(F): 97.8 (29 Dec 2023 07:28), Max: 98.5 (28 Dec 2023 15:26)  HR: 53 (29 Dec 2023 07:28) (53 - 67)  BP: 136/64 (29 Dec 2023 07:28) (133/63 - 136/64)  BP(mean): --  RR: 18 (28 Dec 2023 15:26) (18 - 18)  SpO2: 97% (28 Dec 2023 15:26) (97% - 97%)    LABS:                        11.0   8.95  )-----------( 299      ( 29 Dec 2023 06:55 )             35.0     12-29    142  |  107  |  6<L>  ----------------------------<  99  3.8   |  26  |  0.6<L>    Ca    8.3<L>      29 Dec 2023 06:55  Mg     2.1     12-29    TPro  5.4<L>  /  Alb  3.6  /  TBili  0.4  /  DBili  x   /  AST  16  /  ALT  14  /  AlkPhos  48  12-29      Urinalysis Basic - ( 29 Dec 2023 06:55 )    Color: x / Appearance: x / SG: x / pH: x  Gluc: 99 mg/dL / Ketone: x  / Bili: x / Urobili: x   Blood: x / Protein: x / Nitrite: x   Leuk Esterase: x / RBC: x / WBC x   Sq Epi: x / Non Sq Epi: x / Bacteria: x            Culture - Urine (collected 28 Dec 2023 00:45)  Source: Clean Catch Clean Catch (Midstream)  Final Report (29 Dec 2023 07:52):    No growth          RADIOLOGY:      PHYSICAL EXAM:  CONSTITUTIONAL: awake in bed, No acute distress, AAOx3  HEAD: Atraumatic, normocephalic  EYES: EOM intact, PERRLA, conjunctiva and sclera clear  ENT: moist mucous membranes  PULMONARY: bibasilar crackles  CARDIOVASCULAR: Regular rate and rhythm  GASTROINTESTINAL: Soft, non-tender, non-distended; bowel sounds present  MUSCULOSKELETAL: no edema  NEUROLOGY: non-focal  SKIN: warm and dry

## 2023-12-29 NOTE — PROGRESS NOTE ADULT - ASSESSMENT
Ms. Chow is an 78 year old female with PMH of HLD, HTN, diverticulitis, chronic pain (multiple back and knee surgeries on oxycodone w/ spinal stimulator), right middle lobe syndrome, anxiety and depression (on Xanax),  hiatal hernia, vaginal hysterectomy and laparotomy for adhesiolysis. Admitted for evaluation and management of acute abdominal pain.    #COVID exposure  - pt exposed to pt 12/27  - covid swab 12/17 negative  - isolation from 12/27-5 days if tests negative     #Abdominal Pain 2/2 Uncomplicated Diverticulitis vs. Infectious Enteritis   #Sepsis, Present on Admission  - treated as inpatient for uncomplicated diverticulitis in 10/2023  - colonoscopy done a few weeks ago, with Dr. Vásquez, multiple polyps were removed, benign pathology  - EGD was done 1 year ago with Dr. Vásquez  - CT A/P was negative for diverticulitis however given recent history and strong clinical suspicion it cannot be ruled out  - lactate downtrended from 3.1 to 1.5  - continue Zosyn 3.375 mg IVPB q6h   - clear fluids- LR 50cc/hr   - WBC - 21->12.86-->9.58  - surgery consult appreciated  - GI consult appreciated   - follow up blood-NTD and urine cultures, RVP  - diet adv to full liquid    #Diarrhea  - Miralax and senna stopped  - 1 episode of diarrhea overnight 12/29  - collect GI PCR and stool culture if possible    #GERD  #HO Hiatal Hernia  - c/w home omeprazole 20 mg as pantoprazole 40 mg    #HTN  - holding losartan 160 mg qd and amlodipine 10 mg qhs, resume if BP stable    #HLD  - will continue home Crestor 5 mg as atorvastatin 20 mg qhs    #Chronic Back Pain s/p Spinal Cord Stimulator  - follows with Dr. Joya  - c/w Percocet    #Right Middle Lobe Syndrome  - follows with Dr. Amaya  - on Advair Diskus at home, reported that she never needs to use it    #Anxiety  #Depression  - c/w Xanax 0.5 mg qid prn  - c/w sertraline 50 mg qd    # DVT prophylaxis: Lovenox  # GI prophylaxis: PPI  # Diet: full liquid  # Activity: AAT  # Code status: Full Code  # Disposition: medicine    Pending: adv in diet,  food tolerance, temp, wbc trend, stool culture, GI PCR

## 2023-12-30 LAB
ANION GAP SERPL CALC-SCNC: 10 MMOL/L — SIGNIFICANT CHANGE UP (ref 7–14)
ANION GAP SERPL CALC-SCNC: 10 MMOL/L — SIGNIFICANT CHANGE UP (ref 7–14)
BASOPHILS # BLD AUTO: 0.07 K/UL — SIGNIFICANT CHANGE UP (ref 0–0.2)
BASOPHILS # BLD AUTO: 0.07 K/UL — SIGNIFICANT CHANGE UP (ref 0–0.2)
BASOPHILS NFR BLD AUTO: 0.7 % — SIGNIFICANT CHANGE UP (ref 0–1)
BASOPHILS NFR BLD AUTO: 0.7 % — SIGNIFICANT CHANGE UP (ref 0–1)
BUN SERPL-MCNC: 5 MG/DL — LOW (ref 10–20)
BUN SERPL-MCNC: 5 MG/DL — LOW (ref 10–20)
CALCIUM SERPL-MCNC: 8.6 MG/DL — SIGNIFICANT CHANGE UP (ref 8.4–10.4)
CALCIUM SERPL-MCNC: 8.6 MG/DL — SIGNIFICANT CHANGE UP (ref 8.4–10.4)
CHLORIDE SERPL-SCNC: 106 MMOL/L — SIGNIFICANT CHANGE UP (ref 98–110)
CHLORIDE SERPL-SCNC: 106 MMOL/L — SIGNIFICANT CHANGE UP (ref 98–110)
CO2 SERPL-SCNC: 25 MMOL/L — SIGNIFICANT CHANGE UP (ref 17–32)
CO2 SERPL-SCNC: 25 MMOL/L — SIGNIFICANT CHANGE UP (ref 17–32)
CREAT SERPL-MCNC: 0.6 MG/DL — LOW (ref 0.7–1.5)
CREAT SERPL-MCNC: 0.6 MG/DL — LOW (ref 0.7–1.5)
EGFR: 91 ML/MIN/1.73M2 — SIGNIFICANT CHANGE UP
EGFR: 91 ML/MIN/1.73M2 — SIGNIFICANT CHANGE UP
EOSINOPHIL # BLD AUTO: 0.37 K/UL — SIGNIFICANT CHANGE UP (ref 0–0.7)
EOSINOPHIL # BLD AUTO: 0.37 K/UL — SIGNIFICANT CHANGE UP (ref 0–0.7)
EOSINOPHIL NFR BLD AUTO: 3.9 % — SIGNIFICANT CHANGE UP (ref 0–8)
EOSINOPHIL NFR BLD AUTO: 3.9 % — SIGNIFICANT CHANGE UP (ref 0–8)
GI PCR PANEL: SIGNIFICANT CHANGE UP
GI PCR PANEL: SIGNIFICANT CHANGE UP
GLUCOSE SERPL-MCNC: 94 MG/DL — SIGNIFICANT CHANGE UP (ref 70–99)
GLUCOSE SERPL-MCNC: 94 MG/DL — SIGNIFICANT CHANGE UP (ref 70–99)
HCT VFR BLD CALC: 39.7 % — SIGNIFICANT CHANGE UP (ref 37–47)
HCT VFR BLD CALC: 39.7 % — SIGNIFICANT CHANGE UP (ref 37–47)
HGB BLD-MCNC: 12.5 G/DL — SIGNIFICANT CHANGE UP (ref 12–16)
HGB BLD-MCNC: 12.5 G/DL — SIGNIFICANT CHANGE UP (ref 12–16)
IMM GRANULOCYTES NFR BLD AUTO: 0.4 % — HIGH (ref 0.1–0.3)
IMM GRANULOCYTES NFR BLD AUTO: 0.4 % — HIGH (ref 0.1–0.3)
LYMPHOCYTES # BLD AUTO: 2.13 K/UL — SIGNIFICANT CHANGE UP (ref 1.2–3.4)
LYMPHOCYTES # BLD AUTO: 2.13 K/UL — SIGNIFICANT CHANGE UP (ref 1.2–3.4)
LYMPHOCYTES # BLD AUTO: 22.6 % — SIGNIFICANT CHANGE UP (ref 20.5–51.1)
LYMPHOCYTES # BLD AUTO: 22.6 % — SIGNIFICANT CHANGE UP (ref 20.5–51.1)
MAGNESIUM SERPL-MCNC: 2.3 MG/DL — SIGNIFICANT CHANGE UP (ref 1.8–2.4)
MAGNESIUM SERPL-MCNC: 2.3 MG/DL — SIGNIFICANT CHANGE UP (ref 1.8–2.4)
MCHC RBC-ENTMCNC: 25.4 PG — LOW (ref 27–31)
MCHC RBC-ENTMCNC: 25.4 PG — LOW (ref 27–31)
MCHC RBC-ENTMCNC: 31.5 G/DL — LOW (ref 32–37)
MCHC RBC-ENTMCNC: 31.5 G/DL — LOW (ref 32–37)
MCV RBC AUTO: 80.5 FL — LOW (ref 81–99)
MCV RBC AUTO: 80.5 FL — LOW (ref 81–99)
MONOCYTES # BLD AUTO: 0.77 K/UL — HIGH (ref 0.1–0.6)
MONOCYTES # BLD AUTO: 0.77 K/UL — HIGH (ref 0.1–0.6)
MONOCYTES NFR BLD AUTO: 8.2 % — SIGNIFICANT CHANGE UP (ref 1.7–9.3)
MONOCYTES NFR BLD AUTO: 8.2 % — SIGNIFICANT CHANGE UP (ref 1.7–9.3)
NEUTROPHILS # BLD AUTO: 6.06 K/UL — SIGNIFICANT CHANGE UP (ref 1.4–6.5)
NEUTROPHILS # BLD AUTO: 6.06 K/UL — SIGNIFICANT CHANGE UP (ref 1.4–6.5)
NEUTROPHILS NFR BLD AUTO: 64.2 % — SIGNIFICANT CHANGE UP (ref 42.2–75.2)
NEUTROPHILS NFR BLD AUTO: 64.2 % — SIGNIFICANT CHANGE UP (ref 42.2–75.2)
NRBC # BLD: 0 /100 WBCS — SIGNIFICANT CHANGE UP (ref 0–0)
NRBC # BLD: 0 /100 WBCS — SIGNIFICANT CHANGE UP (ref 0–0)
PLATELET # BLD AUTO: 336 K/UL — SIGNIFICANT CHANGE UP (ref 130–400)
PLATELET # BLD AUTO: 336 K/UL — SIGNIFICANT CHANGE UP (ref 130–400)
PMV BLD: 12 FL — HIGH (ref 7.4–10.4)
PMV BLD: 12 FL — HIGH (ref 7.4–10.4)
POTASSIUM SERPL-MCNC: 4.1 MMOL/L — SIGNIFICANT CHANGE UP (ref 3.5–5)
POTASSIUM SERPL-MCNC: 4.1 MMOL/L — SIGNIFICANT CHANGE UP (ref 3.5–5)
POTASSIUM SERPL-SCNC: 4.1 MMOL/L — SIGNIFICANT CHANGE UP (ref 3.5–5)
POTASSIUM SERPL-SCNC: 4.1 MMOL/L — SIGNIFICANT CHANGE UP (ref 3.5–5)
RBC # BLD: 4.93 M/UL — SIGNIFICANT CHANGE UP (ref 4.2–5.4)
RBC # BLD: 4.93 M/UL — SIGNIFICANT CHANGE UP (ref 4.2–5.4)
RBC # FLD: 17.2 % — HIGH (ref 11.5–14.5)
RBC # FLD: 17.2 % — HIGH (ref 11.5–14.5)
SODIUM SERPL-SCNC: 141 MMOL/L — SIGNIFICANT CHANGE UP (ref 135–146)
SODIUM SERPL-SCNC: 141 MMOL/L — SIGNIFICANT CHANGE UP (ref 135–146)
WBC # BLD: 9.44 K/UL — SIGNIFICANT CHANGE UP (ref 4.8–10.8)
WBC # BLD: 9.44 K/UL — SIGNIFICANT CHANGE UP (ref 4.8–10.8)
WBC # FLD AUTO: 9.44 K/UL — SIGNIFICANT CHANGE UP (ref 4.8–10.8)
WBC # FLD AUTO: 9.44 K/UL — SIGNIFICANT CHANGE UP (ref 4.8–10.8)

## 2023-12-30 PROCEDURE — 99232 SBSQ HOSP IP/OBS MODERATE 35: CPT

## 2023-12-30 PROCEDURE — 99233 SBSQ HOSP IP/OBS HIGH 50: CPT

## 2023-12-30 RX ORDER — VALSARTAN 80 MG/1
80 TABLET ORAL DAILY
Refills: 0 | Status: DISCONTINUED | OUTPATIENT
Start: 2023-12-30 | End: 2024-01-02

## 2023-12-30 RX ORDER — VALSARTAN 80 MG/1
80 TABLET ORAL DAILY
Refills: 0 | Status: DISCONTINUED | OUTPATIENT
Start: 2023-12-30 | End: 2023-12-30

## 2023-12-30 RX ORDER — AMLODIPINE BESYLATE 2.5 MG/1
5 TABLET ORAL DAILY
Refills: 0 | Status: DISCONTINUED | OUTPATIENT
Start: 2023-12-30 | End: 2024-01-02

## 2023-12-30 RX ADMIN — PIPERACILLIN AND TAZOBACTAM 25 GRAM(S): 4; .5 INJECTION, POWDER, LYOPHILIZED, FOR SOLUTION INTRAVENOUS at 09:06

## 2023-12-30 RX ADMIN — ENOXAPARIN SODIUM 40 MILLIGRAM(S): 100 INJECTION SUBCUTANEOUS at 17:45

## 2023-12-30 RX ADMIN — AMLODIPINE BESYLATE 5 MILLIGRAM(S): 2.5 TABLET ORAL at 10:31

## 2023-12-30 RX ADMIN — Medication 0.5 MILLIGRAM(S): at 09:54

## 2023-12-30 RX ADMIN — Medication 1 TABLET(S): at 17:46

## 2023-12-30 RX ADMIN — VALSARTAN 80 MILLIGRAM(S): 80 TABLET ORAL at 10:32

## 2023-12-30 RX ADMIN — ATORVASTATIN CALCIUM 20 MILLIGRAM(S): 80 TABLET, FILM COATED ORAL at 21:55

## 2023-12-30 RX ADMIN — PANTOPRAZOLE SODIUM 40 MILLIGRAM(S): 20 TABLET, DELAYED RELEASE ORAL at 05:55

## 2023-12-30 RX ADMIN — SERTRALINE 50 MILLIGRAM(S): 25 TABLET, FILM COATED ORAL at 12:37

## 2023-12-30 RX ADMIN — PIPERACILLIN AND TAZOBACTAM 25 GRAM(S): 4; .5 INJECTION, POWDER, LYOPHILIZED, FOR SOLUTION INTRAVENOUS at 00:52

## 2023-12-30 RX ADMIN — TAMSULOSIN HYDROCHLORIDE 0.4 MILLIGRAM(S): 0.4 CAPSULE ORAL at 21:55

## 2023-12-30 RX ADMIN — Medication 0.5 MILLIGRAM(S): at 18:20

## 2023-12-30 NOTE — PROGRESS NOTE ADULT - ASSESSMENT
Assessment and Plan  Case of an 78 year old female patient with history of Anxiety, Depression, DL, HTN, hiatal hernia, chronic back and knee pains s/p multiple surgeries, and recent admission for sigmoid diverticulitis in 10/2023 who presented to the ED on 12/26 for evaluation of abdominal pain, nausea, and vomiting, found to be febrile with evidence of leukocytosis and lactic acidosis on blood work and evidence of dilated loops of small bowel on imaging, admitted for further management. We are consulted for small bowel dilation and concern of enteritis.      Sepsis with Mildly Dilated Loops of Small Bowel   Nausea, Vomiting, and Abdominal Pain  Rule Out Viral/Bacterial Enteritis  Recent Uncomplicated Sigmoid Diverticulitis in 10/2023  * Recent admission in 10/2023 for sigmoid diverticulitis s/p antibiotics course. Sedimentation Rate, Erythrocyte (10.24.23 @ 06:06) 8 mm/Hr; C-Reactive Protein, Serum (10.24.23 @ 06:06) 5.8 mg/L  * CT Abdomen and Pelvis w/ IV Cont (10.16.23 @ 04:33) Colonic diverticulosis. Diffuse sigmoid thickening with surrounding fat stranding. Likely inflamed diverticulum on series 4 image 264. Avulsion, pneumoperitoneum or pneumatosis.  * Had colonoscopy 3 weeks ago at Dr Vásquez's office: several polyps (benign pathology per patient) and internal hemorrhoids -> no records  * Current presentation 12/26: 1 day of diffuse abdominal pain associated with nausea, 7x non bilious vomiting, chills, subjective fevers  * /72 mmHg; HR 98 bpm; RR 20 bpm; T 101.2 degrees   * ED Labs WBC 21.06, Hb 13.3, Plt 400; Na 141, K 4.5, BUN 15, Cr 0.7, LFT 0.5/62/28/18; Lipase 18; LA 3.1 to 1.5 on 12/26  * RVP - and blood cultures NGTD on 12/26  * CT Abdomen and Pelvis w/ IV Cont (12.26.23 @ 13:21) Mildly dilated loop of small bowel in the left lower abdomen, nonspecific. Follow-up is recommended  * Family history of Crohn Disease in daughter    RECOMMENDATIONS   - Surgical evaluation appreciated: no acute surgical intervention  - Monitor T for fever. Trend WBC and LA. Inflammatory markers noted: ESR 6; CRP 7.3  - Continue antibiotics; agree with switching to PO Augmentin as patient is clinically improving   - Advance diet as tolerated  - Monitor BM for diarrhea. Follow up stool studies: GI PCR (negative on 12/29) and stool cultures (received)  - Monitor nausea and vomiting. Antiemetics: IV Zofran 4mg Q8h PRN   - Pain control: agree with tylenol PRN and Percocet PRN   - In case of no improvement or in case of worsening pain, would consider CT abdomen with PO contrast  - Follow up with our [facility] located at [address] [place] [address]. Phone Number: [phone]        History of GERD- Controlled   History of Hiatal Hernia  * Last EGD was a few months ago with Dr Vásquez: scar noted near site of prior hiatal hernia    * Was started on PPI     RECOMMENDATIONS  - Continue PO protonix 40mg daily 30 minutes before breakfast  - Avoid NSAIDs  - Educated about lifestyle modifications: avoid spicy foods or late time dinners      Stable Hepatic Cysts and Subcentimeter Hypodensities  * Noted on prior imaging  * Stable on current imaging as above  * LFTs 0.5/62/28/18    RECOMMENDATIONS  - Trend LFTs   - Monitor for symptoms      Thank you for your consult.  - Please note that plan was communicated with medical team.   - Please reach GI on [phone] during weekdays till 5pm.  - Please call the GI service line after 5pm on Weekdays and anytime on Weekends: [phone].      Cindy Mckeon MD  PGY - 4 Gastroenterology Fellow   [facility]     Assessment and Plan  Case of an 78 year old female patient with history of Anxiety, Depression, DL, HTN, hiatal hernia, chronic back and knee pains s/p multiple surgeries, and recent admission for sigmoid diverticulitis in 10/2023 who presented to the ED on 12/26 for evaluation of abdominal pain, nausea, and vomiting, found to be febrile with evidence of leukocytosis and lactic acidosis on blood work and evidence of dilated loops of small bowel on imaging, admitted for further management. We are consulted for small bowel dilation and concern of enteritis.      Sepsis with Mildly Dilated Loops of Small Bowel   Nausea, Vomiting, and Abdominal Pain  Rule Out Viral/Bacterial Enteritis  Recent Uncomplicated Sigmoid Diverticulitis in 10/2023  * Recent admission in 10/2023 for sigmoid diverticulitis s/p antibiotics course. Sedimentation Rate, Erythrocyte (10.24.23 @ 06:06) 8 mm/Hr; C-Reactive Protein, Serum (10.24.23 @ 06:06) 5.8 mg/L  * CT Abdomen and Pelvis w/ IV Cont (10.16.23 @ 04:33) Colonic diverticulosis. Diffuse sigmoid thickening with surrounding fat stranding. Likely inflamed diverticulum on series 4 image 264. Avulsion, pneumoperitoneum or pneumatosis.  * Had colonoscopy 3 weeks ago at Dr Vásquez's office: several polyps (benign pathology per patient) and internal hemorrhoids -> no records  * Current presentation 12/26: 1 day of diffuse abdominal pain associated with nausea, 7x non bilious vomiting, chills, subjective fevers  * /72 mmHg; HR 98 bpm; RR 20 bpm; T 101.2 degrees   * ED Labs WBC 21.06, Hb 13.3, Plt 400; Na 141, K 4.5, BUN 15, Cr 0.7, LFT 0.5/62/28/18; Lipase 18; LA 3.1 to 1.5 on 12/26  * RVP - and blood cultures NGTD on 12/26  * CT Abdomen and Pelvis w/ IV Cont (12.26.23 @ 13:21) Mildly dilated loop of small bowel in the left lower abdomen, nonspecific. Follow-up is recommended  * Family history of Crohn Disease in daughter    RECOMMENDATIONS   - Surgical evaluation appreciated: no acute surgical intervention  - Monitor T for fever. Trend WBC and LA. Inflammatory markers noted: ESR 6; CRP 7.3  - Continue antibiotics; agree with switching to PO Augmentin as patient is clinically improving   - Advance diet as tolerated  - Monitor BM for diarrhea. Follow up stool studies: GI PCR (negative on 12/29) and stool cultures (received)  - Monitor nausea and vomiting. Antiemetics: IV Zofran 4mg Q8h PRN   - Pain control: agree with tylenol PRN and Percocet PRN   - In case of no improvement or in case of worsening pain, would consider CT abdomen with PO contrast  - Follow up with our [facility] located at [address] [place] [address]. Phone Number: [phone]        History of GERD- Controlled   History of Hiatal Hernia  * Last EGD was a few months ago with Dr Vásquez: scar noted near site of prior hiatal hernia    * Was started on PPI     RECOMMENDATIONS  - Continue PO protonix 40mg daily 30 minutes before breakfast  - Avoid NSAIDs  - Educated about lifestyle modifications: avoid spicy foods or late time dinners      Stable Hepatic Cysts and Subcentimeter Hypodensities  * Noted on prior imaging  * Stable on current imaging as above  * LFTs 0.5/62/28/18    RECOMMENDATIONS  - Trend LFTs   - Monitor for symptoms      Thank you for your consult.  - Please note that plan was communicated with medical team.   - Please reach GI on [phone] during weekdays till 5pm.  - Please call the GI service line after 5pm on Weekdays and anytime on Weekends: [phone].      Cindy Mckeon MD  PGY - 4 Gastroenterology Fellow   [facility]

## 2023-12-30 NOTE — PROGRESS NOTE ADULT - ASSESSMENT
Ms. Chow is an 78 year old female with PMH of HLD, HTN, diverticulitis, chronic pain (multiple back and knee surgeries on oxycodone w/ spinal stimulator), right middle lobe syndrome, anxiety and depression (on Xanax),  hiatal hernia, vaginal hysterectomy and laparotomy for adhesiolysis. Admitted for evaluation and management of acute abdominal pain.    #COVID exposure  -pt exposed to pt 12/27  -covid swab 12/17 negative  -isolation from 12/27-5 days if tests negative     #Abdominal Pain 2/2 Uncomplicated Diverticulitis vs. Infectious Enteritis   #Sepsis, Present on Admission  -treated as inpatient for uncomplicated diverticulitis in 10/2023  -colonoscopy done a few weeks ago, with Dr. Vásquez, multiple polyps were removed, benign pathology  -EGD was done 1 year ago with Dr. Vásquez  -CT A/P was negative for diverticulitis however given recent history and strong clinical suspicion it cannot be ruled out  -lactate downtrended from 3.1 to 1.5  -stop Zosyn 3.375 mg IVPB q6h after 3 days - now switched to augementin plan for 4 days  -full liquid   -WBC - 21->12.86-->9.58-->normal   -surgery consult appreciated  -GI consult appreciated   -follow up blood-NTD and urine cultures, RVP-neg  -gi pcr-wnl and stool culture in progress     #Diarrhea  -mirlax and senna stopped  -gi pcr-negative and stool culture in progress     #GERD  #HO Hiatal Hernia  -will continue home omeprazole 20 mg as pantoprazole 40 mg    #HTN  -holding losartan 160 mg qd and amlodipine 10 mg qhs, resume if BP stable    #HLD  -will continue home Crestor 5 mg as atorvastatin 20 mg qhs    #Chronic Back Pain s/p Spinal Cord Stimulator  -follows with Dr. Joya  -c/w Percocet    #Right Middle Lobe Syndrome  -follows with Dr. Amaya  -on Advair Diskus at home, reported that she never needs to use it    Anxiety  Depression  -c/w Xanax 0.5 mg qid prn  -c/w sertraline 50 mg qd    DVT prophylaxis: enoxaparin  GI prophylaxis: pantoprazole  Diet: clears   Code status: full code  Activity: AAT  Pending: food tolerance, temp, wbc trend, stool culture

## 2023-12-30 NOTE — PROGRESS NOTE ADULT - SUBJECTIVE AND OBJECTIVE BOX
Patient is a 78y old  Female who presents with a chief complaint of Abdominal Pain (30 Dec 2023 10:37)      Patient seen and examined at bedside.  Patient denies any chest pain or shortness of breath   ALLERGIES:  No Known Allergies    MEDICATIONS:  ALPRAZolam 0.5 milliGRAM(s) Oral four times a day PRN  amLODIPine   Tablet 5 milliGRAM(s) Oral daily  amoxicillin  875 milliGRAM(s)/clavulanate 1 Tablet(s) Oral two times a day  atorvastatin 20 milliGRAM(s) Oral at bedtime  budesonide  80 MICROgram(s)/formoterol 4.5 MICROgram(s) Inhaler 2 Puff(s) Inhalation two times a day  enoxaparin Injectable 40 milliGRAM(s) SubCutaneous every 24 hours  guaiFENesin  milliGRAM(s) Oral every 12 hours PRN  influenza  Vaccine (HIGH DOSE) 0.7 milliLiter(s) IntraMuscular once  ondansetron Injectable 4 milliGRAM(s) IV Push every 8 hours PRN  oxycodone    5 mG/acetaminophen 325 mG 2 Tablet(s) Oral every 8 hours  pantoprazole    Tablet 40 milliGRAM(s) Oral before breakfast  sertraline 50 milliGRAM(s) Oral daily  tamsulosin 0.4 milliGRAM(s) Oral at bedtime  valsartan 80 milliGRAM(s) Oral daily    Vital Signs Last 24 Hrs  T(F): 98.5 (30 Dec 2023 16:12), Max: 98.5 (30 Dec 2023 16:12)  HR: 88 (30 Dec 2023 16:12) (55 - 88)  BP: 129/61 (30 Dec 2023 16:12) (129/61 - 186/76)  RR: 18 (30 Dec 2023 16:12) (18 - 18)  SpO2: --  I&O's Summary    29 Dec 2023 07:01  -  30 Dec 2023 07:00  --------------------------------------------------------  IN: 0 mL / OUT: 600 mL / NET: -600 mL        PHYSICAL EXAM:  General: NAD, A/O x 3  ENT: MMM  Neck: Supple, No JVD  Lungs: basal crackles, no wheezing   Cardio: RRR, S1/S2, No murmurs  Abdomen: Soft, +tender, Nondistended; Bowel sounds present  Extremities: No cyanosis, No edema    LABS:                        12.5   9.44  )-----------( 336      ( 30 Dec 2023 08:00 )             39.7     12-30    141  |  106  |  5   ----------------------------<  94  4.1   |  25  |  0.6    Ca    8.6      30 Dec 2023 08:00  Mg     2.3     12-30    TPro  5.4  /  Alb  3.6  /  TBili  0.4  /  DBili  x   /  AST  16  /  ALT  14  /  AlkPhos  48  12-29              10-17 Chol 129 mg/dL LDL -- HDL 50 mg/dL Trig 137 mg/dL                  Urinalysis Basic - ( 30 Dec 2023 08:00 )    Color: x / Appearance: x / SG: x / pH: x  Gluc: 94 mg/dL / Ketone: x  / Bili: x / Urobili: x   Blood: x / Protein: x / Nitrite: x   Leuk Esterase: x / RBC: x / WBC x   Sq Epi: x / Non Sq Epi: x / Bacteria: x        Culture - Urine (collected 28 Dec 2023 00:45)  Source: Clean Catch Clean Catch (Midstream)  Final Report (29 Dec 2023 07:52):    No growth    Culture - Blood (collected 26 Dec 2023 13:43)  Source: .Blood Blood  Preliminary Report (29 Dec 2023 23:01):    No growth at 72 Hours    Culture - Blood (collected 26 Dec 2023 13:43)  Source: .Blood Blood  Preliminary Report (29 Dec 2023 23:01):    No growth at 72 Hours      COVID-19 PCR: NotDetec (12-27-23 @ 12:44)      RADIOLOGY & ADDITIONAL TESTS:    Care Discussed with Consultants/Other Providers:

## 2023-12-30 NOTE — PROGRESS NOTE ADULT - SUBJECTIVE AND OBJECTIVE BOX
Gastroenterology Follow Up Note      Location: [facility] 4B (Back) 0[facility]  Patient Name: MANUEL ROSENBAUM  Age: 78y  Gender: Female      Chief Complaint  Patient is a 78y old Female who presents with a chief complaint of Abdominal Pain (29 Dec 2023 17:16)  Primary diagnosis of Abdominal pain      Reason for Consult  Enteritis      Progress Note  This morning patient was seen and examined at bedside.    Today is hospital day 4d.  Patient is doing fine.  Patient's appetite is improved.  She notes 1 episode of vomiting on 12/29 after eating lemon ice but notes feeling overall much better than before.   Patient denies any abdominal pain (only tenderness on touching but improved).  She had multiple episodes of loose stools x3 on 12/28 after taking laxatives, 1-2 on 12/29, and 1 episode on 12/30 overnight.      Vital Signs in the last 24 hours   Vitals Summary T(C): 36.9 (12-30-23 @ 09:08), Max: 37.1 (12-29-23 @ 15:50)  HR: 55 (12-30-23 @ 09:08) (55 - 75)  BP: 186/76 (12-30-23 @ 09:08) (139/66 - 186/76)  RR: 18 (12-30-23 @ 09:08) (18 - 18)  SpO2: 97% (12-29-23 @ 15:50) (97% - 97%)  Vent Data   Intake/ Output   12-29-23 @ 07:01  -  12-30-23 @ 07:00  --------------------------------------------------------  IN: 0 mL / OUT: 600 mL / NET: -600 mL      Physical Exam  * General Appearance: Alert, cooperative, interactive, oriented to time, place, and person, in no acute distress  * Lungs: Good bilateral air entry, normal breath sounds   * Heart: Regular Rate and Rhythm, normal S1 and S2, no audible murmur, rub, or gallop  * Abdomen: Symmetric, non-distended, soft, tenderness noted along LLQ>RLQ, no guarding or rebound tenderness, bowel sounds active all four quadrants, no masses        Investigations   Laboratory Workup      - CBC:                        12.5   9.44  )-----------( 336      ( 30 Dec 2023 08:00 )             39.7       - Hgb Trend:  12.5  12-30-23 @ 08:00  11.0  12-29-23 @ 06:55  11.0  12-28-23 @ 08:18          - Chemistry:  12-30    141  |  106  |  5<L>  ----------------------------<  94  4.1   |  25  |  0.6<L>    Ca    8.6      30 Dec 2023 08:00  Mg     2.3     12-30    TPro  5.4<L>  /  Alb  3.6  /  TBili  0.4  /  DBili  x   /  AST  16  /  ALT  14  /  AlkPhos  48  12-29    Liver panel trend:  TBili 0.4   /   AST 16   /   ALT 14   /   AlkP 48   /   Tptn 5.4   /   Alb 3.6    /   DBili --      12-29  TBili 0.5   /   AST 19   /   ALT 14   /   AlkP 46   /   Tptn 5.5   /   Alb 3.6    /   DBili --      12-28  TBili 0.6   /   AST 19   /   ALT 14   /   AlkP 56   /   Tptn 6.0   /   Alb 3.9    /   DBili --      12-27  TBili 0.5   /   AST 28   /   ALT 18   /   AlkP 62   /   Tptn 7.1   /   Alb 4.5    /   DBili --      12-26          Microbiological Workup  Urinalysis Basic - ( 30 Dec 2023 08:00 )    Color: x / Appearance: x / SG: x / pH: x  Gluc: 94 mg/dL / Ketone: x  / Bili: x / Urobili: x   Blood: x / Protein: x / Nitrite: x   Leuk Esterase: x / RBC: x / WBC x   Sq Epi: x / Non Sq Epi: x / Bacteria: x        Culture - Urine (collected 28 Dec 2023 00:45)  Source: Clean Catch Clean Catch (Midstream)  Final Report (29 Dec 2023 07:52):    No growth        Current Medications  Standing Medications  amLODIPine   Tablet 5 milliGRAM(s) Oral daily  atorvastatin 20 milliGRAM(s) Oral at bedtime  budesonide  80 MICROgram(s)/formoterol 4.5 MICROgram(s) Inhaler 2 Puff(s) Inhalation two times a day  enoxaparin Injectable 40 milliGRAM(s) SubCutaneous every 24 hours  influenza  Vaccine (HIGH DOSE) 0.7 milliLiter(s) IntraMuscular once  oxycodone    5 mG/acetaminophen 325 mG 2 Tablet(s) Oral every 8 hours  pantoprazole    Tablet 40 milliGRAM(s) Oral before breakfast  piperacillin/tazobactam IVPB.. 3.375 Gram(s) IV Intermittent every 6 hours  sertraline 50 milliGRAM(s) Oral daily  tamsulosin 0.4 milliGRAM(s) Oral at bedtime  valsartan 80 milliGRAM(s) Oral daily    PRN Medications  ALPRAZolam 0.5 milliGRAM(s) Oral four times a day PRN anxiety  guaiFENesin  milliGRAM(s) Oral every 12 hours PRN Cough  ondansetron Injectable 4 milliGRAM(s) IV Push every 8 hours PRN Nausea and/or Vomiting    Singles Doses Administered  (ADM OVERRIDE) 1 each &lt;see task&gt; GiveOnce  (ADM OVERRIDE) 1 each &lt;see task&gt; GiveOnce  (ADM OVERRIDE) 1 each &lt;see task&gt; GiveOnce  (ADM OVERRIDE) 1 each &lt;see task&gt; GiveOnce  (ADM OVERRIDE) 1 each &lt;see task&gt; GiveOnce  (ADM OVERRIDE) 2 each &lt;see task&gt; GiveOnce  acetaminophen     Tablet .. 650 milliGRAM(s) Oral once  cefepime   IVPB 2000 milliGRAM(s) IV Intermittent once  famotidine Injectable 20 milliGRAM(s) IV Push once  ketorolac   Injectable 15 milliGRAM(s) IV Push Once  lactated ringers Bolus 300 milliLiter(s) IV Bolus once  lactated ringers Bolus 1000 milliLiter(s) IV Bolus once  metoclopramide Injectable 10 milliGRAM(s) IV Push once  metroNIDAZOLE  IVPB 500 milliGRAM(s) IV Intermittent once  morphine  - Injectable 4 milliGRAM(s) IV Push once  ondansetron Injectable 4 milliGRAM(s) IV Push once  potassium chloride   Powder 20 milliEquivalent(s) Oral once  sodium chloride 0.9% Bolus 1000 milliLiter(s) IV Bolus once   Gastroenterology Follow Up Note      [facility] 4B (Back[facility]  Patient Name: MANUEL ROSENBAUM  Age: 78y  Gender: Female      Chief Complaint  Patient is a 78y old Female who presents with a chief complaint of Abdominal Pain (29 Dec 2023 17:16)  Primary diagnosis of Abdominal pain      Reason for Consult  Enteritis      Progress Note  This morning patient was seen and examined at bedside.    Today is hospital day 4d.  Patient is doing fine.  Patient's appetite is improved.  She notes 1 episode of vomiting on 12/29 after eating lemon ice but notes feeling overall much better than before.   Patient denies any abdominal pain (only tenderness on touching but improved).  She had multiple episodes of loose stools x3 on 12/28 after taking laxatives, 1-2 on 12/29, and 1 episode on 12/30 overnight.      Vital Signs in the last 24 hours   Vitals Summary T(C): 36.9 (12-30-23 @ 09:08), Max: 37.1 (12-29-23 @ 15:50)  HR: 55 (12-30-23 @ 09:08) (55 - 75)  BP: 186/76 (12-30-23 @ 09:08) (139/66 - 186/76)  RR: 18 (12-30-23 @ 09:08) (18 - 18)  SpO2: 97% (12-29-23 @ 15:50) (97% - 97%)  Vent Data   Intake/ Output   12-29-23 @ 07:01  -  12-30-23 @ 07:00  --------------------------------------------------------  IN: 0 mL / OUT: 600 mL / NET: -600 mL      Physical Exam  * General Appearance: Alert, cooperative, interactive, oriented to time, place, and person, in no acute distress  * Lungs: Good bilateral air entry, normal breath sounds   * Heart: Regular Rate and Rhythm, normal S1 and S2, no audible murmur, rub, or gallop  * Abdomen: Symmetric, non-distended, soft, tenderness noted along LLQ>RLQ, no guarding or rebound tenderness, bowel sounds active all four quadrants, no masses        Investigations   Laboratory Workup      - CBC:                        12.5   9.44  )-----------( 336      ( 30 Dec 2023 08:00 )             39.7       - Hgb Trend:  12.5  12-30-23 @ 08:00  11.0  12-29-23 @ 06:55  11.0  12-28-23 @ 08:18          - Chemistry:  12-30    141  |  106  |  5<L>  ----------------------------<  94  4.1   |  25  |  0.6<L>    Ca    8.6      30 Dec 2023 08:00  Mg     2.3     12-30    TPro  5.4<L>  /  Alb  3.6  /  TBili  0.4  /  DBili  x   /  AST  16  /  ALT  14  /  AlkPhos  48  12-29    Liver panel trend:  TBili 0.4   /   AST 16   /   ALT 14   /   AlkP 48   /   Tptn 5.4   /   Alb 3.6    /   DBili --      12-29  TBili 0.5   /   AST 19   /   ALT 14   /   AlkP 46   /   Tptn 5.5   /   Alb 3.6    /   DBili --      12-28  TBili 0.6   /   AST 19   /   ALT 14   /   AlkP 56   /   Tptn 6.0   /   Alb 3.9    /   DBili --      12-27  TBili 0.5   /   AST 28   /   ALT 18   /   AlkP 62   /   Tptn 7.1   /   Alb 4.5    /   DBili --      12-26          Microbiological Workup  Urinalysis Basic - ( 30 Dec 2023 08:00 )    Color: x / Appearance: x / SG: x / pH: x  Gluc: 94 mg/dL / Ketone: x  / Bili: x / Urobili: x   Blood: x / Protein: x / Nitrite: x   Leuk Esterase: x / RBC: x / WBC x   Sq Epi: x / Non Sq Epi: x / Bacteria: x        Culture - Urine (collected 28 Dec 2023 00:45)  Source: Clean Catch Clean Catch (Midstream)  Final Report (29 Dec 2023 07:52):    No growth        Current Medications  Standing Medications  amLODIPine   Tablet 5 milliGRAM(s) Oral daily  atorvastatin 20 milliGRAM(s) Oral at bedtime  budesonide  80 MICROgram(s)/formoterol 4.5 MICROgram(s) Inhaler 2 Puff(s) Inhalation two times a day  enoxaparin Injectable 40 milliGRAM(s) SubCutaneous every 24 hours  influenza  Vaccine (HIGH DOSE) 0.7 milliLiter(s) IntraMuscular once  oxycodone    5 mG/acetaminophen 325 mG 2 Tablet(s) Oral every 8 hours  pantoprazole    Tablet 40 milliGRAM(s) Oral before breakfast  piperacillin/tazobactam IVPB.. 3.375 Gram(s) IV Intermittent every 6 hours  sertraline 50 milliGRAM(s) Oral daily  tamsulosin 0.4 milliGRAM(s) Oral at bedtime  valsartan 80 milliGRAM(s) Oral daily    PRN Medications  ALPRAZolam 0.5 milliGRAM(s) Oral four times a day PRN anxiety  guaiFENesin  milliGRAM(s) Oral every 12 hours PRN Cough  ondansetron Injectable 4 milliGRAM(s) IV Push every 8 hours PRN Nausea and/or Vomiting    Singles Doses Administered  (ADM OVERRIDE) 1 each &lt;see task&gt; GiveOnce  (ADM OVERRIDE) 1 each &lt;see task&gt; GiveOnce  (ADM OVERRIDE) 1 each &lt;see task&gt; GiveOnce  (ADM OVERRIDE) 1 each &lt;see task&gt; GiveOnce  (ADM OVERRIDE) 1 each &lt;see task&gt; GiveOnce  (ADM OVERRIDE) 2 each &lt;see task&gt; GiveOnce  acetaminophen     Tablet .. 650 milliGRAM(s) Oral once  cefepime   IVPB 2000 milliGRAM(s) IV Intermittent once  famotidine Injectable 20 milliGRAM(s) IV Push once  ketorolac   Injectable 15 milliGRAM(s) IV Push Once  lactated ringers Bolus 300 milliLiter(s) IV Bolus once  lactated ringers Bolus 1000 milliLiter(s) IV Bolus once  metoclopramide Injectable 10 milliGRAM(s) IV Push once  metroNIDAZOLE  IVPB 500 milliGRAM(s) IV Intermittent once  morphine  - Injectable 4 milliGRAM(s) IV Push once  ondansetron Injectable 4 milliGRAM(s) IV Push once  potassium chloride   Powder 20 milliEquivalent(s) Oral once  sodium chloride 0.9% Bolus 1000 milliLiter(s) IV Bolus once

## 2023-12-31 LAB
CULTURE RESULTS: SIGNIFICANT CHANGE UP
SARS-COV-2 RNA SPEC QL NAA+PROBE: SIGNIFICANT CHANGE UP
SARS-COV-2 RNA SPEC QL NAA+PROBE: SIGNIFICANT CHANGE UP
SPECIMEN SOURCE: SIGNIFICANT CHANGE UP

## 2023-12-31 PROCEDURE — 99233 SBSQ HOSP IP/OBS HIGH 50: CPT

## 2023-12-31 PROCEDURE — 99232 SBSQ HOSP IP/OBS MODERATE 35: CPT

## 2023-12-31 RX ADMIN — Medication 0.5 MILLIGRAM(S): at 20:33

## 2023-12-31 RX ADMIN — ENOXAPARIN SODIUM 40 MILLIGRAM(S): 100 INJECTION SUBCUTANEOUS at 17:11

## 2023-12-31 RX ADMIN — AMLODIPINE BESYLATE 5 MILLIGRAM(S): 2.5 TABLET ORAL at 05:16

## 2023-12-31 RX ADMIN — Medication 1 TABLET(S): at 17:11

## 2023-12-31 RX ADMIN — VALSARTAN 80 MILLIGRAM(S): 80 TABLET ORAL at 05:20

## 2023-12-31 RX ADMIN — PANTOPRAZOLE SODIUM 40 MILLIGRAM(S): 20 TABLET, DELAYED RELEASE ORAL at 05:18

## 2023-12-31 RX ADMIN — TAMSULOSIN HYDROCHLORIDE 0.4 MILLIGRAM(S): 0.4 CAPSULE ORAL at 21:37

## 2023-12-31 RX ADMIN — SERTRALINE 50 MILLIGRAM(S): 25 TABLET, FILM COATED ORAL at 11:27

## 2023-12-31 RX ADMIN — ATORVASTATIN CALCIUM 20 MILLIGRAM(S): 80 TABLET, FILM COATED ORAL at 21:37

## 2023-12-31 RX ADMIN — Medication 1 TABLET(S): at 05:20

## 2023-12-31 RX ADMIN — Medication 0.5 MILLIGRAM(S): at 11:27

## 2023-12-31 NOTE — PROGRESS NOTE ADULT - ASSESSMENT
Assessment and Plan  Case of an 78 year old female patient with history of Anxiety, Depression, DL, HTN, hiatal hernia, chronic back and knee pains s/p multiple surgeries, and recent admission for sigmoid diverticulitis in 10/2023 who presented to the ED on 12/26 for evaluation of abdominal pain, nausea, and vomiting, found to be febrile with evidence of leukocytosis and lactic acidosis on blood work and evidence of dilated loops of small bowel on imaging, admitted for further management. We are consulted for small bowel dilation and concern of enteritis.      Sepsis with Mildly Dilated Loops of Small Bowel   Nausea, Vomiting, and Abdominal Pain  Rule Out Viral/Bacterial Enteritis  Recent Uncomplicated Sigmoid Diverticulitis in 10/2023  * Recent admission in 10/2023 for sigmoid diverticulitis s/p antibiotics course. Sedimentation Rate, Erythrocyte (10.24.23 @ 06:06) 8 mm/Hr; C-Reactive Protein, Serum (10.24.23 @ 06:06) 5.8 mg/L  * CT Abdomen and Pelvis w/ IV Cont (10.16.23 @ 04:33) Colonic diverticulosis. Diffuse sigmoid thickening with surrounding fat stranding. Likely inflamed diverticulum on series 4 image 264. Avulsion, pneumoperitoneum or pneumatosis.  * Had colonoscopy 3 weeks ago at Dr Vásquez's office: several polyps (benign pathology per patient) and internal hemorrhoids -> no records  * Current presentation 12/26: 1 day of diffuse abdominal pain associated with nausea, 7x non bilious vomiting, chills, subjective fevers  * /72 mmHg; HR 98 bpm; RR 20 bpm; T 101.2 degrees   * ED Labs WBC 21.06, Hb 13.3, Plt 400; Na 141, K 4.5, BUN 15, Cr 0.7, LFT 0.5/62/28/18; Lipase 18; LA 3.1 to 1.5 on 12/26  * RVP - and blood cultures NGTD on 12/26  * CT Abdomen and Pelvis w/ IV Cont (12.26.23 @ 13:21) Mildly dilated loop of small bowel in the left lower abdomen, nonspecific. Follow-up is recommended  * Family history of Crohn Disease in daughter    RECOMMENDATIONS   - Surgical evaluation appreciated: no acute surgical intervention  - Monitor T for fever. Trend WBC and LA. Inflammatory markers noted: ESR 6; CRP 7.3  - Continue PO Augmentin  - Advance diet as tolerated; patient agreeable today 12/31  - Monitor BM for diarrhea. Follow up stool studies: GI PCR (negative on 12/29) and stool cultures (received)  - Monitor nausea and vomiting. Antiemetics: IV Zofran 4mg Q8h PRN   - Pain control: agree with tylenol PRN and Percocet PRN   - In case of no improvement or in case of worsening pain, would consider CT abdomen with PO contrast  - Follow up with our [facility] located at [address] [place] [address]. Phone Number: [phone]        History of GERD- Controlled   History of Hiatal Hernia  * Last EGD was a few months ago with Dr Vásquez: scar noted near site of prior hiatal hernia    * Was started on PPI     RECOMMENDATIONS  - Continue PO protonix 40mg daily 30 minutes before breakfast  - Avoid NSAIDs  - Educated about lifestyle modifications: avoid spicy foods or late time dinners      Stable Hepatic Cysts and Subcentimeter Hypodensities  * Noted on prior imaging  * Stable on current imaging as above  * LFTs 0.5/62/28/18    RECOMMENDATIONS  - Trend LFTs   - Monitor for symptoms      Thank you for your consult.  - Please note that plan was communicated with medical team.   - Please reach GI on [phone] during weekdays till 5pm.  - Please call the GI service line after 5pm on Weekdays and anytime on Weekends: [phone].      Cindy Mckeon MD  PGY - 4 Gastroenterology Fellow   [facility]         Assessment and Plan  Case of an 78 year old female patient with history of Anxiety, Depression, DL, HTN, hiatal hernia, chronic back and knee pains s/p multiple surgeries, and recent admission for sigmoid diverticulitis in 10/2023 who presented to the ED on 12/26 for evaluation of abdominal pain, nausea, and vomiting, found to be febrile with evidence of leukocytosis and lactic acidosis on blood work and evidence of dilated loops of small bowel on imaging, admitted for further management. We are consulted for small bowel dilation and concern of enteritis.      Sepsis with Mildly Dilated Loops of Small Bowel   Nausea, Vomiting, and Abdominal Pain  Rule Out Viral/Bacterial Enteritis  Recent Uncomplicated Sigmoid Diverticulitis in 10/2023  * Recent admission in 10/2023 for sigmoid diverticulitis s/p antibiotics course. Sedimentation Rate, Erythrocyte (10.24.23 @ 06:06) 8 mm/Hr; C-Reactive Protein, Serum (10.24.23 @ 06:06) 5.8 mg/L  * CT Abdomen and Pelvis w/ IV Cont (10.16.23 @ 04:33) Colonic diverticulosis. Diffuse sigmoid thickening with surrounding fat stranding. Likely inflamed diverticulum on series 4 image 264. Avulsion, pneumoperitoneum or pneumatosis.  * Had colonoscopy 3 weeks ago at Dr Vásquez's office: several polyps (benign pathology per patient) and internal hemorrhoids -> no records  * Current presentation 12/26: 1 day of diffuse abdominal pain associated with nausea, 7x non bilious vomiting, chills, subjective fevers  * /72 mmHg; HR 98 bpm; RR 20 bpm; T 101.2 degrees   * ED Labs WBC 21.06, Hb 13.3, Plt 400; Na 141, K 4.5, BUN 15, Cr 0.7, LFT 0.5/62/28/18; Lipase 18; LA 3.1 to 1.5 on 12/26  * RVP - and blood cultures NGTD on 12/26  * CT Abdomen and Pelvis w/ IV Cont (12.26.23 @ 13:21) Mildly dilated loop of small bowel in the left lower abdomen, nonspecific. Follow-up is recommended  * Family history of Crohn Disease in daughter    RECOMMENDATIONS   - Surgical evaluation appreciated: no acute surgical intervention  - Monitor T for fever. Trend WBC and LA. Inflammatory markers noted: ESR 6; CRP 7.3  - Continue PO Augmentin  - Advance diet as tolerated; patient agreeable today 12/31  - Monitor BM for diarrhea. Follow up stool studies: GI PCR (negative on 12/29) and stool cultures (received)  - Monitor nausea and vomiting. Antiemetics: IV Zofran 4mg Q8h PRN   - Pain control: agree with tylenol PRN and Percocet PRN   - In case of no improvement or in case of worsening pain, would consider CT abdomen with PO contrast  - Follow up with our [facility] located at [address] [place] [address]. Phone Number: [phone]        History of GERD- Controlled   History of Hiatal Hernia  * Last EGD was a few months ago with Dr Vásquez: scar noted near site of prior hiatal hernia    * Was started on PPI     RECOMMENDATIONS  - Continue PO protonix 40mg daily 30 minutes before breakfast  - Avoid NSAIDs  - Educated about lifestyle modifications: avoid spicy foods or late time dinners      Stable Hepatic Cysts and Subcentimeter Hypodensities  * Noted on prior imaging  * Stable on current imaging as above  * LFTs 0.5/62/28/18    RECOMMENDATIONS  - Trend LFTs   - Monitor for symptoms      Thank you for your consult.  - Please note that plan was communicated with medical team.   - Please reach GI on [phone] during weekdays till 5pm.  - Please call the GI service line after 5pm on Weekdays and anytime on Weekends: [phone].      Cindy Mckeon MD  PGY - 4 Gastroenterology Fellow   [facility]

## 2023-12-31 NOTE — PROGRESS NOTE ADULT - ASSESSMENT
Ms. Chow is an 78 year old female with PMH of HLD, HTN, diverticulitis, chronic pain (multiple back and knee surgeries on oxycodone w/ spinal stimulator), right middle lobe syndrome, anxiety and depression (on Xanax),  hiatal hernia, vaginal hysterectomy and laparotomy for adhesiolysis. Admitted for evaluation and management of acute abdominal pain.    #COVID exposure  -pt exposed to pt 12/27  -covid swab 12/17 negative  -isolation from 12/27-5 days if tests negative     #Abdominal Pain 2/2 Uncomplicated Diverticulitis vs. Infectious Enteritis   #Sepsis, Present on Admission  -treated as inpatient for uncomplicated diverticulitis in 10/2023  -colonoscopy done a few weeks ago, with Dr. Vásquez, multiple polyps were removed, benign pathology  -EGD was done 1 year ago with Dr. Vásquez  -CT A/P was negative for diverticulitis however given recent history and strong clinical suspicion it cannot be ruled out  -lactate downtrended from 3.1 to 1.5  -stop Zosyn 3.375 mg IVPB q6h after 3 days - now switched to augementin plan for 4 days-end date 2/2  -regular diet   -WBC - 21->12.86-->9.58-->normal   -surgery consult appreciated  -GI consult appreciated   -follow up blood-NTD and urine cultures, RVP-neg  -gi pcr-wnl and stool culture in progress     #Diarrhea  -mirlax and senna stopped  -gi pcr-negative and stool culture in progress     #GERD  #HO Hiatal Hernia  -will continue home omeprazole 20 mg as pantoprazole 40 mg    #HTN  -holding losartan 160 mg qd and amlodipine 10 mg qhs, resume if BP stable    #HLD  -will continue home Crestor 5 mg as atorvastatin 20 mg qhs    #Chronic Back Pain s/p Spinal Cord Stimulator  -follows with Dr. Joya  -c/w Percocet    #Right Middle Lobe Syndrome  -follows with Dr. Amaya  -on Advair Diskus at home, reported that she never needs to use it    Anxiety  Depression  -c/w Xanax 0.5 mg qid prn  -c/w sertraline 50 mg qd    DVT prophylaxis: enoxaparin  GI prophylaxis: pantoprazole  Diet: clears   Code status: full code  Activity: AAT  Pending: food tolerance, temp,  stool culture---plan dc 1/1

## 2023-12-31 NOTE — PROGRESS NOTE ADULT - SUBJECTIVE AND OBJECTIVE BOX
Patient is a 78y old  Female who presents with a chief complaint of Abdominal Pain (31 Dec 2023 13:22)      Patient seen and examined at bedside.  Patient denies any chest pain or shortness of breath   ALLERGIES:  No Known Allergies    MEDICATIONS:  ALPRAZolam 0.5 milliGRAM(s) Oral four times a day PRN  amLODIPine   Tablet 5 milliGRAM(s) Oral daily  amoxicillin  875 milliGRAM(s)/clavulanate 1 Tablet(s) Oral two times a day  atorvastatin 20 milliGRAM(s) Oral at bedtime  budesonide  80 MICROgram(s)/formoterol 4.5 MICROgram(s) Inhaler 2 Puff(s) Inhalation two times a day  enoxaparin Injectable 40 milliGRAM(s) SubCutaneous every 24 hours  guaiFENesin  milliGRAM(s) Oral every 12 hours PRN  influenza  Vaccine (HIGH DOSE) 0.7 milliLiter(s) IntraMuscular once  ondansetron Injectable 4 milliGRAM(s) IV Push every 8 hours PRN  oxycodone    5 mG/acetaminophen 325 mG 2 Tablet(s) Oral every 8 hours  pantoprazole    Tablet 40 milliGRAM(s) Oral before breakfast  sertraline 50 milliGRAM(s) Oral daily  tamsulosin 0.4 milliGRAM(s) Oral at bedtime  valsartan 80 milliGRAM(s) Oral daily    Vital Signs Last 24 Hrs  T(F): 96.9 (31 Dec 2023 08:31), Max: 98.5 (30 Dec 2023 16:12)  HR: 65 (31 Dec 2023 08:31) (65 - 88)  BP: 128/60 (31 Dec 2023 08:31) (128/60 - 142/77)  RR: 18 (31 Dec 2023 08:31) (18 - 18)  SpO2: 92% (31 Dec 2023 08:31) (92% - 92%)  I&O's Summary      PHYSICAL EXAM:  General: NAD, A/O x 3  ENT: MMM  Neck: Supple, No JVD  Lungs: Clear to auscultation bilaterally  Cardio: RRR, S1/S2, No murmurs  Abdomen: Soft, mild tender, Nondistended; Bowel sounds present  Extremities: No cyanosis, No edema    LABS:                        12.5   9.44  )-----------( 336      ( 30 Dec 2023 08:00 )             39.7     12-30    141  |  106  |  5   ----------------------------<  94  4.1   |  25  |  0.6    Ca    8.6      30 Dec 2023 08:00  Mg     2.3     12-30    TPro  5.4  /  Alb  3.6  /  TBili  0.4  /  DBili  x   /  AST  16  /  ALT  14  /  AlkPhos  48  12-29              10-17 Chol 129 mg/dL LDL -- HDL 50 mg/dL Trig 137 mg/dL                  Urinalysis Basic - ( 30 Dec 2023 08:00 )    Color: x / Appearance: x / SG: x / pH: x  Gluc: 94 mg/dL / Ketone: x  / Bili: x / Urobili: x   Blood: x / Protein: x / Nitrite: x   Leuk Esterase: x / RBC: x / WBC x   Sq Epi: x / Non Sq Epi: x / Bacteria: x        Culture - Stool (collected 29 Dec 2023 10:50)  Source: .Stool Feces  Preliminary Report (30 Dec 2023 18:36):    No enteric pathogens to date: Final culture pending    Culture - Urine (collected 28 Dec 2023 00:45)  Source: Clean Catch Clean Catch (Midstream)  Final Report (29 Dec 2023 07:52):    No growth    Culture - Blood (collected 26 Dec 2023 13:43)  Source: .Blood Blood  Preliminary Report (30 Dec 2023 23:01):    No growth at 4 days    Culture - Blood (collected 26 Dec 2023 13:43)  Source: .Blood Blood  Preliminary Report (30 Dec 2023 23:01):    No growth at 4 days      COVID-19 PCR: NotDetec (12-31-23 @ 09:27)  COVID-19 PCR: NotDetec (12-27-23 @ 12:44)      RADIOLOGY & ADDITIONAL TESTS:    Care Discussed with Consultants/Other Providers:

## 2023-12-31 NOTE — PROGRESS NOTE ADULT - SUBJECTIVE AND OBJECTIVE BOX
MANUEL CHOW 78y Female  MRN#: [id number]   Hospital Day: 5d    SUBJECTIVE  Ms. Chow is an 78 year old female with PMH of HLD, HTN, diverticulosis, chronic pain (multiple back and knee surgeries on oxycodone w/ spinal stimulator), right middle lobe syndrome, anxiety and depression (on Xanax),  hiatal hernia, vaginal hysterectomy and laparotomy for adhesiolysis.    She presented to the ED complaining of abdominal pain of 1 day duration.     The pain woke her up from sleep at night, was diffuse, and was associated with multiple episodes of NBNB vomiting, chills and cough. Last BM was one day ago. Denied diarrhea, sick contacts, chest pain, or SOB.     Of note, she was recently managed as inpatient in 10/2023 for acute diverticulitis with ceftriaxone and metronidazole. She was evaluated by GI during that admission and underwent outpatient colonoscopy with Dr. Vásquez a few weeks ago, per patient multiple polyps were found and resected, all were benign on histopathology. 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    In the ED, the patient was febrile (101.2 F), remainder of the vital signs were stable (/72, HR 98, RR 20, SpO2 98% on RA)    Physical examination was remarkable for diffuse tenderness to palpation in the abdomen, particularly the lower quadrants. Bowel sounds were present. No guarding or rebound tenderness. Pardo sign was negative.     Labs were significant for leukocytosis (WBC 21.06), and a lactate of 3.1 (trended down to 1.5 on VBG 6 hours later).    CXR showed low lung volumes (the patient has right middle lobe syndrome and follows with Dr. Amaya)    CT A/P w/ IV contrast was negative for diverticulitis, and only showed a mild dilation of a small bowel loop in the left lower abdomen. Follow up was recommended.    The patient was seen by general surgery, who gave the impression of viral/bacterial enteritis, and recommended NPO, IVF with no surgical intervention. They will continue to follow.  Patient is a 78y old Female who presents with a chief complaint of Abdominal Pain (31 Dec 2023 14:47)  Currently admitted to medicine with the primary diagnosis of Abdominal pain      INTERVAL HPI AND OVERNIGHT EVENTS:  Patient was examined and seen at bedside. This morning she is resting comfortably in bed and reports no issues or overnight events.    OBJECTIVE  PAST MEDICAL & SURGICAL HISTORY  HTN (hypertension)    HLD (hyperlipidemia)    Chronic back pain  s/p back sx and stimulator    Gastroesophageal reflux disease with hiatal hernia    H/O vaginal hysterectomy    History of laparotomy      ALLERGIES:  No Known Allergies    MEDICATIONS:  STANDING MEDICATIONS  amLODIPine   Tablet 5 milliGRAM(s) Oral daily  amoxicillin  875 milliGRAM(s)/clavulanate 1 Tablet(s) Oral two times a day  atorvastatin 20 milliGRAM(s) Oral at bedtime  budesonide  80 MICROgram(s)/formoterol 4.5 MICROgram(s) Inhaler 2 Puff(s) Inhalation two times a day  enoxaparin Injectable 40 milliGRAM(s) SubCutaneous every 24 hours  influenza  Vaccine (HIGH DOSE) 0.7 milliLiter(s) IntraMuscular once  oxycodone    5 mG/acetaminophen 325 mG 2 Tablet(s) Oral every 8 hours  pantoprazole    Tablet 40 milliGRAM(s) Oral before breakfast  sertraline 50 milliGRAM(s) Oral daily  tamsulosin 0.4 milliGRAM(s) Oral at bedtime  valsartan 80 milliGRAM(s) Oral daily    PRN MEDICATIONS  ALPRAZolam 0.5 milliGRAM(s) Oral four times a day PRN  guaiFENesin  milliGRAM(s) Oral every 12 hours PRN  ondansetron Injectable 4 milliGRAM(s) IV Push every 8 hours PRN      VITAL SIGNS: Last 24 Hours  T(C): 36.1 (31 Dec 2023 08:31), Max: 36.1 (31 Dec 2023 08:31)  T(F): 96.9 (31 Dec 2023 08:31), Max: 96.9 (31 Dec 2023 08:31)  HR: 65 (31 Dec 2023 08:31) (65 - 65)  BP: 128/60 (31 Dec 2023 08:31) (128/60 - 142/77)  BP(mean): --  RR: 18 (31 Dec 2023 08:31) (18 - 18)  SpO2: 92% (31 Dec 2023 08:31) (92% - 92%)    LABS:                        12.5   9.44  )-----------( 336      ( 30 Dec 2023 08:00 )             39.7     12-30    141  |  106  |  5<L>  ----------------------------<  94  4.1   |  25  |  0.6<L>    Ca    8.6      30 Dec 2023 08:00  Mg     2.3     12-30        Urinalysis Basic - ( 30 Dec 2023 08:00 )    Color: x / Appearance: x / SG: x / pH: x  Gluc: 94 mg/dL / Ketone: x  / Bili: x / Urobili: x   Blood: x / Protein: x / Nitrite: x   Leuk Esterase: x / RBC: x / WBC x   Sq Epi: x / Non Sq Epi: x / Bacteria: x            Culture - Stool (collected 29 Dec 2023 10:50)  Source: .Stool Feces  Final Report (31 Dec 2023 19:06):    No enteric pathogens isolated.    (Stool culture examined for Salmonella,    Shigella, Campylobacter, Aeromonas, Plesiomonas,    Vibrio, E.coli O157 and Yersinia)          RADIOLOGY:      PHYSICAL EXAM:  CONSTITUTIONAL: awake in bed, No acute distress, AAOx3  HEAD: Atraumatic, normocephalic  EYES: EOM intact, PERRLA, conjunctiva and sclera clear  ENT: moist mucous membranes  PULMONARY: Clear to auscultation bilaterally  CARDIOVASCULAR: Regular rate and rhythm  GASTROINTESTINAL: Soft, non-tender, non-distended; bowel sounds present  MUSCULOSKELETAL: no edema  NEUROLOGY: non-focal  SKIN: warm and dry     MANUEL CHOW 78y Female  MRN#: [id number]   Hospital Day: 5d    SUBJECTIVE  Ms. Chow is an 78 year old female with PMH of HLD, HTN, diverticulosis, chronic pain (multiple back and knee surgeries on oxycodone w/ spinal stimulator), right middle lobe syndrome, anxiety and depression (on Xanax),  hiatal hernia, vaginal hysterectomy and laparotomy for adhesiolysis.    She presented to the ED complaining of abdominal pain of 1 day duration.     The pain woke her up from sleep at night, was diffuse, and was associated with multiple episodes of NBNB vomiting, chills and cough. Last BM was one day ago. Denied diarrhea, sick contacts, chest pain, or SOB.     Of note, she was recently managed as inpatient in 10/2023 for acute diverticulitis with ceftriaxone and metronidazole. She was evaluated by GI during that admission and underwent outpatient colonoscopy with Dr. Vásquez a few weeks ago, per patient multiple polyps were found and resected, all were benign on histopathology. 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    In the ED, the patient was febrile (101.2 F), remainder of the vital signs were stable (/72, HR 98, RR 20, SpO2 98% on RA)    Physical examination was remarkable for diffuse tenderness to palpation in the abdomen, particularly the lower quadrants. Bowel sounds were present. No guarding or rebound tenderness. Pardo sign was negative.     Labs were significant for leukocytosis (WBC 21.06), and a lactate of 3.1 (trended down to 1.5 on VBG 6 hours later).    CXR showed low lung volumes (the patient has right middle lobe syndrome and follows with Dr. Amaya)    CT A/P w/ IV contrast was negative for diverticulitis, and only showed a mild dilation of a small bowel loop in the left lower abdomen. Follow up was recommended.    The patient was seen by general surgery, who gave the impression of viral/bacterial enteritis, and recommended NPO, IVF with no surgical intervention. They will continue to follow.  Patient is a 78y old Female who presents with a chief complaint of Abdominal Pain (31 Dec 2023 14:47)  Currently admitted to medicine with the primary diagnosis of Abdominal pain      INTERVAL HPI AND OVERNIGHT EVENTS:  Patient was examined and seen at bedside. This morning she is resting comfortably in bed and reports no issues or overnight events.    OBJECTIVE  PAST MEDICAL & SURGICAL HISTORY  HTN (hypertension)    HLD (hyperlipidemia)    Chronic back pain  s/p back sx and stimulator    Gastroesophageal reflux disease with hiatal hernia    H/O vaginal hysterectomy    History of laparotomy      ALLERGIES:  No Known Allergies    MEDICATIONS:  STANDING MEDICATIONS  amLODIPine   Tablet 5 milliGRAM(s) Oral daily  amoxicillin  875 milliGRAM(s)/clavulanate 1 Tablet(s) Oral two times a day  atorvastatin 20 milliGRAM(s) Oral at bedtime  budesonide  80 MICROgram(s)/formoterol 4.5 MICROgram(s) Inhaler 2 Puff(s) Inhalation two times a day  enoxaparin Injectable 40 milliGRAM(s) SubCutaneous every 24 hours  influenza  Vaccine (HIGH DOSE) 0.7 milliLiter(s) IntraMuscular once  oxycodone    5 mG/acetaminophen 325 mG 2 Tablet(s) Oral every 8 hours  pantoprazole    Tablet 40 milliGRAM(s) Oral before breakfast  sertraline 50 milliGRAM(s) Oral daily  tamsulosin 0.4 milliGRAM(s) Oral at bedtime  valsartan 80 milliGRAM(s) Oral daily    PRN MEDICATIONS  ALPRAZolam 0.5 milliGRAM(s) Oral four times a day PRN  guaiFENesin  milliGRAM(s) Oral every 12 hours PRN  ondansetron Injectable 4 milliGRAM(s) IV Push every 8 hours PRN      VITAL SIGNS: Last 24 Hours  T(C): 36.1 (31 Dec 2023 08:31), Max: 36.1 (31 Dec 2023 08:31)  T(F): 96.9 (31 Dec 2023 08:31), Max: 96.9 (31 Dec 2023 08:31)  HR: 65 (31 Dec 2023 08:31) (65 - 65)  BP: 128/60 (31 Dec 2023 08:31) (128/60 - 142/77)  BP(mean): --  RR: 18 (31 Dec 2023 08:31) (18 - 18)  SpO2: 92% (31 Dec 2023 08:31) (92% - 92%)    LABS:                        12.5   9.44  )-----------( 336      ( 30 Dec 2023 08:00 )             39.7     12-30    141  |  106  |  5<L>  ----------------------------<  94  4.1   |  25  |  0.6<L>    Ca    8.6      30 Dec 2023 08:00  Mg     2.3     12-30        Urinalysis Basic - ( 30 Dec 2023 08:00 )    Color: x / Appearance: x / SG: x / pH: x  Gluc: 94 mg/dL / Ketone: x  / Bili: x / Urobili: x   Blood: x / Protein: x / Nitrite: x   Leuk Esterase: x / RBC: x / WBC x   Sq Epi: x / Non Sq Epi: x / Bacteria: x            Culture - Stool (collected 29 Dec 2023 10:50)  Source: .Stool Feces  Final Report (31 Dec 2023 19:06):    No enteric pathogens isolated.    (Stool culture examined for Salmonella,    Shigella, Campylobacter, Aeromonas, Plesiomonas,    Vibrio, E.coli O157 and Yersinia)          RADIOLOGY:      PHYSICAL EXAM:  CONSTITUTIONAL: awake in bed, No acute distress, AAOx3  HEAD: Atraumatic, normocephalic  EYES: EOM intact, PERRLA, conjunctiva and sclera clear  ENT: moist mucous membranes  PULMONARY: Clear to auscultation bilaterally  CARDIOVASCULAR: Regular rate and rhythm  GASTROINTESTINAL: Soft, non-tender, non-distended; bowel sounds present  MUSCULOSKELETAL: no edema  NEUROLOGY: non-focal  SKIN: warm and dry

## 2023-12-31 NOTE — PROGRESS NOTE ADULT - SUBJECTIVE AND OBJECTIVE BOX
Gastroenterology Follow Up Note      Location: [facility] 4B (Back) 018 B ([facility] 4B (Back))  Patient Name: MANUEL ROSENBAUM  Age: 78y  Gender: Female      Chief Complaint  Patient is a 78y old Female who presents with a chief complaint of Abdominal Pain (30 Dec 2023 16:53)  Primary diagnosis of Abdominal pain      Reason for Consult  Enteritis      Progress Note  This morning patient was seen and examined at bedside.    Today is hospital day 5d.  Patient is doing fine.  Patient's appetite is improved.  She notes 1 episode of vomiting on 12/29 after eating lemon ice but notes feeling overall much better than before.   Patient denies any abdominal pain.  She had multiple episodes of loose stools x3 on 12/28 after taking laxatives, 1-2 on 12/29, 4 on 12/30, and 2 on 12/31.        Vital Signs in the last 24 hours   Vitals Summary T(C): 36.1 (12-31-23 @ 08:31), Max: 36.9 (12-30-23 @ 16:12)  HR: 65 (12-31-23 @ 08:31) (65 - 88)  BP: 128/60 (12-31-23 @ 08:31) (128/60 - 142/77)  RR: 18 (12-31-23 @ 08:31) (18 - 18)  SpO2: 92% (12-31-23 @ 08:31) (92% - 92%)  Vent Data   Intake/ Output   Measurements       Physical Exam  * General Appearance: Alert, cooperative, interactive, oriented to time, place, and person, in no acute distress  * Lungs: Good bilateral air entry, normal breath sounds   * Heart: Regular Rate and Rhythm, normal S1 and S2, no audible murmur, rub, or gallop  * Abdomen: Symmetric, non-distended, soft, marked improvement in tenderness, no guarding or rebound tenderness, bowel sounds active all four quadrants, no masses      Investigations   Laboratory Workup      - CBC:                        12.5   9.44  )-----------( 336      ( 30 Dec 2023 08:00 )             39.7       - Hgb Trend:  12.5  12-30-23 @ 08:00  11.0  12-29-23 @ 06:55          - Chemistry:  12-30    141  |  106  |  5<L>  ----------------------------<  94  4.1   |  25  |  0.6<L>    Ca    8.6      30 Dec 2023 08:00  Mg     2.3     12-30      Liver panel trend:  TBili 0.4   /   AST 16   /   ALT 14   /   AlkP 48   /   Tptn 5.4   /   Alb 3.6    /   DBili --      12-29  TBili 0.5   /   AST 19   /   ALT 14   /   AlkP 46   /   Tptn 5.5   /   Alb 3.6    /   DBili --      12-28  TBili 0.6   /   AST 19   /   ALT 14   /   AlkP 56   /   Tptn 6.0   /   Alb 3.9    /   DBili --      12-27  TBili 0.5   /   AST 28   /   ALT 18   /   AlkP 62   /   Tptn 7.1   /   Alb 4.5    /   DBili --      12-26      - Coagulation Studies:      - ABG:      - Cardiac Markers:        Microbiological Workup  Urinalysis Basic - ( 30 Dec 2023 08:00 )    Color: x / Appearance: x / SG: x / pH: x  Gluc: 94 mg/dL / Ketone: x  / Bili: x / Urobili: x   Blood: x / Protein: x / Nitrite: x   Leuk Esterase: x / RBC: x / WBC x   Sq Epi: x / Non Sq Epi: x / Bacteria: x        Culture - Stool (collected 29 Dec 2023 10:50)  Source: .Stool Feces  Preliminary Report (30 Dec 2023 18:36):    No enteric pathogens to date: Final culture pending        Radiological Workup            Current Medications  Standing Medications  amLODIPine   Tablet 5 milliGRAM(s) Oral daily  amoxicillin  875 milliGRAM(s)/clavulanate 1 Tablet(s) Oral two times a day  atorvastatin 20 milliGRAM(s) Oral at bedtime  budesonide  80 MICROgram(s)/formoterol 4.5 MICROgram(s) Inhaler 2 Puff(s) Inhalation two times a day  enoxaparin Injectable 40 milliGRAM(s) SubCutaneous every 24 hours  influenza  Vaccine (HIGH DOSE) 0.7 milliLiter(s) IntraMuscular once  oxycodone    5 mG/acetaminophen 325 mG 2 Tablet(s) Oral every 8 hours  pantoprazole    Tablet 40 milliGRAM(s) Oral before breakfast  sertraline 50 milliGRAM(s) Oral daily  tamsulosin 0.4 milliGRAM(s) Oral at bedtime  valsartan 80 milliGRAM(s) Oral daily    PRN Medications  ALPRAZolam 0.5 milliGRAM(s) Oral four times a day PRN anxiety  guaiFENesin  milliGRAM(s) Oral every 12 hours PRN Cough  ondansetron Injectable 4 milliGRAM(s) IV Push every 8 hours PRN Nausea and/or Vomiting    Singles Doses Administered  (ADM OVERRIDE) 1 each &lt;see task&gt; GiveOnce  (ADM OVERRIDE) 1 each &lt;see task&gt; GiveOnce  (ADM OVERRIDE) 1 each &lt;see task&gt; GiveOnce  (ADM OVERRIDE) 1 each &lt;see task&gt; GiveOnce  (ADM OVERRIDE) 1 each &lt;see task&gt; GiveOnce  (ADM OVERRIDE) 2 each &lt;see task&gt; GiveOnce  acetaminophen     Tablet .. 650 milliGRAM(s) Oral once  cefepime   IVPB 2000 milliGRAM(s) IV Intermittent once  famotidine Injectable 20 milliGRAM(s) IV Push once  ketorolac   Injectable 15 milliGRAM(s) IV Push Once  lactated ringers Bolus 1000 milliLiter(s) IV Bolus once  lactated ringers Bolus 300 milliLiter(s) IV Bolus once  metoclopramide Injectable 10 milliGRAM(s) IV Push once  metroNIDAZOLE  IVPB 500 milliGRAM(s) IV Intermittent once  morphine  - Injectable 4 milliGRAM(s) IV Push once  ondansetron Injectable 4 milliGRAM(s) IV Push once  potassium chloride   Powder 20 milliEquivalent(s) Oral once  sodium chloride 0.9% Bolus 1000 milliLiter(s) IV Bolus once     Gastroenterology Follow Up Note      Location: [facility] 4B (Back) 018 B ([facility] 4B (Back))  Patient Name: MANUEL ROSENBAUM  Age: 78y  Gender: Female      Chief Complaint  Patient is a 78y old Female who presents with a chief complaint of Abdominal Pain (30 Dec 2023 16:53)  Primary diagnosis of Abdominal pain      Reason for Consult  Enteritis      Progress Note  This morning patient was seen and examined at bedside.    Today is hospital day 5d.  Patient is doing fine.  Patient's appetite is improved.  She notes 1 episode of vomiting on 12/29 after eating lemon ice but notes feeling overall much better than before.   Patient denies any abdominal pain.  She had multiple episodes of loose stools x3 on 12/28 after taking laxatives, 1-2 on 12/29, 4 on 12/30, and 2 on 12/31.        Vital Signs in the last 24 hours   Vitals Summary T(C): 36.1 (12-31-23 @ 08:31), Max: 36.9 (12-30-23 @ 16:12)  HR: 65 (12-31-23 @ 08:31) (65 - 88)  BP: 128/60 (12-31-23 @ 08:31) (128/60 - 142/77)  RR: 18 (12-31-23 @ 08:31) (18 - 18)  SpO2: 92% (12-31-23 @ 08:31) (92% - 92%)  Vent Data   Intake/ Output   Measurements       Physical Exam  * General Appearance: Alert, cooperative, interactive, oriented to time, place, and person, in no acute distress  * Lungs: Good bilateral air entry, normal breath sounds   * Heart: Regular Rate and Rhythm, normal S1 and S2, no audible murmur, rub, or gallop  * Abdomen: Symmetric, non-distended, soft, marked improvement in tenderness, no guarding or rebound tenderness, bowel sounds active all four quadrants, no masses      Investigations   Laboratory Workup      - CBC:                        12.5   9.44  )-----------( 336      ( 30 Dec 2023 08:00 )             39.7       - Hgb Trend:  12.5  12-30-23 @ 08:00  11.0  12-29-23 @ 06:55          - Chemistry:  12-30    141  |  106  |  5<L>  ----------------------------<  94  4.1   |  25  |  0.6<L>    Ca    8.6      30 Dec 2023 08:00  Mg     2.3     12-30      Liver panel trend:  TBili 0.4   /   AST 16   /   ALT 14   /   AlkP 48   /   Tptn 5.4   /   Alb 3.6    /   DBili --      12-29  TBili 0.5   /   AST 19   /   ALT 14   /   AlkP 46   /   Tptn 5.5   /   Alb 3.6    /   DBili --      12-28  TBili 0.6   /   AST 19   /   ALT 14   /   AlkP 56   /   Tptn 6.0   /   Alb 3.9    /   DBili --      12-27  TBili 0.5   /   AST 28   /   ALT 18   /   AlkP 62   /   Tptn 7.1   /   Alb 4.5    /   DBili --      12-26      - Coagulation Studies:      - ABG:      - Cardiac Markers:        Microbiological Workup  Urinalysis Basic - ( 30 Dec 2023 08:00 )    Color: x / Appearance: x / SG: x / pH: x  Gluc: 94 mg/dL / Ketone: x  / Bili: x / Urobili: x   Blood: x / Protein: x / Nitrite: x   Leuk Esterase: x / RBC: x / WBC x   Sq Epi: x / Non Sq Epi: x / Bacteria: x        Culture - Stool (collected 29 Dec 2023 10:50)  Source: .Stool Feces  Preliminary Report (30 Dec 2023 18:36):    No enteric pathogens to date: Final culture pending        Radiological Workup            Current Medications  Standing Medications  amLODIPine   Tablet 5 milliGRAM(s) Oral daily  amoxicillin  875 milliGRAM(s)/clavulanate 1 Tablet(s) Oral two times a day  atorvastatin 20 milliGRAM(s) Oral at bedtime  budesonide  80 MICROgram(s)/formoterol 4.5 MICROgram(s) Inhaler 2 Puff(s) Inhalation two times a day  enoxaparin Injectable 40 milliGRAM(s) SubCutaneous every 24 hours  influenza  Vaccine (HIGH DOSE) 0.7 milliLiter(s) IntraMuscular once  oxycodone    5 mG/acetaminophen 325 mG 2 Tablet(s) Oral every 8 hours  pantoprazole    Tablet 40 milliGRAM(s) Oral before breakfast  sertraline 50 milliGRAM(s) Oral daily  tamsulosin 0.4 milliGRAM(s) Oral at bedtime  valsartan 80 milliGRAM(s) Oral daily    PRN Medications  ALPRAZolam 0.5 milliGRAM(s) Oral four times a day PRN anxiety  guaiFENesin  milliGRAM(s) Oral every 12 hours PRN Cough  ondansetron Injectable 4 milliGRAM(s) IV Push every 8 hours PRN Nausea and/or Vomiting    Singles Doses Administered  (ADM OVERRIDE) 1 each &lt;see task&gt; GiveOnce  (ADM OVERRIDE) 1 each &lt;see task&gt; GiveOnce  (ADM OVERRIDE) 1 each &lt;see task&gt; GiveOnce  (ADM OVERRIDE) 1 each &lt;see task&gt; GiveOnce  (ADM OVERRIDE) 1 each &lt;see task&gt; GiveOnce  (ADM OVERRIDE) 2 each &lt;see task&gt; GiveOnce  acetaminophen     Tablet .. 650 milliGRAM(s) Oral once  cefepime   IVPB 2000 milliGRAM(s) IV Intermittent once  famotidine Injectable 20 milliGRAM(s) IV Push once  ketorolac   Injectable 15 milliGRAM(s) IV Push Once  lactated ringers Bolus 1000 milliLiter(s) IV Bolus once  lactated ringers Bolus 300 milliLiter(s) IV Bolus once  metoclopramide Injectable 10 milliGRAM(s) IV Push once  metroNIDAZOLE  IVPB 500 milliGRAM(s) IV Intermittent once  morphine  - Injectable 4 milliGRAM(s) IV Push once  ondansetron Injectable 4 milliGRAM(s) IV Push once  potassium chloride   Powder 20 milliEquivalent(s) Oral once  sodium chloride 0.9% Bolus 1000 milliLiter(s) IV Bolus once

## 2023-12-31 NOTE — PROGRESS NOTE ADULT - ASSESSMENT
Ms. Chow is an 78 year old female with PMH of HLD, HTN, diverticulitis, chronic pain (multiple back and knee surgeries on oxycodone w/ spinal stimulator), right middle lobe syndrome, anxiety and depression (on Xanax),  hiatal hernia, vaginal hysterectomy and laparotomy for adhesiolysis. Admitted for evaluation and management of acute abdominal pain.    #COVID exposure  - pt exposed to pt 12/27  - covid swab 12/17 negative  - isolation from 12/27-5 days if tests negative     #Abdominal Pain 2/2 Uncomplicated Diverticulitis vs. Infectious Enteritis   #Sepsis, Present on Admission  - treated as inpatient for uncomplicated diverticulitis in 10/2023  - colonoscopy done a few weeks ago, with Dr. Vásquez, multiple polyps were removed, benign pathology  - EGD was done 1 year ago with Dr. Vásquez  - CT A/P was negative for diverticulitis however given recent history and strong clinical suspicion it cannot be ruled out  - lactate downtrended from 3.1 to 1.5  - continue Zosyn 3.375 mg IVPB q6h   - clear fluids- LR 50cc/hr   - WBC - 21->12.86-->9.58  - surgery consult appreciated  - GI consult appreciated   - follow up blood-NTD and urine cultures, RVP neg  - gi pcr-neg, f/u stool cx  - diet adv to regular    #Diarrhea  - Miralax and senna stopped  - 1 episode of diarrhea overnight 12/29  - GI PCR negative   - f/u stool cx    #GERD  #HO Hiatal Hernia  - c/w home omeprazole 20 mg as pantoprazole 40 mg    #HTN  - holding losartan 160 mg qd and amlodipine 10 mg qhs, resume if BP stable    #HLD  - will continue home Crestor 5 mg as atorvastatin 20 mg qhs    #Chronic Back Pain s/p Spinal Cord Stimulator  - follows with Dr. Joya  - c/w Percocet    #Right Middle Lobe Syndrome  - follows with Dr. Amaya  - on Advair Diskus at home, reported that she never needs to use it    #Anxiety  #Depression  - c/w Xanax 0.5 mg qid prn  - c/w sertraline 50 mg qd    # DVT prophylaxis: Lovenox  # GI prophylaxis: PPI  # Diet: DASH  # Activity: AAT  # Code status: Full Code  # Disposition: medicine    Pending: adv in diet, wbc trend, stool culture

## 2024-01-01 LAB
ALBUMIN SERPL ELPH-MCNC: 4 G/DL — SIGNIFICANT CHANGE UP (ref 3.5–5.2)
ALBUMIN SERPL ELPH-MCNC: 4 G/DL — SIGNIFICANT CHANGE UP (ref 3.5–5.2)
ALP SERPL-CCNC: 60 U/L — SIGNIFICANT CHANGE UP (ref 30–115)
ALP SERPL-CCNC: 60 U/L — SIGNIFICANT CHANGE UP (ref 30–115)
ALT FLD-CCNC: 13 U/L — SIGNIFICANT CHANGE UP (ref 0–41)
ALT FLD-CCNC: 13 U/L — SIGNIFICANT CHANGE UP (ref 0–41)
ANION GAP SERPL CALC-SCNC: 12 MMOL/L — SIGNIFICANT CHANGE UP (ref 7–14)
ANION GAP SERPL CALC-SCNC: 12 MMOL/L — SIGNIFICANT CHANGE UP (ref 7–14)
AST SERPL-CCNC: 16 U/L — SIGNIFICANT CHANGE UP (ref 0–41)
AST SERPL-CCNC: 16 U/L — SIGNIFICANT CHANGE UP (ref 0–41)
BASOPHILS # BLD AUTO: 0.08 K/UL — SIGNIFICANT CHANGE UP (ref 0–0.2)
BASOPHILS # BLD AUTO: 0.08 K/UL — SIGNIFICANT CHANGE UP (ref 0–0.2)
BASOPHILS NFR BLD AUTO: 0.9 % — SIGNIFICANT CHANGE UP (ref 0–1)
BASOPHILS NFR BLD AUTO: 0.9 % — SIGNIFICANT CHANGE UP (ref 0–1)
BILIRUB SERPL-MCNC: 0.3 MG/DL — SIGNIFICANT CHANGE UP (ref 0.2–1.2)
BILIRUB SERPL-MCNC: 0.3 MG/DL — SIGNIFICANT CHANGE UP (ref 0.2–1.2)
BUN SERPL-MCNC: 9 MG/DL — LOW (ref 10–20)
BUN SERPL-MCNC: 9 MG/DL — LOW (ref 10–20)
CALCIUM SERPL-MCNC: 9.1 MG/DL — SIGNIFICANT CHANGE UP (ref 8.4–10.4)
CALCIUM SERPL-MCNC: 9.1 MG/DL — SIGNIFICANT CHANGE UP (ref 8.4–10.4)
CHLORIDE SERPL-SCNC: 105 MMOL/L — SIGNIFICANT CHANGE UP (ref 98–110)
CHLORIDE SERPL-SCNC: 105 MMOL/L — SIGNIFICANT CHANGE UP (ref 98–110)
CO2 SERPL-SCNC: 25 MMOL/L — SIGNIFICANT CHANGE UP (ref 17–32)
CO2 SERPL-SCNC: 25 MMOL/L — SIGNIFICANT CHANGE UP (ref 17–32)
CREAT SERPL-MCNC: 0.7 MG/DL — SIGNIFICANT CHANGE UP (ref 0.7–1.5)
CREAT SERPL-MCNC: 0.7 MG/DL — SIGNIFICANT CHANGE UP (ref 0.7–1.5)
EGFR: 87 ML/MIN/1.73M2 — SIGNIFICANT CHANGE UP
EGFR: 87 ML/MIN/1.73M2 — SIGNIFICANT CHANGE UP
EOSINOPHIL # BLD AUTO: 0.38 K/UL — SIGNIFICANT CHANGE UP (ref 0–0.7)
EOSINOPHIL # BLD AUTO: 0.38 K/UL — SIGNIFICANT CHANGE UP (ref 0–0.7)
EOSINOPHIL NFR BLD AUTO: 4.2 % — SIGNIFICANT CHANGE UP (ref 0–8)
EOSINOPHIL NFR BLD AUTO: 4.2 % — SIGNIFICANT CHANGE UP (ref 0–8)
GLUCOSE SERPL-MCNC: 98 MG/DL — SIGNIFICANT CHANGE UP (ref 70–99)
GLUCOSE SERPL-MCNC: 98 MG/DL — SIGNIFICANT CHANGE UP (ref 70–99)
HCT VFR BLD CALC: 38.8 % — SIGNIFICANT CHANGE UP (ref 37–47)
HCT VFR BLD CALC: 38.8 % — SIGNIFICANT CHANGE UP (ref 37–47)
HGB BLD-MCNC: 12.3 G/DL — SIGNIFICANT CHANGE UP (ref 12–16)
HGB BLD-MCNC: 12.3 G/DL — SIGNIFICANT CHANGE UP (ref 12–16)
IMM GRANULOCYTES NFR BLD AUTO: 0.4 % — HIGH (ref 0.1–0.3)
IMM GRANULOCYTES NFR BLD AUTO: 0.4 % — HIGH (ref 0.1–0.3)
LYMPHOCYTES # BLD AUTO: 2.26 K/UL — SIGNIFICANT CHANGE UP (ref 1.2–3.4)
LYMPHOCYTES # BLD AUTO: 2.26 K/UL — SIGNIFICANT CHANGE UP (ref 1.2–3.4)
LYMPHOCYTES # BLD AUTO: 25.3 % — SIGNIFICANT CHANGE UP (ref 20.5–51.1)
LYMPHOCYTES # BLD AUTO: 25.3 % — SIGNIFICANT CHANGE UP (ref 20.5–51.1)
MAGNESIUM SERPL-MCNC: 2.1 MG/DL — SIGNIFICANT CHANGE UP (ref 1.8–2.4)
MAGNESIUM SERPL-MCNC: 2.1 MG/DL — SIGNIFICANT CHANGE UP (ref 1.8–2.4)
MCHC RBC-ENTMCNC: 25.5 PG — LOW (ref 27–31)
MCHC RBC-ENTMCNC: 25.5 PG — LOW (ref 27–31)
MCHC RBC-ENTMCNC: 31.7 G/DL — LOW (ref 32–37)
MCHC RBC-ENTMCNC: 31.7 G/DL — LOW (ref 32–37)
MCV RBC AUTO: 80.3 FL — LOW (ref 81–99)
MCV RBC AUTO: 80.3 FL — LOW (ref 81–99)
MONOCYTES # BLD AUTO: 0.67 K/UL — HIGH (ref 0.1–0.6)
MONOCYTES # BLD AUTO: 0.67 K/UL — HIGH (ref 0.1–0.6)
MONOCYTES NFR BLD AUTO: 7.5 % — SIGNIFICANT CHANGE UP (ref 1.7–9.3)
MONOCYTES NFR BLD AUTO: 7.5 % — SIGNIFICANT CHANGE UP (ref 1.7–9.3)
NEUTROPHILS # BLD AUTO: 5.52 K/UL — SIGNIFICANT CHANGE UP (ref 1.4–6.5)
NEUTROPHILS # BLD AUTO: 5.52 K/UL — SIGNIFICANT CHANGE UP (ref 1.4–6.5)
NEUTROPHILS NFR BLD AUTO: 61.7 % — SIGNIFICANT CHANGE UP (ref 42.2–75.2)
NEUTROPHILS NFR BLD AUTO: 61.7 % — SIGNIFICANT CHANGE UP (ref 42.2–75.2)
NRBC # BLD: 0 /100 WBCS — SIGNIFICANT CHANGE UP (ref 0–0)
NRBC # BLD: 0 /100 WBCS — SIGNIFICANT CHANGE UP (ref 0–0)
PLATELET # BLD AUTO: 343 K/UL — SIGNIFICANT CHANGE UP (ref 130–400)
PLATELET # BLD AUTO: 343 K/UL — SIGNIFICANT CHANGE UP (ref 130–400)
PMV BLD: 11.5 FL — HIGH (ref 7.4–10.4)
PMV BLD: 11.5 FL — HIGH (ref 7.4–10.4)
POTASSIUM SERPL-MCNC: 3.7 MMOL/L — SIGNIFICANT CHANGE UP (ref 3.5–5)
POTASSIUM SERPL-MCNC: 3.7 MMOL/L — SIGNIFICANT CHANGE UP (ref 3.5–5)
POTASSIUM SERPL-SCNC: 3.7 MMOL/L — SIGNIFICANT CHANGE UP (ref 3.5–5)
POTASSIUM SERPL-SCNC: 3.7 MMOL/L — SIGNIFICANT CHANGE UP (ref 3.5–5)
PROT SERPL-MCNC: 6.1 G/DL — SIGNIFICANT CHANGE UP (ref 6–8)
PROT SERPL-MCNC: 6.1 G/DL — SIGNIFICANT CHANGE UP (ref 6–8)
RBC # BLD: 4.83 M/UL — SIGNIFICANT CHANGE UP (ref 4.2–5.4)
RBC # BLD: 4.83 M/UL — SIGNIFICANT CHANGE UP (ref 4.2–5.4)
RBC # FLD: 17.7 % — HIGH (ref 11.5–14.5)
RBC # FLD: 17.7 % — HIGH (ref 11.5–14.5)
SODIUM SERPL-SCNC: 142 MMOL/L — SIGNIFICANT CHANGE UP (ref 135–146)
SODIUM SERPL-SCNC: 142 MMOL/L — SIGNIFICANT CHANGE UP (ref 135–146)
WBC # BLD: 8.95 K/UL — SIGNIFICANT CHANGE UP (ref 4.8–10.8)
WBC # BLD: 8.95 K/UL — SIGNIFICANT CHANGE UP (ref 4.8–10.8)
WBC # FLD AUTO: 8.95 K/UL — SIGNIFICANT CHANGE UP (ref 4.8–10.8)
WBC # FLD AUTO: 8.95 K/UL — SIGNIFICANT CHANGE UP (ref 4.8–10.8)

## 2024-01-01 PROCEDURE — 99231 SBSQ HOSP IP/OBS SF/LOW 25: CPT

## 2024-01-01 PROCEDURE — 99232 SBSQ HOSP IP/OBS MODERATE 35: CPT

## 2024-01-01 RX ADMIN — Medication 1 TABLET(S): at 17:09

## 2024-01-01 RX ADMIN — AMLODIPINE BESYLATE 5 MILLIGRAM(S): 2.5 TABLET ORAL at 05:26

## 2024-01-01 RX ADMIN — PANTOPRAZOLE SODIUM 40 MILLIGRAM(S): 20 TABLET, DELAYED RELEASE ORAL at 05:26

## 2024-01-01 RX ADMIN — ENOXAPARIN SODIUM 40 MILLIGRAM(S): 100 INJECTION SUBCUTANEOUS at 17:09

## 2024-01-01 RX ADMIN — Medication 1 TABLET(S): at 05:26

## 2024-01-01 RX ADMIN — TAMSULOSIN HYDROCHLORIDE 0.4 MILLIGRAM(S): 0.4 CAPSULE ORAL at 21:33

## 2024-01-01 RX ADMIN — ATORVASTATIN CALCIUM 20 MILLIGRAM(S): 80 TABLET, FILM COATED ORAL at 21:37

## 2024-01-01 RX ADMIN — SERTRALINE 50 MILLIGRAM(S): 25 TABLET, FILM COATED ORAL at 12:05

## 2024-01-01 RX ADMIN — Medication 0.5 MILLIGRAM(S): at 23:33

## 2024-01-01 RX ADMIN — Medication 0.5 MILLIGRAM(S): at 12:05

## 2024-01-01 RX ADMIN — VALSARTAN 80 MILLIGRAM(S): 80 TABLET ORAL at 05:26

## 2024-01-01 NOTE — PROGRESS NOTE ADULT - ATTENDING COMMENTS
Pt feeling better, tolerating diet. Stool tests negative. Continue serial abd exam. If new/worsening symptoms recommend repeat CT.

## 2024-01-01 NOTE — PROGRESS NOTE ADULT - SUBJECTIVE AND OBJECTIVE BOX
Patient is a 78y old  Female who presents with a chief complaint of Abdominal Pain (01 Jan 2024 12:08)      Patient seen and examined at bedside.  Patient denies any chest pain or shortness of breath   ALLERGIES:  No Known Allergies    MEDICATIONS:  ALPRAZolam 0.5 milliGRAM(s) Oral four times a day PRN  amLODIPine   Tablet 5 milliGRAM(s) Oral daily  amoxicillin  875 milliGRAM(s)/clavulanate 1 Tablet(s) Oral two times a day  atorvastatin 20 milliGRAM(s) Oral at bedtime  budesonide  80 MICROgram(s)/formoterol 4.5 MICROgram(s) Inhaler 2 Puff(s) Inhalation two times a day  enoxaparin Injectable 40 milliGRAM(s) SubCutaneous every 24 hours  guaiFENesin  milliGRAM(s) Oral every 12 hours PRN  influenza  Vaccine (HIGH DOSE) 0.7 milliLiter(s) IntraMuscular once  ondansetron Injectable 4 milliGRAM(s) IV Push every 8 hours PRN  oxycodone    5 mG/acetaminophen 325 mG 2 Tablet(s) Oral every 8 hours  pantoprazole    Tablet 40 milliGRAM(s) Oral before breakfast  sertraline 50 milliGRAM(s) Oral daily  tamsulosin 0.4 milliGRAM(s) Oral at bedtime  valsartan 80 milliGRAM(s) Oral daily    Vital Signs Last 24 Hrs  T(F): 97.3 (01 Jan 2024 15:35), Max: 97.3 (01 Jan 2024 15:35)  HR: 76 (01 Jan 2024 15:35) (61 - 76)  BP: 140/62 (01 Jan 2024 15:35) (133/60 - 143/80)  RR: 18 (01 Jan 2024 15:35) (18 - 19)  SpO2: 93% (01 Jan 2024 05:23) (93% - 93%)  I&O's Summary    31 Dec 2023 07:01  -  01 Jan 2024 07:00  --------------------------------------------------------  IN: 460 mL / OUT: 1 mL / NET: 459 mL        PHYSICAL EXAM:  General: NAD, A/O x 3  ENT: MMM  Neck: Supple, No JVD  Lungs: Clear to auscultation bilaterally  Cardio: RRR, S1/S2, No murmurs  Abdomen: Soft, +tender, Nondistended; Bowel sounds present  Extremities: No cyanosis, No edema    LABS:                        12.3   8.95  )-----------( 343      ( 01 Jan 2024 06:21 )             38.8     01-01    142  |  105  |  9   ----------------------------<  98  3.7   |  25  |  0.7    Ca    9.1      01 Jan 2024 06:21  Mg     2.1     01-01    TPro  6.1  /  Alb  4.0  /  TBili  0.3  /  DBili  x   /  AST  16  /  ALT  13  /  AlkPhos  60  01-01              10-17 Chol 129 mg/dL LDL -- HDL 50 mg/dL Trig 137 mg/dL                  Urinalysis Basic - ( 01 Jan 2024 06:21 )    Color: x / Appearance: x / SG: x / pH: x  Gluc: 98 mg/dL / Ketone: x  / Bili: x / Urobili: x   Blood: x / Protein: x / Nitrite: x   Leuk Esterase: x / RBC: x / WBC x   Sq Epi: x / Non Sq Epi: x / Bacteria: x        Culture - Stool (collected 29 Dec 2023 10:50)  Source: .Stool Feces  Final Report (31 Dec 2023 19:06):    No enteric pathogens isolated.    (Stool culture examined for Salmonella,    Shigella, Campylobacter, Aeromonas, Plesiomonas,    Vibrio, E.coli O157 and Yersinia)    Culture - Urine (collected 28 Dec 2023 00:45)  Source: Clean Catch Clean Catch (Midstream)  Final Report (29 Dec 2023 07:52):    No growth    Culture - Blood (collected 26 Dec 2023 13:43)  Source: .Blood Blood  Final Report (31 Dec 2023 23:00):    No growth at 5 days    Culture - Blood (collected 26 Dec 2023 13:43)  Source: .Blood Blood  Final Report (31 Dec 2023 23:00):    No growth at 5 days      COVID-19 PCR: NotDetec (12-31-23 @ 09:27)  COVID-19 PCR: NotDetec (12-27-23 @ 12:44)      RADIOLOGY & ADDITIONAL TESTS:    Care Discussed with Consultants/Other Providers:

## 2024-01-01 NOTE — PROGRESS NOTE ADULT - TIME BILLING
Coordination of care

## 2024-01-01 NOTE — PROGRESS NOTE ADULT - ASSESSMENT
Assessment and Plan  Case of an 78 year old female patient with history of Anxiety, Depression, DL, HTN, hiatal hernia, chronic back and knee pains s/p multiple surgeries, and recent admission for sigmoid diverticulitis in 10/2023 who presented to the ED on 12/26 for evaluation of abdominal pain, nausea, and vomiting, found to be febrile with evidence of leukocytosis and lactic acidosis on blood work and evidence of dilated loops of small bowel on imaging, admitted for further management. We are consulted for small bowel dilation and concern of enteritis.      Sepsis with Mildly Dilated Loops of Small Bowel   Nausea, Vomiting, and Abdominal Pain  Likely Viral Enteritis  Recent Uncomplicated Sigmoid Diverticulitis in 10/2023  * Recent admission in 10/2023 for sigmoid diverticulitis s/p antibiotics course. Sedimentation Rate, Erythrocyte (10.24.23 @ 06:06) 8 mm/Hr; C-Reactive Protein, Serum (10.24.23 @ 06:06) 5.8 mg/L  * CT Abdomen and Pelvis w/ IV Cont (10.16.23 @ 04:33) Colonic diverticulosis. Diffuse sigmoid thickening with surrounding fat stranding. Likely inflamed diverticulum on series 4 image 264. Avulsion, pneumoperitoneum or pneumatosis.  * Had colonoscopy 3 weeks ago at Dr Vásquez's office: several polyps (benign pathology per patient) and internal hemorrhoids -> no records  * Current presentation 12/26: 1 day of diffuse abdominal pain associated with nausea, 7x non bilious vomiting, chills, subjective fevers  * /72 mmHg; HR 98 bpm; RR 20 bpm; T 101.2 degrees   * ED Labs WBC 21.06, Hb 13.3, Plt 400; Na 141, K 4.5, BUN 15, Cr 0.7, LFT 0.5/62/28/18; Lipase 18; LA 3.1 to 1.5 on 12/26  * RVP - and blood cultures NGTD on 12/26  * ESR 6; CRP 7.3  * CT Abdomen and Pelvis w/ IV Cont (12.26.23 @ 13:21) Mildly dilated loop of small bowel in the left lower abdomen, nonspecific. Follow-up is recommended  * Family history of Crohn Disease in daughter    RECOMMENDATIONS   - Surgical evaluation appreciated: no acute surgical intervention  - Advance diet as tolerated: currently on a DASH diet since 12/31  - Monitor T for fever. Inflammatory markers noted  - Continue PO Augmentin  - Monitor BM for diarrhea. Stool studies noted to be negative: GI PCR (negative on 12/29) and stool cultures (negative)  - Monitor nausea and vomiting. Antiemetics  - Pain control  - In case of no improvement or in case of worsening pain, would consider CT abdomen with PO contrast  - Follow up with our [facility] located at [address] [place] [address]. Phone Number: [phone]        History of GERD- Controlled   History of Hiatal Hernia  * Last EGD was a few months ago with Dr Vásquez: scar noted near site of prior hiatal hernia    * Was started on PPI     RECOMMENDATIONS  - Continue PO protonix 40mg daily 30 minutes before breakfast  - Avoid NSAIDs  - Educated about lifestyle modifications: avoid spicy foods or late time dinners      Stable Hepatic Cysts and Subcentimeter Hypodensities  * Noted on prior imaging  * Stable on current imaging as above  * LFTs 0.5/62/28/18    RECOMMENDATIONS  - Trend LFTs   - Monitor for symptoms      Thank you for your consult.  - Please note that plan was communicated with medical team.   - Please reach GI on [phone] during weekdays till 5pm.  - Please call the GI service line after 5pm on Weekdays and anytime on Weekends: [phone].      Cindy Mckeon MD  PGY - 4 Gastroenterology Fellow   [facility]         Assessment and Plan  Case of an 78 year old female patient with history of Anxiety, Depression, DL, HTN, hiatal hernia, chronic back and knee pains s/p multiple surgeries, and recent admission for sigmoid diverticulitis in 10/2023 who presented to the ED on 12/26 for evaluation of abdominal pain, nausea, and vomiting, found to be febrile with evidence of leukocytosis and lactic acidosis on blood work and evidence of dilated loops of small bowel on imaging, admitted for further management. We are consulted for small bowel dilation and concern of enteritis.      Sepsis with Mildly Dilated Loops of Small Bowel   Nausea, Vomiting, and Abdominal Pain  Likely Viral Enteritis  Recent Uncomplicated Sigmoid Diverticulitis in 10/2023  * Recent admission in 10/2023 for sigmoid diverticulitis s/p antibiotics course. Sedimentation Rate, Erythrocyte (10.24.23 @ 06:06) 8 mm/Hr; C-Reactive Protein, Serum (10.24.23 @ 06:06) 5.8 mg/L  * CT Abdomen and Pelvis w/ IV Cont (10.16.23 @ 04:33) Colonic diverticulosis. Diffuse sigmoid thickening with surrounding fat stranding. Likely inflamed diverticulum on series 4 image 264. Avulsion, pneumoperitoneum or pneumatosis.  * Had colonoscopy 3 weeks ago at Dr Vásquez's office: several polyps (benign pathology per patient) and internal hemorrhoids -> no records  * Current presentation 12/26: 1 day of diffuse abdominal pain associated with nausea, 7x non bilious vomiting, chills, subjective fevers  * /72 mmHg; HR 98 bpm; RR 20 bpm; T 101.2 degrees   * ED Labs WBC 21.06, Hb 13.3, Plt 400; Na 141, K 4.5, BUN 15, Cr 0.7, LFT 0.5/62/28/18; Lipase 18; LA 3.1 to 1.5 on 12/26  * RVP - and blood cultures NGTD on 12/26  * ESR 6; CRP 7.3  * CT Abdomen and Pelvis w/ IV Cont (12.26.23 @ 13:21) Mildly dilated loop of small bowel in the left lower abdomen, nonspecific. Follow-up is recommended  * Family history of Crohn Disease in daughter    RECOMMENDATIONS   - Surgical evaluation appreciated: no acute surgical intervention  - Advance diet as tolerated: currently on a DASH diet since 12/31  - Monitor T for fever. Inflammatory markers noted  - Continue PO Augmentin  - Monitor BM for diarrhea. Stool studies noted to be negative: GI PCR (negative on 12/29) and stool cultures (negative)  - Monitor nausea and vomiting. Antiemetics  - Pain control  - In case of no improvement or in case of worsening pain, would consider CT abdomen with PO contrast  - Follow up with our [facility] located at [address] [place] [address]. Phone Number: [phone]        History of GERD- Controlled   History of Hiatal Hernia  * Last EGD was a few months ago with Dr Vásquez: scar noted near site of prior hiatal hernia    * Was started on PPI     RECOMMENDATIONS  - Continue PO protonix 40mg daily 30 minutes before breakfast  - Avoid NSAIDs  - Educated about lifestyle modifications: avoid spicy foods or late time dinners      Stable Hepatic Cysts and Subcentimeter Hypodensities  * Noted on prior imaging  * Stable on current imaging as above  * LFTs 0.5/62/28/18    RECOMMENDATIONS  - Trend LFTs   - Monitor for symptoms      Thank you for your consult.  - Please note that plan was communicated with medical team.   - Please reach GI on [phone] during weekdays till 5pm.  - Please call the GI service line after 5pm on Weekdays and anytime on Weekends: [phone].      Cindy Mckeon MD  PGY - 4 Gastroenterology Fellow   [facility]

## 2024-01-01 NOTE — PROGRESS NOTE ADULT - ASSESSMENT
Ms. Chow is an 78 year old female with PMH of HLD, HTN, diverticulitis, chronic pain (multiple back and knee surgeries on oxycodone w/ spinal stimulator), right middle lobe syndrome, anxiety and depression (on Xanax),  hiatal hernia, vaginal hysterectomy and laparotomy for adhesiolysis. Admitted for evaluation and management of acute abdominal pain.    #COVID exposure  -pt exposed to pt 12/27  -covid swab 12/17 negative  -isolation from 12/27-5 Tested negative 12/31 - isolation order cancelled     #Abdominal Pain 2/2 Uncomplicated Diverticulitis vs. Infectious Enteritis   #Sepsis, Present on Admission  -treated as inpatient for uncomplicated diverticulitis in 10/2023  -colonoscopy done a few weeks ago, with Dr. Vásquez, multiple polyps were removed, benign pathology  -EGD was done 1 year ago with Dr. Vásquez  -CT A/P was negative for diverticulitis however given recent history and strong clinical suspicion it cannot be ruled out  -lactate downtrended from 3.1 to 1.5  -stop Zosyn 3.375 mg IVPB q6h after 3 days - now switched to augementin plan for 4 days-end date 2/2  -regular diet   -WBC - 21->12.86-->9.58-->normal   -surgery consult appreciated  -GI consult appreciated   -follow up blood-NTD and urine cultures, RVP-neg  -gi pcr-wnl and stool culture negative  -pt feels not strong enough to dc today; plan to start appeal process 1/2 if she refuses to leave 1/2    #Diarrhea  -mirlax and senna stopped  -gi pcr-negative and stool culture negative     #GERD  #HO Hiatal Hernia  -will continue home omeprazole 20 mg as pantoprazole 40 mg    #HTN  -holding losartan 160 mg qd and amlodipine 10 mg qhs, resume if BP stable    #HLD  -will continue home Crestor 5 mg as atorvastatin 20 mg qhs    #Chronic Back Pain s/p Spinal Cord Stimulator  -follows with Dr. Joya  -c/w Percocet    #Right Middle Lobe Syndrome  -follows with Dr. Amaya  -on Advair Diskus at home, reported that she never needs to use it    Anxiety  Depression  -c/w Xanax 0.5 mg qid prn  -c/w sertraline 50 mg qd    DVT prophylaxis: enoxaparin  GI prophylaxis: pantoprazole  Diet: clears   Code status: full code  Activity: AAT  Pending: plan to dc 1/2 or start appeal process

## 2024-01-01 NOTE — PROGRESS NOTE ADULT - SUBJECTIVE AND OBJECTIVE BOX
Gastroenterology Follow Up Note      Location: [facility] 4B (Back) 018 B ([facility] 4B (Back))  Patient Name: MANUEL ROSENBAUM  Age: 78y  Gender: Female      Chief Complaint  Patient is a 78y old Female who presents with a chief complaint of Abdominal Pain (31 Dec 2023 20:30)  Primary diagnosis of Abdominal pain      Reason for Consult  Enteritis      Progress Note  This morning patient was seen and examined at bedside.    Today is hospital day 6d.  Patient is doing fine.  Patient will try to eat regular food today.  She seems anxious and is concerned she might get nausea or vomiting.  Patient denies any abdominal pain.  She had multiple episodes of loose stools x2 on 01/01.      Vital Signs in the last 24 hours   Vitals Summary T(C): 35.9 (01-01-24 @ 07:22), Max: 35.9 (01-01-24 @ 07:22)  HR: 61 (01-01-24 @ 07:22) (61 - 75)  BP: 133/60 (01-01-24 @ 07:22) (133/60 - 143/80)  RR: 18 (01-01-24 @ 07:22) (18 - 19)  SpO2: 93% (01-01-24 @ 05:23) (93% - 93%)  Vent Data   Intake/ Output   12-31-23 @ 07:01  -  01-01-24 @ 07:00  --------------------------------------------------------  IN: 460 mL / OUT: 1 mL / NET: 459 mL        Physical Exam  * General Appearance: Alert, cooperative, interactive, oriented to time, place, and person, in no acute distress  * Lungs: Good bilateral air entry, normal breath sounds   * Heart: Regular Rate and Rhythm, normal S1 and S2, no audible murmur, rub, or gallop  * Abdomen: Symmetric, non-distended, soft, marked improvement in tenderness, no guarding or rebound tenderness, bowel sounds active all four quadrants, no masses        Investigations   Laboratory Workup      - CBC:                        12.3   8.95  )-----------( 343      ( 01 Jan 2024 06:21 )             38.8       - Hgb Trend:  12.3  01-01-24 @ 06:21  12.5  12-30-23 @ 08:00          - Chemistry:  01-01    142  |  105  |  9<L>  ----------------------------<  98  3.7   |  25  |  0.7    Ca    9.1      01 Jan 2024 06:21  Mg     2.1     01-01    TPro  6.1  /  Alb  4.0  /  TBili  0.3  /  DBili  x   /  AST  16  /  ALT  13  /  AlkPhos  60  01-01    Liver panel trend:  TBili 0.3   /   AST 16   /   ALT 13   /   AlkP 60   /   Tptn 6.1   /   Alb 4.0    /   DBili --      01-01  TBili 0.4   /   AST 16   /   ALT 14   /   AlkP 48   /   Tptn 5.4   /   Alb 3.6    /   DBili --      12-29  TBili 0.5   /   AST 19   /   ALT 14   /   AlkP 46   /   Tptn 5.5   /   Alb 3.6    /   DBili --      12-28  TBili 0.6   /   AST 19   /   ALT 14   /   AlkP 56   /   Tptn 6.0   /   Alb 3.9    /   DBili --      12-27  TBili 0.5   /   AST 28   /   ALT 18   /   AlkP 62   /   Tptn 7.1   /   Alb 4.5    /   DBili --      12-26        Microbiological Workup  Urinalysis Basic - ( 01 Jan 2024 06:21 )    Color: x / Appearance: x / SG: x / pH: x  Gluc: 98 mg/dL / Ketone: x  / Bili: x / Urobili: x   Blood: x / Protein: x / Nitrite: x   Leuk Esterase: x / RBC: x / WBC x   Sq Epi: x / Non Sq Epi: x / Bacteria: x        Current Medications  Standing Medications  amLODIPine   Tablet 5 milliGRAM(s) Oral daily  amoxicillin  875 milliGRAM(s)/clavulanate 1 Tablet(s) Oral two times a day  atorvastatin 20 milliGRAM(s) Oral at bedtime  budesonide  80 MICROgram(s)/formoterol 4.5 MICROgram(s) Inhaler 2 Puff(s) Inhalation two times a day  enoxaparin Injectable 40 milliGRAM(s) SubCutaneous every 24 hours  influenza  Vaccine (HIGH DOSE) 0.7 milliLiter(s) IntraMuscular once  oxycodone    5 mG/acetaminophen 325 mG 2 Tablet(s) Oral every 8 hours  pantoprazole    Tablet 40 milliGRAM(s) Oral before breakfast  sertraline 50 milliGRAM(s) Oral daily  tamsulosin 0.4 milliGRAM(s) Oral at bedtime  valsartan 80 milliGRAM(s) Oral daily    PRN Medications  ALPRAZolam 0.5 milliGRAM(s) Oral four times a day PRN anxiety  guaiFENesin  milliGRAM(s) Oral every 12 hours PRN Cough  ondansetron Injectable 4 milliGRAM(s) IV Push every 8 hours PRN Nausea and/or Vomiting    Singles Doses Administered  (ADM OVERRIDE) 1 each &lt;see task&gt; GiveOnce  (ADM OVERRIDE) 1 each &lt;see task&gt; GiveOnce  (ADM OVERRIDE) 1 each &lt;see task&gt; GiveOnce  (ADM OVERRIDE) 1 each &lt;see task&gt; GiveOnce  (ADM OVERRIDE) 1 each &lt;see task&gt; GiveOnce  (ADM OVERRIDE) 2 each &lt;see task&gt; GiveOnce  acetaminophen     Tablet .. 650 milliGRAM(s) Oral once  cefepime   IVPB 2000 milliGRAM(s) IV Intermittent once  famotidine Injectable 20 milliGRAM(s) IV Push once  ketorolac   Injectable 15 milliGRAM(s) IV Push Once  lactated ringers Bolus 1000 milliLiter(s) IV Bolus once  lactated ringers Bolus 300 milliLiter(s) IV Bolus once  metoclopramide Injectable 10 milliGRAM(s) IV Push once  metroNIDAZOLE  IVPB 500 milliGRAM(s) IV Intermittent once  morphine  - Injectable 4 milliGRAM(s) IV Push once  ondansetron Injectable 4 milliGRAM(s) IV Push once  potassium chloride   Powder 20 milliEquivalent(s) Oral once  sodium chloride 0.9% Bolus 1000 milliLiter(s) IV Bolus once       Gastroenterology Follow Up Note      Location: [facility] 4B (Back) 018 B ([facility] 4B (Back))  Patient Name: MANUEL ROSENBAUM  Age: 78y  Gender: Female      Chief Complaint  Patient is a 78y old Female who presents with a chief complaint of Abdominal Pain (31 Dec 2023 20:30)  Primary diagnosis of Abdominal pain      Reason for Consult  Enteritis      Progress Note  This morning patient was seen and examined at bedside.    Today is hospital day 6d.  Patient is doing fine.  Patient will try to eat regular food today.  She seems anxious and is concerned she might get nausea or vomiting.  Patient denies any abdominal pain.  She had multiple episodes of loose stools x2 on 01/01.      Vital Signs in the last 24 hours   Vitals Summary T(C): 35.9 (01-01-24 @ 07:22), Max: 35.9 (01-01-24 @ 07:22)  HR: 61 (01-01-24 @ 07:22) (61 - 75)  BP: 133/60 (01-01-24 @ 07:22) (133/60 - 143/80)  RR: 18 (01-01-24 @ 07:22) (18 - 19)  SpO2: 93% (01-01-24 @ 05:23) (93% - 93%)  Vent Data   Intake/ Output   12-31-23 @ 07:01  -  01-01-24 @ 07:00  --------------------------------------------------------  IN: 460 mL / OUT: 1 mL / NET: 459 mL        Physical Exam  * General Appearance: Alert, cooperative, interactive, oriented to time, place, and person, in no acute distress  * Lungs: Good bilateral air entry, normal breath sounds   * Heart: Regular Rate and Rhythm, normal S1 and S2, no audible murmur, rub, or gallop  * Abdomen: Symmetric, non-distended, soft, marked improvement in tenderness, no guarding or rebound tenderness, bowel sounds active all four quadrants, no masses        Investigations   Laboratory Workup      - CBC:                        12.3   8.95  )-----------( 343      ( 01 Jan 2024 06:21 )             38.8       - Hgb Trend:  12.3  01-01-24 @ 06:21  12.5  12-30-23 @ 08:00          - Chemistry:  01-01    142  |  105  |  9<L>  ----------------------------<  98  3.7   |  25  |  0.7    Ca    9.1      01 Jan 2024 06:21  Mg     2.1     01-01    TPro  6.1  /  Alb  4.0  /  TBili  0.3  /  DBili  x   /  AST  16  /  ALT  13  /  AlkPhos  60  01-01    Liver panel trend:  TBili 0.3   /   AST 16   /   ALT 13   /   AlkP 60   /   Tptn 6.1   /   Alb 4.0    /   DBili --      01-01  TBili 0.4   /   AST 16   /   ALT 14   /   AlkP 48   /   Tptn 5.4   /   Alb 3.6    /   DBili --      12-29  TBili 0.5   /   AST 19   /   ALT 14   /   AlkP 46   /   Tptn 5.5   /   Alb 3.6    /   DBili --      12-28  TBili 0.6   /   AST 19   /   ALT 14   /   AlkP 56   /   Tptn 6.0   /   Alb 3.9    /   DBili --      12-27  TBili 0.5   /   AST 28   /   ALT 18   /   AlkP 62   /   Tptn 7.1   /   Alb 4.5    /   DBili --      12-26        Microbiological Workup  Urinalysis Basic - ( 01 Jan 2024 06:21 )    Color: x / Appearance: x / SG: x / pH: x  Gluc: 98 mg/dL / Ketone: x  / Bili: x / Urobili: x   Blood: x / Protein: x / Nitrite: x   Leuk Esterase: x / RBC: x / WBC x   Sq Epi: x / Non Sq Epi: x / Bacteria: x        Current Medications  Standing Medications  amLODIPine   Tablet 5 milliGRAM(s) Oral daily  amoxicillin  875 milliGRAM(s)/clavulanate 1 Tablet(s) Oral two times a day  atorvastatin 20 milliGRAM(s) Oral at bedtime  budesonide  80 MICROgram(s)/formoterol 4.5 MICROgram(s) Inhaler 2 Puff(s) Inhalation two times a day  enoxaparin Injectable 40 milliGRAM(s) SubCutaneous every 24 hours  influenza  Vaccine (HIGH DOSE) 0.7 milliLiter(s) IntraMuscular once  oxycodone    5 mG/acetaminophen 325 mG 2 Tablet(s) Oral every 8 hours  pantoprazole    Tablet 40 milliGRAM(s) Oral before breakfast  sertraline 50 milliGRAM(s) Oral daily  tamsulosin 0.4 milliGRAM(s) Oral at bedtime  valsartan 80 milliGRAM(s) Oral daily    PRN Medications  ALPRAZolam 0.5 milliGRAM(s) Oral four times a day PRN anxiety  guaiFENesin  milliGRAM(s) Oral every 12 hours PRN Cough  ondansetron Injectable 4 milliGRAM(s) IV Push every 8 hours PRN Nausea and/or Vomiting    Singles Doses Administered  (ADM OVERRIDE) 1 each &lt;see task&gt; GiveOnce  (ADM OVERRIDE) 1 each &lt;see task&gt; GiveOnce  (ADM OVERRIDE) 1 each &lt;see task&gt; GiveOnce  (ADM OVERRIDE) 1 each &lt;see task&gt; GiveOnce  (ADM OVERRIDE) 1 each &lt;see task&gt; GiveOnce  (ADM OVERRIDE) 2 each &lt;see task&gt; GiveOnce  acetaminophen     Tablet .. 650 milliGRAM(s) Oral once  cefepime   IVPB 2000 milliGRAM(s) IV Intermittent once  famotidine Injectable 20 milliGRAM(s) IV Push once  ketorolac   Injectable 15 milliGRAM(s) IV Push Once  lactated ringers Bolus 1000 milliLiter(s) IV Bolus once  lactated ringers Bolus 300 milliLiter(s) IV Bolus once  metoclopramide Injectable 10 milliGRAM(s) IV Push once  metroNIDAZOLE  IVPB 500 milliGRAM(s) IV Intermittent once  morphine  - Injectable 4 milliGRAM(s) IV Push once  ondansetron Injectable 4 milliGRAM(s) IV Push once  potassium chloride   Powder 20 milliEquivalent(s) Oral once  sodium chloride 0.9% Bolus 1000 milliLiter(s) IV Bolus once

## 2024-01-02 ENCOUNTER — TRANSCRIPTION ENCOUNTER (OUTPATIENT)
Age: 79
End: 2024-01-02

## 2024-01-02 VITALS
HEART RATE: 78 BPM | RESPIRATION RATE: 20 BRPM | TEMPERATURE: 99 F | DIASTOLIC BLOOD PRESSURE: 82 MMHG | SYSTOLIC BLOOD PRESSURE: 164 MMHG

## 2024-01-02 LAB
ALBUMIN SERPL ELPH-MCNC: 4 G/DL — SIGNIFICANT CHANGE UP (ref 3.5–5.2)
ALBUMIN SERPL ELPH-MCNC: 4 G/DL — SIGNIFICANT CHANGE UP (ref 3.5–5.2)
ALP SERPL-CCNC: 67 U/L — SIGNIFICANT CHANGE UP (ref 30–115)
ALP SERPL-CCNC: 67 U/L — SIGNIFICANT CHANGE UP (ref 30–115)
ALT FLD-CCNC: 13 U/L — SIGNIFICANT CHANGE UP (ref 0–41)
ALT FLD-CCNC: 13 U/L — SIGNIFICANT CHANGE UP (ref 0–41)
ANION GAP SERPL CALC-SCNC: 11 MMOL/L — SIGNIFICANT CHANGE UP (ref 7–14)
ANION GAP SERPL CALC-SCNC: 11 MMOL/L — SIGNIFICANT CHANGE UP (ref 7–14)
AST SERPL-CCNC: 18 U/L — SIGNIFICANT CHANGE UP (ref 0–41)
AST SERPL-CCNC: 18 U/L — SIGNIFICANT CHANGE UP (ref 0–41)
BASOPHILS # BLD AUTO: 0.08 K/UL — SIGNIFICANT CHANGE UP (ref 0–0.2)
BASOPHILS # BLD AUTO: 0.08 K/UL — SIGNIFICANT CHANGE UP (ref 0–0.2)
BASOPHILS NFR BLD AUTO: 0.8 % — SIGNIFICANT CHANGE UP (ref 0–1)
BASOPHILS NFR BLD AUTO: 0.8 % — SIGNIFICANT CHANGE UP (ref 0–1)
BILIRUB SERPL-MCNC: 0.3 MG/DL — SIGNIFICANT CHANGE UP (ref 0.2–1.2)
BILIRUB SERPL-MCNC: 0.3 MG/DL — SIGNIFICANT CHANGE UP (ref 0.2–1.2)
BUN SERPL-MCNC: 9 MG/DL — LOW (ref 10–20)
BUN SERPL-MCNC: 9 MG/DL — LOW (ref 10–20)
CALCIUM SERPL-MCNC: 8.8 MG/DL — SIGNIFICANT CHANGE UP (ref 8.4–10.5)
CALCIUM SERPL-MCNC: 8.8 MG/DL — SIGNIFICANT CHANGE UP (ref 8.4–10.5)
CHLORIDE SERPL-SCNC: 105 MMOL/L — SIGNIFICANT CHANGE UP (ref 98–110)
CHLORIDE SERPL-SCNC: 105 MMOL/L — SIGNIFICANT CHANGE UP (ref 98–110)
CO2 SERPL-SCNC: 25 MMOL/L — SIGNIFICANT CHANGE UP (ref 17–32)
CO2 SERPL-SCNC: 25 MMOL/L — SIGNIFICANT CHANGE UP (ref 17–32)
CREAT SERPL-MCNC: 0.7 MG/DL — SIGNIFICANT CHANGE UP (ref 0.7–1.5)
CREAT SERPL-MCNC: 0.7 MG/DL — SIGNIFICANT CHANGE UP (ref 0.7–1.5)
EGFR: 87 ML/MIN/1.73M2 — SIGNIFICANT CHANGE UP
EGFR: 87 ML/MIN/1.73M2 — SIGNIFICANT CHANGE UP
EOSINOPHIL # BLD AUTO: 0.4 K/UL — SIGNIFICANT CHANGE UP (ref 0–0.7)
EOSINOPHIL # BLD AUTO: 0.4 K/UL — SIGNIFICANT CHANGE UP (ref 0–0.7)
EOSINOPHIL NFR BLD AUTO: 3.9 % — SIGNIFICANT CHANGE UP (ref 0–8)
EOSINOPHIL NFR BLD AUTO: 3.9 % — SIGNIFICANT CHANGE UP (ref 0–8)
GLUCOSE SERPL-MCNC: 96 MG/DL — SIGNIFICANT CHANGE UP (ref 70–99)
GLUCOSE SERPL-MCNC: 96 MG/DL — SIGNIFICANT CHANGE UP (ref 70–99)
HCT VFR BLD CALC: 41.1 % — SIGNIFICANT CHANGE UP (ref 37–47)
HCT VFR BLD CALC: 41.1 % — SIGNIFICANT CHANGE UP (ref 37–47)
HGB BLD-MCNC: 12.8 G/DL — SIGNIFICANT CHANGE UP (ref 12–16)
HGB BLD-MCNC: 12.8 G/DL — SIGNIFICANT CHANGE UP (ref 12–16)
IMM GRANULOCYTES NFR BLD AUTO: 0.3 % — SIGNIFICANT CHANGE UP (ref 0.1–0.3)
IMM GRANULOCYTES NFR BLD AUTO: 0.3 % — SIGNIFICANT CHANGE UP (ref 0.1–0.3)
LYMPHOCYTES # BLD AUTO: 2.88 K/UL — SIGNIFICANT CHANGE UP (ref 1.2–3.4)
LYMPHOCYTES # BLD AUTO: 2.88 K/UL — SIGNIFICANT CHANGE UP (ref 1.2–3.4)
LYMPHOCYTES # BLD AUTO: 27.7 % — SIGNIFICANT CHANGE UP (ref 20.5–51.1)
LYMPHOCYTES # BLD AUTO: 27.7 % — SIGNIFICANT CHANGE UP (ref 20.5–51.1)
MAGNESIUM SERPL-MCNC: 2.1 MG/DL — SIGNIFICANT CHANGE UP (ref 1.8–2.4)
MAGNESIUM SERPL-MCNC: 2.1 MG/DL — SIGNIFICANT CHANGE UP (ref 1.8–2.4)
MCHC RBC-ENTMCNC: 25.4 PG — LOW (ref 27–31)
MCHC RBC-ENTMCNC: 25.4 PG — LOW (ref 27–31)
MCHC RBC-ENTMCNC: 31.1 G/DL — LOW (ref 32–37)
MCHC RBC-ENTMCNC: 31.1 G/DL — LOW (ref 32–37)
MCV RBC AUTO: 81.7 FL — SIGNIFICANT CHANGE UP (ref 81–99)
MCV RBC AUTO: 81.7 FL — SIGNIFICANT CHANGE UP (ref 81–99)
MONOCYTES # BLD AUTO: 0.64 K/UL — HIGH (ref 0.1–0.6)
MONOCYTES # BLD AUTO: 0.64 K/UL — HIGH (ref 0.1–0.6)
MONOCYTES NFR BLD AUTO: 6.2 % — SIGNIFICANT CHANGE UP (ref 1.7–9.3)
MONOCYTES NFR BLD AUTO: 6.2 % — SIGNIFICANT CHANGE UP (ref 1.7–9.3)
NEUTROPHILS # BLD AUTO: 6.35 K/UL — SIGNIFICANT CHANGE UP (ref 1.4–6.5)
NEUTROPHILS # BLD AUTO: 6.35 K/UL — SIGNIFICANT CHANGE UP (ref 1.4–6.5)
NEUTROPHILS NFR BLD AUTO: 61.1 % — SIGNIFICANT CHANGE UP (ref 42.2–75.2)
NEUTROPHILS NFR BLD AUTO: 61.1 % — SIGNIFICANT CHANGE UP (ref 42.2–75.2)
NRBC # BLD: 0 /100 WBCS — SIGNIFICANT CHANGE UP (ref 0–0)
NRBC # BLD: 0 /100 WBCS — SIGNIFICANT CHANGE UP (ref 0–0)
PLATELET # BLD AUTO: 365 K/UL — SIGNIFICANT CHANGE UP (ref 130–400)
PLATELET # BLD AUTO: 365 K/UL — SIGNIFICANT CHANGE UP (ref 130–400)
PMV BLD: 11.6 FL — HIGH (ref 7.4–10.4)
PMV BLD: 11.6 FL — HIGH (ref 7.4–10.4)
POTASSIUM SERPL-MCNC: 3.9 MMOL/L — SIGNIFICANT CHANGE UP (ref 3.5–5)
POTASSIUM SERPL-MCNC: 3.9 MMOL/L — SIGNIFICANT CHANGE UP (ref 3.5–5)
POTASSIUM SERPL-SCNC: 3.9 MMOL/L — SIGNIFICANT CHANGE UP (ref 3.5–5)
POTASSIUM SERPL-SCNC: 3.9 MMOL/L — SIGNIFICANT CHANGE UP (ref 3.5–5)
PROT SERPL-MCNC: 6.2 G/DL — SIGNIFICANT CHANGE UP (ref 6–8)
PROT SERPL-MCNC: 6.2 G/DL — SIGNIFICANT CHANGE UP (ref 6–8)
RBC # BLD: 5.03 M/UL — SIGNIFICANT CHANGE UP (ref 4.2–5.4)
RBC # BLD: 5.03 M/UL — SIGNIFICANT CHANGE UP (ref 4.2–5.4)
RBC # FLD: 17.6 % — HIGH (ref 11.5–14.5)
RBC # FLD: 17.6 % — HIGH (ref 11.5–14.5)
SODIUM SERPL-SCNC: 141 MMOL/L — SIGNIFICANT CHANGE UP (ref 135–146)
SODIUM SERPL-SCNC: 141 MMOL/L — SIGNIFICANT CHANGE UP (ref 135–146)
WBC # BLD: 10.38 K/UL — SIGNIFICANT CHANGE UP (ref 4.8–10.8)
WBC # BLD: 10.38 K/UL — SIGNIFICANT CHANGE UP (ref 4.8–10.8)
WBC # FLD AUTO: 10.38 K/UL — SIGNIFICANT CHANGE UP (ref 4.8–10.8)
WBC # FLD AUTO: 10.38 K/UL — SIGNIFICANT CHANGE UP (ref 4.8–10.8)

## 2024-01-02 PROCEDURE — 99239 HOSP IP/OBS DSCHRG MGMT >30: CPT

## 2024-01-02 RX ORDER — CHLORHEXIDINE GLUCONATE 213 G/1000ML
1 SOLUTION TOPICAL
Refills: 0 | Status: DISCONTINUED | OUTPATIENT
Start: 2024-01-02 | End: 2024-01-02

## 2024-01-02 RX ADMIN — VALSARTAN 80 MILLIGRAM(S): 80 TABLET ORAL at 05:04

## 2024-01-02 RX ADMIN — SERTRALINE 50 MILLIGRAM(S): 25 TABLET, FILM COATED ORAL at 12:28

## 2024-01-02 RX ADMIN — Medication 0.5 MILLIGRAM(S): at 12:28

## 2024-01-02 RX ADMIN — Medication 1 TABLET(S): at 05:03

## 2024-01-02 RX ADMIN — AMLODIPINE BESYLATE 5 MILLIGRAM(S): 2.5 TABLET ORAL at 05:12

## 2024-01-02 RX ADMIN — PANTOPRAZOLE SODIUM 40 MILLIGRAM(S): 20 TABLET, DELAYED RELEASE ORAL at 05:04

## 2024-01-02 NOTE — DISCHARGE NOTE PROVIDER - EXTENDED VTE YES NO FOR MLM ENOXAPARIN
Central labs drawn via port  Catheter maintenance performed per protocol without complications  Pt states that she has not had a bowel movement since Saturday morning  Pt usually voids 1-2 times per day  When she does have the feeling of having a BM, about a cup of mucous comes out with specks of stool in it  Abdomen tender upon palpation  Positive bowel sounds x4 quadrants (very faint in b/l lower quadrants)  Call made to [facility] STEFFANY to make aware  Pt's cell phone number given to [facility] and she will contact the pt  ,

## 2024-01-02 NOTE — DISCHARGE NOTE NURSING/CASE MANAGEMENT/SOCIAL WORK - PATIENT PORTAL LINK FT
You can access the FollowMyHealth Patient Portal offered by [facility] by registering at the following website: [email or web address]. By joining [employer]’s FollowMyHealth portal, you will also be able to view your health information using other applications (apps) compatible with our system. You can access the FollowMyHealth Patient Portal offered by [facility] by registering at the following website: [email or web address]. By joining [employer]’s FollowMyHealth portal, you will also be able to view your health information using other applications (apps) compatible with our system.

## 2024-01-02 NOTE — DISCHARGE NOTE PROVIDER - ATTENDING DISCHARGE PHYSICAL EXAMINATION:
Vital Signs Last 24 Hrs  T(F): 97.1 (02 Jan 2024 07:21), Max: 97.9 (01 Jan 2024 23:35)  HR: 65 (02 Jan 2024 07:21) (65 - 80)  BP: 108/51 (02 Jan 2024 07:21) (108/51 - 140/62)  RR: 18 (02 Jan 2024 07:21) (18 - 19)  SpO2: 94% (02 Jan 2024 04:58) (92% - 94%)    PHYSICAL EXAM:  GENERAL: NAD, well-groomed, well-developed  HEAD:  Atraumatic, Normocephalic  EYES: EOMI, conjunctiva and sclera clear  ENMT: Moist mucous membranes, Good dentition, no thrush  NECK: Supple, No JVD  CHEST/LUNG: Clear to auscultation bilaterally, good air entry, non-labored breathing  HEART: RRR; S1/S2, No murmur  ABDOMEN: Soft, +tender, Nondistended; Bowel sounds present  VASCULAR: Normal pulses, Normal capillary refill  EXTREMITIES: No calf tenderness, No cyanosis, No edema  LYMPH: Normal; No lymphadenopathy noted  SKIN: Warm, Intact  PSYCH: Normal mood, Normal affect  NERVOUS SYSTEM:  A/O x3, Good concentration; CN 2-12 intact, No focal deficits

## 2024-01-02 NOTE — DISCHARGE NOTE PROVIDER - HOSPITAL COURSE
Ms. Chow is an 78 year old female with PMH of HLD, HTN, diverticulosis, chronic pain (multiple back and knee surgeries on oxycodone w/ spinal stimulator), right middle lobe syndrome, anxiety and depression (on Xanax),  hiatal hernia, vaginal hysterectomy and laparotomy for adhesiolysis. She presented to the ED complaining of abdominal pain of 1 day duration. The pain woke her up from sleep at night, was diffuse, and was associated with multiple episodes of NBNB vomiting, chills and cough. Last BM was one day ago. Denied diarrhea, sick contacts, chest pain, or SOB.   Of note, she was recently managed as inpatient in 10/2023 for acute diverticulitis with ceftriaxone and metronidazole. She was evaluated by GI during that admission and underwent outpatient colonoscopy with Dr. Vásquez a few weeks ago, per patient multiple polyps were found and resected, all were benign on histopathology. 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    In the ED, the patient was febrile (101.2 F), remainder of the vital signs were stable (/72, HR 98, RR 20, SpO2 98% on RA)  Physical examination was remarkable for diffuse tenderness to palpation in the abdomen, particularly the lower quadrants. Bowel sounds were present. No guarding or rebound tenderness. Pardo sign was negative.   Labs were significant for leukocytosis (WBC 21.06), and a lactate of 3.1 (trended down to 1.5 on VBG 6 hours later).  CXR showed low lung volumes (the patient has right middle lobe syndrome and follows with Dr. Amaya)  CT A/P w/ IV contrast was negative for diverticulitis, and only showed a mild dilation of a small bowel loop in the left lower abdomen. Follow up was recommended.  The patient was seen by general surgery, who gave the impression of viral/bacterial enteritis, and recommended NPO, IVF with no surgical intervention. They will continue to follow.    Ms. Chow is an 78 year old female with PMH of HLD, HTN, diverticulitis, chronic pain (multiple back and knee surgeries on oxycodone w/ spinal stimulator), right middle lobe syndrome, anxiety and depression (on Xanax),  hiatal hernia, vaginal hysterectomy and laparotomy for adhesiolysis. Admitted for evaluation and management of acute abdominal pain.    #COVID exposure  -pt exposed to pt 12/27  -covid swab 12/17 negative  -isolation from 12/27-5 Tested negative 12/31 - isolation order cancelled     #Abdominal Pain 2/2 Uncomplicated Diverticulitis vs. Infectious Enteritis   #Sepsis, Present on Admission  -treated as inpatient for uncomplicated diverticulitis in 10/2023  -colonoscopy done a few weeks ago, with Dr. Vásquez, multiple polyps were removed, benign pathology  -EGD was done 1 year ago with Dr. Vásquez  -CT A/P was negative for diverticulitis however given recent history and strong clinical suspicion it cannot be ruled out  -lactate downtrended from 3.1 to 1.5  -stop Zosyn 3.375 mg IVPB q6h after 3 days - now switched to augementin plan for 4 days-end date 2/2  -regular diet   -WBC - 21->12.86-->9.58-->normal   -surgery consult appreciated  -GI consult appreciated   -follow up blood-NTD and urine cultures, RVP-neg  -gi pcr-wnl and stool culture negative  -DC stable    #Diarrhea  -mirlax and senna stopped  -gi pcr-negative and stool culture negative     #GERD  #HO Hiatal Hernia  -will continue home omeprazole 20 mg as pantoprazole 40 mg    #HTN  -holding losartan 160 mg qd and amlodipine 10 mg qhs, resume if BP stable    #HLD  -will continue home Crestor 5 mg as atorvastatin 20 mg qhs    #Chronic Back Pain s/p Spinal Cord Stimulator  -follows with Dr. Joya  -c/w Percocet    #Right Middle Lobe Syndrome  -follows with Dr. Amaya  -on Advair Diskus at home, reported that she never needs to use it    Anxiety  Depression  -c/w Xanax 0.5 mg qid prn  -c/w sertraline 50 mg qd      Pt stable for DC Ms. Chow is an 78 year old female with PMH of HLD, HTN, diverticulosis, chronic pain (multiple back and knee surgeries on oxycodone w/ spinal stimulator), right middle lobe syndrome, anxiety and depression (on Xanax),  hiatal hernia, vaginal hysterectomy and laparotomy for adhesiolysis. She presented to the ED complaining of abdominal pain of 1 day duration. The pain woke her up from sleep at night, was diffuse, and was associated with multiple episodes of NBNB vomiting, chills and cough. Last BM was one day ago. Denied diarrhea, sick contacts, chest pain, or SOB.   Of note, she was recently managed as inpatient in 10/2023 for acute diverticulitis with ceftriaxone and metronidazole. She was evaluated by GI during that admission and underwent outpatient colonoscopy with Dr. Vásquez a few weeks ago, per patient multiple polyps were found and resected, all were benign on histopathology. 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    In the ED, the patient was febrile (101.2 F), remainder of the vital signs were stable (/72, HR 98, RR 20, SpO2 98% on RA)  Physical examination was remarkable for diffuse tenderness to palpation in the abdomen, particularly the lower quadrants. Bowel sounds were present. No guarding or rebound tenderness. Pardo sign was negative.   Labs were significant for leukocytosis (WBC 21.06), and a lactate of 3.1 (trended down to 1.5 on VBG 6 hours later).  CXR showed low lung volumes (the patient has right middle lobe syndrome and follows with Dr. Amaya)  CT A/P w/ IV contrast was negative for diverticulitis, and only showed a mild dilation of a small bowel loop in the left lower abdomen. Follow up was recommended.  The patient was seen by general surgery, who gave the impression of viral/bacterial enteritis, and recommended NPO, IVF with no surgical intervention. They will continue to follow.    Ms. Chow is an 78 year old female with PMH of HLD, HTN, diverticulitis, chronic pain (multiple back and knee surgeries on oxycodone w/ spinal stimulator), right middle lobe syndrome, anxiety and depression (on Xanax),  hiatal hernia, vaginal hysterectomy and laparotomy for adhesiolysis. Admitted for evaluation and management of acute abdominal pain.    #COVID exposure  -pt exposed to pt 12/27  -covid swab 12/17 negative  -isolation from 12/27-5 Tested negative 12/31 - isolation order cancelled     #Abdominal Pain 2/2 Uncomplicated Diverticulitis vs. Infectious Enteritis   #Sepsis, Present on Admission  -treated as inpatient for uncomplicated diverticulitis in 10/2023  -colonoscopy done a few weeks ago, with Dr. Vásquez, multiple polyps were removed, benign pathology  -EGD was done 1 year ago with Dr. Vásquez  -CT A/P was negative for diverticulitis however given recent history and strong clinical suspicion it cannot be ruled out  -lactate downtrended from 3.1 to 1.5  -stop Zosyn 3.375 mg IVPB q6h after 3 days - now switched to augementin plan for 4 days-end date 2/2  -regular diet   -WBC - 21->12.86-->9.58-->normal   -surgery consult appreciated  -GI consult appreciated   -follow up blood-NTD and urine cultures, RVP-neg  -gi pcr-wnl and stool culture negative  -DC stable    #Diarrhea  -mirlax and senna stopped  -gi pcr-negative and stool culture negative   -pt was offered imodium and has refused  -pt was cancelled it may take time for GI tract to normalize and she should follow up with her GI OP doc    #GERD  #HO Hiatal Hernia  -will continue home omeprazole 20 mg as pantoprazole 40 mg    #HTN  -holding losartan 160 mg qd and amlodipine 10 mg qhs, resume if BP stable    #HLD  -will continue home Crestor 5 mg as atorvastatin 20 mg qhs    #Chronic Back Pain s/p Spinal Cord Stimulator  -follows with Dr. Joya  -c/w Percocet    #Right Middle Lobe Syndrome  -follows with Dr. Amaya  -on Advair Diskus at home, reported that she never needs to use it    Anxiety  Depression  -c/w Xanax 0.5 mg qid prn  -c/w sertraline 50 mg qd      Pt stable for DC with home care

## 2024-01-02 NOTE — DISCHARGE NOTE PROVIDER - CARE PROVIDER_API CALL
Seema Irene  Internal Medicine  [address] [place] [address]  Phone: [phone]  Fax: [phone]  Follow Up Time: 1 week    Leonor Kowalski  Gastroenterology  [address] [place] [address]  Phone: [phone]  Fax: [phone]  Follow Up Time: 1 week   Seema Irene  Internal Medicine  [address] [place] [address]  Phone: [phone]  Fax: [phone]  Follow Up Time: 1 week    Leonor Kowalski  Gastroenterology  [address] [place] [id number]  Phone: [phone]  Fax: [phone]  Follow Up Time: 1 week

## 2024-01-02 NOTE — DISCHARGE NOTE PROVIDER - PROVIDER TOKENS
PROVIDER:[TOKEN:[[id number]],FOLLOWUP:[1 week]],PROVIDER:[TOKEN:[[id number]],FOLLOWUP:[1 week]] PROVIDER:[TOKEN:[[id number]],FOLLOWUP:[1 week]],PROVIDER:[TOKEN:[[id number]],FOLLOWUP:[1 week]]

## 2024-01-02 NOTE — DISCHARGE NOTE PROVIDER - NSDCCPCAREPLAN_GEN_ALL_CORE_FT
PRINCIPAL DISCHARGE DIAGNOSIS  Diagnosis: Abdominal pain  Assessment and Plan of Treatment: Nydia came to the ED complaining of abdominal pain and vomiting. The pain was most likely from  Uncomplicated Diverticulitis vs. Infectious Enteritis. GI doctors were called to evaluate you. You had colonoscopy done a few weeks ago, with Dr. Vásquez, multiple polyps were removed, benign pathology. EGD was done 1 year ago with Dr. Vásquez.   CT A/P was negative for diverticulitis however given recent history and strong clinical suspicion it cannot be ruled out. We treated you with IV antibitocs and transitioned to oral antibitiotics, you have improved. Please follow up with gastroenterology and your PCP outpatient.

## 2024-01-02 NOTE — DISCHARGE NOTE PROVIDER - NSDCMRMEDTOKEN_GEN_ALL_CORE_FT
Advair Diskus 250 mcg-50 mcg inhalation powder: 1 puff(s) inhaled 2 times a day  ALPRAZolam 0.5 mg oral tablet: 1 tab(s) orally 4 times a day  Crestor 5 mg oral tablet: 1 tab(s) orally once a day  Diovan 160 mg oral capsule: 1 tab(s) orally once a day  Norvasc 10 mg oral tablet: 1 tab(s) orally once a day (at bedtime)  omeprazole 20 mg oral delayed release tablet: 1 tab(s) orally 2 times a day  oxyCODONE-acetaminophen 7.5 mg-325 mg oral tablet: 1 tab(s) orally every 8 hours, As Needed  sertraline 50 mg oral tablet: 1 tab(s) orally once a day  tamsulosin 0.4 mg oral capsule: 1 cap(s) orally once a day   Advair Diskus 250 mcg-50 mcg inhalation powder: 1 puff(s) inhaled 2 times a day  ALPRAZolam 0.5 mg oral tablet: 1 tab(s) orally 4 times a day  amoxicillin-clavulanate 875 mg-125 mg oral tablet: 1 tab(s) orally 2 times a day  Crestor 5 mg oral tablet: 1 tab(s) orally once a day  Diovan 160 mg oral capsule: 1 tab(s) orally once a day  Norvasc 10 mg oral tablet: 1 tab(s) orally once a day (at bedtime)  omeprazole 20 mg oral delayed release tablet: 1 tab(s) orally 2 times a day  oxyCODONE-acetaminophen 7.5 mg-325 mg oral tablet: 1 tab(s) orally every 8 hours, As Needed  sertraline 50 mg oral tablet: 1 tab(s) orally once a day  tamsulosin 0.4 mg oral capsule: 1 cap(s) orally once a day

## 2024-01-02 NOTE — DISCHARGE NOTE NURSING/CASE MANAGEMENT/SOCIAL WORK - NSDCPEFALRISK_GEN_ALL_CORE
For information on Fall & Injury Prevention, visit: [email or web address] OR  [email or web address] OR  https://www.cdc.gov/steadi/patient.html For information on Fall & Injury Prevention, visit: [email or web address] OR  [email or web address] OR  https://www.cdc.gov/steadi/patient.html

## 2024-01-07 DIAGNOSIS — Z90.710 ACQUIRED ABSENCE OF BOTH CERVIX AND UTERUS: ICD-10-CM

## 2024-01-07 DIAGNOSIS — G89.29 OTHER CHRONIC PAIN: ICD-10-CM

## 2024-01-07 DIAGNOSIS — K57.92 DIVERTICULITIS OF INTESTINE, PART UNSPECIFIED, WITHOUT PERFORATION OR ABSCESS WITHOUT BLEEDING: ICD-10-CM

## 2024-01-07 DIAGNOSIS — F41.9 ANXIETY DISORDER, UNSPECIFIED: ICD-10-CM

## 2024-01-07 DIAGNOSIS — M54.9 DORSALGIA, UNSPECIFIED: ICD-10-CM

## 2024-01-07 DIAGNOSIS — J98.19 OTHER PULMONARY COLLAPSE: ICD-10-CM

## 2024-01-07 DIAGNOSIS — Z96.82 PRESENCE OF NEUROSTIMULATOR: ICD-10-CM

## 2024-01-07 DIAGNOSIS — E78.5 HYPERLIPIDEMIA, UNSPECIFIED: ICD-10-CM

## 2024-01-07 DIAGNOSIS — Z11.52 ENCOUNTER FOR SCREENING FOR COVID-19: ICD-10-CM

## 2024-01-07 DIAGNOSIS — K76.89 OTHER SPECIFIED DISEASES OF LIVER: ICD-10-CM

## 2024-01-07 DIAGNOSIS — I10 ESSENTIAL (PRIMARY) HYPERTENSION: ICD-10-CM

## 2024-01-07 DIAGNOSIS — F32.A DEPRESSION, UNSPECIFIED: ICD-10-CM

## 2024-01-07 DIAGNOSIS — K21.9 GASTRO-ESOPHAGEAL REFLUX DISEASE WITHOUT ESOPHAGITIS: ICD-10-CM

## 2024-01-07 DIAGNOSIS — A09 INFECTIOUS GASTROENTERITIS AND COLITIS, UNSPECIFIED: ICD-10-CM

## 2024-01-07 DIAGNOSIS — A41.9 SEPSIS, UNSPECIFIED ORGANISM: ICD-10-CM

## 2024-04-04 ENCOUNTER — OUTPATIENT (OUTPATIENT)
Dept: OUTPATIENT SERVICES | Facility: HOSPITAL | Age: 79
LOS: 1 days | End: 2024-04-04
Payer: MEDICARE

## 2024-04-04 DIAGNOSIS — M54.16 RADICULOPATHY, LUMBAR REGION: ICD-10-CM

## 2024-04-04 DIAGNOSIS — Z98.890 OTHER SPECIFIED POSTPROCEDURAL STATES: Chronic | ICD-10-CM

## 2024-04-04 DIAGNOSIS — Z90.710 ACQUIRED ABSENCE OF BOTH CERVIX AND UTERUS: Chronic | ICD-10-CM

## 2024-04-04 PROCEDURE — 72110 X-RAY EXAM L-2 SPINE 4/>VWS: CPT

## 2024-04-04 PROCEDURE — 72110 X-RAY EXAM L-2 SPINE 4/>VWS: CPT | Mod: 26

## 2024-04-05 DIAGNOSIS — M54.16 RADICULOPATHY, LUMBAR REGION: ICD-10-CM

## 2024-06-13 ENCOUNTER — OUTPATIENT (OUTPATIENT)
Dept: OUTPATIENT SERVICES | Facility: HOSPITAL | Age: 79
LOS: 1 days | End: 2024-06-13
Payer: COMMERCIAL

## 2024-06-13 DIAGNOSIS — M96.1 POSTLAMINECTOMY SYNDROME, NOT ELSEWHERE CLASSIFIED: ICD-10-CM

## 2024-06-13 DIAGNOSIS — Z98.890 OTHER SPECIFIED POSTPROCEDURAL STATES: Chronic | ICD-10-CM

## 2024-06-13 DIAGNOSIS — Z90.710 ACQUIRED ABSENCE OF BOTH CERVIX AND UTERUS: Chronic | ICD-10-CM

## 2024-06-13 PROCEDURE — 72131 CT LUMBAR SPINE W/O DYE: CPT | Mod: 26

## 2024-06-13 PROCEDURE — 72131 CT LUMBAR SPINE W/O DYE: CPT

## 2024-06-14 DIAGNOSIS — M96.1 POSTLAMINECTOMY SYNDROME, NOT ELSEWHERE CLASSIFIED: ICD-10-CM

## 2024-07-01 ENCOUNTER — APPOINTMENT (OUTPATIENT)
Dept: NEUROSURGERY | Facility: CLINIC | Age: 79
End: 2024-07-01
Payer: OTHER MISCELLANEOUS

## 2024-07-01 VITALS — BODY MASS INDEX: 26.66 KG/M2 | HEIGHT: 65 IN | WEIGHT: 160 LBS

## 2024-07-01 PROBLEM — R29.898 WEAKNESS OF RIGHT LOWER EXTREMITY: Status: ACTIVE | Noted: 2024-07-01

## 2024-07-01 PROBLEM — M54.9 CHRONIC BACK PAIN: Status: ACTIVE | Noted: 2021-10-08

## 2024-07-01 PROCEDURE — 99204 OFFICE O/P NEW MOD 45 MIN: CPT

## 2024-07-15 ENCOUNTER — APPOINTMENT (OUTPATIENT)
Dept: NEUROSURGERY | Facility: CLINIC | Age: 79
End: 2024-07-15
Payer: OTHER MISCELLANEOUS

## 2024-07-15 VITALS — BODY MASS INDEX: 26.66 KG/M2 | WEIGHT: 160 LBS | HEIGHT: 65 IN

## 2024-07-15 DIAGNOSIS — M54.9 DORSALGIA, UNSPECIFIED: ICD-10-CM

## 2024-07-15 DIAGNOSIS — R29.898 OTHER SYMPTOMS AND SIGNS INVOLVING THE MUSCULOSKELETAL SYSTEM: ICD-10-CM

## 2024-07-15 DIAGNOSIS — G89.29 DORSALGIA, UNSPECIFIED: ICD-10-CM

## 2024-07-15 PROCEDURE — 99213 OFFICE O/P EST LOW 20 MIN: CPT

## 2024-08-05 ENCOUNTER — APPOINTMENT (OUTPATIENT)
Dept: PULMONOLOGY | Facility: CLINIC | Age: 79
End: 2024-08-05

## 2024-08-05 PROCEDURE — 99214 OFFICE O/P EST MOD 30 MIN: CPT

## 2024-08-05 PROCEDURE — G2211 COMPLEX E/M VISIT ADD ON: CPT

## 2024-08-05 NOTE — PHYSICAL EXAM
[No Acute Distress] : no acute distress [Normal Oropharynx] : normal oropharynx [Normal Appearance] : normal appearance [No Neck Mass] : no neck mass [Normal Rate/Rhythm] : normal rate/rhythm [Normal S1, S2] : normal s1, s2 [No Murmurs] : no murmurs [No Resp Distress] : no resp distress [No Abnormalities] : no abnormalities [Benign] : benign [Normal Gait] : normal gait [No Clubbing] : no clubbing [No Cyanosis] : no cyanosis [No Edema] : no edema [FROM] : FROM [Normal Color/ Pigmentation] : normal color/ pigmentation [No Focal Deficits] : no focal deficits [Oriented x3] : oriented x3 [Normal Affect] : normal affect [TextBox_68] : Scattered rales

## 2024-08-05 NOTE — HISTORY OF PRESENT ILLNESS
[TextBox_4] : Subjective: - Summary : Patient reports feeling chronically short of breath, frequent weakness, and the need to rest during short walks. She admits inconsistent use of inhalers. - Chief Complaint (CC) : Shortness of breath and general weakness. - History of Present Illness (HPI) : The patient reports frequent shortness of breath. He also mentions feeling weak, and that this necessitates stopping to rest sometimes during short walks. has not been consistently using his inhalers. - Review of Systems : Respiratory system is affected, as evidenced by regular shortness of breath. - Medications : The patient is prescribed inhalers, but does not use them consistently. - Diagnostic Results : - Vital Signs : - Physical Examination (PE) : On observation, the patient exhibits signs of shortness of breath.

## 2024-08-22 ENCOUNTER — OUTPATIENT (OUTPATIENT)
Dept: OUTPATIENT SERVICES | Facility: HOSPITAL | Age: 79
LOS: 1 days | End: 2024-08-22
Payer: OTHER MISCELLANEOUS

## 2024-08-22 ENCOUNTER — RESULT REVIEW (OUTPATIENT)
Age: 79
End: 2024-08-22

## 2024-08-22 DIAGNOSIS — Z98.890 OTHER SPECIFIED POSTPROCEDURAL STATES: Chronic | ICD-10-CM

## 2024-08-22 DIAGNOSIS — Z90.710 ACQUIRED ABSENCE OF BOTH CERVIX AND UTERUS: Chronic | ICD-10-CM

## 2024-08-22 DIAGNOSIS — M54.9 DORSALGIA, UNSPECIFIED: ICD-10-CM

## 2024-08-22 DIAGNOSIS — M79.661 PAIN IN RIGHT LOWER LEG: ICD-10-CM

## 2024-08-22 DIAGNOSIS — G89.29 OTHER CHRONIC PAIN: ICD-10-CM

## 2024-08-22 PROCEDURE — 72148 MRI LUMBAR SPINE W/O DYE: CPT

## 2024-08-22 PROCEDURE — 72148 MRI LUMBAR SPINE W/O DYE: CPT | Mod: 26

## 2024-08-23 DIAGNOSIS — M79.661 PAIN IN RIGHT LOWER LEG: ICD-10-CM

## 2024-08-23 DIAGNOSIS — M54.9 DORSALGIA, UNSPECIFIED: ICD-10-CM

## 2024-10-07 ENCOUNTER — APPOINTMENT (OUTPATIENT)
Dept: NEUROSURGERY | Facility: CLINIC | Age: 79
End: 2024-10-07

## 2024-12-09 ENCOUNTER — APPOINTMENT (OUTPATIENT)
Dept: PULMONOLOGY | Facility: CLINIC | Age: 79
End: 2024-12-09
Payer: MEDICARE

## 2024-12-09 VITALS
HEIGHT: 65 IN | WEIGHT: 150 LBS | SYSTOLIC BLOOD PRESSURE: 118 MMHG | OXYGEN SATURATION: 98 % | HEART RATE: 97 BPM | BODY MASS INDEX: 24.99 KG/M2 | DIASTOLIC BLOOD PRESSURE: 68 MMHG

## 2024-12-09 DIAGNOSIS — R06.09 OTHER FORMS OF DYSPNEA: ICD-10-CM

## 2024-12-09 DIAGNOSIS — J47.9 BRONCHIECTASIS, UNCOMPLICATED: ICD-10-CM

## 2024-12-09 DIAGNOSIS — J98.11 ATELECTASIS: ICD-10-CM

## 2024-12-09 DIAGNOSIS — J98.19 OTHER PULMONARY COLLAPSE: ICD-10-CM

## 2024-12-09 PROCEDURE — 99213 OFFICE O/P EST LOW 20 MIN: CPT

## 2024-12-09 PROCEDURE — G2211 COMPLEX E/M VISIT ADD ON: CPT

## 2025-04-23 ENCOUNTER — APPOINTMENT (OUTPATIENT)
Age: 80
End: 2025-04-23
Payer: MEDICARE

## 2025-04-23 DIAGNOSIS — L60.2 ONYCHOGRYPHOSIS: ICD-10-CM

## 2025-04-23 DIAGNOSIS — M79.675 TINEA UNGUIUM: ICD-10-CM

## 2025-04-23 DIAGNOSIS — M79.674 TINEA UNGUIUM: ICD-10-CM

## 2025-04-23 DIAGNOSIS — B35.1 TINEA UNGUIUM: ICD-10-CM

## 2025-04-23 PROCEDURE — 99213 OFFICE O/P EST LOW 20 MIN: CPT | Mod: 25

## 2025-04-23 PROCEDURE — 11721 DEBRIDE NAIL 6 OR MORE: CPT

## 2025-05-27 ENCOUNTER — APPOINTMENT (OUTPATIENT)
Dept: PULMONOLOGY | Facility: CLINIC | Age: 80
End: 2025-05-27
Payer: MEDICARE

## 2025-05-27 VITALS
SYSTOLIC BLOOD PRESSURE: 114 MMHG | DIASTOLIC BLOOD PRESSURE: 62 MMHG | OXYGEN SATURATION: 90 % | HEART RATE: 76 BPM | WEIGHT: 157 LBS | BODY MASS INDEX: 26.13 KG/M2

## 2025-05-27 DIAGNOSIS — J47.9 BRONCHIECTASIS, UNCOMPLICATED: ICD-10-CM

## 2025-05-27 DIAGNOSIS — J98.19 OTHER PULMONARY COLLAPSE: ICD-10-CM

## 2025-05-27 PROCEDURE — G2211 COMPLEX E/M VISIT ADD ON: CPT

## 2025-05-27 PROCEDURE — 99214 OFFICE O/P EST MOD 30 MIN: CPT

## 2025-07-23 ENCOUNTER — APPOINTMENT (OUTPATIENT)
Age: 80
End: 2025-07-23